# Patient Record
Sex: FEMALE | Race: WHITE | NOT HISPANIC OR LATINO | Employment: FULL TIME | URBAN - NONMETROPOLITAN AREA
[De-identification: names, ages, dates, MRNs, and addresses within clinical notes are randomized per-mention and may not be internally consistent; named-entity substitution may affect disease eponyms.]

---

## 2017-01-11 ENCOUNTER — HISTORY (OUTPATIENT)
Dept: INTERNAL MEDICINE | Facility: OTHER | Age: 57
End: 2017-01-11

## 2017-01-11 ENCOUNTER — COMMUNICATION - GICH (OUTPATIENT)
Dept: INTERNAL MEDICINE | Facility: OTHER | Age: 57
End: 2017-01-11

## 2017-01-11 ENCOUNTER — OFFICE VISIT - GICH (OUTPATIENT)
Dept: INTERNAL MEDICINE | Facility: OTHER | Age: 57
End: 2017-01-11

## 2017-01-11 DIAGNOSIS — R35.0 FREQUENCY OF MICTURITION: ICD-10-CM

## 2017-01-11 DIAGNOSIS — F51.04 PSYCHOPHYSIOLOGIC INSOMNIA: ICD-10-CM

## 2017-01-11 DIAGNOSIS — H92.01 OTALGIA OF RIGHT EAR: ICD-10-CM

## 2017-01-11 DIAGNOSIS — B96.89 OTHER SPECIFIED BACTERIAL AGENTS AS THE CAUSE OF DISEASES CLASSIFIED ELSEWHERE: ICD-10-CM

## 2017-01-11 DIAGNOSIS — Z00.00 ENCOUNTER FOR GENERAL ADULT MEDICAL EXAMINATION WITHOUT ABNORMAL FINDINGS: ICD-10-CM

## 2017-01-11 DIAGNOSIS — I10 ESSENTIAL (PRIMARY) HYPERTENSION: ICD-10-CM

## 2017-01-11 DIAGNOSIS — R20.8 OTHER DISTURBANCES OF SKIN SENSATION: ICD-10-CM

## 2017-01-11 DIAGNOSIS — N76.0 ACUTE VAGINITIS: ICD-10-CM

## 2017-01-11 DIAGNOSIS — Z01.419 ENCOUNTER FOR GYNECOLOGICAL EXAMINATION WITHOUT ABNORMAL FINDING: ICD-10-CM

## 2017-01-11 DIAGNOSIS — G43.909 MIGRAINE WITHOUT STATUS MIGRAINOSUS, NOT INTRACTABLE: ICD-10-CM

## 2017-01-11 DIAGNOSIS — J32.9 CHRONIC SINUSITIS: ICD-10-CM

## 2017-01-11 LAB
BACTERIA URINE: NORMAL BACTERIA/HPF
BILIRUB UR QL: NEGATIVE
CLARITY, URINE: CLEAR CLARITY
COLOR UR: YELLOW COLOR
EPITHELIAL CELLS: NORMAL EPI/HPF
GLUCOSE URINE: NEGATIVE MG/DL
KETONES UR QL: NEGATIVE MG/DL
LEUKOCYTE ESTERASE URINE: ABNORMAL
NITRITE UR QL STRIP: NEGATIVE
OCCULT BLOOD,URINE - HISTORICAL: NEGATIVE
PH UR: 6 [PH]
PROTEIN QUALITATIVE,URINE - HISTORICAL: NEGATIVE MG/DL
RBC - HISTORICAL: NORMAL /HPF
SP GR UR STRIP: <=1.005
UROBILINOGEN,QUALITATIVE - HISTORICAL: NORMAL EU/DL
WBC - HISTORICAL: NORMAL /HPF

## 2017-01-23 ENCOUNTER — COMMUNICATION - GICH (OUTPATIENT)
Dept: INTERNAL MEDICINE | Facility: OTHER | Age: 57
End: 2017-01-23

## 2017-01-23 DIAGNOSIS — R10.9 ABDOMINAL PAIN: ICD-10-CM

## 2017-01-23 DIAGNOSIS — R10.2 PELVIC AND PERINEAL PAIN: ICD-10-CM

## 2017-01-23 LAB — HPV RESULTS - HISTORICAL: POSITIVE

## 2017-01-24 ENCOUNTER — HOSPITAL ENCOUNTER (OUTPATIENT)
Dept: RADIOLOGY | Facility: OTHER | Age: 57
End: 2017-01-24
Attending: INTERNAL MEDICINE

## 2017-01-24 DIAGNOSIS — R10.2 PELVIC AND PERINEAL PAIN: ICD-10-CM

## 2017-01-26 ENCOUNTER — OFFICE VISIT - GICH (OUTPATIENT)
Dept: INTERNAL MEDICINE | Facility: OTHER | Age: 57
End: 2017-01-26

## 2017-01-26 ENCOUNTER — HISTORY (OUTPATIENT)
Dept: INTERNAL MEDICINE | Facility: OTHER | Age: 57
End: 2017-01-26

## 2017-01-26 DIAGNOSIS — N84.0 POLYP OF CORPUS UTERI: ICD-10-CM

## 2017-01-26 DIAGNOSIS — R10.31 RIGHT LOWER QUADRANT PAIN: ICD-10-CM

## 2017-01-26 DIAGNOSIS — R39.9 UNSPECIFIED SYMPTOMS AND SIGNS INVOLVING THE GENITOURINARY SYSTEM: ICD-10-CM

## 2017-01-26 LAB
A/G RATIO - HISTORICAL: 1.8 (ref 1–2)
ALBUMIN SERPL-MCNC: 4.3 G/DL (ref 3.5–5.7)
ALP SERPL-CCNC: 70 IU/L (ref 34–104)
ALT (SGPT) - HISTORICAL: 11 IU/L (ref 7–52)
ANION GAP - HISTORICAL: 13 (ref 5–18)
AST SERPL-CCNC: 15 IU/L (ref 13–39)
BACTERIA URINE: NORMAL BACTERIA/HPF
BILIRUB SERPL-MCNC: 0.4 MG/DL (ref 0.3–1)
BILIRUB UR QL: NEGATIVE
BUN SERPL-MCNC: 15 MG/DL (ref 7–25)
BUN/CREAT RATIO - HISTORICAL: 19
CALCIUM SERPL-MCNC: 9.5 MG/DL (ref 8.6–10.3)
CHLORIDE SERPLBLD-SCNC: 102 MMOL/L (ref 98–107)
CLARITY, URINE: CLEAR CLARITY
CO2 SERPL-SCNC: 28 MMOL/L (ref 21–31)
COLOR UR: YELLOW COLOR
CREAT SERPL-MCNC: 0.79 MG/DL (ref 0.7–1.3)
EPITHELIAL CELLS: NORMAL EPI/HPF
ERYTHROCYTE [DISTWIDTH] IN BLOOD BY AUTOMATED COUNT: 11.6 % (ref 11.5–15.5)
GFR IF NOT AFRICAN AMERICAN - HISTORICAL: >60 ML/MIN/1.73M2
GLOBULIN - HISTORICAL: 2.4 G/DL (ref 2–3.7)
GLUCOSE SERPL-MCNC: 99 MG/DL (ref 70–105)
GLUCOSE URINE: NEGATIVE MG/DL
HCT VFR BLD AUTO: 40.2 % (ref 33–51)
HEMOGLOBIN: 13.3 G/DL (ref 12–16)
KETONES UR QL: NEGATIVE MG/DL
LEUKOCYTE ESTERASE URINE: ABNORMAL
MCH RBC QN AUTO: 28.3 PG (ref 26–34)
MCHC RBC AUTO-ENTMCNC: 33.1 G/DL (ref 32–36)
MCV RBC AUTO: 86 FL (ref 80–100)
NITRITE UR QL STRIP: NEGATIVE
OCCULT BLOOD,URINE - HISTORICAL: NEGATIVE
PH UR: 6 [PH]
PLATELET # BLD AUTO: 170 THOU/CU MM (ref 140–440)
PMV BLD: 7.1 FL (ref 6.5–11)
POTASSIUM SERPL-SCNC: 4.3 MMOL/L (ref 3.5–5.1)
PROT SERPL-MCNC: 6.7 G/DL (ref 6.4–8.9)
PROTEIN QUALITATIVE,URINE - HISTORICAL: NEGATIVE MG/DL
RBC - HISTORICAL: NORMAL /HPF
RED BLOOD COUNT - HISTORICAL: 4.7 MIL/CU MM (ref 4–5.2)
SODIUM SERPL-SCNC: 143 MMOL/L (ref 133–143)
SP GR UR STRIP: <=1.005
UROBILINOGEN,QUALITATIVE - HISTORICAL: NORMAL EU/DL
WBC - HISTORICAL: NORMAL /HPF
WHITE BLOOD COUNT - HISTORICAL: 3.9 THOU/CU MM (ref 4.5–11)

## 2017-02-02 ENCOUNTER — HOSPITAL ENCOUNTER (OUTPATIENT)
Dept: RADIOLOGY | Facility: OTHER | Age: 57
End: 2017-02-02
Attending: INTERNAL MEDICINE

## 2017-02-02 DIAGNOSIS — R10.31 RIGHT LOWER QUADRANT PAIN: ICD-10-CM

## 2017-02-08 ENCOUNTER — AMBULATORY - GICH (OUTPATIENT)
Dept: SCHEDULING | Facility: OTHER | Age: 57
End: 2017-02-08

## 2017-02-09 ENCOUNTER — AMBULATORY - GICH (OUTPATIENT)
Dept: SCHEDULING | Facility: OTHER | Age: 57
End: 2017-02-09

## 2017-04-05 ENCOUNTER — OFFICE VISIT - GICH (OUTPATIENT)
Dept: INTERNAL MEDICINE | Facility: OTHER | Age: 57
End: 2017-04-05

## 2017-04-05 ENCOUNTER — HISTORY (OUTPATIENT)
Dept: INTERNAL MEDICINE | Facility: OTHER | Age: 57
End: 2017-04-05

## 2017-04-05 DIAGNOSIS — H92.01 OTALGIA OF RIGHT EAR: ICD-10-CM

## 2017-04-05 DIAGNOSIS — J01.01 ACUTE RECURRENT MAXILLARY SINUSITIS: ICD-10-CM

## 2017-05-01 ENCOUNTER — COMMUNICATION - GICH (OUTPATIENT)
Dept: INTERNAL MEDICINE | Facility: OTHER | Age: 57
End: 2017-05-01

## 2017-05-01 DIAGNOSIS — F51.04 PSYCHOPHYSIOLOGIC INSOMNIA: ICD-10-CM

## 2017-09-07 ENCOUNTER — OFFICE VISIT - GICH (OUTPATIENT)
Dept: INTERNAL MEDICINE | Facility: OTHER | Age: 57
End: 2017-09-07

## 2017-09-07 ENCOUNTER — HISTORY (OUTPATIENT)
Dept: INTERNAL MEDICINE | Facility: OTHER | Age: 57
End: 2017-09-07

## 2017-09-07 DIAGNOSIS — H92.01 OTALGIA OF RIGHT EAR: ICD-10-CM

## 2017-09-07 DIAGNOSIS — H66.91 OTITIS MEDIA OF RIGHT EAR: ICD-10-CM

## 2017-09-07 DIAGNOSIS — J32.0 CHRONIC MAXILLARY SINUSITIS: ICD-10-CM

## 2017-09-11 ENCOUNTER — AMBULATORY - GICH (OUTPATIENT)
Dept: SCHEDULING | Facility: OTHER | Age: 57
End: 2017-09-11

## 2017-09-13 ENCOUNTER — HISTORY (OUTPATIENT)
Dept: OBGYN | Facility: OTHER | Age: 57
End: 2017-09-13

## 2017-09-13 ENCOUNTER — OFFICE VISIT - GICH (OUTPATIENT)
Dept: OBGYN | Facility: OTHER | Age: 57
End: 2017-09-13

## 2017-09-13 DIAGNOSIS — N84.0 POLYP OF CORPUS UTERI: ICD-10-CM

## 2017-09-14 ENCOUNTER — COMMUNICATION - GICH (OUTPATIENT)
Dept: INTERNAL MEDICINE | Facility: OTHER | Age: 57
End: 2017-09-14

## 2017-10-04 ENCOUNTER — AMBULATORY - GICH (OUTPATIENT)
Dept: INTERNAL MEDICINE | Facility: OTHER | Age: 57
End: 2017-10-04

## 2017-10-04 DIAGNOSIS — H92.01 OTALGIA OF RIGHT EAR: ICD-10-CM

## 2017-10-04 DIAGNOSIS — J01.41 ACUTE RECURRENT PANSINUSITIS: ICD-10-CM

## 2017-10-17 ENCOUNTER — COMMUNICATION - GICH (OUTPATIENT)
Dept: INTERNAL MEDICINE | Facility: OTHER | Age: 57
End: 2017-10-17

## 2017-10-17 DIAGNOSIS — F51.04 PSYCHOPHYSIOLOGIC INSOMNIA: ICD-10-CM

## 2017-10-20 ENCOUNTER — PRE VISIT (OUTPATIENT)
Dept: OTOLARYNGOLOGY | Facility: CLINIC | Age: 57
End: 2017-10-20

## 2017-10-20 NOTE — TELEPHONE ENCOUNTER
"APPT INFO    Date /Time:  10/30/17 at 5PM   Reason for Appt:  chronic maxillary sinusitis and right ear pain   Ref Provider/Clinic:  Abena Adkins @ [facility]   Patient Contact (Y/N) & Call Details: yes, called and spoke to pt, she has been seen at [facility] and [facility].  She had 2 sinus surgeries done in [place] with Dr. Davila.  CT Sinus 2/2047 done at [facility].   Action:  Emailed YESY to [email or web address]     RECORDS CLINIC NAME  (\"None\" if no records ) RECEIVED RECS & IMG? Y/N   (may include other helpful notes)   Internal Clinics:  n/a          External Clinics: [facility]  yes    [place]  yes    [facility]  yes    [facility] [place]  yes     Records Received From:  [facility]    DATE/EXAM/LOCATION  (specify location if different)   Office Notes:  1/11/17, 4/5/17, 9/7/17   Radiology Reports:  CT Head/Sinus 6/30/16   Missing:  Pt has seen 2 ENT doctors and had multiple sinus surgeries   Need Imaging from [facility]   CT Sinus done Feb 2017 (need to find out where it was done)       "

## 2017-10-23 NOTE — TELEPHONE ENCOUNTER
ACTION    What did you do?  Received YESY, faxed with request.   Faxed imaging request to [facility].

## 2017-10-24 NOTE — TELEPHONE ENCOUNTER
Received Imaging From:     Image Type (x): Disc:_____    Pacs:__X___      Exam Date/Name: CT Maxillofacial 2/8/17  CT Maxillofacial 11/8/10   Comments: In Pacs.

## 2017-10-25 NOTE — TELEPHONE ENCOUNTER
[facility]   [facility]     DATE/EXAM/LOCATION  (specify location if different)   Office Notes:  2/8/17, 10/10/12   Radiology Reports: - CT maxillofacial 2/8/17  - CT paranasal sinuses 11/18/10- [facility] (blurry report)   Missing:  records/1 surgery at [facility], 2 surgeries at [facility]

## 2017-10-25 NOTE — TELEPHONE ENCOUNTER
ACTION    What did you do?  Faxed requests to [facility] ([facility]

## 2017-10-26 NOTE — TELEPHONE ENCOUNTER
Records Received From:  [facility]     DATE/EXAM/LOCATION  (specify location if different)   Path/Culture Reports:  3/3/16 right nasal sinus contents   8/11/16 nose/throat

## 2017-10-27 NOTE — TELEPHONE ENCOUNTER
Phone Call:    Who did you talk to? (or) Who did you call? [facility] medical records   Call Detail/Action: Called and left VM to check on status of imaging.

## 2017-10-27 NOTE — TELEPHONE ENCOUNTER
Received Imaging From: [facility]     Image Type (x): Disc:_____    Pacs:_x____      Exam Date/Name: CT Head/Sinus 6/30/16 Comments: imaging is available to view in pacs

## 2017-10-27 NOTE — TELEPHONE ENCOUNTER
Records Received From:   [facility]     DATE/EXAM/LOCATION  (specify location if different)   Office Notes: , 2/10/11 5/25/11, 5/24/11, 1/18/11   Operative Notes: - right sphenoidotomy 1/19/11 Dr. Becerra    Path/Culture Reports:  sphenoid sinus right 1/19/11    Per fax, pathology slides will be mailed

## 2017-10-27 NOTE — TELEPHONE ENCOUNTER
ACTION    What did you do? Faxed 2nd request to [place].  Their fax machine has been down.  Faxed request to alternative number [phone].

## 2017-10-30 ENCOUNTER — AMBULATORY - GICH (OUTPATIENT)
Dept: SCHEDULING | Facility: OTHER | Age: 57
End: 2017-10-30

## 2017-10-30 ENCOUNTER — OFFICE VISIT (OUTPATIENT)
Dept: OTOLARYNGOLOGY | Facility: CLINIC | Age: 57
End: 2017-10-30

## 2017-10-30 VITALS — HEIGHT: 66 IN | BODY MASS INDEX: 21.86 KG/M2 | WEIGHT: 136 LBS

## 2017-10-30 DIAGNOSIS — H92.01 RIGHT EAR PAIN: Primary | ICD-10-CM

## 2017-10-30 PROBLEM — F51.04 CHRONIC INSOMNIA: Status: ACTIVE | Noted: 2017-10-30

## 2017-10-30 PROBLEM — G43.009 MIGRAINE WITHOUT AURA: Status: ACTIVE | Noted: 2017-10-30

## 2017-10-30 PROBLEM — J32.9 CHRONIC SINUSITIS: Status: ACTIVE | Noted: 2017-01-11

## 2017-10-30 RX ORDER — LORAZEPAM 1 MG/1
TABLET ORAL
COMMUNITY
Start: 2017-10-23 | End: 2018-03-07

## 2017-10-30 RX ORDER — SUMATRIPTAN 100 MG/1
100 TABLET, FILM COATED ORAL
COMMUNITY
Start: 2017-01-11 | End: 2018-03-07

## 2017-10-30 RX ORDER — HYDROCODONE BITARTRATE AND ACETAMINOPHEN 5; 325 MG/1; MG/1
1 TABLET ORAL
COMMUNITY
Start: 2017-10-05 | End: 2018-03-07

## 2017-10-30 ASSESSMENT — PAIN SCALES - GENERAL: PAINLEVEL: WORST PAIN (10)

## 2017-10-30 NOTE — PATIENT INSTRUCTIONS
Plan of care:  MRI in [place]-I will contact you with who to call to schedule  To schedule with Dr Cardoza:  You have been referred to the TMD (temporomandibular joint disorder) Clinic. Please call the clinic at [phone] to schedule an appointment.    Clinic contact information:  1. To schedule an appointment call [phone], option 1  2. To talk to the Triage RN call [phone], option 3  3. If you need to speak to Ladan or get a message to your doctor on a Friday, call the triage RN  4. LadanRN: [phone]  5. Surgery scheduling:      Shobha Cabello: [phone]      Alpa Ahn: [phone]  6. Fax: [phone]  7. [phone]

## 2017-10-30 NOTE — NURSING NOTE
Chief Complaint   Patient presents with     Consult     chronic maxillary sinusitis and rt ear pain     Cintia Mcghee Medical Assistant

## 2017-10-30 NOTE — LETTER
10/30/2017       RE: Alessandra Aiken  [address] [place] [address]     Dear Colleague,    Thank you for referring your patient, Alessandra Aiken, to the [facility] EAR NOSE AND THROAT at [facility]. Please see a copy of my visit note below.    Otolaryngology Adult Consultation    Patient: Alessandra Aiken   : 1960     Patient presents with:  Consult:   Chief Complaint   Patient presents with     Consult     chronic maxillary sinusitis and rt ear pain        Referring Provider: Abena Adkins   Consulting Physician:  Candida Alvarado MD       Assessment/Plan: Alessandra has very persistent right ear pain of uncertain etiology.  I certainly cannot find anything on her exam today that would point to a specific cause.  She is quite frustrated by this and has not gotten relief from medical therapy thus far.  I would like her to meet with Dr. Mandeep Cardoza.  I am concerned  she may have neuropathic pain or neuralgia of some sort, and she may benefit from a different class of medical therapy but certainly further diagnostic opinion would be appreciated.  I am also going to order an MRI as she has had this persistent pain, and there is no obvious cause.  I want to make sure that there is not an occult lesion somewhere along the skull base or inner ear that could be contributing to her ongoing pain symptoms.          HPI: Alessandra Aiken is a 57 year old female seen today in the Otolaryngology Clinic in consultation from Dr. Adkins for a history of chronic right ear pain.  This has been going on for quite some time.  She does have a past history of ENT related issues.  She was diagnosed with a sphenoid sinus fungal ball.  This was removed.  She has seen a couple of different ENT doctors and had several surgeries for this, but she states that she began to have pain in her right ear, and it has become quite intense.  It is always there.  Nothing seems to make it better or worse.  She thought  she had hearing loss, but she reports to me that her hearing test has been normal.  She has had imaging studies which have been normal.  I reviewed her CT scans.  It does show a little bit of debris in her sphenoid sinus, but this would not be causing her right ear pain.  She has seen multiple doctors for this in the past.  She has not had trials of neuromodulator drugs.  She states that sometimes she does use narcotics but is not sure that even really helps.  She does not think ibuprofen helps either.  Sometimes this is quite stressful for her, but overall, she has been able to maintain a positive attitude and good function.  She has a history of migraines.  She states that this is distinctly different.  She denies that she has had any trauma or surgery related to her ear.  She did have a tube placed to try to alleviate her pain, and that was not helpful.         No current outpatient prescriptions on file prior to visit.  No current facility-administered medications on file prior to visit.        Allergies   Allergen Reactions     Fentanyl Anxiety and Itching     Patient describes feeling creepy/crawly to Dr. Saenz.        Past Medical History:   Diagnosis Date     Chronic sinusitis 2009    ?     Migraines 1988    ?         Review of Systems   ENT ROS 10/22/2017   Ears, Nose, Throat Ear pain, Ringing/noise in ears, Nasal congestion or drainage, Sore throat        14 point ROS neg other than the symptoms noted above.    Physical Exam:    General Assessment   The patient is alert, oriented and in no acute distress.   Head/Face/Scalp  Grossly normal.   Ears  Normal canals, auricles and tympanic membranes.   Nose  External nose is straight, skin is normal. Intranasal exam (anterior rhinoscopy) reveals normal moist mucosa, turbinate tissue without edema, erythema or crusting.  Septum mainly in the midline.    Mouth  Oral cavity shows healthy mucosa with out ulceration, masses or other lesions involving the tongue,  palate, buccal mucosa, floor of mouth or gingiva.  Dentition in good repair.  Oropharynx with normal tonsils, posterior wall and base of tongue.  Neck  No significant adenopathy, thyroid or salivary gland abnormality.       Again, thank you for allowing me to participate in the care of your patient.      Sincerely,    Candida Alvarado MD

## 2017-10-30 NOTE — MR AVS SNAPSHOT
After Visit Summary   10/30/2017    Alessandra Aiken    MRN: [id number]           Patient Information     Date Of Birth          1960        Visit Information        Provider Department      10/30/2017 5:00 PM Candida Alvarado MD [facility] Ear Nose and Throat        Care Instructions    Plan of care:  MRI in [place]-I will contact you with who to call to schedule  To schedule with Dr Cardoza:  You have been referred to the TMD (temporomandibular joint disorder) Clinic. Please call the clinic at [phone] to schedule an appointment.    Clinic contact information:  1. To schedule an appointment call [phone], option 1  2. To talk to the Triage RN call [phone], option 3  3. If you need to speak to Ladan or get a message to your doctor on a Friday, call the triage RN  4. LadanRN: [phone]  5. Surgery scheduling:      Shobha Cabello: [phone]      Alpa Ahn: [phone]  6. Fax: [phone]  7. [phone]            Follow-ups after your visit        Who to contact     Please call your clinic at [phone] to:    Ask questions about your health    Make or cancel appointments    Discuss your medicines    Learn about your test results    Speak to your doctor   If you have compliments or concerns about an experience at your clinic, or if you wish to file a complaint, please contact [facility] Patient Relations at [phone] or email us at [email or web address]         Additional Information About Your Visit        MyChart Information     [employer] gives you secure access to your electronic health record. If you see a primary care provider, you can also send messages to your care team and make appointments. If you have questions, please call your primary care clinic.  If you do not have a primary care provider, please call [phone] and they will assist you.      [email or web address] is an electronic gateway that provides easy, online access  "to your medical records. With [employer], you can request a clinic appointment, read your test results, renew a prescription or communicate with your care team.     To access your existing account, please contact your [facility] or call [phone] for assistance.        Care EveryWhere ID     This is your Care EveryWhere ID. This could be used by other organizations to access your [facility] medical records  [id number]        Your Vitals Were     Height BMI (Body Mass Index)                1.676 m (5' 6\") 21.95 kg/m2           Blood Pressure from Last 3 Encounters:   No data found for BP    Weight from Last 3 Encounters:   10/30/17 61.7 kg (136 lb)              Today, you had the following     No orders found for display       Primary Care Provider Office Phone # Fax #    Abena AdkinsDO [phone]       [facility] [place] [facility] [address] [facility] [address]        Equal Access to Services     SPARKLE SCHWAB : Hadii aad ku hadasho Soomaali, waaxda luqadaha, qaybta kaalmada adeegyada, waxay genein davidn salvatore rodriguez . So [facility] [phone].    ATENCIÓN: Si habla español, tiene a john disposición servicios gratuitos de asistencia lingüística. Llame al [phone].    We comply with applicable federal civil rights laws and [place] laws. We do not discriminate on the basis of race, color, national origin, age, disability, sex, sexual orientation, or gender identity.            Thank you!     Thank you for choosing [facility] EAR NOSE AND THROAT  for your care. Our goal is always to provide you with excellent care. Hearing back from our patients is one way we can continue to improve our services. Please take a few minutes to complete the written survey that you may receive in the mail after your visit with us. Thank you!             Your Updated Medication List - Protect others around you: Learn how to safely use, store and throw away your " medicines at www.disposemymeds.org.          This list is accurate as of: 10/30/17  5:47 PM.  Always use your most recent med list.                   Brand Name Dispense Instructions for use Diagnosis    HYDROcodone-acetaminophen 5-325 MG per tablet    NORCO     Take 1 tablet by mouth        LORazepam 1 MG tablet    ATIVAN     TAKE 1/2 TO 1 TABLET BY MOUTH ONCE DAILY AT NIGHT AS NEEDED FOR insomnia; do not take with hydrocodone        SUMAtriptan 100 MG tablet    IMITREX     Take 100 mg by mouth

## 2017-11-01 ENCOUNTER — AMBULATORY - GICH (OUTPATIENT)
Dept: RADIOLOGY | Facility: OTHER | Age: 57
End: 2017-11-01

## 2017-11-01 DIAGNOSIS — H92.01 OTALGIA OF RIGHT EAR: ICD-10-CM

## 2017-11-03 NOTE — PROGRESS NOTES
Otolaryngology Adult Consultation    Patient: Alessandra Aiken   : 1960     Patient presents with:  Consult:   Chief Complaint   Patient presents with     Consult     chronic maxillary sinusitis and rt ear pain        Referring Provider: Abena Adkins   Consulting Physician:  Candida Alvarado MD       Assessment/Plan: Alessandra has very persistent right ear pain of uncertain etiology.  I certainly cannot find anything on her exam today that would point to a specific cause.  She is quite frustrated by this and has not gotten relief from medical therapy thus far.  I would like her to meet with Dr. Mandeep Cardoza.  I am concerned  she may have neuropathic pain or neuralgia of some sort, and she may benefit from a different class of medical therapy but certainly further diagnostic opinion would be appreciated.  I am also going to order an MRI as she has had this persistent pain, and there is no obvious cause.  I want to make sure that there is not an occult lesion somewhere along the skull base or inner ear that could be contributing to her ongoing pain symptoms.          HPI: Alessandra Aiken is a 57 year old female seen today in the Otolaryngology Clinic in consultation from Dr. Adkins for a history of chronic right ear pain.  This has been going on for quite some time.  She does have a past history of ENT related issues.  She was diagnosed with a sphenoid sinus fungal ball.  This was removed.  She has seen a couple of different ENT doctors and had several surgeries for this, but she states that she began to have pain in her right ear, and it has become quite intense.  It is always there.  Nothing seems to make it better or worse.  She thought she had hearing loss, but she reports to me that her hearing test has been normal.  She has had imaging studies which have been normal.  I reviewed her CT scans.  It does show a little bit of debris in her sphenoid sinus, but this would not be causing her right ear pain.  She has seen  multiple doctors for this in the past.  She has not had trials of neuromodulator drugs.  She states that sometimes she does use narcotics but is not sure that even really helps.  She does not think ibuprofen helps either.  Sometimes this is quite stressful for her, but overall, she has been able to maintain a positive attitude and good function.  She has a history of migraines.  She states that this is distinctly different.  She denies that she has had any trauma or surgery related to her ear.  She did have a tube placed to try to alleviate her pain, and that was not helpful.         No current outpatient prescriptions on file prior to visit.  No current facility-administered medications on file prior to visit.        Allergies   Allergen Reactions     Fentanyl Anxiety and Itching     Patient describes feeling creepy/crawly to Dr. Saenz.        Past Medical History:   Diagnosis Date     Chronic sinusitis 2009    ?     Migraines 1988    ?         Review of Systems   ENT ROS 10/22/2017   Ears, Nose, Throat Ear pain, Ringing/noise in ears, Nasal congestion or drainage, Sore throat        14 point ROS neg other than the symptoms noted above.    Physical Exam:    General Assessment   The patient is alert, oriented and in no acute distress.   Head/Face/Scalp  Grossly normal.   Ears  Normal canals, auricles and tympanic membranes.   Nose  External nose is straight, skin is normal. Intranasal exam (anterior rhinoscopy) reveals normal moist mucosa, turbinate tissue without edema, erythema or crusting.  Septum mainly in the midline.    Mouth  Oral cavity shows healthy mucosa with out ulceration, masses or other lesions involving the tongue, palate, buccal mucosa, floor of mouth or gingiva.  Dentition in good repair.  Oropharynx with normal tonsils, posterior wall and base of tongue.  Neck  No significant adenopathy, thyroid or salivary gland abnormality.

## 2017-11-07 ENCOUNTER — AMBULATORY - GICH (OUTPATIENT)
Dept: RADIOLOGY | Facility: OTHER | Age: 57
End: 2017-11-07

## 2017-11-07 ENCOUNTER — TRANSFERRED RECORDS (OUTPATIENT)
Dept: HEALTH INFORMATION MANAGEMENT | Facility: CLINIC | Age: 57
End: 2017-11-07

## 2017-11-07 ENCOUNTER — HOSPITAL ENCOUNTER (OUTPATIENT)
Dept: RADIOLOGY | Facility: OTHER | Age: 57
End: 2017-11-07

## 2017-11-07 DIAGNOSIS — H92.01 OTALGIA OF RIGHT EAR: ICD-10-CM

## 2017-11-26 ENCOUNTER — HEALTH MAINTENANCE LETTER (OUTPATIENT)
Age: 57
End: 2017-11-26

## 2017-12-05 ENCOUNTER — TRANSFERRED RECORDS (OUTPATIENT)
Dept: HEALTH INFORMATION MANAGEMENT | Facility: CLINIC | Age: 57
End: 2017-12-05

## 2017-12-27 NOTE — PROGRESS NOTES
Patient Information     Patient Name MRN Alessandra Sampson [id number] Female 1960      Progress Notes by Carley Shaffer at 2017 11:35 AM     Author:  Carley Shaffer Service:  (none) Author Type:  Other Clinical Staff     Filed:  2017 11:35 AM Date of Service:  2017 11:35 AM Status:  Signed     :  Carley Shaffer (Other Clinical Staff)            Falls Risk Criteria:    Age 65 and older or under age 4        Sensory deficits    Poor vision    Use of ambulatory aides    Impaired judgment    Unable to walk independently    Meets High Risk criteria for falls:  no

## 2017-12-28 NOTE — TELEPHONE ENCOUNTER
Patient Information     Patient Name MRN Alessandra Sampson [id number] Female 1960      Telephone Encounter by Jenni Potts at 2017 11:15 AM     Author:  Jenni Potts Service:  (none) Author Type:  (none)     Filed:  2017 11:30 AM Encounter Date:  2017 Status:  Signed     :  Jenni Potts from the [facility] called with information for upcoming appt that the patient has with them.  She was referred to ENT and will be seeing Dr. Candida Alvarado on Monday, Oct 30th @ 5:00pm.  Jenni Potts ....................  2017   11:30 AM

## 2017-12-28 NOTE — PROGRESS NOTES
Patient Information     Patient Name MRN Alessandra Sampson [id number] Female 1960      Progress Notes by Abena Adkins DO at 2017  4:00 PM     Author:  Abena Adkins DO Service:  (none) Author Type:  PHYS- Osteopathic     Filed:  2017  7:29 AM Encounter Date:  2017 Status:  Signed     :  Abena Adkins DO (PHYS- Osteopathic)            Chief Complaint     Patient presents with       Ear Problem      right ear pain, sore throat, sinus drainage, cough, headach        Subjective:   Ms. Aiken is a 57 y.o. female  seen for the acute concern today of ongoing issues with right ear pain, sinus drainage, sinus headaches.  She reports that this has been worse over the last 2-3 weeks.  She has an extensive history of sinusitis with recurrent otitis media.  She has had persistent right ear pain for months to a year.  She does believe that her ear pain improves with antibiotics and her last course was in April.  She has previously seen multiple ENT providers and undergone multiple surgeries including myringotomy and sphenoidectomy.  She did have a CT done in 2017 however no results are available from this.  It is reportedly unremarkable.  She has not been having any issues with her left ear.  She reports that the pain is severe enough at this time that she has difficulty laying her head down.  It has been keeping her up at night.  She does ask for something for pain today as the nonsteroidal anti-inflammatory medication she has been taking is not working.  She is interested in referral to a tertiary center to further evaluate this.      She has not followed up with gynecology after her ultrasound showed fibroids or polyps.  She was encouraged to schedule this appointment.    She  reports that she has never smoked. She has never used smokeless tobacco.    Past medical history reviewed as below:     Past Medical History:     Diagnosis  Date     Allergic rhinitis      Chronic insomnia   "    Hx of pregnancy     G3, P3 - all NSVDs      Hypertension      Migraine      Pyelonephritis 12/4/2014    Right        Sepsis due to urinary tract infection (HC) 12/4/2014    right pyelo, mod right hydro, 2 mm right renal stone    .      ROS:   Pertinent ROS was performed and was negative, including for fevers, chills, chest pain, shortness of breath, increased lower extremity edema, changes in bowel or bladder. No other concerns, with exception of HPI above.      Objective:    /78  Pulse 64  Temp 98.2  F (36.8  C) (Tympanic)  Resp 18  Ht 1.676 m (5' 6\")  Wt 62.4 kg (137 lb 9 oz)  Breastfeeding? No  BMI 22.2 kg/m2  GEN: Vitals reviewed.  Patient is in no acute distress although is visibly worn out. Cooperative with exam.  HEENT: Normocephalic atraumatic.  Pupils equally round.  No scleral icterus, no conjunctival erythema. Oropharynx with no erythema or exudates. Dentition adequate.  Left external auditory canal and tympanic membrane are normal with no significant erythema, drainage or other changes.  Right external auditory canal is clear, right tympanic membrane demonstrates myringotomy tube with cerumen surrounding this.  Minimal drainage is noted.  No significant erythema.  CV: Heart regular in rate and rhythm with no murmur.    LUNGS: Lungs clear to auscultation bilaterally.  Chest rise equal bilaterally.  No accessory muscle use.  SKIN: Warm and dry to touch.  No rash on face, arms and legs.  EXT: No clubbing or cyanosis.  No peripheral edema.     Assessment/Plan:   1. Chronic maxillary sinusitis  - at this time given her recurrent, persistent sinusitis and pain we will refer her to ENT through the [facility] system.  She is to call if she has any new or changing symptoms or if she does not hear from them to schedule this within a week or so.  - AMB CONSULT TO ENT; Future    2. Right ear pain  - given her persistent ear pain along with her chronic sinusitis referral is placed " as above.  She is provided with a few pain meds at this time however was cautioned with these and encouraged to dispose of any in the pharmacy drop box that she does not use.  She is to call she has any worsening at any point.  - AMB CONSULT TO ENT; Future  - HYDROcodone-acetaminophen, 5-325 mg, (NORCO) per tablet; Take 1 tablet by mouth 3 times daily if needed  for Pain Max acetaminophen dose: 4000 mg in 24 hrs.  Dispense: 10 tablet; Refill: 0    3. Right otitis media, unspecified chronicity, unspecified otitis media type  - we did discuss that given her ongoing ear pain and it is unclear how much of this is infected versus other structural defect.  She is to be attention closely to be sure that antibiotics are improving her symptoms and if not they should be avoided in the future.  - will treat with antibiotics, patient to treat symptoms as instructed below, call if she has any ADR's or worsening symptoms  - recommend eating yogurt/kefir 1-2 times daily while on antibiotics   - cefdinir (OMNICEF) 300 mg capsule; Take 1 capsule by mouth 2 times daily.  Dispense: 28 capsule; Refill: 0      Return in about 4 months (around 1/7/2018) for Annual Exam. with repeat pap     TORY MALONE, DO   9/7/2017 5:35 PM    This document was prepared using voice generated softwear. While every attempt was made for accuracy, grammatical errors may exist.

## 2017-12-28 NOTE — PROGRESS NOTES
Patient Information     Patient Name MRN Sex     Alessandra Aiken [id number] Female 1960      Progress Notes by Renaldo Pierce MD at 2017  8:15 AM     Author:  Renaldo Pierce MD Service:  (none) Author Type:  Physician     Filed:  2017 12:12 PM Encounter Date:  2017 Status:  Signed     :  Renaldo Pierce MD (Physician)            SUBJECTIVE:    Alessandra Aiken is a 57 y.o. female who presents for consultation & possible treatment of her endometrial polyp at the request of Dr Adkins.    Gyn Exam   The patient's pertinent negatives include no genital itching, genital lesions, genital odor, pelvic pain, vaginal bleeding or vaginal discharge. The patient is experiencing no pain. She is not pregnant. Pertinent negatives include no abdominal pain, anorexia, chills, constipation, diarrhea, dysuria, fever, frequency, headaches, hematuria, nausea, painful intercourse, rash, urgency or vomiting. She is sexually active. No, her partner does not have an STD. She is postmenopausal.       Allergies      Allergen   Reactions     Fentanyl  Agitation     Patient describes feeling creepy/crawly to Dr. Saenz.     ,   Family History       Problem   Relation Age of Onset     Good Health  Father      Good Health  Mother      Heart Disease  Paternal Grandfather      MI at 65       Other  Neg.      History is negative for diabetes, thyroid problems, cancer, anxiety, depression and asthma       Good Health  Brother      Good Health  Brother      Good Health  Brother      Cancer-breast  No Family History    ,   Current Outpatient Prescriptions:      cefdinir (OMNICEF) 300 mg capsule, Take 1 capsule by mouth 2 times daily., Disp: 28 capsule, Rfl: 0     HYDROcodone-acetaminophen, 5-325 mg, (NORCO) per tablet, Take 1 tablet by mouth 3 times daily if needed  for Pain Max acetaminophen dose: 4000 mg in 24 hrs., Disp: 10 tablet, Rfl: 0     LORazepam (ATIVAN) 1 mg tablet, TAKE 1/2 TO 1 TABLET BY MOUTH ONCE DAILY AT  NIGHT AS NEEDED FOR insomnia, Disp: 30 tablet, Rfl: 2     SUMAtriptan (IMITREX) 100 mg tablet, Take 1 tablet by mouth every 2 hours if needed for Migraine. Max dose: 200mg per 24 hrs., Disp: 9 tablet, Rfl: 11  Medications have been reviewed by me and are current to the best of my knowledge and ability.,   Past Medical History:     Diagnosis  Date     Allergic rhinitis      Chronic insomnia      Hx of pregnancy     G3, P3 - all NSVDs      Hypertension      Migraine      Pyelonephritis 12/4/2014    Right        Sepsis due to urinary tract infection (HC) 12/4/2014    right pyelo, mod right hydro, 2 mm right renal stone    ,   Patient Active Problem List       Diagnosis  Date Noted     Chronic sinusitis  01/11/2017     Health care maintenance  06/15/2016     Colonoscopy: Done in 2012 and normal, repeat 2022  Breast Exam/Mammography: normal mammogram 06/2016  Pap smear: Never any abnormal, repeat this year  DEXA: Age 66  Immunizations: UTD until age 60  Lipids/Annual Exam: Never done here, repeat with next labs  Hepatitis C screen: discuss in future however likely will get with next labs                Hypertension       Chronic insomnia       Migraine without aura       triggers are alcohol and dairy products        ,   Past Surgical History:      Procedure  Laterality Date     COLONOSCOPY SCREENING  6/5/12    Sigmoid diverticulosis; folllow up 10 years       ENDOSCOPIC SPHENOIDOTOMY  2011/2016     Right endoscopic sphenoidotomy for mycetoma removal at [facility]~6/2012       MYRINGOTOMY W TUBE PLACEMENT Right 09/2016     SHOULDER ARTHROSCOPY Right 07/2009     SINUS SURGERY      repeat clearing      and   Social History       Substance Use Topics         Smoking status:   Never Smoker     Smokeless tobacco:   Never Used     Alcohol use   No      Comment: very rarely        REVIEW OF SYSTEMS:  Review of Systems   Constitutional: Negative for chills, fever, malaise/fatigue and weight loss.   Gastrointestinal: Negative for  "abdominal pain, anorexia, constipation, diarrhea, nausea and vomiting.   Genitourinary: Negative for dysuria, frequency, hematuria, pelvic pain, urgency and vaginal discharge.   Skin: Negative for itching and rash.   Neurological: Negative for dizziness, tingling, tremors and headaches.   Psychiatric/Behavioral: Negative for depression. The patient is not nervous/anxious.        OBJECTIVE:  /80  Pulse 80  Ht 1.676 m (5' 6\")  Wt 60.6 kg (133 lb 9.6 oz)  Breastfeeding? No  BMI 21.56 kg/m2    EXAM:   Physical Exam   Constitutional: She is oriented to person, place, and time and well-developed, well-nourished, and in no distress.   HENT:   Head: Normocephalic and atraumatic.   Eyes: Pupils are equal, round, and reactive to light.   Abdominal: Soft. She exhibits no distension and no mass. There is no tenderness. There is no rebound and no guarding.   Genitourinary: Rectum normal, vagina normal, right adnexa normal, left adnexa normal and vulva normal. Uterus is enlarged and tender. Uterus is not deviated and not fixed.   Cervix is not fixed. Cervix exhibits no motion tenderness, no lesion and no tenderness.   Neurological: She is alert and oriented to person, place, and time.   Skin: Skin is warm and dry.   Psychiatric: Mood, memory, affect and judgment normal.       ASSESSMENT/PLAN:  1. Fibroid uterus with some pelvic discomfort  2. Endometrial polyp (3 mm) with no symptoms.     Plan:  We discussed uterine fibroid symptoms and the small, asymptomatic endometrial polyp.  WE discussed observation versus surgical intervention which does not appear needed at this point. I recommended prompt f/u should she encounter and vaginal bleeding or if her her pelvic discomfort worsens and she agrees.  Also consider repeat pelvic u/s in 6 - 12 months to look for interval changes.  All her questions were answered.         "

## 2017-12-28 NOTE — TELEPHONE ENCOUNTER
Patient Information     Patient Name MRN Alessandra Sampson [id number] Female 1960      Telephone Encounter by Tati Mario RN at 10/19/2017  3:36 PM     Author:  Tati Mario RN Service:  (none) Author Type:  NURS- Registered Nurse     Filed:  10/19/2017  3:49 PM Encounter Date:  10/17/2017 Status:  Signed     :  Tati Mario RN (NURS- Registered Nurse)            Ativan not on refill protocol. Called patient's home and she is out of town through weekend. Able to wait until TORY MALONE DO is back for refill.   Unable to complete prescription refill per RN Medication Refill Policy.................... Tati Mario RN ....................  10/19/2017   3:39 PM    This is a Refill request from: [facility]  Medication: LORazepam (ATIVAN) 1 mg tablet  Prescription #:[id number]    Qty:30 tablet   Ref:2  Start:2017    End:              Route:Oral                  DIMAS:No   Class:Print    Sig:TAKE 1/2 TO 1 TABLET BY MOUTH ONCE DAILY AT NIGHT AS NEEDED FOR insomnia  Quantity requested: 30   Last fill date: 17 for #30 X 2 refills  Due for refill:  prn  Last visit with TORY MALONE was on: 2017 in [facility]  PCP:  TORY MALONE DO  Controlled Substance Agreement:  none  Diagnosis r/t this medication request: chronic insomnia

## 2017-12-30 NOTE — NURSING NOTE
Patient Information     Patient Name MRN Alessandra Sampson [id number] Female 1960      Nursing Note by Norman Rob LPN at 2017  8:15 AM     Author:  Norman Rob LPN Service:  (none) Author Type:  NURS- Licensed Practical Nurse     Filed:  2017  8:20 AM Encounter Date:  2017 Status:  Signed     :  Norman Rob LPN (NURS- Licensed Practical Nurse)            Patient presents to the clinic for a follow up regarding an Endometrial Polyp.  Norman Rob LPN ..............2017 8:20 AM

## 2017-12-30 NOTE — NURSING NOTE
Patient Information     Patient Name MRN Alessandra Sampson [id number] Female 1960      Nursing Note by Tati Garnett at 2017  4:00 PM     Author:  Tati Garnett Service:  (none) Author Type:  (none)     Filed:  2017  4:03 PM Encounter Date:  2017 Status:  Signed     :  Tati Garnett            Patient presents to clinic experiencing sinus headache with drainage, sore throat, pain in right ear and cough for past 3 weeks.    Tati Garnett LPN..................2017  3:58 PM

## 2018-01-02 NOTE — NURSING NOTE
Patient Information     Patient Name MRN Alessandra Sampson [id number] Female 1960      Nursing Note by Tati Garnett at 2017  2:50 PM     Author:  Tati Garnett Service:  (none) Author Type:  (none)     Filed:  2017  2:57 PM Encounter Date:  2017 Status:  Signed     :  Tati Garnett            Patient presents to clinic for physical and annual exam.  Ongoing right ear pain rated at 6. She is experiencing burning sensation and urinary frequency for past week.    Tati Garnett LPN..................2017  2:51 PM

## 2018-01-03 NOTE — PATIENT INSTRUCTIONS
Patient Information     Patient Name MRN Sex Alessandra Saravia [id number] Female 1960      Patient Instructions by Abena Adkins DO at 2017  2:50 PM     Author:  Abena Adkins DO  Service:  (none) Author Type:  PHYS- Osteopathic     Filed:  2017  3:32 PM  Encounter Date:  2017 Status:  Addendum     :  Abena Adkins DO (PHYS- Osteopathic)        Related Notes: Original Note by Abena Adkins DO (PHYS- Osteopathic) filed at 2017  3:27 PM            For your seasonal allergies, please try one of the following:  - Loratadine (Claritin) 10mg daily for 3-4 weeks and then as needed (least sedating, least effective)  - Fexofenadine (Allegra) 12 hour 60mg twice daily for 3-4 weeks and then as needed   - Cetirizine (Zyrtec) 10mg daily for 3-4 weeks and then as needed (most sedating,most effective)    Please note that these can take up to 3-4 weeks to see a difference.    Please check your blood pressure daily off of the Amlodipine at various times, record this and bring it to your follow up appointment.  Your blood pressure goal is greater than 110/50 and less than 135/90.  Please call if it is consistently outside this range.

## 2018-01-03 NOTE — PROGRESS NOTES
Patient Information     Patient Name MRN Sex Alessandra Saravia [id number] Female 1960      Progress Notes by Abena Adkins DO at 2017  2:50 PM     Author:  Abena Adkins DO Service:  (none) Author Type:  PHYS- Osteopathic     Filed:  2017  5:20 PM Encounter Date:  2017 Status:  Signed     :  Abena Adkins DO (PHYS- Osteopathic)            Please see H&P from same date.

## 2018-01-03 NOTE — H&P
Patient Information     Patient Name MRN Alessandra Sampson [id number] Female 1960      H&P by Abena Adkins DO at 2017  2:50 PM     Author:  Abena Adkins DO  Service:  (none) Author Type:  PHYS- Osteopathic     Filed:  2017  6:30 AM  Encounter Date:  2017 Status:  Addendum     :  Abena Adkins DO (PHYS- Osteopathic)        Related Notes: Original Note by Abena Adkins DO (PHYS- Osteopathic) filed at 2017  5:20 PM            Chief Complaint     Patient presents with       Physical      annual exam       HPI: Ms. Aiken is a 56 y.o. female who presents today for yearly physical and follow-up of her chronic conditions.  She has several things she would like to discuss today.    She continues to have a lot of urinary symptoms.  She primarily describes some dysuria along with urinary frequency.  She has had many urinalyses in the past few years.  These were all reviewed in depth today and it does not appear that many of them have had true positive infections.  She has been treated with antibiotics off and on.  She has also had extensive evaluation by urology including cystoscopy, repeat CT scans and a second opinion by urology in [place].  We discussed today that likely further evaluation is not necessary at this time as it would not produce further information.  We also discussed that at times vaginal atrophy and discomfort can cause urinary type symptoms.  She has been postmenopausal for several years.  She denies any vaginal bleeding.  She does have some right lower quadrant pain.    She also has been having ongoing issues with her sinuses.  She has seen 2 different ENT doctors.  She has had repeat sinus surgeries.  She has been off and on antibiotics for this.  She currently reports that she is continuing to have sinus congestion along with right ear pain.  She is currently taking loratadine 10 mg daily.  She has a history of using Flonase however has not been using this  lately.  She has tried a [place] and these have not helped so she is not currently doing this.  She also has used Mucinex in the past with minimal improvement.  She also has some ear pain that has been pretty intense lately and additionally feels she has noted some loss of hearing particularly in her right ear.  She is very frustrated at this point is unsure what to do about her ongoing recurrent symptoms.      She has a history of migraines and has been on sumatriptan.  She states that at most she has 3 a month.  She denies any side effects from the medication.  She is due for refill.    She has a history of hypertension.  She has been on amlodipine 2.5 milligrams.  She ran out of this and did not refill it.  She is curious if she can continue off of the medication at this time.  She denies any side effects from it however would prefer to stay off the medication.    She does have a history of chronic insomnia.  She takes a half milligram of lorazepam nightly for this.  She states she is in need of a refill however does appear that a new prescription was sent in approximately 6 weeks ago and so she will check to see if she has any refills at this time.    She is up-to-date on her mammogram and colonoscopy.  She is due for a Pap smear today.  She was offered influenza but declined this today.    History is discussed and updated on 1/11/2017 with patient.  It is current to the best of my knowledge as below.    Past Medical History      Diagnosis   Date     Allergic rhinitis       Chronic insomnia       Hx of pregnancy       G3, P3 - all NSVDs      Hypertension       Migraine       Pyelonephritis  12/4/2014     Right        Sepsis due to urinary tract infection (HC)  12/4/2014     right pyelo, mod right hydro, 2 mm right renal stone         Past Surgical History       Procedure   Laterality Date     Shoulder arthroscopy  Right 07/2009     Endoscopic sphenoidotomy   2011/2016      Right endoscopic sphenoidotomy for  mycetoma removal at [facility] [place]~6/2012   Colonoscopy - sigmoid diverticulosi*       Colonoscopy screening   6/5/12     Sigmoid diverticulosis; folllow up 10 years       Sinus surgery        repeat clearing       Myringotomy w tube placement  Right 09/2016         Current Outpatient Prescriptions     Medication  Sig     amoxicillin-clavulanate 875-125 mg tablet (AUGMENTIN) Take 1 tablet by mouth 2 times daily with meals for 10 days.     cholecalciferol (VITAMIN D) 1,000 unit tablet Take 1 tablet by mouth once daily.     fluticasone (50 mcg per actuation) nasal solution (FLONASE) Inhale 2 Sprays into both nostrils once daily.     guaiFENesin (MUCINEX) 600 mg Extended-Release tablet Take 1 tablet by mouth 2 times daily if needed for Expectoration.     loratadine (CLARITIN) 10 mg tablet Take 1 tablet by mouth once daily.     LORazepam (ATIVAN) 1 mg tablet TAKE 1/2 TO 1 TABLET BY MOUTH ONCE DAILY AT NIGHT AS NEEDED FOR insomnia.     metroNIDAZOLE (FLAGYL) 500 mg tablet Take 1 tablet by mouth 2 times daily.     miscellaneous medical supply (BLOOD PRESSURE CUFF) misc As directed.     miscellaneous medical supply (BLOOD PRESSURE CUFF) misc As directed.     multivitamins with minerals (OCUVITE; OPTIGEN) tablet Take 1 tablet by mouth once daily.     SUMAtriptan (IMITREX) 100 mg tablet Take 1 tablet by mouth every 2 hours if needed for Migraine. Max dose: 200mg per 24 hrs.     No current facility-administered medications for this visit.      Medications have been reviewed by me and are current to the best of my knowledge and ability.       Allergies      Allergen   Reactions     Fentanyl  Agitation     Patient describes feeling creepy/crawly to Dr. Saenz.          Family History       Problem   Relation Age of Onset     Good Health  Father      Good Health  Mother      Heart Disease  Paternal Grandfather      MI at 65       Other  Neg.      History is negative for diabetes, thyroid problems, cancer, anxiety, depression and asthma        Good Health  Brother      Good Health  Brother      Good Health  Brother      Cancer-breast  No Family History        Family Status     Relation  Status     Father Alive    post lyme syndrome      Mother Alive     Paternal Grandfather  at age 65     Brother Alive     Brother Alive     Brother Alive     Neg.      Brother      Brother      Brother      No Family History         Social History     Social History        Marital status:       Spouse name: N/A     Number of children:  N/A     Years of education:  N/A     Social History Main Topics         Smoking status:   Never Smoker     Smokeless tobacco:   Never Used     Alcohol use   No      Comment: very rarely      Drug use:   No      Comment: no iv drug use      Sexual activity:   Yes     Partners:  Male      Comment: monogamous      Other Topics  Concern     Not on file      Social History Narrative     Lives with spouse, in [place].  Patient has been  and remarried.  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  Benny -  does hair    3 sons, Mike - lives in [facility], 5grandkids          ROS  GEN: -fevers/-chills/-night sweats/+wt change - 10 pound weight gain since August however it does appear that she has a cyclical trend of weight gain in the winter and loss in the summer.    NEURO: -headaches/-vision changes  EARS: +hearing changes/-tinnitus  NOSE: +drainage/+congestion  MOUTH/THROAT: - sore throat/-dysphagia/-sores  LUNGS: -sob/-cough  CARDIOVASCULAR: -cp/-palpitations  GI: -pain/-diarrhea/-constipation/-bloody stools  : +dysuria/-hematuria/-urinary incontinence/-sores/-vaginal discharge or bleeding  ENDOCRINE: -skin or hair changes/-hot-cold intolerance  HEMATOLOGIC/LYMPHATIC: -swollen nodes/-easy bruising  SKIN: -rashes/-lesions/-breast or nipple changes  MSK/RHEUM: -joint pain/-swelling  NEURO: -weakness/-parasthesias  PSYCH:  "-anhedonia/-depression/-anxiety  SLEEP: -daytime somnolence       EXAM:   /90  Pulse 64  Temp 96.8  F (36  C) (Tympanic)  Resp 16  Ht 1.676 m (5' 6\")  Wt 72.1 kg (159 lb)  Breastfeeding? No  BMI 25.66 kg/m2  Estimated body mass index is 25.66 kg/(m^2) as calculated from the following:    Height as of this encounter: 1.676 m (5' 6\").    Weight as of this encounter: 72.1 kg (159 lb).      GEN: Vitals reviewed.  Healthy appearing. Patient is in no acute distress. Cooperative with exam.  HEENT: Normocephalic atraumatic.  Normal external eye, conjunctiva, lids, cornea with no scleral icterus or conjunctival erythema. Pupils equally round. Oropharynx with no erythema or exudates. Dentition adequate.  EACs clear bilat, TM gray with normal landmarks on the left, ear tube noted on the right with mild erythema on the tympanic membrane, no obvious drainage.  NECK: Supple; no thyromegaly or masses noted.  No cervical or supraclavicular lymphadenopathy.  CV: Heart regular in rate and rhythm with no murmur.  Radial and posterior tibial pulses palpable.  LUNGS: Lungs clear to auscultation bilaterally.  Chest rise equal bilaterally.  No accessory muscle use.  ABD:  Soft, mildly tender in the right lower quadrant and suprapubic region, and nondistended.  No rebound. Bowel sounds positive.  SKIN: Warm and dry to touch.  No rash on face, arms and legs.  EXT: No clubbing or cyanosis.  No peripheral edema.  NEURO: Alert and oriented to person, place, and time.  Cranial nerves II-XII grossly intact with no focal or lateralizing deficits.  Muscle tone normal.  Gait normal. No tremor. Sensation intact to light touch.  MSK: ROM of upper and lower ext symmetric and full.  PSYCH: Affect appropriate. Speech fluent. Answers questions appropriately and thought process normal.       ASSESSMENT AND PLAN:    1. Hypertension  - Blood pressure today of 134/90  is at the goal of <140/90.  - Continue off of amlodipine at this time.  Instructed to " check BP at home.  Call if the blood pressure is consistently above 140/90 we will need to restart medication.  - Cautioned patient to monitor with antibiotics, herbals and any OTC medications  - electrolytes and renal function done August 2016.  We will repeat this at least yearly.  - miscellaneous medical supply (BLOOD PRESSURE CUFF) [facility]; As directed.  Dispense: 1 Each; Refill: 0    2. Burning sensation  - patient was strongly encouraged to increase her water intake.  She can also try taking some cranberry tablets.  We will treat her bacterial vaginosis along with her atrophic vaginitis and she continues to have symptoms may consider additional therapies.  She also may benefit from ultrasound of the pelvis given her right lower quadrant pain and the symptoms.  If she does not have improvement we likely will pursue this in the future.  - URINALYSIS W REFLEX MICROSCOPIC IF POSITIVE; Future  - URINALYSIS MICROSCOPIC  - WET PREP GENITAL    3. Urinary frequency  - I am questioning whether patient's urinary frequency is secondary to vaginal atrophy and discomfort.  We will treat her positive wet prep today however I do question if she will have ongoing issues.  We did discuss using lubrication including coconut oil versus starting estrogenic cream.  She will try the coconut oil first if she continues to have symptoms we will send a prescription for topical estrogen.  - WET PREP GENITAL; Future  - WET PREP GENITAL    4. Migraine without status migrainosus, not intractable, unspecified migraine type  - stable at this time with as needed Imitrex.  This will be refilled for the next year.  - SUMAtriptan (IMITREX) 100 mg tablet; Take 1 tablet by mouth every 2 hours if needed for Migraine. Max dose: 200mg per 24 hrs.  Dispense: 9 tablet; Refill: 11    5. Chronic sinusitis, unspecified location  - given patient's chronic and ongoing issues I do think that further evaluation or in second opinion would be warranted.  She has  never really seen an allergy doctor which also may be helpful given her ongoing sinus problems and a possible allergic component.  She was also encouraged to try a different anti-histamine if she does not feel like the Claritin is working.  She will try Allegra or Zyrtec.  - She is to call if she has any further questions or concerns.  - AMB CONSULT TO ENT; Future  - AMB CONSULT TO ALLERGY; Future  - amoxicillin-clavulanate 875-125 mg tablet (AUGMENTIN); Take 1 tablet by mouth 2 times daily with meals for 10 days.  Dispense: 20 tablet; Refill: 0    6. Ear pain, right  - most consistent with bacterial illness even significant headaches and ear pain  - will treat with antibiotics, patient to treat symptoms as instructed below, call if she has any ADR's or worsening symptoms  - recommend eating yogurt/kefir 1-2 times daily while on antibiotics   - amoxicillin-clavulanate 875-125 mg tablet (AUGMENTIN); Take 1 tablet by mouth 2 times daily with meals for 10 days.  Dispense: 20 tablet; Refill: 0    7. Bacterial vaginosis  - patient's wet prep is positive for clue cells.  We will treat with antibiotics at this time.  She is to call she has any residual symptoms.  - metroNIDAZOLE (FLAGYL) 500 mg tablet; Take 1 tablet by mouth 2 times daily.  Dispense: 14 tablet; Refill: 0    8. Chronic insomnia  - continue with half milligram of Ativan at this time.  Patient is to call if she has any questions or concerns.  We may need to decrease or discontinue this in the future as she ages given the side effects of benzodiazepine's.    9. Encounter for cervical Pap smear with pelvic exam  - GYN THIN PREP PAP SCREEN IMAGED; Future  - GYN THIN PREP PAP SCREEN IMAGED    10. Health care maintenance  - plan for mammogram in summer of 2017.  Up-to-date on vaccines.        Return in about 6 months (around 7/11/2017) for medication management.    A total of 40 minutes spent in face-to-face consultation of this patient with 15 minutes spent  addressing preventative care and counseling while the remainder focused on acute and chronic health issues with greater than 50% spent in discussion and care coordination of above listed medical problems including diagnosis, treatment options emphasizing risks and benefits of each, prognosis and importance of compliance for each.     Patient Instructions   For your seasonal allergies, please try one of the following:  - Loratadine (Claritin) 10mg daily for 3-4 weeks and then as needed (least sedating, least effective)  - Fexofenadine (Allegra) 12 hour 60mg twice daily for 3-4 weeks and then as needed   - Cetirizine (Zyrtec) 10mg daily for 3-4 weeks and then as needed (most sedating,most effective)    Please note that these can take up to 3-4 weeks to see a difference.    Please check your blood pressure daily off of the Amlodipine at various times, record this and bring it to your follow up appointment.  Your blood pressure goal is greater than 110/50 and less than 135/90.  Please call if it is consistently outside this range.            TORY MALONE, DO   1/11/2017 3:32 PM

## 2018-01-03 NOTE — ADDENDUM NOTE
Patient Information     Patient Name MRN Alessandra Sampson [id number] Female 1960      Addendum Note by Edith Cross at 2017  2:11 PM     Author:  Edith Cross Service:  (none) Author Type:  (none)     Filed:  2017  2:11 PM Encounter Date:  2017 Status:  Signed     :  Edith Cross       Addended by: EDITH CROSS on: 2017 02:11 PM        Modules accepted: Orders

## 2018-01-03 NOTE — TELEPHONE ENCOUNTER
Patient Information     Patient Name MRN Alessandra Sampson [id number] Female 1960      Telephone Encounter by Abena Adkins DO at 2017  4:45 PM     Author:  Abena Adkins DO Service:  (none) Author Type:  PHYS- Osteopathic     Filed:  2017  4:49 PM Encounter Date:  2017 Status:  Signed     :  Abena Adkins DO (PHYS- Osteopathic)            Patient called with results from her wet prep.  Antibiotics to be sent in.  She is to call she has any further questions or concerns.

## 2018-01-03 NOTE — PROGRESS NOTES
Patient Information     Patient Name MRN Sex Alessandra Saravia [id number] Female 1960      Progress Notes by Maribell Dietz at 2017  7:35 AM     Author:  Maribell Dietz Service:  (none) Author Type:  Other Clinical Staff     Filed:  2017  7:35 AM Date of Service:  2017  7:35 AM Status:  Signed     :  Maribell Dietz (Other Clinical Staff)            1.  Has the patient had a previous reaction to IV contrast? No    2.  Does the patient have kidney disease? No    3.  Is the patient on dialysis? No    If YES to any of these questions, exam will be reviewed with a Radiologist before administering contrast.

## 2018-01-03 NOTE — PROGRESS NOTES
Patient Information     Patient Name MRN Alessandra Sampson [id number] Female 1960      Progress Notes by Maribell Dietz at 2017  7:35 AM     Author:  Maribell Dietz Service:  (none) Author Type:  Other Clinical Staff     Filed:  2017  7:36 AM Date of Service:  2017  7:35 AM Status:  Signed     :  Maribell Dietz (Other Clinical Staff)            Falls Risk Criteria:    Age 65 and older or under age 4        Sensory deficits    Poor vision    Use of ambulatory aides    Impaired judgment    Unable to walk independently    Meets High Risk criteria for falls:  no

## 2018-01-03 NOTE — PROGRESS NOTES
Patient Information     Patient Name MRN Sex Alessandra Saravia [id number] Female 1960      Progress Notes by Abena Adkins DO at 2017  1:30 PM     Author:  Abena Adkins DO Service:  (none) Author Type:  PHYS- Osteopathic     Filed:  2017  2:10 PM Encounter Date:  2017 Status:  Signed     :  Abena Adkins DO (PHYS- Osteopathic)            Chief Complaint     Patient presents with       Results      pelvic ultrasound taken 17         HPI: Ms. Aiken is a 56 y.o. female who presents today for follow up of recent ultrasound.  She has had several tests over the last few weeks of which we discussed today.    She has been having some right lower quadrant pain.  She states this is new from prior pains.  She has had this off and on for at least the last month or more.  She has not taken anything with this.  She has been on 2 courses of antibiotics lately and these have not changed her symptoms at all.  She does describe the pain as a burning pain that radiates around her back.  We did get a ultrasound of the pelvis that showed multiple fibroids of the uterus along with an endometrial polyp along but did not visualize the ovaries.  This was discussed personally with gynecology who felt that perhaps further discussion about intervention or visualization of the polyp would be warranted.  She is no longer having periods.    She did recently have a Pap smear at that with HPV positive cytology negative.  She will need a repeat in one year and this was discussed in depth.      She was having some urinary symptoms at her visit 2 weeks ago at which time we did a wet prep and was positive for bacterial vaginosis.  We did treat with metronidazole but she did not have any improvement in her symptoms.  Her UA was negative at that time.  She has continued to have these symptoms.  She has not noted much change overall.  She does have a history of urologic evaluation with cystoscopy that was all  "negative within the last couple years.    She  reports that she has never smoked. She has never used smokeless tobacco.    Past medical history reviewed as below:     Past Medical History      Diagnosis   Date     Allergic rhinitis       Chronic insomnia       Hx of pregnancy       G3, P3 - all NSVDs      Hypertension       Migraine       Pyelonephritis  12/4/2014     Right        Sepsis due to urinary tract infection (HC)  12/4/2014     right pyelo, mod right hydro, 2 mm right renal stone    .      ROS  Pertinent ROS was performed and was negative, including for blood in the stool, changes in stool or diarrhea or constipation, fevers, chills, chest pain or shortness of breath, increased lower extremity edema. No other concerns, with exception of HPI above.      EXAM:   /80  Pulse 84  Temp 97.7  F (36.5  C) (Tympanic)  Resp 16  Ht 1.676 m (5' 6\")  Wt 68.1 kg (150 lb 2 oz)  Breastfeeding? No  BMI 24.23 kg/m2    Estimated body mass index is 24.23 kg/(m^2) as calculated from the following:    Height as of this encounter: 1.676 m (5' 6\").    Weight as of this encounter: 68.1 kg (150 lb 2 oz).      GEN: Vitals reviewed.  Healthy appearing. Patient is in no acute distress. Cooperative with exam.  CV: Heart regular in rate and rhythm with no murmur.  Radial and posterior tibial pulses palpable.  LUNGS: Lungs clear to auscultation bilaterally.  Chest rise equal bilaterally.  No accessory muscle use.  ABD:  Soft, tender in the right lower quadrant, no rebound, no guarding, no rigidity.  Bowel sounds positive.  SKIN: Warm and dry to touch.  No rash on face, arms and legs.  EXT: No clubbing or cyanosis.  No peripheral edema.  PSYCH: Mood is good.  Affect appropriate. Speech fluent. Answers questions appropriately and thought process normal.     ASSESSMENT AND PLAN:    1. Right lower quadrant pain  - etiology of her pain is unknown.  Ovarian cyst versus constipation versus musculoskeletal or other etiology are " considered..  Malignancy is unlikely however possible.  - CT ABDOMEN PELVIS W; Future  - CBC W PLT NO DIFF; Future  - COMP METABOLIC PANEL; Future    2. Urinary tract infection symptoms  - repeat UA today given continued symptoms.  - URINALYSIS W REFLEX MICROSCOPIC IF POSITIVE; Future    3. Endometrial polyp  - referral to gynecology placed.  - AMB CONSULT TO OB-GYN; Future         Return if symptoms worsen or fail to improve.      TORY MALONE,    1/26/2017 2:06 PM

## 2018-01-03 NOTE — PROGRESS NOTES
Patient Information     Patient Name MRN Alessandra Sampson [id number] Female 1960      Progress Notes by Freida Edmonds R.T. ([facility]) at 2017 11:48 AM     Author:  Freida Edmonds R.T. ([facility] Service:  (none) Author Type:  [employer] - Registered Radiologic Technologist     Filed:  2017 11:48 AM Date of Service:  2017 11:48 AM Status:  Signed     :  Freida Edmonds R.T. ([facility]) ([facility] - Registered Radiologic Technologist)            Falls Risk Criteria:    Age 65 and older or under age 4        Sensory deficits    Poor vision    Use of ambulatory aides    Impaired judgment    Unable to walk independently    Meets High Risk criteria for falls:  no

## 2018-01-03 NOTE — TELEPHONE ENCOUNTER
Patient Information     Patient Name MRN Sex Alessandra Saravia [id number] Female 1960      Telephone Encounter by Abena Adkins DO at 2017  5:26 PM     Author:  Abena Adkins DO Service:  (none) Author Type:  PHYS- Osteopathic     Filed:  2017  5:30 PM Encounter Date:  2017 Status:  Signed     :  Abena Adkins DO (PHYS- Osteopathic)            Patient called with abnormal Pap results.  She does continue to have some discomfort in her abdomen/pelvis.  We will obtain an ultrasound of the pelvis at this time.  She will set this up and then schedule an appointment to see me in follow-up to review the results.  We will also discuss what she would like to do about her abnormal Pap results at that time.

## 2018-01-03 NOTE — PROGRESS NOTES
Patient Information     Patient Name MRN Alessandra Sampson [id number] Female 1960      Progress Notes by Maribell Dietz at 2017  7:35 AM     Author:  Maribell Dietz Service:  (none) Author Type:  Other Clinical Staff     Filed:  2017  7:35 AM Date of Service:  2017  7:35 AM Status:  Signed     :  Maribell Dietz (Other Clinical Staff)            IV Contrast- Discharge Instructions After Your CT Scan      The IV contrast you received today will be filtered from your bloodstream by your kidneys during the next 24 hours and pass from the body in urine.  You will not be aware of this process and your urine will not change in color.  To help this process you should drink at least 4 additional glasses of water or juice today.  This reduces stress on your kidneys.    Most contrast reactions are immediate.  Should you develop symptoms of concern after discharge, contact the department at the number below.  After hours you should contact your personal physician.  If you develop breathing distress or wheezing, call 911.

## 2018-01-03 NOTE — NURSING NOTE
Patient Information     Patient Name MRN Alessandra Sampson [id number] Female 1960      Nursing Note by Tati Garnett at 2017  1:30 PM     Author:  Tati Garnett Service:  (none) Author Type:  (none)     Filed:  2017  1:38 PM Encounter Date:  2017 Status:  Signed     :  Tati Garnett            Patient presents to clinic for review and discussion of pelvic ultrasound results taken on 17.    Tati Garnett LPN..................2017  1:35 PM

## 2018-01-04 NOTE — PATIENT INSTRUCTIONS
Patient Information     Patient Name MRN Alessandra Sampson [id number] Female 1960      Patient Instructions by Abena Adkins DO at 2017  9:50 AM     Author:  Abena Adkins DO Service:  (none) Author Type:  PHYS- Osteopathic     Filed:  2017 10:24 AM Encounter Date:  2017 Status:  Signed     :  Abena Adkins DO (PHYS- Osteopathic)            Have ENT send CT done recently.      Your symptoms are most consistent with a bacterial infection.    Take your antibiotics as prescribed. Increase water intake.    Recommend eating yogurt/kefir or taking probiotics 1-2 times daily while on antibiotics     Call if symptoms do not improve in a couple days or if you develop any medication reactions.      For symptomatic relief you may try:    Nasal congestion  - pseudoephedrine 60mg every 4 hours as needed  - afrin nasal spray for no more than 3 consecutive days  - loratadine 10mg once daily as needed     Cough  - Guaifenesin DM (generic Robitussin DM) as needed     Sore throat  - cepacol or other throat lozenges as needed  - gargle salt water (1/2 teaspoon salt in 8oz warm water) every 1-2 hours    Headache, body aches, pain, sinus pressure or fever  - acetaminophen 1000mg every 6 hours as needed (max of 8 - 500mg pills daily)  - ibuprofen 600mg every 4 hours as needed (max of 16 - 200mg pills daily)  - may use oral decongestant If you choose pseudoephedrine, use for only 5-7 days AS DIRECTED. Speak to your pharmacist if you have any concerns about your medications.     General  - get extra rest  - drink plenty of fluid  - avoid tobacco products    You will need to be evaluated if you start to experience:   Fever higher than 102.5 F (39.2 C)   Worsening of current symptoms  Sudden and severe pain in the face and head or trouble seeing or thinking clearly   Swelling or redness around 1 or both eyes   Trouble breathing, chest pain or a stiff neck    * If you are a smoker, try to quit *

## 2018-01-04 NOTE — TELEPHONE ENCOUNTER
Patient Information     Patient Name MRN Sex Alessandra Saravia [id number] Female 1960      Telephone Encounter by Leny Thomas RN at 2017  2:33 PM     Author:  Leny Thomas RN Service:  (none) Author Type:  NURS- Registered Nurse     Filed:  2017  2:35 PM Encounter Date:  2017 Status:  Signed     :  Leny Thomas RN (NURS- Registered Nurse)            Medication is not on refill protocol. Unable to complete prescription refill per RN Medication Refill Policy.................... LENY THOMAS RN ....................  2017   2:33 PM        This is a Refill request from: [facility]  Name of Medication: Ativan 1mg  Quantity requested: 30  Last fill date: 3/9/17  Last visit with TORY MALONE was on: 2017 in [facility]  PCP:  TORY MALONE DO  Controlled Substance Agreement:  none   Diagnosis r/t this medication request: chronic insomnia     Unable to complete prescription refill per RN Medication Refill Policy.................... LENY THOMAS RN ....................  2017   2:33 PM

## 2018-01-04 NOTE — NURSING NOTE
Patient Information     Patient Name MRN Alessandra Sampson [id number] Female 1960      Nursing Note by Tati Garnett at 2017  9:50 AM     Author:  Tati Garnett Service:  (none) Author Type:  (none)     Filed:  2017  9:57 AM Encounter Date:  2017 Status:  Signed     :  Tati Garnett            Patient presents to clinic experiencing right earache, sinus pressure and headache for past 2 weeks.    Tati Garnett LPN..................2017  9:53 AM

## 2018-01-04 NOTE — PROGRESS NOTES
Patient Information     Patient Name MRN Alessandra Sampson [id number] Female 1960      Progress Notes by Abena Adkins DO at 2017  9:50 AM     Author:  Abena Adkins DO Service:  (none) Author Type:  PHYS- Osteopathic     Filed:  2017 10:32 AM Encounter Date:  2017 Status:  Signed     :  Abena Adkins DO (PHYS- Osteopathic)            Chief Complaint     Patient presents with       Ear Problem      right ear pain, headache, sinus pressure x 2 weeks        Subjective:   Ms. Aiken is a 56 y.o. female  seen for the acute concern today of sinus pressure, headache and ear pain.  She states this is been going on for 2 weeks.  The pain has been so severe that she has taken a couple of her 's pain medications.  The pain is primarily in her right ear and maxillary sinuses.  She does have a history of recurrent sinusitis and ear pain.  She was seen by multiple ENT doctors in the past year for these.  Reportedly had a CT done of the sinuses in February that was normal although no records are available for this.  On antibiotics for sinusitis and ear problems approximately 6 times in the last year.  She does have good improvement with the antibiotics.  She has not had any drainage from the right ear although does have a ear tube in place still from last .    She is leaving for vacation and will be flying on a plane in the next week.    She  reports that she has never smoked. She has never used smokeless tobacco.    Past medical history reviewed as below:     Past Medical History:     Diagnosis  Date     Allergic rhinitis      Chronic insomnia      Hx of pregnancy     G3, P3 - all NSVDs      Hypertension      Migraine      Pyelonephritis 2014    Right        Sepsis due to urinary tract infection (HC) 2014    right pyelo, mod right hydro, 2 mm right renal stone    .      ROS:   Pertinent ROS was performed and was negative, including for fevers, chills, shortness of breath,  "lower extremity edema. No other concerns, with exception of HPI above.      Objective:    /80  Pulse 60  Temp 97.2  F (36.2  C) (Tympanic)  Resp 16  Ht 1.676 m (5' 6\")  Wt 64.5 kg (142 lb 4 oz)  Breastfeeding? No  BMI 22.96 kg/m2  GEN: Vitals reviewed.  Patient is in no acute distress. Cooperative with exam.  HEENT: Normocephalic atraumatic.  EACs clear bilat, TM gray with normal landmarks on the left.  Right tympanic membrane with some erythema and serous substance in the lower ear.  Ear tube noted in in place.  Minimal ear wax. Pupils equally round.  No scleral icterus, no conjunctival erythema. Oropharynx with no erythema or exudates.  Right tonsil pillar with slight enlargement.  Dentition adequate.  SKIN: Warm and dry to touch.  No rash on face, arms and legs.  EXT: No clubbing or cyanosis.  No peripheral edema.     Assessment/Plan:   1. Acute recurrent maxillary sinusitis  - most consistent with bacterial illness  - will treat with antibiotics, patient to treat symptoms as instructed below, call if she has any ADR's or worsening symptoms  - recommend eating yogurt/kefir 1-2 times daily while on antibiotics   - amoxicillin-clavulanate 875-125 mg tablet (AUGMENTIN); Take 1 tablet by mouth 2 times daily with meals for 14 days.  Dispense: 28 tablet; Refill: 0    2. Ear pain, right  - we discussed options for follow-up with ENT.  At this time she does not want to return to the ENT doctor she has previously seen.  We did discuss the potential of seeing someone through the [facility] system.  She will think about this and let me know if she wants a referral placed.      Return if symptoms worsen or fail to improve.    Patient Instructions   Have ENT send CT done recently.      Your symptoms are most consistent with a bacterial infection.    Take your antibiotics as prescribed. Increase water intake.    Recommend eating yogurt/kefir or taking probiotics 1-2 times daily while on antibiotics "     Call if symptoms do not improve in a couple days or if you develop any medication reactions.      For symptomatic relief you may try:    Nasal congestion  - pseudoephedrine 60mg every 4 hours as needed  - afrin nasal spray for no more than 3 consecutive days  - loratadine 10mg once daily as needed     Cough  - Guaifenesin DM (generic Robitussin DM) as needed     Sore throat  - cepacol or other throat lozenges as needed  - gargle salt water (1/2 teaspoon salt in 8oz warm water) every 1-2 hours    Headache, body aches, pain, sinus pressure or fever  - acetaminophen 1000mg every 6 hours as needed (max of 8 - 500mg pills daily)  - ibuprofen 600mg every 4 hours as needed (max of 16 - 200mg pills daily)  - may use oral decongestant If you choose pseudoephedrine, use for only 5-7 days AS DIRECTED. Speak to your pharmacist if you have any concerns about your medications.     General  - get extra rest  - drink plenty of fluid  - avoid tobacco products    You will need to be evaluated if you start to experience:   Fever higher than 102.5 F (39.2 C)   Worsening of current symptoms  Sudden and severe pain in the face and head or trouble seeing or thinking clearly   Swelling or redness around 1 or both eyes   Trouble breathing, chest pain or a stiff neck    * If you are a smoker, try to quit *         TORY MALONE,    4/5/2017 10:28 AM    This document was prepared using voice generated softwear. While every attempt was made for accuracy, grammatical errors may exist.

## 2018-01-19 ENCOUNTER — OFFICE VISIT - GICH (OUTPATIENT)
Dept: INTERNAL MEDICINE | Facility: OTHER | Age: 58
End: 2018-01-19

## 2018-01-19 ENCOUNTER — HISTORY (OUTPATIENT)
Dept: INTERNAL MEDICINE | Facility: OTHER | Age: 58
End: 2018-01-19

## 2018-01-19 DIAGNOSIS — J02.9 ACUTE PHARYNGITIS: ICD-10-CM

## 2018-01-19 DIAGNOSIS — L30.9 DERMATITIS: ICD-10-CM

## 2018-01-19 DIAGNOSIS — H92.01 OTALGIA OF RIGHT EAR: ICD-10-CM

## 2018-01-26 VITALS
SYSTOLIC BLOOD PRESSURE: 126 MMHG | DIASTOLIC BLOOD PRESSURE: 80 MMHG | TEMPERATURE: 97.2 F | HEART RATE: 60 BPM | HEIGHT: 66 IN | WEIGHT: 142.25 LBS | RESPIRATION RATE: 16 BRPM | BODY MASS INDEX: 22.86 KG/M2

## 2018-01-26 VITALS
HEART RATE: 84 BPM | SYSTOLIC BLOOD PRESSURE: 134 MMHG | TEMPERATURE: 96.8 F | RESPIRATION RATE: 16 BRPM | DIASTOLIC BLOOD PRESSURE: 90 MMHG | WEIGHT: 159 LBS | BODY MASS INDEX: 24.13 KG/M2 | TEMPERATURE: 97.7 F | WEIGHT: 150.13 LBS | DIASTOLIC BLOOD PRESSURE: 80 MMHG | BODY MASS INDEX: 25.55 KG/M2 | SYSTOLIC BLOOD PRESSURE: 138 MMHG | RESPIRATION RATE: 16 BRPM | HEIGHT: 66 IN | HEART RATE: 64 BPM | HEIGHT: 66 IN

## 2018-01-26 VITALS
RESPIRATION RATE: 18 BRPM | HEART RATE: 64 BPM | WEIGHT: 137.56 LBS | BODY MASS INDEX: 22.11 KG/M2 | HEIGHT: 66 IN | DIASTOLIC BLOOD PRESSURE: 78 MMHG | SYSTOLIC BLOOD PRESSURE: 120 MMHG | TEMPERATURE: 98.2 F

## 2018-01-26 VITALS
HEIGHT: 66 IN | SYSTOLIC BLOOD PRESSURE: 122 MMHG | DIASTOLIC BLOOD PRESSURE: 80 MMHG | WEIGHT: 133.6 LBS | BODY MASS INDEX: 21.47 KG/M2 | HEART RATE: 80 BPM

## 2018-01-29 ENCOUNTER — HISTORY (OUTPATIENT)
Dept: INTERNAL MEDICINE | Facility: OTHER | Age: 58
End: 2018-01-29

## 2018-01-29 ENCOUNTER — DOCUMENTATION ONLY (OUTPATIENT)
Dept: FAMILY MEDICINE | Facility: OTHER | Age: 58
End: 2018-01-29

## 2018-01-29 ENCOUNTER — OFFICE VISIT - GICH (OUTPATIENT)
Dept: INTERNAL MEDICINE | Facility: OTHER | Age: 58
End: 2018-01-29

## 2018-01-29 DIAGNOSIS — Z12.31 ENCOUNTER FOR SCREENING MAMMOGRAM FOR MALIGNANT NEOPLASM OF BREAST: ICD-10-CM

## 2018-01-29 DIAGNOSIS — G43.909 MIGRAINE WITHOUT STATUS MIGRAINOSUS, NOT INTRACTABLE: ICD-10-CM

## 2018-01-29 DIAGNOSIS — N84.0 POLYP OF CORPUS UTERI: ICD-10-CM

## 2018-01-29 DIAGNOSIS — F51.04 PSYCHOPHYSIOLOGIC INSOMNIA: ICD-10-CM

## 2018-01-29 DIAGNOSIS — Z13.220 ENCOUNTER FOR SCREENING FOR LIPOID DISORDERS: ICD-10-CM

## 2018-01-29 DIAGNOSIS — R87.810 CERVICAL HIGH RISK HUMAN PAPILLOMAVIRUS (HPV) DNA TEST POSITIVE: ICD-10-CM

## 2018-01-29 DIAGNOSIS — Z79.899 OTHER LONG TERM (CURRENT) DRUG THERAPY: ICD-10-CM

## 2018-01-29 LAB
A/G RATIO - HISTORICAL: 2 (ref 1–2)
ALBUMIN SERPL-MCNC: 4.5 G/DL (ref 3.5–5.7)
ALP SERPL-CCNC: 78 IU/L (ref 34–104)
ALT (SGPT) - HISTORICAL: 11 IU/L (ref 7–52)
ANION GAP - HISTORICAL: 8 (ref 5–18)
AST SERPL-CCNC: 17 IU/L (ref 13–39)
BILIRUB SERPL-MCNC: 0.7 MG/DL (ref 0.3–1)
BUN SERPL-MCNC: 12 MG/DL (ref 7–25)
BUN/CREAT RATIO - HISTORICAL: 13
CALCIUM SERPL-MCNC: 9.2 MG/DL (ref 8.6–10.3)
CHLORIDE SERPLBLD-SCNC: 104 MMOL/L (ref 98–107)
CHOL/HDL RATIO - HISTORICAL: 2.74
CHOLESTEROL TOTAL: 181 MG/DL
CO2 SERPL-SCNC: 28 MMOL/L (ref 21–31)
CREAT SERPL-MCNC: 0.91 MG/DL (ref 0.7–1.3)
GFR IF NOT AFRICAN AMERICAN - HISTORICAL: >60 ML/MIN/1.73M2
GLOBULIN - HISTORICAL: 2.3 G/DL (ref 2–3.7)
GLUCOSE SERPL-MCNC: 100 MG/DL (ref 70–105)
HDLC SERPL-MCNC: 66 MG/DL (ref 23–92)
LDLC SERPL CALC-MCNC: 93 MG/DL
NON-HDL CHOLESTEROL - HISTORICAL: 115 MG/DL
POTASSIUM SERPL-SCNC: 3.9 MMOL/L (ref 3.5–5.1)
PROT SERPL-MCNC: 6.8 G/DL (ref 6.4–8.9)
PROVIDER ORDERDED STATUS - HISTORICAL: NORMAL
SODIUM SERPL-SCNC: 140 MMOL/L (ref 133–143)
TRIGL SERPL-MCNC: 109 MG/DL

## 2018-01-29 ASSESSMENT — ANXIETY QUESTIONNAIRES
3. WORRYING TOO MUCH ABOUT DIFFERENT THINGS: NOT AT ALL
2. NOT BEING ABLE TO STOP OR CONTROL WORRYING: NOT AT ALL
1. FEELING NERVOUS, ANXIOUS, OR ON EDGE: NOT AT ALL
4. TROUBLE RELAXING: NOT AT ALL
6. BECOMING EASILY ANNOYED OR IRRITABLE: NOT AT ALL
7. FEELING AFRAID AS IF SOMETHING AWFUL MIGHT HAPPEN: NOT AT ALL
5. BEING SO RESTLESS THAT IT IS HARD TO SIT STILL: NOT AT ALL
GAD7 TOTAL SCORE: 0

## 2018-02-05 ENCOUNTER — COMMUNICATION - GICH (OUTPATIENT)
Dept: INTERNAL MEDICINE | Facility: OTHER | Age: 58
End: 2018-02-05

## 2018-02-05 DIAGNOSIS — R87.619 ABNORMAL CYTOLOGICAL FINDING IN SPECIMEN FROM CERVIX UTERI: ICD-10-CM

## 2018-02-05 DIAGNOSIS — R87.810 CERVICAL HIGH RISK HUMAN PAPILLOMAVIRUS (HPV) DNA TEST POSITIVE: ICD-10-CM

## 2018-02-05 LAB — HPV RESULTS - HISTORICAL: POSITIVE

## 2018-02-09 VITALS
RESPIRATION RATE: 18 BRPM | BODY MASS INDEX: 22.21 KG/M2 | TEMPERATURE: 96.1 F | HEART RATE: 72 BPM | DIASTOLIC BLOOD PRESSURE: 78 MMHG | HEIGHT: 66 IN | WEIGHT: 138.19 LBS | SYSTOLIC BLOOD PRESSURE: 114 MMHG

## 2018-02-09 VITALS
WEIGHT: 139.7 LBS | DIASTOLIC BLOOD PRESSURE: 64 MMHG | RESPIRATION RATE: 16 BRPM | BODY MASS INDEX: 22.55 KG/M2 | SYSTOLIC BLOOD PRESSURE: 124 MMHG | TEMPERATURE: 96.5 F

## 2018-02-11 ASSESSMENT — ANXIETY QUESTIONNAIRES: GAD7 TOTAL SCORE: 0

## 2018-02-13 NOTE — PROGRESS NOTES
Patient Information     Patient Name MRAlessandra Agarwal [id number] Female 1960      Progress Notes by Abena Adkins DO at 2018  1:00 PM     Author:  Abena Adkins DO Service:  (none) Author Type:  PHYS- Osteopathic     Filed:  2018  5:40 PM Encounter Date:  2018 Status:  Signed     :  Abena Adkins DO (PHYS- Osteopathic)            Chief Complaint     Patient presents with       Rash      on her back, white spots in the throat and sore Rt ear        Subjective:   Ms. Aiken is a 57 y.o. female  seen for several acute concerns.    She has had a rash that has been on her back for the last 4 weeks.  She has not tried any topical ointments to this.  It is pruritic.  It does burn after she has been working out.  She does feel that it has spread some.  She has not had any change in her home lotion, detergent or other products.    She also has had a sore throat off and on.  This has been going on a couple weeks.  She denies any other symptoms other than some rhinorrhea.  She has not had any fevers.  She has noted some spots on her tonsils.  She has not taken any medications for this.    She does have right ear pain.  This has been chronic.  She did see an ENT at the [facility] who started her on gabapentin.  She did have significant side effects with this after trying it for 3 weeks.  She was then tried on nortriptyline however reports that this has not been working.  This has been for the last 2 and half weeks.  She is curious about getting a refill of her hydrocodone.  Her last prescription was given in October and 30 pills lasted her approximately 3 months.  We did have a long discussion today emphasizing the importance of not using this medication long-term.    She  reports that she has never smoked. She has never used smokeless tobacco.    Past medical history reviewed as below:     Past Medical History:     Diagnosis  Date     Allergic rhinitis      Chronic insomnia       Hx of pregnancy     G3, P3 - all NSVDs      Hypertension      Migraine      Pyelonephritis 12/4/2014    Right        Sepsis due to urinary tract infection (HC) 12/4/2014    right pyelo, mod right hydro, 2 mm right renal stone    .      ROS:   Pertinent ROS was performed and was negative, including for fevers, chills, chest pain, shortness of breath, increased lower extremity edema, changes in bowel or bladder, blood in the stool, difficulty swallowing, sores in the mouth. No other concerns, with exception of HPI above.      Objective:    /64 (Cuff Site: Right Arm, Position: Sitting, Cuff Size: Adult Regular)  Temp 96.5  F (35.8  C) (Tympanic)  Resp 16  Wt 63.4 kg (139 lb 11.2 oz)  BMI 22.55 kg/m2  GEN: Vitals reviewed.  Patient is in no acute distress. Cooperative with exam.  HEENT: Normocephalic atraumatic.  Pupils equally round.  No scleral icterus, no conjunctival erythema. Oropharynx with no erythema or exudates.  Bilateral tonsillar stones are noted.  Dentition adequate.  CV: Heart regular in rate and rhythm with no murmur.    LUNGS: Lungs clear to auscultation bilaterally.  Chest rise equal bilaterally.  No accessory muscle use.  SKIN: Warm and dry to touch.  No rash on face, arms and legs.  Back does have papular rash with areas of excoriation throughout.  EXT: No clubbing or cyanosis.  No peripheral edema.     Assessment/Plan:   1. Dermatitis  -at this time I suspect she is dealing with atopic dermatitis.  She was provided information regarding this.  She is to limit use of cosmetic products.  She is to decrease the Lasix and temperature of her showers.  She can use triamcinolone cream to see if this gives her any benefit.  She is to call if it changes or worsens.  - triamcinolone (ARISTOCORT; KENALOG) 0.1 % cream; Apply  topically to affected area(s) 3 times daily.  Dispense: 80 g; Refill: 1    2. Sore throat  - Likely viral in nature.  Recommend conservative treatment including symptomatic  relief as below.    - Discussed reason for no antibiotics and risk of antibiotic use.    - Area on tonsils does appear to be consistent with tonsillar stones  - Recommend increased fluid intake, humidified air and rest as much as possible.    3. Right ear pain  - advised time she was encouraged to continue to have conversations with ENT regarding this.  She is provided with a prescription of hydrocodone until she sees them.  She is encouraged to limit use as much as possible.  She was instructed not to drive while on this.  We may be able to try low-dose gabapentin as she did get some benefit and may tolerate a 100 mg dose better.  She is to let me know if she has any interested in this in the future.  - HYDROcodone-acetaminophen, 5-325 mg, (NORCO) per tablet; Take 1 tablet by mouth 3 times daily if needed  for Pain Max acetaminophen dose: 4000 mg in 24 hrs.  Dispense: 20 tablet; Refill: 0      Return if symptoms worsen or fail to improve.    Patient Instructions      Index Related topics   Dermatitis: Atopic (Eczema)   ________________________________________________________________________  KEY POINTS    Dermatitis is irritation and swelling of the skin. Atopic means that you have inherited the risk for allergic skin conditions. Atopic dermatitis may also be called eczema.    You may need ointment for the skin or medicine by mouth to treat the itching or inflammation.    Avoid the things that you know will make your skin rash worse, such as wearing tight or scratchy clothing, or certain foods or medicines.  ________________________________________________________________________  What is atopic dermatitis?  Dermatitis is irritation and swelling (inflammation) of the skin. Atopic means that you have inherited the risk for allergic conditions such as skin rashes, hay fever, and asthma.  Atopic dermatitis may also be called eczema. Eczema is a common skin problem that usually starts in childhood or early adulthood.  It s a chronic disease, which means you will likely have it all of your life.    What is the cause?  The immune system is your body s defense against infection. When you have atopic dermatitis, your immune system may react when you are exposed to certain things such as:    Allergies, for example to a food or medicine    Hot baths or showers    Soap    Scratchy or tight clothing    Quick temperature or humidity changes    Stress  Eczema may always be present or may flare-up only in certain seasons. It often gets worse in the winter when indoor air can be very dry.  An allergy to dust mites may make eczema worse. Dust mites are very tiny bugs that you cannot see without a microscope. They live in mattresses, pillows, carpet, and upholstered furniture.    What are the symptoms?  Most people with eczema have a mild form. Symptoms may include:    Itching    Dryness    Fine scales or flaking    Redness  Any area of skin may be affected, but the most common areas are:    Behind the knees    The inside of the elbows    On the side of the neck    Around the eyes and ears  Severe eczema causes intense itching. The skin is usually very sensitive to being touched. Even a light touch, like when your hair blows across your face, may cause itching. Many people who have severe eczema are quite sensitive to scratchy fabrics, especially wool.    How is it diagnosed?  Your healthcare provider will ask about your symptoms and medical history and examine your skin.  Your provider may recommend testing for an allergy to dust mites.    How is it treated?  Treatment cannot cure eczema, but it can help lessen your symptoms and prevent eczema from getting worse. It may help to use a moisturizer. Or it may go away if you put 1% hydrocortisone cream on the area up to 4 times a day. No prescription is needed for this cream.  For more severe eczema, your healthcare provider may prescribe:    Cream or ointment to stop the itching and other  symptoms    Antihistamine pills to help stop itching and any allergic reaction. Do not put antihistamine creams or lotions on your skin if you are taking antihistamine pills.    Anti-inflammatory medicine, such as prednisone, if your symptoms are severe  If tests show that you are allergic to dust mites, your provider may recommend that you try to get rid of any dust mites in your home. In some cases, allergy shots for dust mites may be helpful.  Your healthcare provider will recommend how often you should bathe or shower and which soaps and moisturizers you should use. This helps keep your skin from getting too dry.    How can I take care of myself?  Follow the full course of treatment prescribed by your healthcare provider. In addition:    Avoid scratching your skin even though it itches. Scratching may break the skin and cause infection.    Take short baths or warm showers no more than once a day. Use a mild moisturizing soap or nonsoap cleanser. Avoid long, hot baths. Hot water can increase itching. Pat your skin dry with a soft towel. Do not rub your skin dry. Use moisturizing cream or lotion when your skin is still a little wet.    Use unscented moisturizing creams or ointments, rather than water-based lotions. Moisturize your skin regularly, several times a day, if possible. Ask your healthcare provider or pharmacist for recommendations.    Avoid the things that you know will make your skin rash worse, such as wearing tight or scratchy clothing, or certain foods or medicines.    Choose soft cotton fabrics for your clothing.    Avoid humidity and sudden changes in temperature when possible.    Avoid getting too hot (over-heating).    Wash clothes and bedding in mild soap and hot water. Rinse your laundry twice to get rid of all the soap. Avoid fabric softener liquids and dryer sheets.    Try to get rid of dust mites in your home. Use anti-allergy covers on bed pillows and mattresses. Wash bedding every week or  two. You may want to get rid of [id number] carpets and draperies that catch dust and cannot be damp-wiped or laundered.    Learn to manage stress. Ask for help at home and work when the load is too great to handle. Find ways to relax. For example take up a hobby, listen to music, watch movies, or take walks. Try yoga, meditation, or deep breathing exercises when you feel stressed.  Ask your provider:    How long it will take to recover    If there are activities you should avoid and when you can return to your normal activities    How to take care of yourself at home    What symptoms or problems you should watch for and what to do if you have them  Make sure you know when you should come back for a checkup. Keep all appointments for provider visits or tests.    You can get more information from:    National Eczema Association  834.349.2645  https://nationaleczema.org  Developed by [employer].  Adult Advisor 2017.2 published by [employer].  Last modified: 2016-10-18  Last reviewed: 2015-08-31  This content is reviewed periodically and is subject to change as new health information becomes available. The information is intended to inform and educate and is not a replacement for medical evaluation, advice, diagnosis or treatment by a healthcare professional.  References   Adult Advisor 2017.2 Index    Copyright   2017 [employer], a division of [employer] All rights reserved.            TORY MALONE DO   1/19/2018 4:57 PM    This document was prepared using voice generated softwear. While every attempt was made for accuracy, grammatical errors may exist.

## 2018-02-13 NOTE — H&P
Patient Information     Patient Name MRN Alessandra Sampson [id number] Female 1960      H&P by Abena Adkins DO at 2018  7:40 AM     Author:  Abena Adkins DO Service:  (none) Author Type:  PHYS- Osteopathic     Filed:  2018  8:29 AM Encounter Date:  2018 Status:  Signed     :  Abena Adkins DO (PHYS- Osteopathic)            Chief Complaint     Patient presents with       Physical      annual exam       HPI: Ms. Aiken is a 57 y.o. female who presents today for yearly physical.  She overall is feeling good.      She denies any new concerns.  She has continued to have issues with ear pain.  She has seen ENT for this.  She does have a history of chronic migraines.  She does take her Imitrex 1-2 times monthly.  She denies any changes in this.  She continues to have issues with sleep and does take 1 half to 1 mg of lorazepam at bedtime.  We have discussed the potential of tapering off of this and going onto something else for sleep at some point in the future.  She denies any side effects at this time.    She was noted last year to have an endometrial polyp on pelvic ultrasound.  It was recommended that she consider follow-up in one year for changes.  She is interested in getting this at this time.  We also discussed that she had an abnormal cyst in her kidney.  It was recommended to follow-up however it has been stable for 2 years.  She denies any new symptoms including blood in the urine.  One year ago she was noted to have a Pap smear that was HPV positive.  She is due for repeat today.    She is post menopausal.  She is due for a mammogram.  She is due for screening lipids.  She declines a flu shot.    History is discussed and updated on 2018 with patient.  It is current to the best of my knowledge as below.    Past Medical History:     Diagnosis  Date     Allergic rhinitis      Chronic ear pain, right      Chronic insomnia      Hx of pregnancy     G3, P3 - all NSVDs       Hypertension      Migraine      Pyelonephritis 12/4/2014    Right        Sepsis due to urinary tract infection (HC) 12/4/2014    right pyelo, mod right hydro, 2 mm right renal stone         Past Surgical History:      Procedure  Laterality Date     COLONOSCOPY SCREENING  6/5/12    Sigmoid diverticulosis; folllow up 10 years       ENDOSCOPIC SPHENOIDOTOMY  2011/2016     Right endoscopic sphenoidotomy for mycetoma removal at [place]~6/2012       MYRINGOTOMY W TUBE PLACEMENT Right 09/2016     SHOULDER ARTHROSCOPY Right 07/2009     SINUS SURGERY      repeat clearing           Current Outpatient Prescriptions     Medication  Sig     HYDROcodone-acetaminophen, 5-325 mg, (NORCO) per tablet Take 1 tablet by mouth 3 times daily if needed  for Pain Max acetaminophen dose: 4000 mg in 24 hrs.     LORazepam (ATIVAN) 1 mg tablet TAKE 1/2 TO 1 TABLET BY MOUTH ONCE DAILY AT NIGHT AS NEEDED FOR insomnia; do not take with hydrocodone     SUMAtriptan (IMITREX) 100 mg tablet Take 1 tablet by mouth every 2 hours if needed for Migraine. Max dose: 200mg per 24 hrs.     triamcinolone (ARISTOCORT; KENALOG) 0.1 % cream Apply  topically to affected area(s) 3 times daily.     No current facility-administered medications for this visit.      Medications have been reviewed by me and are current to the best of my knowledge and ability.       Allergies      Allergen   Reactions     Fentanyl  Itching, Anxiety and Agitation     Patient describes feeling creepy/crawly to Dr. Saenz.            Family History       Problem   Relation Age of Onset     Good Health  Father      Good Health  Mother      Heart Disease  Paternal Grandfather      MI at 65       Other  Neg.      History is negative for diabetes, thyroid problems, cancer, anxiety, depression and asthma       Good Health  Brother      Good Health  Brother      Good Health  Brother      Cancer-breast  No Family History        Family Status     Relation  Status     Father Alive    post lyme syndrome   "    Mother Alive     Paternal Grandfather  at age 65     Neg.      Brother Alive     Brother Alive     Brother Alive     No Family History         Social History     Social History        Marital status:       Spouse name: N/A     Number of children:  N/A     Years of education:  N/A     Social History Main Topics         Smoking status:   Never Smoker     Smokeless tobacco:   Never Used     Alcohol use   No      Comment: very rarely      Drug use:   No      Comment: no iv drug use      Sexual activity:   Yes     Partners:  Male      Comment: monogamous      Other Topics  Concern     Not on file      Social History Narrative     Lives with spouse, in [place].  Patient has been  and remarried.  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  Benny -  does hair    3 sons, Mike - lives in [facility], 5 grandkids          ROS  GEN: -fevers/-chills/-night sweats/-wt change  NEURO: +headaches/-vision changes  EARS: -hearing changes/-tinnitus  NOSE: -drainage/-congestion  MOUTH/THROAT: - sore throat/-dysphagia/-sores  LUNGS: -sob/-cough  CARDIOVASCULAR: -cp/-palpitations  GI: -pain/-diarrhea/-constipation/-bloody stools  : -dysuria/-hematuria/-sores/-vaginal discharge or bleeding  ENDOCRINE: -skin or hair changes  HEMATOLOGIC/LYMPHATIC: -swollen nodes  SKIN: -rashes/-lesions/-breast or nipple changes  MSK/RHEUM: -joint pain/-swelling  NEURO: -weakness/-parasthesias  PSYCH: -anhedonia/-depression/-anxiety       EXAM:   /78 (Cuff Site: Right Arm, Position: Sitting, Cuff Size: Adult Regular)  Pulse 72  Temp 96.1  F (35.6  C) (Tympanic)  Resp 18  Ht 1.676 m (5' 6\")  Wt 62.7 kg (138 lb 3 oz)  Breastfeeding? No  BMI 22.3 kg/m2  Estimated body mass index is 22.3 kg/(m^2) as calculated from the following:    Height as of this encounter: 1.676 m (5' 6\").    Weight as of this encounter: 62.7 kg (138 lb 3 oz).      GEN: Vitals " reviewed.  Healthy appearing. Patient is in no acute distress. Cooperative with exam.  HEENT: Normocephalic atraumatic.  Normal external eye, conjunctiva, lids, cornea with no scleral icterus or conjunctival erythema. Pupils equally round. Oropharynx with no erythema or exudates. Dentition adequate.  EACs clear bilat, TM gray with normal landmarks on the left.  Ear tube noted on the right.  NECK: Supple; no thyromegaly or masses noted.  No cervical or supraclavicular lymphadenopathy.  CV: Heart regular in rate and rhythm with no murmur.  Radial and posterior tibial pulses palpable.  LUNGS: Lungs clear to auscultation bilaterally.  Chest rise equal bilaterally.  No accessory muscle use.  ABD:  Soft, nontender, and nondistended.  No rebound. Bowel sounds positive.  : Normal female external genitalia.  Atrophy present.  No vulvar lesion or mass. Cervix visualized, slight erythema around the cervical os.  Cervix is noted to be friable. Pap smear obtained.  BUS wnl.  Ovaries not palpated.  Normal urethra and no bladder mass palpable.    SKIN: Warm and dry to touch.  No rash on face, arms and legs.  EXT: No clubbing or cyanosis.  No peripheral edema.  NEURO: Alert and oriented to person, place, and time.  Cranial nerves II-XII grossly intact with no focal or lateralizing deficits.  Muscle tone normal.  Gait normal. No tremor. Sensation intact to light touch.  MSK: ROM of upper and lower ext symmetric and full.  PSYCH: Affect appropriate. Speech fluent. Answers questions appropriately and thought process normal.       ASSESSMENT AND PLAN:    1. Migraine without status migrainosus, not intractable, unspecified migraine type  - stable.  Medication renewed.  - SUMAtriptan (IMITREX) 100 mg tablet; Take 1 tablet by mouth every 2 hours if needed for Migraine. Max dose: 200mg per 24 hrs.  Dispense: 9 tablet; Refill: 11    2. Chronic insomnia  - stable.  Medication renewed.  - LORazepam (ATIVAN) 1 mg tablet; TAKE 1/2 TO 1 TABLET  BY MOUTH ONCE DAILY AT NIGHT AS NEEDED FOR insomnia; do not take with hydrocodone  Dispense: 30 tablet; Refill: 5    3. Screening for hyperlipidemia  - LIPID PANEL    4. Encounter for screening mammogram for breast cancer  - XR MAMMO BILAT SCREENING; Future    5. High risk medication use  - COMP METABOLIC PANEL    6. Endometrial polyp  - plan for repeat pelvic ultrasound to assess the size of her polyp.  - US PELVIS COMPLETE TA AND TV; Future    7. Cervical high risk HPV (human papillomavirus) test positive  - repeat Pap smear obtained today given HPV positive in the past.  We will contact her with results.  - GYN THIN PREP PAP SCREEN IMAGED; Future  - GYN THIN PREP PAP SCREEN IMAGED    8. Kidney cyst  - I will plan to talk with radiology about whether repeat imaging is necessary.  I will contact her with recommendations.            Return in about 1 year (around 1/29/2019) for Annual Exam, and as needed sooner.       TORY MALONE, DO   1/29/2018 8:19 AM    This document was prepared using voice generated softwear. While every attempt was made for accuracy, grammatical errors may exist.

## 2018-02-13 NOTE — NURSING NOTE
Patient Information     Patient Name MRN Alessandra Sampson [id number] Female 1960      Nursing Note by Blanca Arredondo at 2018  1:00 PM     Author:  Blanca Arredondo Service:  (none) Author Type:  (none)     Filed:  2018  1:23 PM Encounter Date:  2018 Status:  Signed     :  Blanca Arredondo            Coming in with a Rash on her back that has been there for two, has white spots in her throat, times 1 1/2 to 2 weeks, also a sore Rt ear  Blanca Arredondo ....................  2018   1:03 PM

## 2018-02-13 NOTE — PROGRESS NOTES
Patient Information     Patient Name MRN Sex Alessandra Saravia [id number] Female 1960      Progress Notes by Abena Adkins DO at 2018  7:40 AM     Author:  Abena Adkins DO Service:  (none) Author Type:  PHYS- Osteopathic     Filed:  2018  8:29 AM Encounter Date:  2018 Status:  Signed     :  Abena Adkins DO (PHYS- Osteopathic)            Please see H&P from same date.

## 2018-02-13 NOTE — ADDENDUM NOTE
Patient Information     Patient Name MRN Alessandra Sampson [id number] Female 1960      Addendum Note by Edith Cross at 2018  9:44 AM     Author:  Edith Cross Service:  (none) Author Type:  (none)     Filed:  2018  9:44 AM Encounter Date:  2018 Status:  Signed     :  Edith Cross       Addended by: EDITH CROSS on: 2018 09:44 AM        Modules accepted: Orders

## 2018-02-13 NOTE — NURSING NOTE
Patient Information     Patient Name MRN Sex Alessandra Saravia [id number] Female 1960      Nursing Note by Tati Garnett at 2018  7:40 AM     Author:  Tati Garnett Service:  (none) Author Type:  (none)     Filed:  2018  7:49 AM Encounter Date:  2018 Status:  Signed     :  Tati Garnett            Patient presents to clinic for physical and annual exam.    Tati Garnett LPN..................2018  7:45 AM

## 2018-02-13 NOTE — PATIENT INSTRUCTIONS
Patient Information     Patient Name MRN Alessandra Sampson [id number] Female 1960      Patient Instructions by Abena Adkins DO at 2018  1:00 PM     Author:  Abena Adkins DO Service:  (none) Author Type:  PHYS- Osteopathic     Filed:  2018  1:26 PM Encounter Date:  2018 Status:  Signed     :  Abena Adkins DO (PHYS- Osteopathic)               Index Related topics   Dermatitis: Atopic (Eczema)   ________________________________________________________________________  KEY POINTS    Dermatitis is irritation and swelling of the skin. Atopic means that you have inherited the risk for allergic skin conditions. Atopic dermatitis may also be called eczema.    You may need ointment for the skin or medicine by mouth to treat the itching or inflammation.    Avoid the things that you know will make your skin rash worse, such as wearing tight or scratchy clothing, or certain foods or medicines.  ________________________________________________________________________  What is atopic dermatitis?  Dermatitis is irritation and swelling (inflammation) of the skin. Atopic means that you have inherited the risk for allergic conditions such as skin rashes, hay fever, and asthma.  Atopic dermatitis may also be called eczema. Eczema is a common skin problem that usually starts in childhood or early adulthood. It s a chronic disease, which means you will likely have it all of your life.    What is the cause?  The immune system is your body s defense against infection. When you have atopic dermatitis, your immune system may react when you are exposed to certain things such as:    Allergies, for example to a food or medicine    Hot baths or showers    Soap    Scratchy or tight clothing    Quick temperature or humidity changes    Stress  Eczema may always be present or may flare-up only in certain seasons. It often gets worse in the winter when indoor air can be very dry.  An allergy to dust mites may  make eczema worse. Dust mites are very tiny bugs that you cannot see without a microscope. They live in mattresses, pillows, carpet, and upholstered furniture.    What are the symptoms?  Most people with eczema have a mild form. Symptoms may include:    Itching    Dryness    Fine scales or flaking    Redness  Any area of skin may be affected, but the most common areas are:    Behind the knees    The inside of the elbows    On the side of the neck    Around the eyes and ears  Severe eczema causes intense itching. The skin is usually very sensitive to being touched. Even a light touch, like when your hair blows across your face, may cause itching. Many people who have severe eczema are quite sensitive to scratchy fabrics, especially wool.    How is it diagnosed?  Your healthcare provider will ask about your symptoms and medical history and examine your skin.  Your provider may recommend testing for an allergy to dust mites.    How is it treated?  Treatment cannot cure eczema, but it can help lessen your symptoms and prevent eczema from getting worse. It may help to use a moisturizer. Or it may go away if you put 1% hydrocortisone cream on the area up to 4 times a day. No prescription is needed for this cream.  For more severe eczema, your healthcare provider may prescribe:    Cream or ointment to stop the itching and other symptoms    Antihistamine pills to help stop itching and any allergic reaction. Do not put antihistamine creams or lotions on your skin if you are taking antihistamine pills.    Anti-inflammatory medicine, such as prednisone, if your symptoms are severe  If tests show that you are allergic to dust mites, your provider may recommend that you try to get rid of any dust mites in your home. In some cases, allergy shots for dust mites may be helpful.  Your healthcare provider will recommend how often you should bathe or shower and which soaps and moisturizers you should use. This helps keep your skin  from getting too dry.    How can I take care of myself?  Follow the full course of treatment prescribed by your healthcare provider. In addition:    Avoid scratching your skin even though it itches. Scratching may break the skin and cause infection.    Take short baths or warm showers no more than once a day. Use a mild moisturizing soap or nonsoap cleanser. Avoid long, hot baths. Hot water can increase itching. Pat your skin dry with a soft towel. Do not rub your skin dry. Use moisturizing cream or lotion when your skin is still a little wet.    Use unscented moisturizing creams or ointments, rather than water-based lotions. Moisturize your skin regularly, several times a day, if possible. Ask your healthcare provider or pharmacist for recommendations.    Avoid the things that you know will make your skin rash worse, such as wearing tight or scratchy clothing, or certain foods or medicines.    Choose soft cotton fabrics for your clothing.    Avoid humidity and sudden changes in temperature when possible.    Avoid getting too hot (over-heating).    Wash clothes and bedding in mild soap and hot water. Rinse your laundry twice to get rid of all the soap. Avoid fabric softener liquids and dryer sheets.    Try to get rid of dust mites in your home. Use anti-allergy covers on bed pillows and mattresses. Wash bedding every week or two. You may want to get rid of [id number] carpets and draperies that catch dust and cannot be damp-wiped or laundered.    Learn to manage stress. Ask for help at home and work when the load is too great to handle. Find ways to relax. For example take up a hobby, listen to music, watch movies, or take walks. Try yoga, meditation, or deep breathing exercises when you feel stressed.  Ask your provider:    How long it will take to recover    If there are activities you should avoid and when you can return to your normal activities    How to take care of yourself at home    What symptoms or  problems you should watch for and what to do if you have them  Make sure you know when you should come back for a checkup. Keep all appointments for provider visits or tests.    You can get more information from:    National Eczema Association  369.820.6106  https://nationaleczema.org  Developed by [employer].  Adult Advisor 2017.2 published by [employer].  Last modified: 2016-10-18  Last reviewed: 2015-08-31  This content is reviewed periodically and is subject to change as new health information becomes available. The information is intended to inform and educate and is not a replacement for medical evaluation, advice, diagnosis or treatment by a healthcare professional.  References   Adult Advisor 2017.2 Index    Copyright   2017 [employer], a division of [employer] All rights reserved.

## 2018-02-15 ENCOUNTER — AMBULATORY - GICH (OUTPATIENT)
Dept: FAMILY MEDICINE | Facility: OTHER | Age: 58
End: 2018-02-15

## 2018-02-19 ENCOUNTER — HOSPITAL ENCOUNTER (OUTPATIENT)
Dept: ULTRASOUND IMAGING | Facility: OTHER | Age: 58
Discharge: HOME | End: 2018-02-19
Attending: INTERNAL MEDICINE | Admitting: INTERNAL MEDICINE
Payer: COMMERCIAL

## 2018-02-19 ENCOUNTER — HOSPITAL ENCOUNTER (OUTPATIENT)
Dept: MAMMOGRAPHY | Facility: OTHER | Age: 58
End: 2018-02-19
Attending: INTERNAL MEDICINE
Payer: COMMERCIAL

## 2018-02-19 DIAGNOSIS — N84.0 ENDOMETRIAL POLYP: ICD-10-CM

## 2018-02-19 DIAGNOSIS — Z12.39 SCREENING BREAST EXAMINATION: ICD-10-CM

## 2018-02-19 PROCEDURE — 77067 SCR MAMMO BI INCL CAD: CPT

## 2018-02-19 PROCEDURE — 76856 US EXAM PELVIC COMPLETE: CPT

## 2018-02-20 ENCOUNTER — DOCUMENTATION ONLY (OUTPATIENT)
Dept: FAMILY MEDICINE | Facility: OTHER | Age: 58
End: 2018-02-20

## 2018-02-20 RX ORDER — SUMATRIPTAN 100 MG/1
1 TABLET, FILM COATED ORAL
COMMUNITY
Start: 2018-01-29 | End: 2019-01-31

## 2018-02-20 RX ORDER — TRIAMCINOLONE ACETONIDE 1 MG/G
CREAM TOPICAL 3 TIMES DAILY
COMMUNITY
Start: 2018-01-19 | End: 2018-03-07

## 2018-02-20 RX ORDER — HYDROCODONE BITARTRATE AND ACETAMINOPHEN 5; 325 MG/1; MG/1
1 TABLET ORAL 3 TIMES DAILY PRN
COMMUNITY
Start: 2018-01-19 | End: 2018-04-25

## 2018-02-20 RX ORDER — LORAZEPAM 1 MG/1
.5-1 TABLET ORAL
COMMUNITY
Start: 2018-01-29 | End: 2018-03-07

## 2018-03-07 ENCOUNTER — OFFICE VISIT (OUTPATIENT)
Dept: OBGYN | Facility: OTHER | Age: 58
End: 2018-03-07
Attending: OBSTETRICS & GYNECOLOGY
Payer: COMMERCIAL

## 2018-03-07 VITALS
DIASTOLIC BLOOD PRESSURE: 86 MMHG | SYSTOLIC BLOOD PRESSURE: 146 MMHG | HEART RATE: 68 BPM | WEIGHT: 139 LBS | BODY MASS INDEX: 22.44 KG/M2

## 2018-03-07 DIAGNOSIS — R87.810 CERVICAL HIGH RISK HPV (HUMAN PAPILLOMAVIRUS) TEST POSITIVE: Primary | ICD-10-CM

## 2018-03-07 DIAGNOSIS — Z01.812 PRE-PROCEDURE LAB EXAM: ICD-10-CM

## 2018-03-07 LAB — HCG UR QL: NEGATIVE

## 2018-03-07 PROCEDURE — 88305 TISSUE EXAM BY PATHOLOGIST: CPT

## 2018-03-07 PROCEDURE — 57456 ENDOCERV CURETTAGE W/SCOPE: CPT | Performed by: OBSTETRICS & GYNECOLOGY

## 2018-03-07 PROCEDURE — 81025 URINE PREGNANCY TEST: CPT | Performed by: OBSTETRICS & GYNECOLOGY

## 2018-03-07 RX ORDER — NORTRIPTYLINE HCL 10 MG
CAPSULE ORAL
COMMUNITY
Start: 2018-02-01 | End: 2018-03-07

## 2018-03-07 RX ORDER — GABAPENTIN 300 MG/1
CAPSULE ORAL
COMMUNITY
Start: 2017-12-05 | End: 2018-03-07

## 2018-03-07 RX ORDER — AMITRIPTYLINE HYDROCHLORIDE 10 MG/1
TABLET ORAL
COMMUNITY
Start: 2018-02-20 | End: 2018-11-15

## 2018-03-07 ASSESSMENT — PAIN SCALES - GENERAL: PAINLEVEL: NO PAIN (0)

## 2018-03-07 NOTE — NURSING NOTE
Patient presents today for a colposcopy.  Veronica Jones LPN  3/7/2018  1:49 PM           Prior to the start of the procedure and with procedural staff participation, I verbally confirmed the patient s identity using two indicators, relevant allergies, that the procedure was appropriate and matched the consent or emergent situation, and that the correct equipment/implants were available. Immediately prior to starting the procedure I conducted the Time Out with the procedural staff and re-confirmed the patient s name, procedure, and site/side. (The Joint Commission universal protocol was followed.)  Yes    Sedation (Moderate or Deep): None

## 2018-03-07 NOTE — PROGRESS NOTES
"Colposcopy Procedure Note    57 year old  female presents for colposcopy due to pap smear on 18 showing NILM but +HPV. She had pap 2017 that was also NILM, HPV+.    Cervical Cancer Risk Factors:  - Smoking: denies  - Sexual partners:  monogamous  - Immunosuppression: denies  - HPV vaccination status: not done    Time Out - \"Pause for the Cause\"  Just before the procedure began the following was verified:    Initials   Patient Name    Alessandra Aiken    Patient Date of Birth 1960    Procedure to be performed  Colposcopy   Procedure for colposcopy and biopsy has been explained to the patient. Consent:  Written consent signed and scanned into medical record.    Procedure:  Speculum placed in vagina and excellent visualization of cervix  Achieved. Acetic acid applied to the cervix and no acetowhite changes visualized. No areas of abnormal vascularity. Cervix then swabbed with Lugol's solution with diffusely decreased uptake consistent with atrophy. Endocervical curettage performed and cells collected with a cytobrush. SCJ not completely visualized.      Assessment/Plan:   Ms. Alessandra Aiken is a 57 year old  with Normal exam without visible pathology.    -Specimens sent to pathology  -Will base further treatment on pathology findings.  -Post biopsy instructions given to patient    Ashleigh Fregoso MD  OB/GYN  3/7/2018 2:57 PM      "

## 2018-03-07 NOTE — LETTER
"3/7/2018       RE: Alessandra Aiken  [address]  [facility] [address]     Dear Colleague,    Thank you for referring your patient, Alessandra Aiken, to the [facility] at [facility]. Please see a copy of my visit note below.    Colposcopy Procedure Note    57 year old  female presents for colposcopy due to pap smear on 18 showing NILM but +HPV. She had pap 2017 that was also NILM, HPV+.    Cervical Cancer Risk Factors:  - Smoking: denies  - Sexual partners:  monogamous  - Immunosuppression: denies  - HPV vaccination status: not done    Time Out - \"Pause for the Cause\"  Just before the procedure began the following was verified:    Initials   Patient Name    Alessandra Aiken    Patient Date of Birth 1960    Procedure to be performed  Colposcopy   Procedure for colposcopy and biopsy has been explained to the patient. Consent:  Written consent signed and scanned into medical record.    Procedure:  Speculum placed in vagina and excellent visualization of cervix  Achieved. Acetic acid applied to the cervix and no acetowhite changes visualized. No areas of abnormal vascularity. Cervix then swabbed with Lugol's solution with diffusely decreased uptake consistent with atrophy. Endocervical curettage performed and cells collected with a cytobrush. SCJ not completely visualized.      Assessment/Plan:   Ms. Alessandra Aiken is a 57 year old  with Normal exam without visible pathology.    -Specimens sent to pathology  -Will base further treatment on pathology findings.  -Post biopsy instructions given to patient    Again, thank you for allowing me to participate in the care of your patient.      Sincerely,    Ashleigh Fregoso MD      "

## 2018-03-07 NOTE — MR AVS SNAPSHOT
After Visit Summary   3/7/2018    Alessandra Aiken    MRN: [id number]           Patient Information     Date Of Birth          1960        Visit Information        Provider Department      3/7/2018 1:45 PM Ashleigh Fregoso MD;  OB ROOM [facility]        Today's Diagnoses     Cervical high risk HPV (human papillomavirus) test positive    -  1    Pre-procedure lab exam           Follow-ups after your visit        Your next 10 appointments already scheduled     Mar 09, 2018  3:00 PM CST   (Arrive by 2:45 PM)   NEW FACIAL PAIN with Yehuda Cardoza DDS   [facility] ([facility])    [address] [place] [id number] [phone]            Mar 09, 2018  3:00 PM CST   (Arrive by 2:45 PM)   RETURN FACIAL PAIN with Candida Alvarado MD   [facility] ([facility])    [address] [place] [address] [phone]              Who to contact     If you have questions or need follow up information about today's clinic visit or your schedule please contact [facility] directly at [phone].  Normal or non-critical lab and imaging results will be communicated to you by OndaViahart, letter or phone within 4 business days after the clinic has received the results. If you do not hear from us within 7 days, please contact the clinic through OndaViahart or phone. If you have a critical or abnormal lab result, we will notify you by phone as soon as possible.  Submit refill requests through InteKrin or call your pharmacy and they will forward the refill request to us. Please allow 3 business days for your refill to be completed.          Additional Information About Your Visit        OndaViahart Information     InteKrin gives you secure access to your electronic health record. If you see a primary care provider, you can also send messages to  your care team and make appointments. If you have questions, please call your primary care clinic.  If you do not have a primary care provider, please call [phone] and they will assist you.        Care EveryWhere ID     This is your Care EveryWhere ID. This could be used by other organizations to access your [place] medical records  [id number]        Your Vitals Were     Pulse Last Period BMI (Body Mass Index)             68 03/07/2003 22.44 kg/m2          Blood Pressure from Last 3 Encounters:   03/07/18 146/86   01/29/18 114/78   01/19/18 124/64    Weight from Last 3 Encounters:   03/07/18 63 kg (139 lb)   01/29/18 62.7 kg (138 lb 3 oz)   01/19/18 63.4 kg (139 lb 11.2 oz)              We Performed the Following     COLP VAGINA W CERVIX IF PRES W BIOPSY     HCG qualitative urine     Surgical pathology exam        Primary Care Provider Office Phone # Fax #    Abena FRANCISCO DO Lucille [phone]       [address] [facility] [address]        Equal Access to Services     [facility]: Hadii aad ku hadasho Soomaali, waaxda luqadaha, qaybta kaalmada adeegyaclarissa, glo rodriguez . So [facility] [phone].    ATENCIÓN: Si habla español, tiene a john disposición servicios gratuitos de asistencia lingüística. Llame al [phone].    We comply with applicable federal civil rights laws and [place] laws. We do not discriminate on the basis of race, color, national origin, age, disability, sex, sexual orientation, or gender identity.            Thank you!     Thank you for choosing [facility]  for your care. Our goal is always to provide you with excellent care. Hearing back from our patients is one way we can continue to improve our services. Please take a few minutes to complete the written survey that you may receive in the mail after your visit with us. Thank you!             Your Updated Medication List - Protect others around you: Learn how to safely  use, store and throw away your medicines at www.disposemymeds.org.          This list is accurate as of 3/7/18  3:01 PM.  Always use your most recent med list.                   Brand Name Dispense Instructions for use Diagnosis    amitriptyline 10 MG tablet    ELAVIL          HYDROcodone-acetaminophen 5-325 MG per tablet    NORCO     Take 1 tablet by mouth 3 times daily as needed for pain . Max acetaminophen dose: 4000 mg in 24 hrs.        SUMAtriptan 100 MG tablet    IMITREX     Take 1 tablet by mouth every 2 hours as needed for migraine . maxillary dose: 200mg per 24 hrs.

## 2018-03-09 ENCOUNTER — OFFICE VISIT (OUTPATIENT)
Dept: OTOLARYNGOLOGY | Facility: CLINIC | Age: 58
End: 2018-03-09
Payer: COMMERCIAL

## 2018-03-09 ENCOUNTER — APPOINTMENT (OUTPATIENT)
Dept: OTOLARYNGOLOGY | Facility: CLINIC | Age: 58
End: 2018-03-09
Payer: COMMERCIAL

## 2018-03-09 VITALS — HEIGHT: 66 IN | WEIGHT: 139 LBS | BODY MASS INDEX: 22.34 KG/M2

## 2018-03-09 DIAGNOSIS — H92.01 RIGHT EAR PAIN: Primary | ICD-10-CM

## 2018-03-09 RX ORDER — TRIAMCINOLONE ACETONIDE 1 MG/G
CREAM TOPICAL
Refills: 1 | COMMUNITY
Start: 2018-01-19 | End: 2018-04-25 | Stop reason: ALTCHOICE

## 2018-03-09 RX ORDER — LORAZEPAM 1 MG/1
TABLET ORAL
Refills: 2 | COMMUNITY
Start: 2018-01-09 | End: 2018-08-21

## 2018-03-09 ASSESSMENT — PAIN SCALES - GENERAL: PAINLEVEL: MILD PAIN (3)

## 2018-03-09 NOTE — MR AVS SNAPSHOT
"              After Visit Summary   3/9/2018    Alessandra Aiken    MRN: [id number]           Patient Information     Date Of Birth          1960        Visit Information        Provider Department      3/9/2018 3:00 PM Candida Alvarado MD [facility]        Today's Diagnoses     Right ear pain    -  1       Follow-ups after your visit        Who to contact     Please call your clinic at [phone] to:    Ask questions about your health    Make or cancel appointments    Discuss your medicines    Learn about your test results    Speak to your doctor            Additional Information About Your Visit        MyChart Information     RealMatch gives you secure access to your electronic health record. If you see a primary care provider, you can also send messages to your care team and make appointments. If you have questions, please call your primary care clinic.  If you do not have a primary care provider, please call [phone] and they will assist you.      RealMatch is an electronic gateway that provides easy, online access to your medical records. With RealMatch, you can request a clinic appointment, read your test results, renew a prescription or communicate with your care team.     To access your existing account, please contact your [facility] or call [phone] for assistance.        Care EveryWhere ID     This is your Care EveryWhere ID. This could be used by other organizations to access your [place] medical records  [id number]        Your Vitals Were     Height Last Period BMI (Body Mass Index)             1.676 m (5' 5.98\") 03/07/2003 22.45 kg/m2          Blood Pressure from Last 3 Encounters:   03/07/18 146/86   01/29/18 114/78   01/19/18 124/64    Weight from Last 3 Encounters:   03/09/18 63 kg (139 lb)   03/07/18 63 kg (139 lb)   01/29/18 62.7 kg (138 lb 3 oz)              Today, you had the following     No orders found for display       Primary Care " Provider Office Phone # Fax #    Abena Adkins [phone] [address]   [facility] [address]        Equal Access to Services     ARCADIO SCHWAB : Venkatesh masters tobi Sonestor, waglennyda luqadaha, qaybta kaalmada vanessa, glo manjarrez royalmana batista laTracyleticia chavez. So [facility] [phone].    ATENCIÓN: Si habla español, tiene a john disposición servicios gratuitos de asistencia lingüística. Llame al [phone].    We comply with applicable federal civil rights laws and [place] laws. We do not discriminate on the basis of race, color, national origin, age, disability, sex, sexual orientation, or gender identity.            Thank you!     Thank you for choosing [facility] E[facility]SE AND THROAT  for your care. Our goal is always to provide you with excellent care. Hearing back from our patients is one way we can continue to improve our services. Please take a few minutes to complete the written survey that you may receive in the mail after your visit with us. Thank you!             Your Updated Medication List - Protect others around you: Learn how to safely use, store and throw away your medicines at www.disposemymeds.org.          This list is accurate as of 3/9/18 11:59 PM.  Always use your most recent med list.                   Brand Name Dispense Instructions for use Diagnosis    amitriptyline 10 MG tablet    ELAVIL          HYDROcodone-acetaminophen 5-325 MG per tablet    NORCO     Take 1 tablet by mouth 3 times daily as needed for pain . Max acetaminophen dose: 4000 mg in 24 hrs.        LORazepam 1 MG tablet    ATIVAN     TAKE 1/2-1 TABLET BY MOUTH ONCE DAILY AT NIGHT AS NEEDED FOR insomnia DO not TAKE WITH hydrocodone        SUMAtriptan 100 MG tablet    IMITREX     Take 1 tablet by mouth every 2 hours as needed for migraine . maxillary dose: 200mg per 24 hrs.        triamcinolone 0.1 % cream    KENALOG     Apply topically to affected area(s) 3 times daily.

## 2018-03-09 NOTE — LETTER
3/9/2018       RE: Alessandra Aiken  [address] [facility] [address]     Dear Colleague,    Thank you for referring your patient, Alessandra Aiken, to the [facility] E[facility] at [facility]. Please see a copy of my visit note below.    Alessandra has seen me for R ear pain.  I referred her to Dr. Cardoza who evaluated for TMD and has started trial of medical therapy.  She is still concerned about the tube that was placed 3 years ago and what is causing her ear pain.  Imaging and exam have not revealed the cause.      Exam:  R ear examined under the operating microscope, PET is out, sitting on TM.  This is removed using an alligator forceps.  TM is intact and normal.    I am unable to determine the cause of her R ear pain.  She will continue to work with Dr. Cardoza on trials of medical therapy.  Her previous sinus disease does not appear to be contributing to the pain in her R ear.  I will see her as needed.    Again, thank you for allowing me to participate in the care of your patient.      Sincerely,    Candida Alvarado MD

## 2018-03-09 NOTE — NURSING NOTE
Chief Complaint   Patient presents with     RECHECK     facial pain     Cintia Mcghee Medical Assistant

## 2018-03-10 NOTE — PROGRESS NOTES
Alessandra has seen me for R ear pain.  I referred her to Dr. Cardoza who evaluated for TMD and has started trial of medical therapy.  She is still concerned about the tube that was placed 3 years ago and what is causing her ear pain.  Imaging and exam have not revealed the cause.      Exam:  R ear examined under the operating microscope, PET is out, sitting on TM.  This is removed using an alligator forceps.  TM is intact and normal.    I am unable to determine the cause of her R ear pain.  She will continue to work with Dr. Cardoza on trials of medical therapy.  Her previous sinus disease does not appear to be contributing to the pain in her R ear.  I will see her as needed.

## 2018-04-18 ENCOUNTER — OFFICE VISIT (OUTPATIENT)
Dept: OBGYN | Facility: OTHER | Age: 58
End: 2018-04-18
Attending: OBSTETRICS & GYNECOLOGY
Payer: COMMERCIAL

## 2018-04-18 VITALS
BODY MASS INDEX: 22.45 KG/M2 | HEART RATE: 72 BPM | DIASTOLIC BLOOD PRESSURE: 86 MMHG | SYSTOLIC BLOOD PRESSURE: 124 MMHG | WEIGHT: 139 LBS

## 2018-04-18 DIAGNOSIS — N87.9 CERVICAL DYSPLASIA: Primary | ICD-10-CM

## 2018-04-18 DIAGNOSIS — Z01.812 PRE-OPERATIVE LABORATORY EXAMINATION: ICD-10-CM

## 2018-04-18 LAB — HCG UR QL: NEGATIVE

## 2018-04-18 PROCEDURE — 88307 TISSUE EXAM BY PATHOLOGIST: CPT | Performed by: OBSTETRICS & GYNECOLOGY

## 2018-04-18 PROCEDURE — 81025 URINE PREGNANCY TEST: CPT | Performed by: OBSTETRICS & GYNECOLOGY

## 2018-04-18 PROCEDURE — 57460 BX OF CERVIX W/SCOPE LEEP: CPT | Performed by: OBSTETRICS & GYNECOLOGY

## 2018-04-18 ASSESSMENT — PAIN SCALES - GENERAL: PAINLEVEL: NO PAIN (0)

## 2018-04-18 NOTE — MR AVS SNAPSHOT
After Visit Summary   4/18/2018    Alessandra Aiken    MRN: [id number]           Patient Information     Date Of Birth          1960        Visit Information        Provider Department      4/18/2018 8:15 AM Ashleigh Fregoso MD;  OB ROOM [facility]        Today's Diagnoses     Cervical dysplasia    -  1    Pre-operative laboratory examination           Follow-ups after your visit        Who to contact     If you have questions or need follow up information about today's clinic visit or your schedule please contact [facility] directly at [phone].  Normal or non-critical lab and imaging results will be communicated to you by Colecticahart, letter or phone within 4 business days after the clinic has received the results. If you do not hear from us within 7 days, please contact the clinic through NeuroVistat or phone. If you have a critical or abnormal lab result, we will notify you by phone as soon as possible.  Submit refill requests through A4 Data or call your pharmacy and they will forward the refill request to us. Please allow 3 business days for your refill to be completed.          Additional Information About Your Visit        MyChart Information     A4 Data gives you secure access to your electronic health record. If you see a primary care provider, you can also send messages to your care team and make appointments. If you have questions, please call your primary care clinic.  If you do not have a primary care provider, please call [phone] and they will assist you.        Care EveryWhere ID     This is your Care EveryWhere ID. This could be used by other organizations to access your [place] medical records  [id number]        Your Vitals Were     Pulse Last Period BMI (Body Mass Index)             72 03/07/2003 22.45 kg/m2          Blood Pressure from Last 3 Encounters:   04/18/18 124/86   03/07/18 146/86   01/29/18 114/78    Weight from Last  3 Encounters:   04/18/18 63 kg (139 lb)   03/09/18 63 kg (139 lb)   03/07/18 63 kg (139 lb)              We Performed the Following     COLP CERVIX/UPPER VAGINA W LOOP ELEC BX CERVIX     HCG qualitative urine     Surgical pathology exam        Primary Care Provider Office Phone # Fax #    Abena Adkins [phone] [address] [facility] [address]        Equal Access to Services     SPARKLE SCHWAB : Hadii aad ku hadasho Soomaali, waaxda luqadaha, qaybta kaalmada adeegyada, waxay idiin hayaan adeeg kharash lajoss . So [facility] [phone].    ATENCIÓN: Si darcy rodarte, tiene a john disposición servicios gratuitos de asistencia lingüística. Llame al [phone].    We comply with applicable federal civil rights laws and [place] laws. We do not discriminate on the basis of race, color, national origin, age, disability, sex, sexual orientation, or gender identity.            Thank you!     Thank you for choosing [facility]  for your care. Our goal is always to provide you with excellent care. Hearing back from our patients is one way we can continue to improve our services. Please take a few minutes to complete the written survey that you may receive in the mail after your visit with us. Thank you!             Your Updated Medication List - Protect others around you: Learn how to safely use, store and throw away your medicines at www.disposemymeds.org.          This list is accurate as of 4/18/18  9:01 AM.  Always use your most recent med list.                   Brand Name Dispense Instructions for use Diagnosis    amitriptyline 10 MG tablet    ELAVIL          HYDROcodone-acetaminophen 5-325 MG per tablet    NORCO     Take 1 tablet by mouth 3 times daily as needed for pain . Max acetaminophen dose: 4000 mg in 24 hrs.        LORazepam 1 MG tablet    ATIVAN     TAKE 1/2-1 TABLET BY MOUTH ONCE DAILY AT NIGHT AS NEEDED FOR insomnia DO not TAKE WITH hydrocodone         SUMAtriptan 100 MG tablet    IMITREX     Take 1 tablet by mouth every 2 hours as needed for migraine . maxillary dose: 200mg per 24 hrs.        triamcinolone 0.1 % cream    KENALOG     Apply topically to affected area(s) 3 times daily.

## 2018-04-18 NOTE — PROGRESS NOTES
LEEP NOTE    Menstrual History:  Menstrual History 3/7/2018   LAST MENSTRUAL PERIOD 3/7/2003     Indication/History:  MAICOL II-III on ECC    Consent: Verbal and written consent obtained, prior to sedative, discussed risks and benefits of treatment including damage to surrounding tissues, increased risk of  labor, bleeding, infection, and pain and no treatment including risk of dysplasia progression.    UPT: Negative    Colposcopy findings: Atrophic vagina and cervix. no acetowhite changes. Diffusely decreased uptake of Lugol's. no abnormal vascularity.    Anesthesia: 1% Lidocaine with EPI in radial pattern: 20 mL    Intracervical block  Prep: Lugol's solution    EBL:  5 mL   Complications:  none    LOOP size/type: 15x12 mm  Post LEEP ECC was not done    Bleeding controlled with ball tipped cautery and Monsel's solution.    Pathology:  Jar #1:  Anterior LEEP  Jar #2:  Posterior LEEP  Jar #3:  Top hat    Follow up: pending results of LEEP  Discharge Instructions provided    Ashleigh Fregoso MD  OB/GYN  2018 8:58 AM

## 2018-04-18 NOTE — LETTER
----- Message from Vinay Centeno LPN sent at 1/13/2017  2:45 PM CST -----  Contact: Pt       ----- Message -----     From: Megan Jaffe     Sent: 1/13/2017   1:38 PM       To: Chhaya SANDOVAL Staff    Pt called and stated she needed to speak to the nurse. She stated [place] and needs to see the doctor the following week. She can be reached at [phone].    Thanks,  TF    2018       RE: Alessandra Aiken  [address]   [facility] [address]     Dear Colleague,    Thank you for referring your patient, Alessandra Aiken, to the [facility] at [facility]. Please see a copy of my visit note below.    LEEP NOTE    Menstrual History:  Menstrual History 3/7/2018   LAST MENSTRUAL PERIOD 3/7/2003     Indication/History:  MAICOL II-III on ECC    Consent: Verbal and written consent obtained, prior to sedative, discussed risks and benefits of treatment including damage to surrounding tissues, increased risk of  labor, bleeding, infection, and pain and no treatment including risk of dysplasia progression.    UPT: Negative    Colposcopy findings: Atrophic vagina and cervix. no acetowhite changes. Diffusely decreased uptake of Lugol's. no abnormal vascularity.    Anesthesia: 1% Lidocaine with EPI in radial pattern: 20 mL    Intracervical block  Prep: Lugol's solution    EBL:  5 mL   Complications:  none    LOOP size/type: 15x12 mm  Post LEEP ECC was not done    Bleeding controlled with ball tipped cautery and Monsel's solution.    Pathology:  Jar #1:  Anterior LEEP  Jar #2:  Posterior LEEP  Jar #3:  Top hat    Follow up: pending results of LEEP  Discharge Instructions provided    Ashleigh Fregoso MD  OB/GYN  2018 8:58 AM

## 2018-04-18 NOTE — NURSING NOTE
Patient presents today to have a LEEP procedure done.  Veronica Jones LPN  4/18/2018  8:18 AM

## 2018-04-20 ENCOUNTER — MYC MEDICAL ADVICE (OUTPATIENT)
Dept: OBGYN | Facility: OTHER | Age: 58
End: 2018-04-20

## 2018-04-23 ENCOUNTER — TELEPHONE (OUTPATIENT)
Dept: INTERNAL MEDICINE | Facility: OTHER | Age: 58
End: 2018-04-23

## 2018-04-23 ENCOUNTER — MYC MEDICAL ADVICE (OUTPATIENT)
Dept: INTERNAL MEDICINE | Facility: OTHER | Age: 58
End: 2018-04-23

## 2018-04-25 ENCOUNTER — OFFICE VISIT (OUTPATIENT)
Dept: INTERNAL MEDICINE | Facility: OTHER | Age: 58
End: 2018-04-25
Attending: INTERNAL MEDICINE
Payer: COMMERCIAL

## 2018-04-25 VITALS
BODY MASS INDEX: 22.82 KG/M2 | RESPIRATION RATE: 16 BRPM | DIASTOLIC BLOOD PRESSURE: 84 MMHG | WEIGHT: 142 LBS | SYSTOLIC BLOOD PRESSURE: 130 MMHG | HEART RATE: 80 BPM | HEIGHT: 66 IN | TEMPERATURE: 98.3 F

## 2018-04-25 DIAGNOSIS — R22.9 SKIN NODULE: ICD-10-CM

## 2018-04-25 DIAGNOSIS — R87.613 HSIL (HIGH GRADE SQUAMOUS INTRAEPITHELIAL LESION) ON PAP SMEAR OF CERVIX: ICD-10-CM

## 2018-04-25 DIAGNOSIS — N93.9 VAGINAL BLEEDING: ICD-10-CM

## 2018-04-25 DIAGNOSIS — R21 SKIN RASH: Primary | ICD-10-CM

## 2018-04-25 PROCEDURE — 99214 OFFICE O/P EST MOD 30 MIN: CPT | Performed by: INTERNAL MEDICINE

## 2018-04-25 RX ORDER — TRIAMCINOLONE ACETONIDE 5 MG/G
CREAM TOPICAL
Qty: 30 G | Refills: 1 | Status: SHIPPED | OUTPATIENT
Start: 2018-04-25 | End: 2018-07-06

## 2018-04-25 ASSESSMENT — PAIN SCALES - GENERAL: PAINLEVEL: NO PAIN (0)

## 2018-04-25 NOTE — PROGRESS NOTES
Chief Complaint   Patient presents with     Follow Up For     rash on back, right side, right leg shin          Subjective:   Ms. Aiken is a 57 year old female  seen for the acute concern today of continued rash.    She reports that the rash that has been present on her back and chest is unchanged.  She has tried triamcinolone cream 0.1% and does not feel that this has helped.  Additionally she does have a spot on her anterior left leg.  This 1 is nonpruritic, tender and larger overall.  This is been going on since mid December.    Additionally she did have a LEEP procedure last week with gynecology.  It did show high-grade squamous intraepithelial lesion.  This was removed completely on biopsy and follow-up is recommended in 1 year.  This morning she did work out and afterward had a gush of bright red blood.  She has had some additional discharge off and on since then.  She does have a little bit of pain in the right lower quadrant but otherwise no discomfort.  She has not had any increased discharge although does continue to have some discoloration to her vaginal mucus which has been there since procedure.  She has not had any pruritus with this.  She is curious about further follow-up of this in the long run and does indicate she may be interested in hysterectomy.    She  reports that she has never smoked. She has never used smokeless tobacco.    Past medical history reviewed as below:     Past Medical History:   Diagnosis Date     Allergic rhinitis      Essential (primary) hypertension      Migraine without status migrainosus, not intractable      Otalgia of right ear      Pregnancy     G3, P3 - all NSVDs     Psychophysiologic insomnia      Tubulo-interstitial nephritis     12/4/2014,Right     Urinary tract infection     12/4/2014,right pyelo, mod right hydro, 2 mm right renal stone   .      ROS:   Pertinent  ROS was performed and was negative, including for fevers, chills, increased lower extremity edema,  "changes in bowel or bladder, blood in the stool.  No other concerns, with exception of HPI above.      Objective:    /84 (BP Location: Right arm, Patient Position: Sitting, Cuff Size: Adult Regular)  Pulse 80  Temp 98.3  F (36.8  C) (Tympanic)  Resp 16  Ht 5' 6\" (1.676 m)  Wt 142 lb (64.4 kg)  LMP 03/07/2003  Breastfeeding? No  BMI 22.92 kg/m2  GEN: Vitals reviewed.  Patient is in no acute distress. Cooperative with exam.  HEENT: Normocephalic atraumatic.  Pupils equally round.  No scleral icterus, no conjunctival erythema.   LUNGS: Chest rise equal bilaterally.  No accessory muscle use.  SKIN: Warm and dry to touch.   Papular rash noted on the back.  It is relatively intermittent.  Additional rash noted between the breast and the chest.  One nodule approximately 5-8 mm across on the left shin, tender to palpation.  EXT: No clubbing or cyanosis.  No peripheral edema.  : Normal female external genitalia.  No vulvar lesion or mass. Cervix visualized and residual changes from recent LEEP are noted.  No active bleeding.        Assessment/Plan:   1. Skin rash  -We will increase the concentration of her steroid cream.  She is to continue with this regularly.  If she does continue to have symptoms I would recommend that we consider referral to dermatology or biopsy.  - triamcinolone (KENALOG) 0.5 % cream; Apply sparingly to affected area three times daily.  Dispense: 30 g; Refill: 1    2. Skin nodule  -Although this may be related to her above skin rash there is concern for erythema nodosum.  If she does continue to have this despite treatment we will plan to biopsy the area.    3. Vaginal bleeding  -Appears to be resolved at this time.  If she does have recurrent episodes she was encouraged to follow-up with gynecology.  She was encouraged to decrease her activity level slightly for the next week or so.    4. HSIL (high grade squamous intraepithelial lesion) on Pap smear of cervix  -Given her interest in " possibly pursuing hysterectomy I will talk with gynecology regarding this.         TORY MALONE DO   4/25/2018 1:40 PM    This document was prepared using voice generated softwear. While every attempt was made for accuracy, grammatical errors may exist.

## 2018-04-25 NOTE — MR AVS SNAPSHOT
"              After Visit Summary   4/25/2018    Alessandra Aiken    MRN: [id number]           Patient Information     Date Of Birth          1960        Visit Information        Provider Department      4/25/2018 1:00 PM Abena Adkins DO [facility]        Today's Diagnoses     Skin rash    -  1    Skin nodule        Vaginal bleeding           Follow-ups after your visit        Follow-up notes from your care team     Return if symptoms worsen or fail to improve.      Who to contact     If you have questions or need follow up information about today's clinic visit or your schedule please contact [facility] directly at [phone].  Normal or non-critical lab and imaging results will be communicated to you by [employer], letter or phone within 4 business days after the clinic has received the results. If you do not hear from us within 7 days, please contact the clinic through [employer] or phone. If you have a critical or abnormal lab result, we will notify you by phone as soon as possible.  Submit refill requests through [facility] [employer] or call your pharmacy and they will forward the refill request to us. Please allow 3 business days for your refill to be completed.          Additional Information About Your Visit        MyChart Information     [facility] gives you secure access to your electronic health record. If you see a primary care provider, you can also send messages to your care team and make appointments. If you have questions, please call your primary care clinic.  If you do not have a primary care provider, please call [phone] and they will assist you.        Care EveryWhere ID     This is your Care EveryWhere ID. This could be used by other organizations to access your [facility] medical records  [id number]        Your Vitals Were     Pulse Temperature Respirations Height Last Period Breastfeeding?    80 98.3  F (36.8  C) (Tympanic) 16 5' 6\" (1.676 m) 03/07/2003 No    " BMI (Body Mass Index)                   22.92 kg/m2            Blood Pressure from Last 3 Encounters:   04/25/18 130/84   04/18/18 124/86   03/07/18 146/86    Weight from Last 3 Encounters:   04/25/18 142 lb (64.4 kg)   04/18/18 139 lb (63 kg)   03/09/18 139 lb (63 kg)              Today, you had the following     No orders found for display         Today's Medication Changes          These changes are accurate as of 4/25/18  1:34 PM.  If you have any questions, ask your nurse or doctor.               Start taking these medicines.        Dose/Directions    triamcinolone 0.5 % cream   Commonly known as:  KENALOG   Used for:  Skin rash   Replaces:  triamcinolone 0.1 % cream   Started by:  Abena Adkins DO        Apply sparingly to affected area three times daily.   Quantity:  30 g   Refills:  1         Stop taking these medicines if you haven't already. Please contact your care team if you have questions.     HYDROcodone-acetaminophen 5-325 MG per tablet   Commonly known as:  NORCO   Stopped by:  Abena Adkins DO           triamcinolone 0.1 % cream   Commonly known as:  KENALOG   Replaced by:  triamcinolone 0.5 % cream   Stopped by:  Abena Adkins DO                Where to get your medicines      These medications were sent to [facility], - [place] - [address]  [address] [facility] [address]     Phone:  [phone]     triamcinolone 0.5 % cream                Primary Care Provider Office Phone # Fax #    Abena Adkins [phone]       [address] [facility] [address]        Equal Access to Services     [facility] : Hadii pia britton Sonestor, waaxda luqadaha, qaybta kaalmada glo mendez. So [facility] [phone].    ATENCIÓN: Si habla español, tiene a john disposición servicios gratuitos de asistencia lingüística. Jennifer al [phone].    We comply with applicable federal civil rights laws  and [place] laws. We do not discriminate on the basis of race, color, national origin, age, disability, sex, sexual orientation, or gender identity.            Thank you!     Thank you for choosing [facility]  for your care. Our goal is always to provide you with excellent care. Hearing back from our patients is one way we can continue to improve our services. Please take a few minutes to complete the written survey that you may receive in the mail after your visit with us. Thank you!             Your Updated Medication List - Protect others around you: Learn how to safely use, store and throw away your medicines at www.disposemymeds.org.          This list is accurate as of 4/25/18  1:34 PM.  Always use your most recent med list.                   Brand Name Dispense Instructions for use Diagnosis    amitriptyline 10 MG tablet    ELAVIL          LORazepam 1 MG tablet    ATIVAN     TAKE 1/2-1 TABLET BY MOUTH ONCE DAILY AT NIGHT AS NEEDED FOR insomnia DO not TAKE WITH hydrocodone        SUMAtriptan 100 MG tablet    IMITREX     Take 1 tablet by mouth every 2 hours as needed for migraine . maxillary dose: 200mg per 24 hrs.        triamcinolone 0.5 % cream    KENALOG    30 g    Apply sparingly to affected area three times daily.    Skin rash

## 2018-04-25 NOTE — NURSING NOTE
Patient presents to clinic for follow up with rash on right side, back and right leg shin.  Also, she would like to discuss results for Cervical Dysplasia procedure on 04/18/18.    Tati Garnett LPN............4/25/2018 1:04 PM

## 2018-05-02 ENCOUNTER — MYC MEDICAL ADVICE (OUTPATIENT)
Dept: INTERNAL MEDICINE | Facility: OTHER | Age: 58
End: 2018-05-02
Payer: COMMERCIAL

## 2018-05-02 DIAGNOSIS — R30.0 DYSURIA: Primary | ICD-10-CM

## 2018-05-03 ENCOUNTER — MYC MEDICAL ADVICE (OUTPATIENT)
Dept: INTERNAL MEDICINE | Facility: OTHER | Age: 58
End: 2018-05-03

## 2018-05-03 DIAGNOSIS — R30.0 DYSURIA: ICD-10-CM

## 2018-05-03 LAB
ALBUMIN UR-MCNC: NEGATIVE MG/DL
APPEARANCE UR: CLEAR
BACTERIA #/AREA URNS HPF: ABNORMAL /HPF
BILIRUB UR QL STRIP: NEGATIVE
COLOR UR AUTO: YELLOW
GLUCOSE UR STRIP-MCNC: NEGATIVE MG/DL
HGB UR QL STRIP: ABNORMAL
KETONES UR STRIP-MCNC: NEGATIVE MG/DL
LEUKOCYTE ESTERASE UR QL STRIP: ABNORMAL
NITRATE UR QL: NEGATIVE
NON-SQ EPI CELLS #/AREA URNS LPF: ABNORMAL /LPF
PH UR STRIP: 5.5 PH (ref 5–7)
RBC #/AREA URNS AUTO: ABNORMAL /HPF
SOURCE: ABNORMAL
SP GR UR STRIP: <1.005 (ref 1–1.03)
UROBILINOGEN UR STRIP-ACNC: 0.2 EU/DL (ref 0.2–1)
WBC #/AREA URNS AUTO: ABNORMAL /HPF

## 2018-05-03 PROCEDURE — 87086 URINE CULTURE/COLONY COUNT: CPT | Performed by: INTERNAL MEDICINE

## 2018-05-03 PROCEDURE — 87088 URINE BACTERIA CULTURE: CPT | Performed by: INTERNAL MEDICINE

## 2018-05-03 PROCEDURE — 81001 URINALYSIS AUTO W/SCOPE: CPT | Performed by: INTERNAL MEDICINE

## 2018-05-04 NOTE — TELEPHONE ENCOUNTER
Patient states she is still bleeding fresh red blood since her LEEP procedure. Also reports cramping on the right side. She has been needing to change her pad about twice per day. She denies feeling weak, having a fever, or any foul vaginal discharge. Patient offered and accepted appointment for Wednesday at 8:15 AM.    Afshan Dixon RN...................5/4/2018 10:21 AM

## 2018-05-05 LAB
BACTERIA SPEC CULT: ABNORMAL
SPECIMEN SOURCE: ABNORMAL

## 2018-05-09 ENCOUNTER — OFFICE VISIT (OUTPATIENT)
Dept: OBGYN | Facility: OTHER | Age: 58
End: 2018-05-09
Attending: OBSTETRICS & GYNECOLOGY
Payer: COMMERCIAL

## 2018-05-09 VITALS
DIASTOLIC BLOOD PRESSURE: 84 MMHG | WEIGHT: 137.13 LBS | HEART RATE: 64 BPM | SYSTOLIC BLOOD PRESSURE: 124 MMHG | BODY MASS INDEX: 22.13 KG/M2 | TEMPERATURE: 97.8 F

## 2018-05-09 DIAGNOSIS — R51.9 FACIAL PAIN: Primary | ICD-10-CM

## 2018-05-09 DIAGNOSIS — N87.9 CERVICAL DYSPLASIA: Primary | ICD-10-CM

## 2018-05-09 DIAGNOSIS — R30.0 DYSURIA: ICD-10-CM

## 2018-05-09 PROCEDURE — 99214 OFFICE O/P EST MOD 30 MIN: CPT | Performed by: OBSTETRICS & GYNECOLOGY

## 2018-05-09 RX ORDER — NITROFURANTOIN 25; 75 MG/1; MG/1
100 CAPSULE ORAL 2 TIMES DAILY
Qty: 14 CAPSULE | Refills: 0 | Status: SHIPPED | OUTPATIENT
Start: 2018-05-09 | End: 2018-07-06

## 2018-05-09 ASSESSMENT — PAIN SCALES - GENERAL: PAINLEVEL: NO PAIN (0)

## 2018-05-09 NOTE — PROGRESS NOTES
Follow-Up Visit    S: Ms. Alessandra Aiken is a 58 year old  here for bleeding concerns following a LEEP on 18. She reports the bleeding was menstrual-like until 18 and now only occurs when she urinates. She has been having intermittent cramping during this time that is mild. No fevers or chills. No intercourse since LEEP. She is wondering if she can just have a hysterectomy and be done with all of these issues.    O:  /84 (BP Location: Right arm, Patient Position: Sitting, Cuff Size: Adult Regular)  Pulse 64  Temp 97.8  F (36.6  C) (Oral)  Wt 62.2 kg (137 lb 2 oz)  LMP 2003  BMI 22.13 kg/m2  Gen: Well-appearing, NAD  Pulm: nonlabored  Psych: appropriate mood and affect    Pelvic:  Normal appearing external female genitalia. Normal hair distribution. Vagina is without lesions. No discharge or blood in vaginal vault. Few pinpoint areas of bright red oozing on surface of cervix, otherwise healing well post-LEEP. Areas treated with silver nitrate.    A/P:  Ms. Alessandra Aiken is a 58 year old  here for bleeding post-LEEP. Areas that were oozing now treated. Discussed that hysterectomy is not necessarily indicated for high grade dysplasia, especially given this is her first episode.  However, the cervical dysplasia has been very distressing for her and as she no longer is of child-bearing age, she prefers to have it removed. Discussed the risks and benefits of hysterectomy, including risk of bleeding, infection, and injury to surrounding organs. Discussed anticipated hospital stay, recovery period, and postoperative restrictions. Despite having a hysterectomy, she will still require pap smears for 20 years for high grade dysplasia before she could graduate from screening. Patient desires to proceed with hysterectomy, surgical consents signed today. Tentatively scheduled for 18. She will need a preop physical within 30 days of surgery with her PCP.    At the end of today's visit,  she also had complaints of dysuria. Had a recent urine culture that returned 50-100K of E coli, Rx sent for treatment.    25 minutes were spent with the patient with >50% spent counseling on abnormal pap smears, cervical dysplasia, and hysterectomy.    Ashleigh Fregoso MD  OB/GYN  5/9/2018 10:06 AM

## 2018-05-09 NOTE — LETTER
2018       RE: Alesasndra Aiken  [address]  [facility] [address]     Dear Colleague,    Thank you for referring your patient, Alessandra Aiken, to the [facility] at [facility]. Please see a copy of my visit note below.    Follow-Up Visit    S: Ms. Alessandra Aiken is a 58 year old  here for bleeding concerns following a LEEP on 18. She reports the bleeding was menstrual-like until 18 and now only occurs when she urinates. She has been having intermittent cramping during this time that is mild. No fevers or chills. No intercourse since LEEP. She is wondering if she can just have a hysterectomy and be done with all of these issues.    O:  /84 (BP Location: Right arm, Patient Position: Sitting, Cuff Size: Adult Regular)  Pulse 64  Temp 97.8  F (36.6  C) (Oral)  Wt 62.2 kg (137 lb 2 oz)  LMP 2003  BMI 22.13 kg/m2  Gen: Well-appearing, NAD  Pulm: nonlabored  Psych: appropriate mood and affect    Pelvic:  Normal appearing external female genitalia. Normal hair distribution. Vagina is without lesions. No discharge or blood in vaginal vault. Few pinpoint areas of bright red oozing on surface of cervix, otherwise healing well post-LEEP. Areas treated with silver nitrate.    A/P:  Ms. Alessandra Aiken is a 58 year old  here for bleeding post-LEEP. Areas that were oozing now treated. Discussed that hysterectomy is not necessarily indicated for high grade dysplasia, especially given this is her first episode.  However, the cervical dysplasia has been very distressing for her and as she no longer is of child-bearing age, she prefers to have it removed. Discussed the risks and benefits of hysterectomy, including risk of bleeding, infection, and injury to surrounding organs. Discussed anticipated hospital stay, recovery period, and postoperative restrictions. Despite having a hysterectomy, she will still require pap smears for  20 years for high grade dysplasia before she could graduate from screening. Patient desires to proceed with hysterectomy, surgical consents signed today. Tentatively scheduled for 8/9/18. She will need a preop physical within 30 days of surgery with her PCP.    At the end of today's visit, she also had complaints of dysuria. Had a recent urine culture that returned 50-100K of E coli, Rx sent for treatment.    25 minutes were spent with the patient with >50% spent counseling on abnormal pap smears, cervical dysplasia, and hysterectomy.    Ashleigh Fregoso MD  OB/GYN  5/9/2018 10:06 AM

## 2018-05-09 NOTE — NURSING NOTE
Patient presents today as she has been having vaginal bleeding since her LEEP procedure.  Veronica Jones LPN  5/9/2018  8:15 AM

## 2018-05-09 NOTE — MR AVS SNAPSHOT
After Visit Summary   5/9/2018    Alessandra Aiken    MRN: [id number]           Patient Information     Date Of Birth          1960        Visit Information        Provider Department      5/9/2018 8:15 AM Ashleigh Fregoso MD [facility]        Today's Diagnoses     Cervical dysplasia    -  1    Dysuria           Follow-ups after your visit        Your next 10 appointments already scheduled     Jul 27, 2018  2:00 PM CDT   Office Visit with Abena Adkins DO   [facility]    [address]  [place] [address]   [phone]           Bring a current list of meds and any records pertaining to this visit. For Physicals, please bring immunization records and any forms needing to be filled out. Please arrive 10 minutes early to complete paperwork.            Aug 24, 2018  8:45 AM CDT   SHORT with Ashleigh Fregoso MD   [facility]    [address]  [place] [address]   [phone]            Sep 21, 2018  3:30 PM CDT   Office Visit with Ashleigh Fregoso MD   [facility]    [address]  [place] [address]   [phone]           Bring a current list of meds and any records pertaining to this visit. For Physicals, please bring immunization records and any forms needing to be filled out. Please arrive 10 minutes early to complete paperwork.              Who to contact     If you have questions or need follow up information about today's clinic visit or your schedule please contact [facility] directly at [phone].  Normal or non-critical lab and imaging results will be communicated to you by MyChart, letter or phone within 4 business days after the clinic has received the results. If you do not hear from us within 7 days, please contact the clinic  through [employer] or phone. If you have a critical or abnormal lab result, we will notify you by phone as soon as possible.  Submit refill requests through [employer] or call your pharmacy and they will forward the refill request to us. Please allow 3 business days for your refill to be completed.          Additional Information About Your Visit        [email or web address] Information     [employer] gives you secure access to your electronic health record. If you see a primary care provider, you can also send messages to your care team and make appointments. If you have questions, please call your primary care clinic.  If you do not have a primary care provider, please call [phone] and they will assist you.        Care EveryWhere ID     This is your Care EveryWhere ID. This could be used by other organizations to access your [facility] medical records  [id number]        Your Vitals Were     Pulse Temperature Last Period BMI (Body Mass Index)          64 97.8  F (36.6  C) (Oral) 03/07/2003 22.13 kg/m2         Blood Pressure from Last 3 Encounters:   05/09/18 124/84   04/25/18 130/84   04/18/18 124/86    Weight from Last 3 Encounters:   05/09/18 62.2 kg (137 lb 2 oz)   04/25/18 64.4 kg (142 lb)   04/18/18 63 kg (139 lb)              Today, you had the following     No orders found for display         Today's Medication Changes          These changes are accurate as of 5/9/18 10:15 AM.  If you have any questions, ask your nurse or doctor.               Start taking these medicines.        Dose/Directions    nitroFURantoin (macrocrystal-monohydrate) 100 MG capsule   Commonly known as:  MACROBID   Used for:  Dysuria   Started by:  Ashleigh Fregoso MD        Dose:  100 mg   Take 1 capsule (100 mg) by mouth 2 times daily   Quantity:  14 capsule   Refills:  0            Where to get your medicines      These medications were sent to [facility], - [place], [place] - [address]  [address], [place] [address]     Phone:  [phone]     nitroFURantoin (macrocrystal-monohydrate) 100 MG capsule                Primary Care Provider Office Phone # Fax #    Abena Adkins [phone]       [address]  [place] [address]        Equal Access to Services     [facility] : Hadii aad ku hadasho Soomaali, waaxda luqadaha, qaybta kaalmada adeegyada, waxay idiin hayaan ademana robynwilla rodriguez . So [facility] [phone].    ATENCIÓN: Si habla español, tiene a john disposición servicios gratuitos de asistencia lingüística. Llame al [phone].    We comply with applicable federal civil rights laws and [place] laws. We do not discriminate on the basis of race, color, national origin, age, disability, sex, sexual orientation, or gender identity.            Thank you!     Thank you for choosing [facility]  for your care. Our goal is always to provide you with excellent care. Hearing back from our patients is one way we can continue to improve our services. Please take a few minutes to complete the written survey that you may receive in the mail after your visit with us. Thank you!             Your Updated Medication List - Protect others around you: Learn how to safely use, store and throw away your medicines at www.disposemymeds.org.          This list is accurate as of 5/9/18 10:15 AM.  Always use your most recent med list.                   Brand Name Dispense Instructions for use Diagnosis    amitriptyline 10 MG tablet    ELAVIL          LORazepam 1 MG tablet    ATIVAN     TAKE 1/2-1 TABLET BY MOUTH ONCE DAILY AT NIGHT AS NEEDED FOR insomnia DO not TAKE WITH hydrocodone        nitroFURantoin (macrocrystal-monohydrate) 100 MG capsule    MACROBID    14 capsule    Take 1 capsule (100 mg) by mouth 2 times daily    Dysuria       SUMAtriptan 100 MG tablet    IMITREX     Take 1 tablet by mouth every 2 hours as needed for migraine . maxillary dose: 200mg per 24 hrs.         triamcinolone 0.5 % cream    KENALOG    30 g    Apply sparingly to affected area three times daily.    Skin rash

## 2018-05-09 NOTE — NURSING NOTE
Date of Surgery: 8/9/18  Type of Surgery: Total Vaginal Hysterectomy, Bilateral Salpingectomy, Cystoscopy  Surgeon: Dr. Ashleigh Fregoso MD     Patient's consents were signed and appropriate appointments were scheduled by the [facility] [address] . Patient was given surgical folder. Surgical forms were faxed to [phone] and [phone], copies made and kept for informative purposes, and originals were delivered to Day-surgery. Patient denies questions at this time.    Patient will have pre-op exam with her PCP.    Afshan Dixon RN...................5/9/2018 8:54 AM

## 2018-05-10 ENCOUNTER — TELEPHONE (OUTPATIENT)
Dept: OTOLARYNGOLOGY | Facility: CLINIC | Age: 58
End: 2018-05-10

## 2018-05-15 ENCOUNTER — HOSPITAL ENCOUNTER (OUTPATIENT)
Dept: CT IMAGING | Facility: OTHER | Age: 58
Discharge: HOME | End: 2018-05-15
Attending: OTOLARYNGOLOGY | Admitting: OTOLARYNGOLOGY
Payer: COMMERCIAL

## 2018-05-15 DIAGNOSIS — R51.9 FACIAL PAIN: ICD-10-CM

## 2018-05-15 PROCEDURE — 70486 CT MAXILLOFACIAL W/O DYE: CPT

## 2018-05-16 ENCOUNTER — CARE COORDINATION (OUTPATIENT)
Dept: OTOLARYNGOLOGY | Facility: CLINIC | Age: 58
End: 2018-05-16

## 2018-05-29 ENCOUNTER — MYC MEDICAL ADVICE (OUTPATIENT)
Dept: INTERNAL MEDICINE | Facility: OTHER | Age: 58
End: 2018-05-29

## 2018-05-31 ENCOUNTER — MYC MEDICAL ADVICE (OUTPATIENT)
Dept: INTERNAL MEDICINE | Facility: OTHER | Age: 58
End: 2018-05-31

## 2018-05-31 NOTE — TELEPHONE ENCOUNTER
Please call patient and let her know that I can get her in on Friday at 340 or Monday at 140 for a biopsy of the area.

## 2018-06-01 NOTE — TELEPHONE ENCOUNTER
Called patient. She can come in on Monday at 1:40.    Milagro Mahajan LPN on 6/1/2018 at 8:42 AM

## 2018-06-04 ENCOUNTER — HOSPITAL ENCOUNTER (OUTPATIENT)
Dept: GENERAL RADIOLOGY | Facility: OTHER | Age: 58
Discharge: HOME | End: 2018-06-04
Attending: INTERNAL MEDICINE | Admitting: INTERNAL MEDICINE
Payer: COMMERCIAL

## 2018-06-04 ENCOUNTER — HOSPITAL ENCOUNTER (OUTPATIENT)
Dept: GENERAL RADIOLOGY | Facility: OTHER | Age: 58
End: 2018-06-04
Attending: INTERNAL MEDICINE
Payer: COMMERCIAL

## 2018-06-04 ENCOUNTER — OFFICE VISIT (OUTPATIENT)
Dept: FAMILY MEDICINE | Facility: OTHER | Age: 58
End: 2018-06-04
Attending: NURSE PRACTITIONER
Payer: COMMERCIAL

## 2018-06-04 ENCOUNTER — OFFICE VISIT (OUTPATIENT)
Dept: INTERNAL MEDICINE | Facility: OTHER | Age: 58
End: 2018-06-04
Attending: INTERNAL MEDICINE
Payer: COMMERCIAL

## 2018-06-04 VITALS
HEART RATE: 64 BPM | BODY MASS INDEX: 23.08 KG/M2 | SYSTOLIC BLOOD PRESSURE: 128 MMHG | DIASTOLIC BLOOD PRESSURE: 88 MMHG | WEIGHT: 143 LBS

## 2018-06-04 VITALS
TEMPERATURE: 98.9 F | HEART RATE: 90 BPM | WEIGHT: 142 LBS | BODY MASS INDEX: 22.92 KG/M2 | SYSTOLIC BLOOD PRESSURE: 144 MMHG | DIASTOLIC BLOOD PRESSURE: 84 MMHG | RESPIRATION RATE: 18 BRPM

## 2018-06-04 DIAGNOSIS — R22.9 SKIN NODULE: ICD-10-CM

## 2018-06-04 DIAGNOSIS — S81.802A OPEN WOUND OF LEFT LOWER EXTREMITY, INITIAL ENCOUNTER: Primary | ICD-10-CM

## 2018-06-04 DIAGNOSIS — M79.662 PAIN OF LEFT LOWER LEG: ICD-10-CM

## 2018-06-04 DIAGNOSIS — R22.9 SKIN NODULE: Primary | ICD-10-CM

## 2018-06-04 LAB
CRP SERPL-MCNC: 0 MG/L
ERYTHROCYTE [SEDIMENTATION RATE] IN BLOOD BY WESTERGREN METHOD: 7 MM/H (ref 1–15)

## 2018-06-04 PROCEDURE — 73590 X-RAY EXAM OF LOWER LEG: CPT | Mod: LT

## 2018-06-04 PROCEDURE — 99213 OFFICE O/P EST LOW 20 MIN: CPT | Performed by: NURSE PRACTITIONER

## 2018-06-04 PROCEDURE — 99214 OFFICE O/P EST MOD 30 MIN: CPT | Mod: 25 | Performed by: INTERNAL MEDICINE

## 2018-06-04 PROCEDURE — 11100 HC BIOPSY SKIN/SUBQ/MUC MEM, SINGLE LESION: CPT | Performed by: INTERNAL MEDICINE

## 2018-06-04 PROCEDURE — 86140 C-REACTIVE PROTEIN: CPT | Performed by: INTERNAL MEDICINE

## 2018-06-04 PROCEDURE — 36415 COLL VENOUS BLD VENIPUNCTURE: CPT | Performed by: INTERNAL MEDICINE

## 2018-06-04 PROCEDURE — 88305 TISSUE EXAM BY PATHOLOGIST: CPT

## 2018-06-04 PROCEDURE — 85652 RBC SED RATE AUTOMATED: CPT | Performed by: INTERNAL MEDICINE

## 2018-06-04 PROCEDURE — 71046 X-RAY EXAM CHEST 2 VIEWS: CPT

## 2018-06-04 RX ORDER — TRAMADOL HYDROCHLORIDE 50 MG/1
50 TABLET ORAL EVERY 6 HOURS PRN
Qty: 10 TABLET | Refills: 0 | Status: SHIPPED | OUTPATIENT
Start: 2018-06-04 | End: 2018-06-22

## 2018-06-04 ASSESSMENT — PAIN SCALES - GENERAL
PAINLEVEL: EXTREME PAIN (8)
PAINLEVEL: SEVERE PAIN (6)

## 2018-06-04 NOTE — PATIENT INSTRUCTIONS
General:  - Keep biopsy area protected from excessive sun exposure.   - Use sunscreen.   - Wear light-colored clothing to protect the area. Can also consider use of button bandages for next 6-12 months when outdoors, as the area will be more sun sensitive.  - Call if you notice any increase in bleeding, redness or pain over the next several days    Wash daily with warm soapy water and pat dry.

## 2018-06-04 NOTE — NURSING NOTE
LET GEL ordered by Lauren Albarado NP .  [facility]   Lot # [id number]  Exp. 10/05/2018  NDC [id number]  Patient tolerated well. Let gel applied to wound area and covered with tegaderm.   Stacia Valentine LPN............. June 4, 2018 6:38 PM

## 2018-06-04 NOTE — PROGRESS NOTES
Nursing Notes:   Stacia Valentine, LPN  6/4/2018  6:39 PM  Signed  Patient presents to the clinic for bleeding from her biopsy site. States earlier today she had a punch biopsy to her left shin. States it was bleeding so bad earlier, it filled her shoe. Has been having to constantly changed bandgages.   Stacia Valentine LPN............. June 4, 2018 5:55 PM     Stacia Valentine, LPN  6/4/2018  6:39 PM  Signed  LET GEL ordered by Lauren Albarado NP .  Medication administered per order in [facility]   Lot # [id number]  Exp. 10/05/2018  NDC [id number]  Patient tolerated well. Let gel applied to wound area and covered with tegaderm.   Stacia Valentine LPN............. June 4, 2018 6:38 PM     Stacia Valentine, LPN  6/4/2018  7:07 PM  Signed  LIDOCAINE ordered by Lauren Albarado NP .  Medication administered per order in [employer]   Lot # [id number]  Exp. 02/2022  NDC 72730-545-54   Patient tolerated well.  Stacia Valentine LPN............. June 4, 2018 7:07 PM     SUBJECTIVE:   Alessandra Aiken is a 58 year old female who presents to clinic today for the following health issues:    Surgical wound that will not quit bleeding.  She saw her primary today and had a punch biopsy done on a lesion on her leg.  She states that all afternoon and all evening here and has been bleeding.  She cannot get it to stop.  She denies aspirin use, denies heavy  alcohol use, at home she states that she has been elevating, putting pressure on it but nothing has worked.      Problem list and histories reviewed & adjusted, as indicated.  Additional history: as documented    Patient Active Problem List   Diagnosis     Aftercare following surgery of the musculoskeletal system     Chronic sinusitis     Chronic insomnia     Health care maintenance     Hypertension     Migraine without aura     Affections of shoulder region     Pelvic pain in female     Recurrent sinusitis     Shoulder pain     Past Surgical History:   Procedure  Laterality Date     ARTHROSCOPY SHOULDER      07/2009     COLONOSCOPY      6/5/12,Sigmoid diverticulosis; folllow up 10 years     ENDOSCOPIC SINUS SURGERY      repeat clearing     LEEP TX, CERVICAL  04/18/2018    Dr Fregoso     MYRINGOTOMY, INSERT TUBE, COMBINED      09/2016     OTHER SURGICAL HISTORY      2011/2016,[id number],OTHER, Right endoscopic sphenoidotomy for mycetoma removal at [place]~6/2012       Social History   Substance Use Topics     Smoking status: Never Smoker     Smokeless tobacco: Never Used     Alcohol use No      Comment: Alcoholic Drinks/day: very rarely     Family History   Problem Relation Age of Onset     Family History Negative Father      Good Health     Family History Negative Mother      Good Health     HEART DISEASE Paternal Grandfather      Heart Disease,MI at 65     Family History Negative Brother      Good Health     Family History Negative Brother      Good Health     Family History Negative Brother      Good Health     Other - See Comments No family hx of      History is negative for diabetes, thyroid problems, cancer, anxiety, depression and asthma     Breast Cancer No family hx of      Cancer-breast         Current Outpatient Prescriptions   Medication Sig Dispense Refill     traMADol (ULTRAM) 50 MG tablet Take 1 tablet (50 mg) by mouth every 6 hours as needed for severe pain 10 tablet 0     amitriptyline (ELAVIL) 10 MG tablet        LORazepam (ATIVAN) 1 MG tablet TAKE 1/2-1 TABLET BY MOUTH ONCE DAILY AT NIGHT AS NEEDED FOR insomnia DO not TAKE WITH hydrocodone  2     nitroFURantoin, macrocrystal-monohydrate, (MACROBID) 100 MG capsule Take 1 capsule (100 mg) by mouth 2 times daily 14 capsule 0     SUMAtriptan (IMITREX) 100 MG tablet Take 1 tablet by mouth every 2 hours as needed for migraine . maxillary dose: 200mg per 24 hrs.       triamcinolone (KENALOG) 0.5 % cream Apply sparingly to affected area three times daily. 30 g 1     Allergies   Allergen Reactions     Fentanyl Anxiety and  Itching     Patient describes feeling creepy/crawly to Dr. Saenz.          ROS:  Notable findings in the HPI.       OBJECTIVE:     /84 (BP Location: Left arm, Patient Position: Sitting, Cuff Size: Adult Regular)  Pulse 90  Temp 98.9  F (37.2  C) (Tympanic)  Resp 18  Wt 142 lb (64.4 kg)  LMP 03/07/2003  Breastfeeding? No  BMI 22.92 kg/m2  Body mass index is 22.92 kg/(m^2).  GENERAL: healthy, alert and no distress  EYES: Eyes grossly normal to inspection  RESP: with ease  SKIN: On the left shin there is a 5 mm punch biopsy hole.  It is actively bleeding.    Diagnostic Test Results:  none     ASSESSMENT/PLAN:     1. Open wound of left lower extremity, initial encounter  - traMADol (ULTRAM) 50 MG tablet; Take 1 tablet (50 mg) by mouth every 6 hours as needed for severe pain  Dispense: 10 tablet; Refill: 0    Medical Decision Making:    Differential Diagnosis:  BCC, Hemangioma, SCC. Melanoma      Serious Comorbid Conditions:  Adult:  None    PLAN:    Lesion: At first we attempted to apply let gel using the epinephrine to decrease the bleeding.  She continued to lose around that.  She did not get good anesthesia so we used 2 cc of 1% lidocaine.  The wound was washed up well.  After informed consent was obtained 2 4-0 Ethilon sutures were placed.  Bleeding was managed with pressure.  A large pressure dressing was then applied.  She was told not to remove the dressing for 24 hours.  Sutures out in 10 days.  She was complaining of moderate to severe pain anytime the wound was tested touched, her  was asking if she could have something for pain.  Alessandra agrees she would like to have something for pain at home.  Ultram No. 10 was written.  No refills she is also advised to not take this at the same time as her Ativan.  She understands.  Followup:    If not improving or if condition worsens, follow up with your Primary Care Provider    Disclaimer:  This note consists of words and symbols derived from  keyboarding, dictation, or using voice recognition software. As a result, there may be errors in the script that have gone undetected. Please consider this when interpreting information found in this note.      Lauren Albarado NP, 6/4/2018 5:57 PM

## 2018-06-04 NOTE — NURSING NOTE
Patient presents to the clinic for bleeding from her biopsy site. States earlier today she had a punch biopsy to her left shin. States it was bleeding so bad earlier, it filled her shoe. Has been having to constantly changed bandgages.   Stacia Valentine LPN............. June 4, 2018 5:55 PM

## 2018-06-04 NOTE — PROGRESS NOTES
"Chief Complaint   Patient presents with     Derm Problem     Painful spot on leg         HPI: Ms. Aiken is a 58 year old female who presents today for follow up of skin nodule.  This has been going on for the last 3 months.  She does feel that overall it has worsened.  She does have pain that extends up the anterior shin on the left leg.  She has poked at the lesion and has not noted any drainage from it.  She has not been having any fevers or chills.  She has never had anything like this prior.  She does however feel that the pain is similar to what she feels in her right ear as she has had ongoing issues with ear pain.  She has not tried any recent topicals on the leg however in the past has tried steroids with no improvement.    She  reports that she has never smoked. She has never used smokeless tobacco.    Past medical history reviewed as below:     Past Medical History:   Diagnosis Date     Allergic rhinitis      Essential (primary) hypertension      High grade squamous intraepithelial cervical dysplasia 01/2018     Migraine without status migrainosus, not intractable      Otalgia of right ear      Pregnancy     G3, P3 - all NSVDs     Psychophysiologic insomnia      Tubulo-interstitial nephritis     12/4/2014,Right     Urinary tract infection     12/4/2014,right pyelo, mod right hydro, 2 mm right renal stone   .      ROS  Pertinent ROS was performed and was negative, including for fever, chills, chest pain, shortness of breath, increased lower extremity edema, changes in bowel or bladder, blood in the stool, difficulty swallowing, sores in the mouth. No other concerns, with exception of HPI above.      EXAM:   /88 (BP Location: Right arm, Patient Position: Sitting, Cuff Size: Adult Regular)  Pulse 64  Wt 143 lb (64.9 kg)  LMP 03/07/2003  BMI 23.08 kg/m2    Estimated body mass index is 23.08 kg/(m^2) as calculated from the following:    Height as of 4/25/18: 5' 6\" (1.676 m).    Weight as of this " encounter: 143 lb (64.9 kg).      GEN: Vitals reviewed. Healthy appearing. Patient is in no acute distress. Cooperative with exam.  HEENT: Normocephalic atraumatic.  Pupils equally round.  No scleral icterus, no conjunctival erythema.   LUNGS: Chest rise equal bilaterally.  No accessory muscle use.  SKIN: Warm and dry to touch.  No rash on face, arms and legs.  Skin lesion is noted on the anterior left shin, approx 7-8mm across with central area of excoriation, no drainage, area extending up leg with tenderness to palpation for 1-2 inches along tibia, no surrounding ecchymosis, erythema, induration or edema.  EXT: No clubbing or cyanosis.  No peripheral edema.  PSYCH: Mood is good.  Affect appropriate. Speech fluent. Answers questions appropriately and thought process normal.    LABS: 6/4/2018 - Personally ordered/reviewed  Results for orders placed or performed in visit on 06/04/18   Sedimentation Rate (ESR)   Result Value Ref Range    Sed Rate 7 1 - 15 mm/h   CRP inflammation   Result Value Ref Range    CRP Inflammation 0.0 <0.5 mg/L        Skin Punch Biopsy procedure note  Indication: Skin nodule  Exam: Above  Number of biopsies: 1  Location 1: Anterior left shin    Anesthesia: 1% lidocaine with epinephrine.    Universal protocol was followed. TIME OUT conducted just prior to starting procedure confirmed patient identity, site/side, procedure, patient position, and availability of correct equipment and implants.  Yes    The procedure, benefits, risks, and alternatives were explained to the patient.  She voiced understanding of the informationand agreed to proceed with the procedure.    The skin overlying the left shin was prepped and draped in the usual manner. Anesthetic was infiltrated in area of biopsy.  A 4 mm punch biopsy was obtained from the superior portion of the lesion.  No suture was necessary as the lesion has minimal to no bleeding.    The patient tolerated the procedure well and there were no  immediate complications.  The wound was dressed with antibiotic ointment and covered in a bandage.    Follow up: The specimen is labelled and sent to pathology for evaluation.       ASSESSMENT AND PLAN:  1. Skin nodule  - etiology is unknown at this time, although it may be erythema nodosum, it does not classically reflect this.  Labs and CxR are obtained to rule this out.  Punch biopsy completed to determine possible etiology.  She will be called with results  - Single Lesion  - XR Chest 2 Views; Future  - XR Tibia & Fibula Left 2 Views; Future  - Surgical pathology exam  - Sedimentation Rate (ESR)  - CRP inflammation    2. Pain of left lower leg  -  Given the pain that is extending up the leg I do have concern for an underlying etiology.  xr was obtained today and reviewed personally, no obvious abnormality seen.  She is to call with any new issues or change  - XR Chest 2 Views; Future  - XR Tibia & Fibula Left 2 Views; Future         Patient Instructions   General:  - Keep biopsy area protected from excessive sun exposure.   - Use sunscreen.   - Wear light-colored clothing to protect the area. Can also consider use of button bandages for next 6-12 months when outdoors, as the area will be more sun sensitive.  - Call if you notice any increase in bleeding, redness or pain over the next several days    Wash daily with warm soapy water and pat dry.             TORY MALONE, DO   6/4/2018 5:30 PM    This document was prepared using voice generated softwear. While every attempt was made for accuracy, grammatical errors may exist.

## 2018-06-04 NOTE — MR AVS SNAPSHOT
After Visit Summary   6/4/2018    Alessandra Aiken    MRN: [id number]           Patient Information     Date Of Birth          1960        Visit Information        Provider Department      6/4/2018 5:30 PM Lauren Albarado NP [facility]        Today's Diagnoses     Open wound of left lower extremity, initial encounter    -  1      Care Instructions    Ultram can not be taken at the same time as the Ativan    Dressing on for 24 hours and do not disturb it unless it is bleeding through    Sutures out in 10 days              Follow-ups after your visit        Your next 10 appointments already scheduled     Jul 27, 2018  2:00 PM CDT   [facility]    [address]  [place] [address]   [phone]           Bring a current list of meds and any records pertaining to this visit. For Physicals, please bring immunization records and any forms needing to be filled out. Please arrive 10 minutes early to complete paperwork.            Aug 09, 2018   Pro[facility] with Ash[facility]regoso MD   [facility]    [address]  [place] [address]   [phone]            Aug 24, 2018  8:45 AM CDT   SHORT with Ashleigh Fregoso MD   [facility]    [address]  [place] [address]   [phone]            Sep 21, 2018  3:30 PM CDT   Office Visit with Ashleigh Fregoso MD   [facility]    [address]  [place] [address]   [phone]           Bring a current list of meds and any records pertaining to this visit. For Physicals, please bring immunization records and any forms needing to be filled out. Please arrive 10 minutes early to complete paperwork.              Future tests that  were ordered for you today     Open Future Orders        Priority Expected Expires Ordered    XR Chest 2 Views Routine 6/4/2018 6/4/2019 6/4/2018    XR Tibia & Fibula Left 2 Views Routine 6/4/2018 6/4/2019 6/4/2018            Who to contact     If you have questions or need follow up information about today's clinic visit or your schedule please contact [facility] directly at [phone].  Normal or non-critical lab and imaging results will be communicated to you by JumpSeathart, letter or phone within 4 business days after the clinic has received the results. If you do not hear from us within 7 days, please contact the clinic through Glints or phone. If you have a critical or abnormal lab result, we will notify you by phone as soon as possible.  Submit refill requests through Gl[employer] or call your pharmacy and they will forward the refill request to us. Please allow 3 business days for your refill to be completed.          Additional Information About Your Visit        Glints Information     Glints gives you secure access to your electronic health record. If you see a primary care provider, you can also send messages to your care team and make appointments. If you have questions, please call your primary care clinic.  If you do not have a primary care provider, please call [phone] and they will assist you.        Care EveryWhere ID     This is your Care EveryWhere ID. This could be used by other organizations to access your [facility] medical records  [id number]        Your Vitals Were     Pulse Temperature Respirations Last Period Breastfeeding? BMI (Body Mass Index)    90 98.9  F (37.2  C) (Tympanic) 18 03/07/2003 No 22.92 kg/m2       Blood Pressure from Last 3 Encounters:   06/04/18 144/84   06/04/18 128/88   05/09/18 124/84    Weight from Last 3 Encounters:   06/04/18 142 lb (64.4 kg)   06/04/18 143 lb (64.9 kg)   05/09/18 137 lb 2 oz (62.2 kg)              Today, you had the following      No orders found for display         Today's Medication Changes          These changes are accurate as of 6/4/18  7:21 PM.  If you have any questions, ask your nurse or doctor.               Start taking these medicines.        Dose/Directions    traMADol 50 MG tablet   Commonly known as:  ULTRAM   Used for:  Open wound of left lower extremity, initial encounter   Started by:  Lauren Albarado NP        Dose:  50 mg   Take 1 tablet (50 mg) by mouth every 6 hours as needed for severe pain   Quantity:  10 tablet   Refills:  0            Where to get your medicines      Some of these will need a paper prescription and others can be bought over the counter.  Ask your nurse if you have questions.     Bring a paper prescription for each of these medications     traMADol 50 MG tablet               Information about OPIOIDS     PRESCRIPTION OPIOIDS: WHAT YOU NEED TO KNOW   You have a prescription for an opioid (narcotic) pain medicine. Opioids can cause addiction. If you have a history of chemical dependency of any type, you are at a higher risk of becoming addicted to opioids. Only take this medicine after all other options have been tried. Take it for as short a time and as few doses as possible.     Do not:    Drive. If you drive while taking these medicines, you could be arrested for driving under the influence (DUI).    Operate heavy machinery    Do any other dangerous activities while taking these medicines.     Drink any alcohol while taking these medicines.      Take with any other medicines that contain acetaminophen. Read all labels carefully. Look for the word  acetaminophen  or  Tylenol.  Ask your pharmacist if you have questions or are unsure.    Store your pills in a secure place, locked if possible. We will not replace any lost or stolen medicine. If you don t finish your medicine, please throw away (dispose) as directed by your pharmacist. The [place] [employer] has more information about  safe disposal: https://www.pca.[facility].us/living-green/managing-unwanted-medications    All opioids tend to cause constipation. Drink plenty of water and eat foods that have a lot of fiber, such as fruits, vegetables, prune juice, apple juice and high-fiber cereal. Take a laxative (Miralax, milk of magnesia, Colace, Senna) if you don t move your bowels at least every other day.          Primary Care Provider Office Phone # Fax #    Abena Adkins,  [phone] [address] [facility] [address]        Equal Access to Services     [facility]: Hadii aad guerrero hadasho Soomaali, waaxda luqadaha, qaybta kaalmada ademanayada, glo rodriguez . So [facility] [phone].    ATENCIÓN: Si habla español, tiene a john disposición servicios gratuitos de asistencia lingüística. Llame al [phone].    We comply with applicable federal civil rights laws and [place] laws. We do not discriminate on the basis of race, color, national origin, age, disability, sex, sexual orientation, or gender identity.            Thank you!     Thank you for choosing [facility] AND [facility]  for your care. Our goal is always to provide you with excellent care. Hearing back from our patients is one way we can continue to improve our services. Please take a few minutes to complete the written survey that you may receive in the mail after your visit with us. Thank you!             Your Updated Medication List - Protect others around you: Learn how to safely use, store and throw away your medicines at www.disposemymeds.org.          This list is accurate as of 6/4/18  7:21 PM.  Always use your most recent med list.                   Brand Name Dispense Instructions for use Diagnosis    amitriptyline 10 MG tablet    ELAVIL          LORazepam 1 MG tablet    ATIVAN     TAKE 1/2-1 TABLET BY MOUTH ONCE DAILY AT NIGHT AS NEEDED FOR insomnia DO not TAKE WITH hydrocodone        nitroFURantoin  (macrocrystal-monohydrate) 100 MG capsule    MACROBID    14 capsule    Take 1 capsule (100 mg) by mouth 2 times daily    Dysuria       SUMAtriptan 100 MG tablet    IMITREX     Take 1 tablet by mouth every 2 hours as needed for migraine . maxillary dose: 200mg per 24 hrs.        traMADol 50 MG tablet    ULTRAM    10 tablet    Take 1 tablet (50 mg) by mouth every 6 hours as needed for severe pain    Open wound of left lower extremity, initial encounter       triamcinolone 0.5 % cream    KENALOG    30 g    Apply sparingly to affected area three times daily.    Skin rash

## 2018-06-04 NOTE — MR AVS SNAPSHOT
After Visit Summary   6/4/2018    Alessandra Aiken    MRN: [id number]           Patient Information     Date Of Birth          1960        Visit Information        Provider Department      6/4/2018 1:40 PM Abena Adkins DO;   [facility]        Today's Diagnoses     Skin nodule    -  1    Pain of left lower leg          Care Instructions    General:  - Keep biopsy area protected from excessive sun exposure.   - Use sunscreen.   - Wear light-colored clothing to protect the area. Can also consider use of button bandages for next 6-12 months when outdoors, as the area will be more sun sensitive.  - Call if you notice any increase in bleeding, redness or pain over the next several days    Wash daily with warm soapy water and pat dry.               Follow-ups after your visit        Your next 10 appointments already scheduled     Jun 04, 2018  2:15 PM CDT   XR TIBIA & FIBULA LEFT 2 VIEWS with [id number]   [facility] ([facility]    [address]  [place] [address] [phone]           Please bring a list of your current medicines to your exam. (Include vitamins, minerals and over-thecounter medicines.) Leave your valuables at home.  Tell your doctor if there is a chance you may be pregnant.  You do not need to do anything special for this exam.            Jul 27, 2018  2:00 PM CDT   Office Visit with Abena Adkins DO   [facility]    [address]  [place] [address] [phone]           Bring a current list of meds and any records pertaining to this visit. For Physicals, please bring immunization records and any forms needing to be filled out. Please arrive 10 minutes early to complete paperwork.            Aug 09, 2018   Procedure with Ashleigh Fregoso MD   [facility]    [address]  [place] [address]   [phone]            Aug 24, 2018  8:45 AM CDT   [facility]oso [facility]    [address]  [place] [address]   [phone]            Sep 21, 2018  3:30 PM CDT   [facility] Visit with Ashleigh Fregoso MD   [facility]    [address]  [place] [address]   [phone]           Bring a current list of meds and any records pertaining to this visit. For Physicals, please bring immunization records and any forms needing to be filled out. Please arrive 10 minutes early to complete paperwork.              Future tests that were ordered for you today     Open Future Orders        Priority Expected Expires Ordered    XR Chest 2 Views Routine 6/4/2018 6/4/2019 6/4/2018    XR Tibia & Fibula Left 2 Views Routine 6/4/2018 6/4/2019 6/4/2018            Who to contact     If you have questions or need follow up information about today's clinic visit or your schedule please contact [facility] directly at [phone].  Normal or non-critical lab and imaging results will be communicated to you by Net[employer] Systemshart, letter or phone within 4 business days after the clinic has received the results. If you do not hear from us within 7 days, please contact the clinic through LoyalBlocks or phone. If you have a critical or abnormal lab result, we will notify you by phone as soon as possible.  Submit refill requests through LoyalBlocks or call your pharmacy and they will forward the refill request to us. Please allow 3 business days for your refill to be completed.          Additional Information About Your Visit        Loyal[employer] Information     [employer] gives you secure access to your electronic health record. If you see a primary care provider, you can also send messages to your care team and make appointments. If you have  questions, please call your primary care clinic.  If you do not have a primary care provider, please call [phone] and they will assist you.        Care EveryWhere ID     This is your Care EveryWhere ID. This could be used by other organizations to access your [place] medical records  [id number]        Your Vitals Were     Pulse Last Period BMI (Body Mass Index)             64 03/07/2003 23.08 kg/m2          Blood Pressure from Last 3 Encounters:   06/04/18 128/88   05/09/18 124/84   04/25/18 130/84    Weight from Last 3 Encounters:   06/04/18 143 lb (64.9 kg)   05/09/18 137 lb 2 oz (62.2 kg)   04/25/18 142 lb (64.4 kg)              We Performed the Following     CRP inflammation     Sedimentation Rate (ESR)     Single Lesion     Surgical pathology exam        Primary Care Provider Office Phone # Fax #    Abena Adkins [phone] [address] [facility] [address]        Equal Access to Services     [facility]: Hadii aad ku hadasho Soomaali, waaxda luqadaha, qaybta kaalmada adeegyada, waxay genein hayleticia rodriguez . So [facility] [phone].    ATENCIÓN: Si habla español, tiene a john disposición servicios gratuitos de asistencia lingüística. Llame al [phone].    We comply with applicable federal civil rights laws and [place] laws. We do not discriminate on the basis of race, color, national origin, age, disability, sex, sexual orientation, or gender identity.            Thank you!     Thank you for choosing [facility]  for your care. Our goal is always to provide you with excellent care. Hearing back from our patients is one way we can continue to improve our services. Please take a few minutes to complete the written survey that you may receive in the mail after your visit with us. Thank you!             Your Updated Medication List - Protect others around you: Learn how to safely use, store and throw away your medicines at  www.disposemymeds.org.          This list is accurate as of 6/4/18  2:12 PM.  Always use your most recent med list.                   Brand Name Dispense Instructions for use Diagnosis    amitriptyline 10 MG tablet    ELAVIL          LORazepam 1 MG tablet    ATIVAN     TAKE 1/2-1 TABLET BY MOUTH ONCE DAILY AT NIGHT AS NEEDED FOR insomnia DO not TAKE WITH hydrocodone        nitroFURantoin (macrocrystal-monohydrate) 100 MG capsule    MACROBID    14 capsule    Take 1 capsule (100 mg) by mouth 2 times daily    Dysuria       SUMAtriptan 100 MG tablet    IMITREX     Take 1 tablet by mouth every 2 hours as needed for migraine . maxillary dose: 200mg per 24 hrs.        triamcinolone 0.5 % cream    KENALOG    30 g    Apply sparingly to affected area three times daily.    Skin rash

## 2018-06-05 NOTE — NURSING NOTE
LIDOCAINE ordered by Lauren Albarado NP .  Medication administered per order in [facility]   Lot # [id number]  Exp. 02/2022  NDC 26969-341-90   Patient tolerated well.  Stacia Valentine LPN............. June 4, 2018 7:07 PM

## 2018-06-05 NOTE — PATIENT INSTRUCTIONS
Ultram can not be taken at the same time as the Ativan    Dressing on for 24 hours and do not disturb it unless it is bleeding through    Sutures out in 10 days

## 2018-06-08 ENCOUNTER — TELEPHONE (OUTPATIENT)
Dept: INTERNAL MEDICINE | Facility: OTHER | Age: 58
End: 2018-06-08

## 2018-06-08 DIAGNOSIS — L98.9 SKIN LESION: Primary | ICD-10-CM

## 2018-06-08 NOTE — TELEPHONE ENCOUNTER
She was called regarding results of her punch biopsy.  She will need excisional biopsy.  Referral placed to general surgery.

## 2018-06-22 ENCOUNTER — OFFICE VISIT (OUTPATIENT)
Dept: SURGERY | Facility: OTHER | Age: 58
End: 2018-06-22
Attending: INTERNAL MEDICINE
Payer: COMMERCIAL

## 2018-06-22 VITALS
BODY MASS INDEX: 22.18 KG/M2 | WEIGHT: 137.4 LBS | SYSTOLIC BLOOD PRESSURE: 132 MMHG | HEART RATE: 80 BPM | DIASTOLIC BLOOD PRESSURE: 84 MMHG

## 2018-06-22 DIAGNOSIS — L98.9 SKIN LESION: Primary | ICD-10-CM

## 2018-06-22 PROCEDURE — 88305 TISSUE EXAM BY PATHOLOGIST: CPT | Performed by: SURGERY

## 2018-06-22 PROCEDURE — 11604 EXC TR-EXT MAL+MARG 3.1-4 CM: CPT | Performed by: SURGERY

## 2018-06-22 ASSESSMENT — PAIN SCALES - GENERAL: PAINLEVEL: MODERATE PAIN (5)

## 2018-06-22 NOTE — MR AVS SNAPSHOT
After Visit Summary   6/22/2018    Alessandra Aiken    MRN: [id number]           Patient Information     Date Of Birth          1960        Visit Information        Provider Department      6/22/2018 8:20 AM Kassandra Diaz MD; [facility] [address] [facility]        Today's Diagnoses     Skin lesion          Care Instructions    Your incision was closed with stitches that will need to be removed. It is ok to remove the dressing and get the incision wet in the shower on the day after your procedure. Don't soak in a tub, pool or lakefor 5 days.If you have concerns, please call.             Follow-ups after your visit        Follow-up notes from your care team     Return in about 2 weeks (around 7/6/2018) for Suture removal.      Your next 10 appointments already scheduled     Jul 27, 2018  2:00 PM CDT   Office Visit with Abena Adkins DO   [facility]    [address]  [place] [address]   [phone]           Bring a current list of meds and any records pertaining to this visit. For Physicals, please bring immunization records and any forms needing to be filled out. Please arrive 10 minutes early to complete paperwork.            Aug 09, 2018   Procedure with Ashleigh Fregoso MD   [facility]    [address]  [facility] [address]   [phone]            Aug 24, 2018  8:45 AM CDT   SHORT with Ashleigh Fregoso MD   [facility]    [address]  [place] [facility] [address]   [phone]            Sep 21, 2018  3:30 PM CDT   Office Visit with Ashleigh Fregoso MD   [facility]    [address]  [place] [address]   [phone]           Bring a current list of meds and any records pertaining to this visit. For  Physicals, please bring immunization records and any forms needing to be filled out. Please arrive 10 minutes early to complete paperwork.              Who to contact     If you have questions or need follow up information about today's clinic visit or your schedule please contact [facility] directly at [phone].  Normal or non-critical lab and imaging results will be communicated to you by MyChart, letter or phone within 4 business days after the clinic has received the results. If you do not hear from us within 7 days, please contact the clinic through Astrum Solarhart or phone. If you have a critical or abnormal lab result, we will notify you by phone as soon as possible.  Submit refill requests through [employer] or call your pharmacy and they will forward the refill request to us. Please allow 3 business days for your refill to be completed.          Additional Information About Your Visit        MyChart Information     [employer] gives you secure access to your electronic health record. If you see a primary care provider, you can also send messages to your care team and make appointments. If you have questions, please call your primary care clinic.  If you do not have a primary care provider, please call [phone] and they will assist you.        Care EveryWhere ID     This is your Care EveryWhere ID. This could be used by other organizations to access your [place] medical records  [id number]        Your Vitals Were     Pulse Last Period Breastfeeding? BMI (Body Mass Index)          80 03/07/2003 No 22.18 kg/m2         Blood Pressure from Last 3 Encounters:   06/22/18 132/84   06/04/18 144/84   06/04/18 128/88    Weight from Last 3 Encounters:   06/22/18 137 lb 6.4 oz (62.3 kg)   06/04/18 142 lb (64.4 kg)   06/04/18 143 lb (64.9 kg)              Today, you had the following     No orders found for display       Primary Care Provider Office Phone # Fax #    Abena Adkins [phone]  [phone]       [address]  [place] [address]        Equal Access to Services     BRITNEYSPARKLE JOSSELIN : Hadii pia masters tobi Valerio, waglennyda lujulianrenny, qaybta kamarcosda royalfrancisca, waxdenis genein hayaasal paigeamna chasidylawrence jennifer chavez. So [facility] [phone].    ATENCIÓN: Si habla español, tiene a john disposición servicios gratuitos de asistencia lingüística. Llame al [phone].    We comply with applicable federal civil rights laws and [place] laws. We do not discriminate on the basis of race, color, national origin, age, disability, sex, sexual orientation, or gender identity.            Thank you!     Thank you for choosing [facility]  for your care. Our goal is always to provide you with excellent care. Hearing back from our patients is one way we can continue to improve our services. Please take a few minutes to complete the written survey that you may receive in the mail after your visit with us. Thank you!             Your Updated Medication List - Protect others around you: Learn how to safely use, store and throw away your medicines at www.disposemymeds.org.          This list is accurate as of 6/22/18  9:00 AM.  Always use your most recent med list.                   Brand Name Dispense Instructions for use Diagnosis    amitriptyline 10 MG tablet    ELAVIL          LORazepam 1 MG tablet    ATIVAN     TAKE 1/2-1 TABLET BY MOUTH ONCE DAILY AT NIGHT AS NEEDED FOR insomnia DO not TAKE WITH hydrocodone        nitroFURantoin (macrocrystal-monohydrate) 100 MG capsule    MACROBID    14 capsule    Take 1 capsule (100 mg) by mouth 2 times daily    Dysuria       SUMAtriptan 100 MG tablet    IMITREX     Take 1 tablet by mouth every 2 hours as needed for migraine . maxillary dose: 200mg per 24 hrs.        triamcinolone 0.5 % cream    KENALOG    30 g    Apply sparingly to affected area three times daily.    Skin rash

## 2018-06-22 NOTE — NURSING NOTE
Patient presents to the clinic today for a lesion removal.      Josue Bermudez LPN 06/22/18 8:18 AM

## 2018-06-22 NOTE — NURSING NOTE
Prior to the start of the procedure and with procedural staff participation, I verbally confirmed the patient s identity using two indicators, relevant allergies, that the procedure was appropriate and matched the consent or emergent situation, and that the correct equipment/implants were available. Immediately prior to starting the procedure I conducted the Time Out with the procedural staff and re-confirmed the patient s name, procedure, and site/side. (The Joint Commission universal protocol was followed.)  Yes    Sedation (Moderate or Deep): None      Josue Bermudez LPN 06/22/18 8:25 AM

## 2018-06-22 NOTE — PROGRESS NOTES
SUBJECTIVE:  58 year old female presents for lesion removal. This lesion has been present for awhile. She had a punch biopsy that showed atypia without a definitive diagnosis.     OBJECTIVE:  1.4 x 1.2 x 0.4 cm scaly, nodule left shin with suture at the 12 o'clock position    ASSESSMENT:  Lesion size: as above  Defect size: lesion and margin :  3.4 x 1.5 x 0.5 cm-intermediate closure    PROCEDURE:  The pathophysiology of skin lesions and skin cancers was discussed with the patient. The risks, benefits and alternatives to excision of the lesion were discussed with the patient, including the risks of infection,scarring, bruising, bleeding and the possible need for further procedures. The patient expressed understanding and wishes to proceed. Informed consent paperwork was completed.    Chloraprep was used to cleanse the skinin the area of the lesion. 1% Lidocaine with epinephrine was infiltrated in the skin and subcutaneous tissue in the area of the lesion. When appropriate anesthesia had been achieved, the lesion was sharply excised with a margin of grossly normal tissue. The skin edges were approximated using 4-0 Vicryl and 4-0 Ethilon. Sterile dressing was applied. The specimen was labelled and sent to pathology for evaluation. The procedure was well tolerated without complications. Patient was given post procedure instructions and denied further questions. We will call the patient with pathology results.

## 2018-07-06 ENCOUNTER — OFFICE VISIT (OUTPATIENT)
Dept: SURGERY | Facility: OTHER | Age: 58
End: 2018-07-06
Attending: SURGERY
Payer: COMMERCIAL

## 2018-07-06 VITALS
WEIGHT: 137 LBS | BODY MASS INDEX: 22.11 KG/M2 | HEART RATE: 64 BPM | TEMPERATURE: 98 F | DIASTOLIC BLOOD PRESSURE: 84 MMHG | SYSTOLIC BLOOD PRESSURE: 118 MMHG

## 2018-07-06 DIAGNOSIS — Z48.3 AFTERCARE FOLLOWING SURGERY FOR NEOPLASM: Primary | ICD-10-CM

## 2018-07-06 PROCEDURE — 99212 OFFICE O/P EST SF 10 MIN: CPT | Performed by: SURGERY

## 2018-07-06 ASSESSMENT — PAIN SCALES - GENERAL: PAINLEVEL: NO PAIN (0)

## 2018-07-06 NOTE — MR AVS SNAPSHOT
After Visit Summary   7/6/2018    Alessandra Aiken    MRN: [id number]           Patient Information     Date Of Birth          1960        Visit Information        Provider Department      7/6/2018 8:20 AM Kassandra Diaz MD [facility]        Today's Diagnoses     Aftercare following surgery for neoplasm    -  1       Follow-ups after your visit        Follow-up notes from your care team     Return if symptoms worsen or fail to improve.      Your next 10 appointments already scheduled     Jul 27, 2018  2:00 PM CDT   Office Visit with Abena Adkins DO   [facility] ([facility])    [address]  [place] [address]   [phone]           Bring a current list of meds and any records pertaining to this visit. For Physicals, please bring immunization records and any forms needing to be filled out. Please arrive 10 minutes early to complete paperwork.            Aug 09, 2018   Procedure with Ashleigh Fregoso MD   [facility] ([facility]    [address]  [place] [address]   [phone]            Aug 24, 2018  8:45 AM CDT   SHORT with Ashleigh Fregoso MD   [facility] ([facility])    [address]  [facility] [address]   [phone]            Sep 21, 2018  3:30 PM CDT   Office Visit with Ashleigh Fregoso MD   [facility] ([facility])    [address]  [place] [address]   [phone]           Bring a current list of meds and any records pertaining to this visit. For Physicals, please bring immunization records and any forms needing to be filled out. Please arrive 10 minutes early to complete paperwork.              Who to contact     If you have questions or need follow up information about today's clinic visit or your schedule please contact   [facility] directly at [phone].  Normal or non-critical lab and imaging results will be communicated to you by MyChart, letter or phone within 4 business days after the clinic has received the results. If you do not hear from us within 7 days, please contact the clinic through Cloakwarehart or phone. If you have a critical or abnormal lab result, we will notify you by phone as soon as possible.  Submit refill requests through [employer] or call your pharmacy and they will forward the refill request to us. Please allow 3 business days for your refill to be completed.          Additional Information About Your Visit        [employer] Information     [employer] gives you secure access to your electronic health record. If you see a primary care provider, you can also send messages to your care team and make appointments. If you have questions, please call your primary care clinic.  If you do not have a primary care provider, please call [phone] and they will assist you.        Care EveryWhere ID     This is your Care EveryWhere ID. This could be used by other organizations to access your [place] medical records  [id number]        Your Vitals Were     Pulse Temperature Last Period BMI (Body Mass Index)          64 98  F (36.7  C) (Tympanic) 03/07/2003 22.11 kg/m2         Blood Pressure from Last 3 Encounters:   07/06/18 118/84   06/22/18 132/84   06/04/18 144/84    Weight from Last 3 Encounters:   07/06/18 137 lb (62.1 kg)   06/22/18 137 lb 6.4 oz (62.3 kg)   06/04/18 142 lb (64.4 kg)              Today, you had the following     No orders found for display       Primary Care Provider Office Phone # Fax #    Abena Adkins  [phone] [address] [facility] [address]        Equal Access to Services     ARCADIO SCHWAB : Venkatesh Valerio, tony gray, brian kaglo villeda. So [facility] [phone].    ATENCIÓN: Si  darcy rodarte, tiene a john disposición servicios gratuitos de asistencia lingüística. Jennifer love [phone].    We comply with applicable federal civil rights laws and [place] laws. We do not discriminate on the basis of race, color, national origin, age, disability, sex, sexual orientation, or gender identity.            Thank you!     Thank you for choosing [facility]  for your care. Our goal is always to provide you with excellent care. Hearing back from our patients is one way we can continue to improve our services. Please take a few minutes to complete the written survey that you may receive in the mail after your visit with us. Thank you!             Your Updated Medication List - Protect others around you: Learn how to safely use, store and throw away your medicines at www.disposemymeds.org.          This list is accurate as of 7/6/18 10:14 AM.  Always use your most recent med list.                   Brand Name Dispense Instructions for use Diagnosis    amitriptyline 10 MG tablet    ELAVIL          LORazepam 1 MG tablet    ATIVAN     TAKE 1/2-1 TABLET BY MOUTH ONCE DAILY AT NIGHT AS NEEDED FOR insomnia DO not TAKE WITH hydrocodone        SUMAtriptan 100 MG tablet    IMITREX     Take 1 tablet by mouth every 2 hours as needed for migraine . maxillary dose: 200mg per 24 hrs.

## 2018-07-06 NOTE — PROGRESS NOTES
Patient presents for post procedure visit after excision of a squamous cell cancer from her left shin on 6/22. Patient has done well. No problems with incision.    /84  Pulse 64  Temp 98  F (36.7  C) (Tympanic)  Wt 137 lb (62.1 kg)  LMP 03/07/2003  BMI 22.11 kg/m2    General: NAD, pleasant and cooperative with exam and interview.  Skin:healing incision left lower leg. No sign of infection. No pain with palpation. Sutures removed without difficulty.  Psychiatry: awake, alert and oriented. Appropriate affect.  Pathology results: squamous cell cancer clear margins  Assessment/Plan:  Discussed procedure and pathology results. Patient can return to normal activities. Continue to monitor for new or changing lesions. Encouraged sunscreen use, SPF 30 or greater. Patient will call with questions or concerns.

## 2018-07-27 ENCOUNTER — OFFICE VISIT (OUTPATIENT)
Dept: INTERNAL MEDICINE | Facility: OTHER | Age: 58
End: 2018-07-27
Attending: INTERNAL MEDICINE
Payer: COMMERCIAL

## 2018-07-27 VITALS
HEART RATE: 72 BPM | DIASTOLIC BLOOD PRESSURE: 82 MMHG | BODY MASS INDEX: 22.5 KG/M2 | TEMPERATURE: 98.9 F | WEIGHT: 139.4 LBS | RESPIRATION RATE: 16 BRPM | OXYGEN SATURATION: 96 % | SYSTOLIC BLOOD PRESSURE: 136 MMHG

## 2018-07-27 DIAGNOSIS — Z01.818 PREOP GENERAL PHYSICAL EXAM: Primary | ICD-10-CM

## 2018-07-27 DIAGNOSIS — I10 ESSENTIAL HYPERTENSION: ICD-10-CM

## 2018-07-27 DIAGNOSIS — J32.9 CHRONIC SINUSITIS, UNSPECIFIED LOCATION: ICD-10-CM

## 2018-07-27 DIAGNOSIS — D04.9 SQUAMOUS CELL CARCINOMA IN SITU OF SKIN: ICD-10-CM

## 2018-07-27 DIAGNOSIS — R23.9 SKIN CHANGE: ICD-10-CM

## 2018-07-27 LAB
ERYTHROCYTE [DISTWIDTH] IN BLOOD BY AUTOMATED COUNT: 12.2 % (ref 10–15)
HCT VFR BLD AUTO: 38.9 % (ref 35–47)
HGB BLD-MCNC: 13.2 G/DL (ref 11.7–15.7)
MCH RBC QN AUTO: 29 PG (ref 26.5–33)
MCHC RBC AUTO-ENTMCNC: 33.9 G/DL (ref 31.5–36.5)
MCV RBC AUTO: 86 FL (ref 78–100)
PLATELET # BLD AUTO: 180 10E9/L (ref 150–450)
RBC # BLD AUTO: 4.55 10E12/L (ref 3.8–5.2)
WBC # BLD AUTO: 4.8 10E9/L (ref 4–11)

## 2018-07-27 PROCEDURE — 36415 COLL VENOUS BLD VENIPUNCTURE: CPT | Performed by: INTERNAL MEDICINE

## 2018-07-27 PROCEDURE — 85027 COMPLETE CBC AUTOMATED: CPT | Performed by: INTERNAL MEDICINE

## 2018-07-27 PROCEDURE — 99214 OFFICE O/P EST MOD 30 MIN: CPT | Performed by: INTERNAL MEDICINE

## 2018-07-27 ASSESSMENT — PAIN SCALES - GENERAL: PAINLEVEL: NO PAIN (0)

## 2018-07-27 NOTE — PROGRESS NOTES
[facility]  [address]  [place] [address]  [phone]    PRE-OP EVALUATION:  Today's date: 2018    Alessandra Aiken (: 1960) presents for pre-operative evaluation assessment as requested by Dr. Joseline Fregoso.  She requires evaluation and anesthesia risk assessment prior to undergoing surgery/procedure for treatment of hysterectomy .    Proposed Surgery/ Procedure: Vaginal Hysterectomy  Date of Surgery/ Procedure: 18  Time of Surgery/ Procedure: unknown  Hospital/Surgical Facility: [facility]  Fax number for surgical facility: [phone]  Primary Physician: Abena Adkins  Type of Anesthesia Anticipated: to be determined    Patient has a Health Care Directive or Living Will:  NO    1. NO - Do you have a history of heart attack, stroke, stent, bypass or surgery on an artery in the head, neck, heart or legs?  2. NO - Do you ever have any pain or discomfort in your chest?  3. NO - Do you have a history of  Heart Failure?  4. NO - Are you troubled by shortness of breath when: walking on the level, up a slight hill or at night?  5. NO - Do you currently have a cold, bronchitis or other respiratory infection?  6. NO - Do you have a cough, shortness of breath or wheezing?  7. NO - Do you sometimes get pains in the calves of your legs when you walk?  8. NO - Do you or anyone in your family have previous history of blood clots?  9. NO - Do you or does anyone in your family have a serious bleeding problem such as prolonged bleeding following surgeries or cuts?  10. NO - Have you ever had problems with anemia or been told to take iron pills?  11. NO - Have you had any abnormal blood loss such as black, tarry or bloody stools, or abnormal vaginal bleeding?  12. NO - Have you ever had a blood transfusion?  13. Yes - Have you or any of your relatives ever had problems with anesthesia? - severe nausea with anesthesia  14. NO - Do you have sleep apnea, excessive snoring or  daytime drowsiness?  15. NO - Do you have any prosthetic heart valves?  16. NO - Do you have prosthetic joints?  17. NO - Is there any chance that you may be pregnant?      HPI:     HPI related to upcoming procedure: Overall she has been doing okay.  She does feels that that she wants to complete the hysterectomy.  She has not been having any chest pain, palpitations, shortness of breath lower extremity edema or other new symptoms.  She continues to have chronic sinus issues and ear pain although this has been prolonged and she does follow with ENT for it.    She has a history of hypertension although has not been on medications for this.  With dietary changes she has been able to control it well.  She denies any issues at this time.    She recently was diagnosed with squamous cell carcinoma of the skin and this was removed completely.  With her complexion and history of sun exposure it was recommended that we consider having dermatology examine her skin on annual basis.  She is open to this.      MEDICAL HISTORY:     Patient Active Problem List    Diagnosis Date Noted     Chronic insomnia 10/30/2017     Priority: Medium     Migraine without aura 10/30/2017     Priority: Medium     Overview:   triggers are alcohol and dairy products       Chronic sinusitis 01/11/2017     Priority: Medium     Hypertension 05/13/2015     Priority: Medium      Past Medical History:   Diagnosis Date     Allergic rhinitis      Essential (primary) hypertension      High grade squamous intraepithelial cervical dysplasia 01/2018     Migraine without status migrainosus, not intractable      Otalgia of right ear      Pregnancy     G3, P3 - all NSVDs     Psychophysiologic insomnia      Squamous cell carcinoma of skin 06/2018    leg     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    Right     Urinary tract infection 12/04/2014    right pyelo, mod right hydro, 2 mm right renal stone     Past Surgical History:   Procedure Laterality Date      ARTHROSCOPY SHOULDER  07/2009     AS NASAL/SINUS SCOPE W SPHENOIDOTOMY      2011/2016 Right for mycetoma removal at [facility]~6/2012     COLONOSCOPY  06/05/2012    Sigmoid diverticulosis; follow up 10 years     ENDOSCOPIC SINUS SURGERY      repeat clearing     LEEP TX, CERVICAL  04/18/2018    Dr Fregoso     MYRINGOTOMY, INSERT TUBE, COMBINED  09/2016     Current Outpatient Prescriptions   Medication Sig Dispense Refill     amitriptyline (ELAVIL) 10 MG tablet        LORazepam (ATIVAN) 1 MG tablet TAKE 1/2-1 TABLET BY MOUTH ONCE DAILY AT NIGHT AS NEEDED FOR insomnia DO not TAKE WITH hydrocodone  2     SUMAtriptan (IMITREX) 100 MG tablet Take 1 tablet by mouth every 2 hours as needed for migraine . maxillary dose: 200mg per 24 hrs.       OTC products: None, except as noted above    Allergies   Allergen Reactions     Fentanyl Anxiety and Itching     Patient describes feeling creepy/crawly to Dr. Saenz.       Latex Allergy: NO    Social History   Substance Use Topics     Smoking status: Never Smoker     Smokeless tobacco: Never Used     Alcohol use No      Comment: Alcoholic Drinks/day: very rarely     History   Drug Use No       REVIEW OF SYSTEMS:   Review of systems negative for fevers, chills, chest pain, palpitations, changes in bowel or bladder, increased lower extremity edema.  Additional systems including constitutional, neuro, ENT, endocrine, pulmonary, cardiac, gastrointestinal, genitourinary, musculoskeletal, integument and psychiatric systems are negative, except as otherwise noted.    EXAM:   /82 (BP Location: Right arm, Patient Position: Sitting, Cuff Size: Adult Regular)  Pulse 72  Temp 98.9  F (37.2  C) (Tympanic)  Resp 16  Wt 139 lb 6.4 oz (63.2 kg)  LMP 03/07/2003  SpO2 96%  Breastfeeding? No  BMI 22.5 kg/m2    GENERAL APPEARANCE: healthy, alert and no distress     EYES: EOMI, PERRL     HENT: ear canals and TM's normal and nose and mouth without ulcers or lesions     NECK: no adenopathy, no  asymmetry, masses, or scars and thyroid normal to palpation     RESP: lungs clear to auscultation - no rales, rhonchi or wheezes     CV: regular rates and rhythm, normal S1 S2, no S3 or S4 and no murmur, click or rub     ABDOMEN:  soft, nontender, no HSM or masses and bowel sounds normal     MS: extremities normal- no gross deformities noted, no evidence of inflammation in joints, FROM in all extremities.     SKIN: no suspicious lesions or rashes     NEURO: Normal strength and tone, sensory exam grossly normal, mentation intact and speech normal     PSYCH: mentation appears normal. and affect normal/bright     LYMPHATICS: No cervical adenopathy    DIAGNOSTICS:     Labs Resulted Today:   Results for orders placed or performed in visit on 07/27/18   CBC W PLT No Diff   Result Value Ref Range    WBC 4.8 4.0 - 11.0 10e9/L    RBC Count 4.55 3.8 - 5.2 10e12/L    Hemoglobin 13.2 11.7 - 15.7 g/dL    Hematocrit 38.9 35.0 - 47.0 %    MCV 86 78 - 100 fl    MCH 29.0 26.5 - 33.0 pg    MCHC 33.9 31.5 - 36.5 g/dL    RDW 12.2 10.0 - 15.0 %    Platelet Count 180 150 - 450 10e9/L       IMPRESSION:   Reason for surgery/procedure: Pelvic pain, abnormal Pap smear  Diagnosis/reason for consult: Hypertension, chronic sinus issues    The proposed surgical procedure is considered INTERMEDIATE risk.    REVISED CARDIAC RISK INDEX  The patient has the following serious cardiovascular risks for perioperative complications such as (MI, PE, VFib and 3  AV Block):  No serious cardiac risks  INTERPRETATION: 0 risks: Class I (very low risk - 0.4% complication rate)    The patient has the following additional risks for perioperative complications:  No identified additional risks      ICD-10-CM    1. Preop general physical exam Z01.818 CBC W PLT No Diff     CBC W PLT No Diff   2. Essential hypertension I10    3. Chronic sinusitis, unspecified location J32.9    4. Squamous cell carcinoma in situ of skin -referral placed to dermatology for annual skin  exams and to talk about other skin changes that she has noted. D04.9 DERMATOLOGY REFERRAL   5. Skin change R23.9 DERMATOLOGY REFERRAL       RECOMMENDATIONS:       --Patient is to take all scheduled medications on the day of surgery EXCEPT for modifications listed below.    APPROVAL GIVEN to proceed with proposed procedure, without further diagnostic evaluation       Signed Electronically by: Abena Adkins DO    Copy of this evaluation report is provided to requesting physician.    [place] Preop Guidelines    Revised Cardiac Risk Index

## 2018-07-27 NOTE — NURSING NOTE
Pt present to clinic today for pre op for her vaginal hysterectomy on August 9th at [facility].  Gwen Goncalves LPN on 7/27/2018 at 2:04 PM  Date of Surgery: August 9th 2018  Type of Surgery: Vaginal Hysterectomy  Surgeon: Dr. Mirna Fregoso   Hospital:  [facility]      Fever/Chills or other infectious symptoms in pastmonth: NO  >10lb weight loss in past two months: no     Health Care Directive/Code status:  No   Hx of blood transfusions:   NO   Td up to date:  YES  History of VRE/MRSA:  NO   Gwen Goncalves LPN on 7/27/2018 at 2:07 PM

## 2018-07-27 NOTE — PATIENT INSTRUCTIONS
Before Your Surgery      Call your surgeon if there is any change in your health. This includes signs of a cold or flu (such as a sore throat, runny nose, cough, rash or fever).    Do not smoke, drink alcohol or take over the counter medicine (unless your surgeon or primary care doctor tells you to) for the 24 hours before and after surgery.    If you take prescribed drugs: Follow your doctor s orders about which medicines to take and which to stop until after surgery.    Eating and drinking prior to surgery: follow the instructions from your surgeon    Take a shower or bath the night before surgery. Use the soap your surgeon gave you to gently clean your skin. If you do not have soap from your surgeon, use your regular soap. Do not shave or scrub the surgery site.  Wear clean pajamas and have clean sheets on your bed.   PREOP RECOMMENDATIONS:  -- Hold aspirin for 7 days before surgery  -- Hold Advil/ibuprofen, Aleve/naproxen, for 2-3 days prior to surgery  -- Hold vitamins and supplements for 2 weeks prior to surgery  -- Okay to use Tylenol (Acetaminophen)  -- No food or drink after midnight the night before surgery    Please call our office and the surgical services with any change in condition or new symptoms that develop between today and your surgical date, in particular if you have anynew chest pain, shortness of breath, swelling or fevers.

## 2018-07-27 NOTE — MR AVS SNAPSHOT
After Visit Summary   7/27/2018    Alessandra Aiken    MRN: [id number]           Patient Information     Date Of Birth          1960        Visit Information        Provider Department      7/27/2018 2:00 PM Abena Adkins DO [facility]        Today's Diagnoses     Preop general physical exam    -  1    Essential hypertension        Chronic sinusitis, unspecified location        Squamous cell carcinoma in situ of skin        Skin change          Care Instructions      Before Your Surgery      Call your surgeon if there is any change in your health. This includes signs of a cold or flu (such as a sore throat, runny nose, cough, rash or fever).    Do not smoke, drink alcohol or take over the counter medicine (unless your surgeon or primary care doctor tells you to) for the 24 hours before and after surgery.    If you take prescribed drugs: Follow your doctor s orders about which medicines to take and which to stop until after surgery.    Eating and drinking prior to surgery: follow the instructions from your surgeon    Take a shower or bath the night before surgery. Use the soap your surgeon gave you to gently clean your skin. If you do not have soap from your surgeon, use your regular soap. Do not shave or scrub the surgery site.  Wear clean pajamas and have clean sheets on your bed.   PREOP RECOMMENDATIONS:  -- Hold aspirin for 7 days before surgery  -- Hold Advil/ibuprofen, Aleve/naproxen, for 2-3 days prior to surgery  -- Hold vitamins and supplements for 2 weeks prior to surgery  -- Okay to use Tylenol (Acetaminophen)  -- No food or drink after midnight the night before surgery    Please call our office and the surgical services with any change in condition or new symptoms that develop between today and your surgical date, in particular if you have anynew chest pain, shortness of breath, swelling or fevers.               Follow-ups after your visit        Additional Services      DERMATOLOGY REFERRAL       Your provider has referred you to: dermatology professionals - [facility]    Please be aware that coverage of these services is subject to the terms and limitations of your health insurance plan.  Call member services at your health plan with any benefit or coverage questions.      Please bring the following with you to your appointment:    (1) Any X-Rays, CTs or MRIs which have been performed.  Contact the facility where they were done to arrange for  prior to your scheduled appointment.    (2) List of current medications  (3) This referral request   (4) Any documents/labs given to you for this referral                  Follow-up notes from your care team     Return in about 6 months (around 1/27/2019) for Annual Review.      Your next 10 appointments already scheduled     Aug 09, 2018   [facility]    [address]  [place] [address]   [phone]            Aug 24, 2018  8:45 AM CDT   SHORT with Ashleigh Fregoso MD   [facility]    [address]  [place] [address]   [phone]            Sep 21, 2018  3:30 PM CDT   Office Visit with Ashleigh [facility]oso [facility]   [facility]    [address]  [place] [address]   [phone]           Bring a current list of meds and any records pertaining to this visit. For Physicals, please bring immunization records and any forms needing to be filled out. Please arrive 10 minutes early to complete paperwork.              Future tests that were ordered for you today     Open Future Orders        Priority Expected Expires Ordered    CBC W PLT No Diff Routine  7/27/2019 7/27/2018            Who to contact     If you have questions or need follow up information about today's clinic visit or your  schedule please contact [facility] directly at [phone].  Normal or non-critical lab and imaging results will be communicated to you by MyChart, letter or phone within 4 business days after the clinic has received the results. If you do not hear from us within 7 days, please contact the clinic through [employer] or phone. If you have a critical or abnormal lab result, we will notify you by phone as soon as possible.  Submit refill requests through [employer] or call your pharmacy and they will forward the refill request to us. Please allow 3 business days for your refill to be completed.          Additional Information About Your Visit        [employer] Information     [employer] gives you secure access to your electronic health record. If you see a primary care provider, you can also send messages to your care team and make appointments. If you have questions, please call your primary care clinic.  If you do not have a primary care provider, please call [phone] and they will assist you.        Care EveryWhere ID     This is your Care EveryWhere ID. This could be used by other organizations to access your [facility] medical records  [id number]        Your Vitals Were     Pulse Temperature Respirations Last Period Pulse Oximetry Breastfeeding?    72 98.9  F (37.2  C) (Tympanic) 16 03/07/2003 96% No    BMI (Body Mass Index)                   22.5 kg/m2            Blood Pressure from Last 3 Encounters:   07/27/18 136/82   07/06/18 118/84   06/22/18 132/84    Weight from Last 3 Encounters:   07/27/18 139 lb 6.4 oz (63.2 kg)   07/06/18 137 lb (62.1 kg)   06/22/18 137 lb 6.4 oz (62.3 kg)              We Performed the Following     DERMATOLOGY REFERRAL        Primary Care Provider Office Phone # Fax #    Abena FRANCISCO Lucille  [phone] [address] [facility] [address]        Equal Access to Services     ARCADIO SCHWAB : tony Polanco, brian  glo zaldivarmana chavez. So [facility] [phone].    ATENCIÓN: Si darcy rodarte, tiene a john disposición servicios gratuitos de asistencia lingüística. Jennifer al [phone].    We comply with applicable federal civil rights laws and [place] laws. We do not discriminate on the basis of race, color, national origin, age, disability, sex, sexual orientation, or gender identity.            Thank you!     Thank you for choosing [facility]  for your care. Our goal is always to provide you with excellent care. Hearing back from our patients is one way we can continue to improve our services. Please take a few minutes to complete the written survey that you may receive in the mail after your visit with us. Thank you!             Your Updated Medication List - Protect others around you: Learn how to safely use, store and throw away your medicines at www.disposemymeds.org.          This list is accurate as of 7/27/18  2:50 PM.  Always use your most recent med list.                   Brand Name Dispense Instructions for use Diagnosis    amitriptyline 10 MG tablet    ELAVIL          LORazepam 1 MG tablet    ATIVAN     TAKE 1/2-1 TABLET BY MOUTH ONCE DAILY AT NIGHT AS NEEDED FOR insomnia DO not TAKE WITH hydrocodone        SUMAtriptan 100 MG tablet    IMITREX     Take 1 tablet by mouth every 2 hours as needed for migraine . maxillary dose: 200mg per 24 hrs.

## 2018-08-08 ENCOUNTER — ANESTHESIA EVENT (OUTPATIENT)
Dept: SURGERY | Facility: OTHER | Age: 58
End: 2018-08-08
Payer: COMMERCIAL

## 2018-08-09 ENCOUNTER — SURGERY (OUTPATIENT)
Age: 58
End: 2018-08-09

## 2018-08-09 ENCOUNTER — ANESTHESIA (OUTPATIENT)
Dept: SURGERY | Facility: OTHER | Age: 58
End: 2018-08-09
Payer: COMMERCIAL

## 2018-08-09 ENCOUNTER — HOSPITAL ENCOUNTER (OUTPATIENT)
Facility: OTHER | Age: 58
Discharge: HOME | End: 2018-08-10
Attending: OBSTETRICS & GYNECOLOGY | Admitting: OBSTETRICS & GYNECOLOGY
Payer: COMMERCIAL

## 2018-08-09 DIAGNOSIS — Z90.710 S/P VAGINAL HYSTERECTOMY: Primary | ICD-10-CM

## 2018-08-09 LAB
ABO + RH BLD: NORMAL
ABO + RH BLD: NORMAL
BLD GP AB SCN SERPL QL: NORMAL
BLOOD BANK CMNT PATIENT-IMP: NORMAL
ERYTHROCYTE [DISTWIDTH] IN BLOOD BY AUTOMATED COUNT: 12.3 % (ref 10–15)
HCT VFR BLD AUTO: 37.1 % (ref 35–47)
HGB BLD-MCNC: 12.7 G/DL (ref 11.7–15.7)
MCH RBC QN AUTO: 29.2 PG (ref 26.5–33)
MCHC RBC AUTO-ENTMCNC: 34.2 G/DL (ref 31.5–36.5)
MCV RBC AUTO: 85 FL (ref 78–100)
PLATELET # BLD AUTO: 144 10E9/L (ref 150–450)
RBC # BLD AUTO: 4.35 10E12/L (ref 3.8–5.2)
SPECIMEN EXP DATE BLD: NORMAL
WBC # BLD AUTO: 2.9 10E9/L (ref 4–11)

## 2018-08-09 PROCEDURE — 88307 TISSUE EXAM BY PATHOLOGIST: CPT | Performed by: OBSTETRICS & GYNECOLOGY

## 2018-08-09 PROCEDURE — 25000125 ZZHC RX 250: Performed by: NURSE ANESTHETIST, CERTIFIED REGISTERED

## 2018-08-09 PROCEDURE — 37000008 ZZH ANESTHESIA TECHNICAL FEE, 1ST 30 MIN: Performed by: OBSTETRICS & GYNECOLOGY

## 2018-08-09 PROCEDURE — 86901 BLOOD TYPING SEROLOGIC RH(D): CPT | Performed by: OBSTETRICS & GYNECOLOGY

## 2018-08-09 PROCEDURE — 71000014 ZZH RECOVERY PHASE 1 LEVEL 2 FIRST HR: Performed by: OBSTETRICS & GYNECOLOGY

## 2018-08-09 PROCEDURE — 36000056 ZZH SURGERY LEVEL 3 1ST 30 MIN: Performed by: OBSTETRICS & GYNECOLOGY

## 2018-08-09 PROCEDURE — 36000058 ZZH SURGERY LEVEL 3 EA 15 ADDTL MIN: Performed by: OBSTETRICS & GYNECOLOGY

## 2018-08-09 PROCEDURE — 25000128 H RX IP 250 OP 636: Performed by: NURSE ANESTHETIST, CERTIFIED REGISTERED

## 2018-08-09 PROCEDURE — 25000128 H RX IP 250 OP 636: Performed by: OBSTETRICS & GYNECOLOGY

## 2018-08-09 PROCEDURE — 40000306 ZZH STATISTIC PRE PROC ASSESS II: Performed by: OBSTETRICS & GYNECOLOGY

## 2018-08-09 PROCEDURE — 86900 BLOOD TYPING SEROLOGIC ABO: CPT | Performed by: OBSTETRICS & GYNECOLOGY

## 2018-08-09 PROCEDURE — 85027 COMPLETE CBC AUTOMATED: CPT | Performed by: OBSTETRICS & GYNECOLOGY

## 2018-08-09 PROCEDURE — 58262 VAG HYST INCLUDING T/O: CPT | Performed by: OBSTETRICS & GYNECOLOGY

## 2018-08-09 PROCEDURE — 58262 VAG HYST INCLUDING T/O: CPT | Performed by: NURSE ANESTHETIST, CERTIFIED REGISTERED

## 2018-08-09 PROCEDURE — 37000009 ZZH ANESTHESIA TECHNICAL FEE, EACH ADDTL 15 MIN: Performed by: OBSTETRICS & GYNECOLOGY

## 2018-08-09 PROCEDURE — 25000132 ZZH RX MED GY IP 250 OP 250 PS 637: Performed by: OBSTETRICS & GYNECOLOGY

## 2018-08-09 PROCEDURE — 58262 VAG HYST INCLUDING T/O: CPT | Mod: 80 | Performed by: OBSTETRICS & GYNECOLOGY

## 2018-08-09 PROCEDURE — 27210794 ZZH OR GENERAL SUPPLY STERILE: Performed by: OBSTETRICS & GYNECOLOGY

## 2018-08-09 PROCEDURE — 86850 RBC ANTIBODY SCREEN: CPT | Performed by: OBSTETRICS & GYNECOLOGY

## 2018-08-09 PROCEDURE — 25000125 ZZHC RX 250: Performed by: OBSTETRICS & GYNECOLOGY

## 2018-08-09 PROCEDURE — 36415 COLL VENOUS BLD VENIPUNCTURE: CPT | Performed by: OBSTETRICS & GYNECOLOGY

## 2018-08-09 RX ORDER — DEXAMETHASONE SODIUM PHOSPHATE 4 MG/ML
INJECTION, SOLUTION INTRA-ARTICULAR; INTRALESIONAL; INTRAMUSCULAR; INTRAVENOUS; SOFT TISSUE PRN
Status: DISCONTINUED | OUTPATIENT
Start: 2018-08-09 | End: 2018-08-09

## 2018-08-09 RX ORDER — SODIUM CHLORIDE, SODIUM LACTATE, POTASSIUM CHLORIDE, CALCIUM CHLORIDE 600; 310; 30; 20 MG/100ML; MG/100ML; MG/100ML; MG/100ML
INJECTION, SOLUTION INTRAVENOUS CONTINUOUS
Status: DISCONTINUED | OUTPATIENT
Start: 2018-08-09 | End: 2018-08-09 | Stop reason: HOSPADM

## 2018-08-09 RX ORDER — ACETAMINOPHEN 325 MG/1
650 TABLET ORAL EVERY 4 HOURS PRN
Status: DISCONTINUED | OUTPATIENT
Start: 2018-08-09 | End: 2018-08-10 | Stop reason: HOSPADM

## 2018-08-09 RX ORDER — LIDOCAINE 40 MG/G
CREAM TOPICAL
Status: DISCONTINUED | OUTPATIENT
Start: 2018-08-09 | End: 2018-08-10 | Stop reason: HOSPADM

## 2018-08-09 RX ORDER — IBUPROFEN 600 MG/1
600 TABLET, FILM COATED ORAL EVERY 6 HOURS PRN
Status: DISCONTINUED | OUTPATIENT
Start: 2018-08-09 | End: 2018-08-10 | Stop reason: HOSPADM

## 2018-08-09 RX ORDER — OXYCODONE HYDROCHLORIDE 5 MG/1
5-10 TABLET ORAL EVERY 4 HOURS PRN
Status: DISCONTINUED | OUTPATIENT
Start: 2018-08-09 | End: 2018-08-10 | Stop reason: HOSPADM

## 2018-08-09 RX ORDER — ONDANSETRON 2 MG/ML
4 INJECTION INTRAMUSCULAR; INTRAVENOUS EVERY 6 HOURS PRN
Status: DISCONTINUED | OUTPATIENT
Start: 2018-08-09 | End: 2018-08-10 | Stop reason: HOSPADM

## 2018-08-09 RX ORDER — LIDOCAINE 40 MG/G
CREAM TOPICAL
Status: DISCONTINUED | OUTPATIENT
Start: 2018-08-09 | End: 2018-08-09 | Stop reason: HOSPADM

## 2018-08-09 RX ORDER — ONDANSETRON 4 MG/1
4 TABLET, ORALLY DISINTEGRATING ORAL EVERY 6 HOURS PRN
Status: DISCONTINUED | OUTPATIENT
Start: 2018-08-09 | End: 2018-08-10 | Stop reason: HOSPADM

## 2018-08-09 RX ORDER — METOCLOPRAMIDE HYDROCHLORIDE 5 MG/ML
10 INJECTION INTRAMUSCULAR; INTRAVENOUS EVERY 6 HOURS PRN
Status: DISCONTINUED | OUTPATIENT
Start: 2018-08-09 | End: 2018-08-10 | Stop reason: HOSPADM

## 2018-08-09 RX ORDER — ONDANSETRON 4 MG/1
4 TABLET, ORALLY DISINTEGRATING ORAL EVERY 30 MIN PRN
Status: DISCONTINUED | OUTPATIENT
Start: 2018-08-09 | End: 2018-08-09

## 2018-08-09 RX ORDER — CEFAZOLIN SODIUM 1 G/3ML
1 INJECTION, POWDER, FOR SOLUTION INTRAMUSCULAR; INTRAVENOUS SEE ADMIN INSTRUCTIONS
Status: DISCONTINUED | OUTPATIENT
Start: 2018-08-09 | End: 2018-08-09 | Stop reason: HOSPADM

## 2018-08-09 RX ORDER — ONDANSETRON 2 MG/ML
4 INJECTION INTRAMUSCULAR; INTRAVENOUS EVERY 30 MIN PRN
Status: DISCONTINUED | OUTPATIENT
Start: 2018-08-09 | End: 2018-08-09

## 2018-08-09 RX ORDER — KETOROLAC TROMETHAMINE 30 MG/ML
INJECTION, SOLUTION INTRAMUSCULAR; INTRAVENOUS PRN
Status: DISCONTINUED | OUTPATIENT
Start: 2018-08-09 | End: 2018-08-09

## 2018-08-09 RX ORDER — LIDOCAINE HYDROCHLORIDE 10 MG/ML
INJECTION, SOLUTION INFILTRATION; PERINEURAL PRN
Status: DISCONTINUED | OUTPATIENT
Start: 2018-08-09 | End: 2018-08-09

## 2018-08-09 RX ORDER — PROPOFOL 10 MG/ML
INJECTION, EMULSION INTRAVENOUS CONTINUOUS PRN
Status: DISCONTINUED | OUTPATIENT
Start: 2018-08-09 | End: 2018-08-09

## 2018-08-09 RX ORDER — ONDANSETRON 2 MG/ML
INJECTION INTRAMUSCULAR; INTRAVENOUS PRN
Status: DISCONTINUED | OUTPATIENT
Start: 2018-08-09 | End: 2018-08-09

## 2018-08-09 RX ORDER — OXYCODONE HYDROCHLORIDE 5 MG/1
5-10 TABLET ORAL EVERY 6 HOURS PRN
Status: DISCONTINUED | OUTPATIENT
Start: 2018-08-09 | End: 2018-08-09

## 2018-08-09 RX ORDER — BUPIVACAINE HYDROCHLORIDE 7.5 MG/ML
INJECTION, SOLUTION INTRASPINAL PRN
Status: DISCONTINUED | OUTPATIENT
Start: 2018-08-09 | End: 2018-08-09

## 2018-08-09 RX ORDER — PHENAZOPYRIDINE HYDROCHLORIDE 100 MG/1
200 TABLET, FILM COATED ORAL ONCE
Status: DISCONTINUED | OUTPATIENT
Start: 2018-08-09 | End: 2018-08-09 | Stop reason: HOSPADM

## 2018-08-09 RX ORDER — SODIUM CHLORIDE, SODIUM LACTATE, POTASSIUM CHLORIDE, CALCIUM CHLORIDE 600; 310; 30; 20 MG/100ML; MG/100ML; MG/100ML; MG/100ML
INJECTION, SOLUTION INTRAVENOUS CONTINUOUS
Status: DISCONTINUED | OUTPATIENT
Start: 2018-08-09 | End: 2018-08-09

## 2018-08-09 RX ORDER — BUPIVACAINE HYDROCHLORIDE AND EPINEPHRINE 5; 5 MG/ML; UG/ML
INJECTION, SOLUTION PERINEURAL PRN
Status: DISCONTINUED | OUTPATIENT
Start: 2018-08-09 | End: 2018-08-09

## 2018-08-09 RX ORDER — HYDROMORPHONE HYDROCHLORIDE 1 MG/ML
.3-.5 INJECTION, SOLUTION INTRAMUSCULAR; INTRAVENOUS; SUBCUTANEOUS EVERY 5 MIN PRN
Status: DISCONTINUED | OUTPATIENT
Start: 2018-08-09 | End: 2018-08-09

## 2018-08-09 RX ORDER — SCOLOPAMINE TRANSDERMAL SYSTEM 1 MG/1
1 PATCH, EXTENDED RELEASE TRANSDERMAL
Status: DISCONTINUED | OUTPATIENT
Start: 2018-08-09 | End: 2018-08-09 | Stop reason: HOSPADM

## 2018-08-09 RX ORDER — CEFAZOLIN SODIUM 2 G/100ML
2 INJECTION, SOLUTION INTRAVENOUS
Status: COMPLETED | OUTPATIENT
Start: 2018-08-09 | End: 2018-08-09

## 2018-08-09 RX ORDER — ACETAMINOPHEN 10 MG/ML
INJECTION, SOLUTION INTRAVENOUS PRN
Status: DISCONTINUED | OUTPATIENT
Start: 2018-08-09 | End: 2018-08-09

## 2018-08-09 RX ORDER — NALOXONE HYDROCHLORIDE 0.4 MG/ML
.1-.4 INJECTION, SOLUTION INTRAMUSCULAR; INTRAVENOUS; SUBCUTANEOUS
Status: DISCONTINUED | OUTPATIENT
Start: 2018-08-09 | End: 2018-08-10 | Stop reason: HOSPADM

## 2018-08-09 RX ORDER — PROCHLORPERAZINE MALEATE 10 MG
10 TABLET ORAL EVERY 6 HOURS PRN
Status: DISCONTINUED | OUTPATIENT
Start: 2018-08-09 | End: 2018-08-10 | Stop reason: HOSPADM

## 2018-08-09 RX ORDER — SODIUM CHLORIDE, SODIUM LACTATE, POTASSIUM CHLORIDE, CALCIUM CHLORIDE 600; 310; 30; 20 MG/100ML; MG/100ML; MG/100ML; MG/100ML
INJECTION, SOLUTION INTRAVENOUS CONTINUOUS
Status: DISCONTINUED | OUTPATIENT
Start: 2018-08-09 | End: 2018-08-10 | Stop reason: HOSPADM

## 2018-08-09 RX ORDER — METOCLOPRAMIDE 5 MG/1
10 TABLET ORAL EVERY 6 HOURS PRN
Status: DISCONTINUED | OUTPATIENT
Start: 2018-08-09 | End: 2018-08-10 | Stop reason: HOSPADM

## 2018-08-09 RX ORDER — NALOXONE HYDROCHLORIDE 0.4 MG/ML
.1-.4 INJECTION, SOLUTION INTRAMUSCULAR; INTRAVENOUS; SUBCUTANEOUS
Status: ACTIVE | OUTPATIENT
Start: 2018-08-09 | End: 2018-08-10

## 2018-08-09 RX ORDER — LIDOCAINE HYDROCHLORIDE 20 MG/ML
INJECTION, SOLUTION INFILTRATION; PERINEURAL PRN
Status: DISCONTINUED | OUTPATIENT
Start: 2018-08-09 | End: 2018-08-09

## 2018-08-09 RX ADMIN — IBUPROFEN 600 MG: 600 TABLET ORAL at 15:39

## 2018-08-09 RX ADMIN — BUPIVACAINE HYDROCHLORIDE AND EPINEPHRINE BITARTRATE 10 ML: 5; .0091 INJECTION, SOLUTION PERINEURAL at 08:42

## 2018-08-09 RX ADMIN — OXYCODONE HYDROCHLORIDE 10 MG: 5 TABLET ORAL at 18:14

## 2018-08-09 RX ADMIN — LIDOCAINE HYDROCHLORIDE 60 MG: 20 INJECTION, SOLUTION INFILTRATION; PERINEURAL at 07:45

## 2018-08-09 RX ADMIN — OXYCODONE HYDROCHLORIDE 5 MG: 5 TABLET ORAL at 12:01

## 2018-08-09 RX ADMIN — SODIUM CHLORIDE, SODIUM LACTATE, POTASSIUM CHLORIDE, AND CALCIUM CHLORIDE 100 ML/HR: 600; 310; 30; 20 INJECTION, SOLUTION INTRAVENOUS at 10:18

## 2018-08-09 RX ADMIN — SODIUM CHLORIDE, SODIUM LACTATE, POTASSIUM CHLORIDE, AND CALCIUM CHLORIDE: 600; 310; 30; 20 INJECTION, SOLUTION INTRAVENOUS at 07:27

## 2018-08-09 RX ADMIN — KETOROLAC TROMETHAMINE 30 MG: 30 INJECTION, SOLUTION INTRAMUSCULAR at 08:57

## 2018-08-09 RX ADMIN — LIDOCAINE HYDROCHLORIDE 0.1 ML: 10 INJECTION, SOLUTION EPIDURAL; INFILTRATION; INTRACAUDAL; PERINEURAL at 07:27

## 2018-08-09 RX ADMIN — OXYCODONE HYDROCHLORIDE 10 MG: 5 TABLET ORAL at 22:56

## 2018-08-09 RX ADMIN — ACETAMINOPHEN 650 MG: 325 TABLET, FILM COATED ORAL at 22:56

## 2018-08-09 RX ADMIN — IBUPROFEN 600 MG: 600 TABLET ORAL at 21:45

## 2018-08-09 RX ADMIN — PROPOFOL 75 MCG/KG/MIN: 10 INJECTION, EMULSION INTRAVENOUS at 07:45

## 2018-08-09 RX ADMIN — SCOLOPAMINE TRANSDERMAL SYSTEM 1 PATCH: 1 PATCH, EXTENDED RELEASE TRANSDERMAL at 07:19

## 2018-08-09 RX ADMIN — OXYCODONE HYDROCHLORIDE 5 MG: 5 TABLET ORAL at 12:04

## 2018-08-09 RX ADMIN — CEFAZOLIN SODIUM 2 G: 2 INJECTION, SOLUTION INTRAVENOUS at 07:40

## 2018-08-09 RX ADMIN — LIDOCAINE HYDROCHLORIDE 3 ML: 10 INJECTION, SOLUTION INFILTRATION; PERINEURAL at 07:43

## 2018-08-09 RX ADMIN — DEXAMETHASONE SODIUM PHOSPHATE 4 MG: 4 INJECTION, SOLUTION INTRA-ARTICULAR; INTRALESIONAL; INTRAMUSCULAR; INTRAVENOUS; SOFT TISSUE at 07:51

## 2018-08-09 RX ADMIN — BUPIVACAINE HYDROCHLORIDE IN DEXTROSE 1.8 ML: 7.5 INJECTION, SOLUTION SUBARACHNOID at 07:43

## 2018-08-09 RX ADMIN — MIDAZOLAM 2 MG: 1 INJECTION INTRAMUSCULAR; INTRAVENOUS at 07:38

## 2018-08-09 RX ADMIN — ACETAMINOPHEN 1000 MG: 10 INJECTION, SOLUTION INTRAVENOUS at 07:47

## 2018-08-09 RX ADMIN — ONDANSETRON 4 MG: 2 INJECTION INTRAMUSCULAR; INTRAVENOUS at 07:38

## 2018-08-09 NOTE — PROGRESS NOTES
Pt up ambulated in halls with standby assist. Pt tolerated well with steady gait. Pt IV SL and is taking in good PO.  Pt has small amount of bloody drainage on nikita pad. HS cares done and pt back to bed. Pt given 10 mg roxicodone per MD order. Philly Fregoso RN on 8/9/2018 at 6:28 PM

## 2018-08-09 NOTE — OR NURSING
PACU Transfer Note    Alessandra Aiken was transferred to [facility] via cart.  Equipment used for transport:  none.  Accompanied by:  RN  Report to KALYN Crump    PACU Respiratory Event Documentation     1) Episodes of Apnea greater than or equal to 10 seconds: 0    2) Bradypnea - less than 8 breaths per minute: 0    3) Pain score on 0 to 10 scale: 0    4) Pain-sedation mismatch (yes or no): no    5) Repeated 02 desaturation less than 90% (yes or no): no    Anesthesia notified? (yes or no): yes, updated    Any of the above events occuring repeatedly in separate 30 minute intervals may be considered recurrent PACU respiratory events.    Patient stable and meets phase 1 discharge criteria for transport from PACU.

## 2018-08-09 NOTE — ANESTHESIA PREPROCEDURE EVALUATION
Anesthesia Evaluation     . Pt has had prior anesthetic. Type: General    History of anesthetic complications   - PONV        ROS/MED HX    ENT/Pulmonary:  - neg pulmonary ROS     Neurologic:  - neg neurologic ROS     Cardiovascular:  - neg cardiovascular ROS       METS/Exercise Tolerance:  >4 METS   Hematologic:  - neg hematologic  ROS       Musculoskeletal:  - neg musculoskeletal ROS       GI/Hepatic:  - neg GI/hepatic ROS       Renal/Genitourinary:  - ROS Renal section negative       Endo:  - neg endo ROS       Psychiatric:  - neg psychiatric ROS       Infectious Disease:  - neg infectious disease ROS       Malignancy:      - no malignancy   Other:    - neg other ROS                 Physical Exam  Normal systems: dental    Airway   Mallampati: I  TM distance: >3 FB  Neck ROM: full    Dental     Cardiovascular   Rhythm and rate: regular and normal      Pulmonary    breath sounds clear to auscultation                    Anesthesia Plan      History & Physical Review      ASA Status:  2 .    NPO Status:  > 8 hours    Plan for Spinal (with ITN for PPC) with Propofol induction. Maintenance will be TIVA.    PONV prophylaxis:  Ondansetron (or other 5HT-3), Scopolamine patch and Dexamethasone or Solumedrol       Postoperative Care  Postoperative pain management:  IV analgesics, Oral pain medications, Multi-modal analgesia and Neuraxial analgesia.      Consents  Anesthetic plan, risks, benefits and alternatives discussed with:  Patient and Spouse..                          .

## 2018-08-09 NOTE — ANESTHESIA POSTPROCEDURE EVALUATION
Patient: Alessandra A Attila    Procedure(s):  Total Vaginal Hysterectomy & Bilateral Salpingectomy, Cystoscopy - Wound Class: II-Clean Contaminated   - Wound Class: II-Clean Contaminated    Diagnosis:Cervical Dysplasia  Diagnosis Additional Information: No value filed.    Anesthesia Type:  Spinal    Note:  Anesthesia Post Evaluation    Patient location during evaluation: PACU  Patient participation: Able to fully participate in evaluation  Level of consciousness: awake and alert  Pain management: adequate  Airway patency: patent  Cardiovascular status: acceptable  Respiratory status: acceptable  Hydration status: acceptable  PONV: none             Last vitals:  Vitals:    08/09/18 0715 08/09/18 0900   BP: (!) 132/94 125/84   Resp: 16 16   Temp: 97.8  F (36.6  C) 96.9  F (36.1  C)   SpO2: 96% 96%         Electronically Signed By: CLARENCE BENNETT CRNA  August 9, 2018  9:10 AM

## 2018-08-09 NOTE — H&P (VIEW-ONLY)
[facility]  [address]  [place] [address]  [phone]    PRE-OP EVALUATION:  Today's date: 2018    Alessandra Aiken (: 1960) presents for pre-operative evaluation assessment as requested by Dr. Joseline Fregoso.  She requires evaluation and anesthesia risk assessment prior to undergoing surgery/procedure for treatment of hysterectomy .    Proposed Surgery/ Procedure: Vaginal Hysterectomy  Date of Surgery/ Procedure: 18  Time of Surgery/ Procedure: unknown  Hospital/Surgical Facility: [facility]  Fax number for surgical facility: [phone]  Primary Physician: Abena Adkins  Type of Anesthesia Anticipated: to be determined    Patient has a Health Care Directive or Living Will:  NO    1. NO - Do you have a history of heart attack, stroke, stent, bypass or surgery on an artery in the head, neck, heart or legs?  2. NO - Do you ever have any pain or discomfort in your chest?  3. NO - Do you have a history of  Heart Failure?  4. NO - Are you troubled by shortness of breath when: walking on the level, up a slight hill or at night?  5. NO - Do you currently have a cold, bronchitis or other respiratory infection?  6. NO - Do you have a cough, shortness of breath or wheezing?  7. NO - Do you sometimes get pains in the calves of your legs when you walk?  8. NO - Do you or anyone in your family have previous history of blood clots?  9. NO - Do you or does anyone in your family have a serious bleeding problem such as prolonged bleeding following surgeries or cuts?  10. NO - Have you ever had problems with anemia or been told to take iron pills?  11. NO - Have you had any abnormal blood loss such as black, tarry or bloody stools, or abnormal vaginal bleeding?  12. NO - Have you ever had a blood transfusion?  13. Yes - Have you or any of your relatives ever had problems with anesthesia? - severe nausea with anesthesia  14. NO - Do you have sleep apnea, excessive snoring or  daytime drowsiness?  15. NO - Do you have any prosthetic heart valves?  16. NO - Do you have prosthetic joints?  17. NO - Is there any chance that you may be pregnant?      HPI:     HPI related to upcoming procedure: Overall she has been doing okay.  She does feels that that she wants to complete the hysterectomy.  She has not been having any chest pain, palpitations, shortness of breath lower extremity edema or other new symptoms.  She continues to have chronic sinus issues and ear pain although this has been prolonged and she does follow with ENT for it.    She has a history of hypertension although has not been on medications for this.  With dietary changes she has been able to control it well.  She denies any issues at this time.    She recently was diagnosed with squamous cell carcinoma of the skin and this was removed completely.  With her complexion and history of sun exposure it was recommended that we consider having dermatology examine her skin on annual basis.  She is open to this.      MEDICAL HISTORY:     Patient Active Problem List    Diagnosis Date Noted     Chronic insomnia 10/30/2017     Priority: Medium     Migraine without aura 10/30/2017     Priority: Medium     Overview:   triggers are alcohol and dairy products       Chronic sinusitis 01/11/2017     Priority: Medium     Hypertension 05/13/2015     Priority: Medium      Past Medical History:   Diagnosis Date     Allergic rhinitis      Essential (primary) hypertension      High grade squamous intraepithelial cervical dysplasia 01/2018     Migraine without status migrainosus, not intractable      Otalgia of right ear      Pregnancy     G3, P3 - all NSVDs     Psychophysiologic insomnia      Squamous cell carcinoma of skin 06/2018    leg     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    Right     Urinary tract infection 12/04/2014    right pyelo, mod right hydro, 2 mm right renal stone     Past Surgical History:   Procedure Laterality Date      ARTHROSCOPY SHOULDER  07/2009     AS NASAL/SINUS SCOPE W SPHENOIDOTOMY      2011/2016 Right for mycetoma removal at [place]~6/2012     COLONOSCOPY  06/05/2012    Sigmoid diverticulosis; follow up 10 years     ENDOSCOPIC SINUS SURGERY      repeat clearing     LEEP TX, CERVICAL  04/18/2018    Dr Fregoso     MYRINGOTOMY, INSERT TUBE, COMBINED  09/2016     Current Outpatient Prescriptions   Medication Sig Dispense Refill     amitriptyline (ELAVIL) 10 MG tablet        LORazepam (ATIVAN) 1 MG tablet TAKE 1/2-1 TABLET BY MOUTH ONCE DAILY AT NIGHT AS NEEDED FOR insomnia DO not TAKE WITH hydrocodone  2     SUMAtriptan (IMITREX) 100 MG tablet Take 1 tablet by mouth every 2 hours as needed for migraine . maxillary dose: 200mg per 24 hrs.       OTC products: None, except as noted above    Allergies   Allergen Reactions     Fentanyl Anxiety and Itching     Patient describes feeling creepy/crawly to Dr. Saenz.       Latex Allergy: NO    Social History   Substance Use Topics     Smoking status: Never Smoker     Smokeless tobacco: Never Used     Alcohol use No      Comment: Alcoholic Drinks/day: very rarely     History   Drug Use No       REVIEW OF SYSTEMS:   Review of systems negative for fevers, chills, chest pain, palpitations, changes in bowel or bladder, increased lower extremity edema.  Additional systems including constitutional, neuro, ENT, endocrine, pulmonary, cardiac, gastrointestinal, genitourinary, musculoskeletal, integument and psychiatric systems are negative, except as otherwise noted.    EXAM:   /82 (BP Location: Right arm, Patient Position: Sitting, Cuff Size: Adult Regular)  Pulse 72  Temp 98.9  F (37.2  C) (Tympanic)  Resp 16  Wt 139 lb 6.4 oz (63.2 kg)  LMP 03/07/2003  SpO2 96%  Breastfeeding? No  BMI 22.5 kg/m2    GENERAL APPEARANCE: healthy, alert and no distress     EYES: EOMI, PERRL     HENT: ear canals and TM's normal and nose and mouth without ulcers or lesions     NECK: no adenopathy, no  asymmetry, masses, or scars and thyroid normal to palpation     RESP: lungs clear to auscultation - no rales, rhonchi or wheezes     CV: regular rates and rhythm, normal S1 S2, no S3 or S4 and no murmur, click or rub     ABDOMEN:  soft, nontender, no HSM or masses and bowel sounds normal     MS: extremities normal- no gross deformities noted, no evidence of inflammation in joints, FROM in all extremities.     SKIN: no suspicious lesions or rashes     NEURO: Normal strength and tone, sensory exam grossly normal, mentation intact and speech normal     PSYCH: mentation appears normal. and affect normal/bright     LYMPHATICS: No cervical adenopathy    DIAGNOSTICS:     Labs Resulted Today:   Results for orders placed or performed in visit on 07/27/18   CBC W PLT No Diff   Result Value Ref Range    WBC 4.8 4.0 - 11.0 10e9/L    RBC Count 4.55 3.8 - 5.2 10e12/L    Hemoglobin 13.2 11.7 - 15.7 g/dL    Hematocrit 38.9 35.0 - 47.0 %    MCV 86 78 - 100 fl    MCH 29.0 26.5 - 33.0 pg    MCHC 33.9 31.5 - 36.5 g/dL    RDW 12.2 10.0 - 15.0 %    Platelet Count 180 150 - 450 10e9/L       IMPRESSION:   Reason for surgery/procedure: Pelvic pain, abnormal Pap smear  Diagnosis/reason for consult: Hypertension, chronic sinus issues    The proposed surgical procedure is considered INTERMEDIATE risk.    REVISED CARDIAC RISK INDEX  The patient has the following serious cardiovascular risks for perioperative complications such as (MI, PE, VFib and 3  AV Block):  No serious cardiac risks  INTERPRETATION: 0 risks: Class I (very low risk - 0.4% complication rate)    The patient has the following additional risks for perioperative complications:  No identified additional risks      ICD-10-CM    1. Preop general physical exam Z01.818 CBC W PLT No Diff     CBC W PLT No Diff   2. Essential hypertension I10    3. Chronic sinusitis, unspecified location J32.9    4. Squamous cell carcinoma in situ of skin -referral placed to dermatology for annual skin  exams and to talk about other skin changes that she has noted. D04.9 DERMATOLOGY REFERRAL   5. Skin change R23.9 DERMATOLOGY REFERRAL       RECOMMENDATIONS:       --Patient is to take all scheduled medications on the day of surgery EXCEPT for modifications listed below.    APPROVAL GIVEN to proceed with proposed procedure, without further diagnostic evaluation       Signed Electronically by: Abena Adkins DO    Copy of this evaluation report is provided to requesting physician.    [facility] Preop Guidelines    Revised Cardiac Risk Index

## 2018-08-09 NOTE — INTERVAL H&P NOTE
History and Physical Update    The history and physical has been reviewed and the patient has been examined.  There are no interim changes to the patient's history or physical condition.    Ashleigh Fregoso MD

## 2018-08-09 NOTE — OP NOTE
Name: Alessandra Aiken  MRN: [id number]  : 1960  Date of Surgery: 2018    Pre-operative Diagnosis:   - Cervical dysplasia  Post-operative Diagnosis:   - Same    Procedure(s):   1. Exam under anesthesia   2. Total vaginal hysterectomy, bilateral salpingectomy  3. Cystoscopy    Surgeon: Dr. Ashleigh Fregoso  Assistants: Dr Darrius Fregoso    Anesthesia: MAC and spinal  EBL: 50 mL   Specimens: Uterus, cervix and bilateral fallopian tubes  Complications: None apparent    Indications: Alessandra Aiken is a 58 year old old with high grade cervical dysplasia s/p LEEP. Due to anxiety regarding recurrent risk of high grade dysplasia, she desired hysterectomy.  The risks and benefits of and alternatives to the procedure were discussed with the patient, all questions were answered and written informed consent was obtained.     Findings: Exam under anesthesia revealed small, mobile, retroverted uterus. Cervix flush with the vaginal walls. Grade 1-2 cystocele at rest.     Procedure Details:    After consent was obtainedthe patient was taken to the OR suite and placed under general anesthetic. She was prepped and draped in sterile fashion in dorsal lithotomy position in candy cane stirrups. After appropriate timeout procedure, a weighted speculum was placed in the posterior vaginal fornix. Right angle retractors were placed in the anterior and lateral vaginal fornices. The cervix was grasped with single tooth tenaculae on the anterior and posterior lips and the cervix was injected with 0.5% marcaine with dilute epinephrine circumfrentially. The vaginal mucosa was sharply incised circumscribing the cervix. The anterior vesicopubic fascia was sharply dissected, and the peritoneum identified and divided. Intraabdominal position was confirmed. The cul de sac was entered sharply posterior to the cervix in a similar fashion. The vaginal mucosa was bluntly swept away from the underlying ligaments.The uterosacral ligament on the  left was clamped, cauterized and divided with the Ligasure device.  In two successive bites the cardinal ligament complex was clamped, cauterized and divided. The same process was performed on the right taking down the uterosacral and cardinal ligaments. The uterus was inverted and the right and left uteroovarian ligaments were clamped cauterized and divided. The uterus was removed and sent to Pathology.    A pack was placed in the abdomen and the tubes and ovaries were seen to be normal in appearance.The right tube was grasped and brought into the field. It was excised by dividing the mesosalpinx with Ligasure and excised. The same was carried out on the left.. A Segundo's suturewas placed incorporating the utersacral ligaments and the posterior peritoneum and the ends were brought out through the vaginal mucosa. The pack was removed, all counts noted to be correct, and the culdeplasty stitch was tied. Brisk bleeding was noted from the right angle of the vaginal cuff, which was controlled with a Figure-of-X of 0- vicryl. The vaginal cuff was reapproximated with O Vicryl in an interrupted fashion. Hemostasis was excellent.     Cystoscopy was performed with efflux of urine noted from bilateral ureteral orifices. A bladder survey was performed without evidence of injury or foreign material. A Gorman catheter was placed in the urethra with clear urine returning.  The patient was returned to the supine position and awakened from her anesthetic. She was taken to the PACU in stable condition. There were no complications.     First Assist: Dr Darrius Fregoso was requested to be present for this procedure to assist with hemostasis and visualization.     Ashleihg Fregoso MD  OB/GYN  8/9/2018 9:12 AM

## 2018-08-09 NOTE — PROGRESS NOTES
Pt had been given roxicodone per MD order see MAR for 7 out of ten pain. Pt is now sleeping and call light in reach. Philly Fregoso RN on 8/9/2018 at 1:21 PM

## 2018-08-09 NOTE — IP AVS SNAPSHOT
[facility]    [address]    [place] [address]    Phone:  [phone]    Fax:  [phone]                                       After Visit Summary   8/9/2018    Alessandra Aiken    MRN: [id number]           After Visit Summary Signature Page     I have received my discharge instructions, and my questions have been answered. I have discussed any challenges I see with this plan with the nurse or doctor.    ..........................................................................................................................................  Patient/Patient Representative Signature      ..........................................................................................................................................  Patient Representative Print Name and Relationship to Patient    ..................................................               ................................................  Date                                            Time    ..........................................................................................................................................  Reviewed by Signature/Title    ...................................................              ..............................................  Date                                                            Time

## 2018-08-09 NOTE — ANESTHESIA CARE TRANSFER NOTE
Patient: Alessandra A Attila    Procedure(s):  Total Vaginal Hysterectomy & Bilateral Salpingectomy, Cystoscopy - Wound Class: II-Clean Contaminated   - Wound Class: II-Clean Contaminated    Diagnosis: Cervical Dysplasia  Diagnosis Additional Information: No value filed.    Anesthesia Type:   Spinal     Note:  Airway :Room Air  Patient transferred to:PACU  Handoff Report: Identifed the Patient, Identified the Reponsible Provider, Reviewed the pertinent medical history, Discussed the surgical course, Reviewed Intra-OP anesthesia mangement and issues during anesthesia, Set expectations for post-procedure period and Allowed opportunity for questions and acknowledgement of understanding      Vitals: (Last set prior to Anesthesia Care Transfer)    CRNA VITALS  8/9/2018 0827 - 8/9/2018 0859      8/9/2018             Resp Rate (set): 10                Electronically Signed By: CLARENCE Milian CRNA  August 9, 2018  8:59 AM

## 2018-08-09 NOTE — IP AVS SNAPSHOT
MRN:[id number]                      After Visit Summary   8/9/2018    Alessandra Aiken    MRN: [id number]           Thank you!     Thank you for choosing [facility] for your care. Our goal is always to provide you with excellent care. Hearing back from our patients is one way we can continue to improve our services. Please take a few minutes to complete the written survey that you may receive in the mail after you visit with us. Thank you!        Patient Information     Date Of Birth          1960        Designated Caregiver       Most Recent Value    Caregiver    Will someone help with your care after discharge? yes    Name of designated caregiver vernon -    Phone number of caregiver [phone]      About your hospital stay     You were admitted on:  August 9, 2018 You last received care in the:  [facility]    You were discharged on:  August 10, 2018       Who to Call     For medical emergencies, please call 911.  For non-urgent questions about your medical care, please call your primary care provider or clinic, [phone]  For questions related to your surgery, please call your surgery clinic        Attending Provider     Provider Ashleigh Booker MD OB/Gyn       Primary Care Provider Office Phone # Fax #    Abena FRANCISCO DO [place] [phone]      After Care Instructions     Diet Instructions       Resume pre-procedure diet            Discharge Instructions       Pelvic rest for 6 weeks, including no sex, tampons, or douching            Discharge Instructions       Follow up with Dr Fregoso in 2 and 6 weeks            No Alcohol       For 24 hours post procedure            No driving or operating machinery        until the day after procedure            No lifting        No lifting over 15 lbs and no strenuous physical activity for 6 weeks                  Your next 10 appointments already scheduled     Aug 22, 2018  8:45 AM MAISHA   SHORT with  Ashleigh Fregoso MD   [facility]    [address]  [place] [address] [phone]            Sep 21, 2018  3:30 PM CDT   Office Visit with Ashleigh Fregoso MD   [facility]    [address]  [place] [address] [phone]           Bring a current list of meds and any records pertaining to this visit. For Physicals, please bring immunization records and any forms needing to be filled out. Please arrive 10 minutes early to complete paperwork.            Jan 31, 2019  3:40 PM CST   PHYSICAL with Abena Adkins DO   [facility]    [address]  [place] [phone]              Pending Results     Date and Time Order Name Status Description    8/9/2018 0818 Surgical pathology exam In process             Admission Information     Date & Time Provider Department Dept. Phone    8/9/2018 Ashleigh Fregoso MD [facility] [phone]      Your Vitals Were     Blood Pressure Pulse Temperature Respirations Weight Last Period    123/83 55 98.2  F (36.8  C) (Oral) 18 60.4 kg (133 lb 2 oz) 03/07/2003    Pulse Oximetry BMI (Body Mass Index)                98% 21.49 kg/m2          MyChart Information     "[employer]" gives you secure access to your electronic health record. If you see a primary care provider, you can also send messages to your care team and make appointments. If you have questions, please call your primary care clinic.  If you do not have a primary care provider, please call [phone] and they will assist you.        Care EveryWhere ID     This is your Care EveryWhere ID. This could be used by other organizations to access your [facility] medical records  [id number]        Equal Access to Services     ARCADIO SCHWAB : tony Polanco,  brian mendez, glo jarquinaan ah. Sonia [facility] [phone].    ATENCIÓN: Si darcy rodarte, tiene a john disposición servicios gratuitos de asistencia lingüística. Jennifer al [phone].    We comply with applicable federal civil rights laws and [place] laws. We do not discriminate on the basis of race, color, national origin, age, disability, sex, sexual orientation, or gender identity.               Review of your medicines      START taking        Dose / Directions    ibuprofen 600 MG tablet   Commonly known as:  ADVIL/MOTRIN        Dose:  600 mg   Take 1 tablet (600 mg) by mouth every 6 hours as needed for other (inflammatory pain)   Quantity:  40 tablet   Refills:  0       oxyCODONE-acetaminophen 5-325 MG per tablet   Commonly known as:  PERCOCET        Dose:  1-2 tablet   Take 1-2 tablets by mouth every 6 hours as needed for pain   Quantity:  20 tablet   Refills:  0       polyethylene glycol powder   Commonly known as:  MIRALAX        Dose:  1 capful   Take 17 g (1 capful) by mouth daily   Quantity:  510 g   Refills:  0         CONTINUE these medicines which have NOT CHANGED        Dose / Directions    amitriptyline 10 MG tablet   Commonly known as:  ELAVIL        Refills:  0       LORazepam 1 MG tablet   Commonly known as:  ATIVAN        TAKE 1/2-1 TABLET BY MOUTH ONCE DAILY AT NIGHT AS NEEDED FOR insomnia DO not TAKE WITH hydrocodone   Refills:  2       SUMAtriptan 100 MG tablet   Commonly known as:  IMITREX        Dose:  1 tablet   Take 1 tablet by mouth every 2 hours as needed for migraine . maxillary dose: 200mg per 24 hrs.   Refills:  0            Where to get your medicines      These medications were sent to [facility], - [place], [place] - [address]  [address], [place] [address]     Phone:  [phone]     ibuprofen 600 MG tablet    polyethylene glycol powder         Some of these will need a paper prescription and others  can be bought over the counter. Ask your nurse if you have questions.     Bring a paper prescription for each of these medications     oxyCODONE-acetaminophen 5-325 MG per tablet                Protect others around you: Learn how to safely use, store and throw away your medicines at www.disposemymeds.org.        Information about OPIOIDS     PRESCRIPTION OPIOIDS: WHAT YOU NEED TO KNOW   We gave you an opioid (narcotic) pain medicine. It is important to manage your pain, but opioids are not always the best choice. You should first try all the other options your care team gave you. Take this medicine for as short a time (and as few doses) as possible.    Some activities can increase your pain, such as bandage changes or therapy sessions. It may help to take your pain medicine 30 to 60 minutes before these activities. Reduce your stress by getting enough sleep, working on hobbies you enjoy and practicing relaxation or meditation. Talk to your care team about ways to manage your pain beyond prescription opioids.    These medicines have risks:    DO NOT drive when on new or higher doses of pain medicine. These medicines can affect your alertness and reaction times, and you could be arrested for driving under the influence (DUI). If you need to use opioids long-term, talk to your care team about driving.    DO NOT operate heavy machinery    DO NOT do any other dangerous activities while taking these medicines.    DO NOT drink any alcohol while taking these medicines.     If the opioid prescribed includes acetaminophen, DO NOT take with any other medicines that contain acetaminophen. Read all labels carefully. Look for the word  acetaminophen  or  Tylenol.  Ask your pharmacist if you have questions or are unsure.    You can get addicted to pain medicines, especially if you have a history of addiction (chemical, alcohol or substance dependence). Talk to your care team about ways to reduce this risk.    All opioids tend to  cause constipation. Drink plenty of water and eat foods that have a lot of fiber, such as fruits, vegetables, prune juice, apple juice and high-fiber cereal. Take a laxative (Miralax, milk of magnesia, Colace, Senna) if you don t move your bowels at least every other day. Other side effects include upset stomach, sleepiness, dizziness, throwing up, tolerance (needing more of the medicine to have the same effect), physical dependence and slowed breathing.    Store your pills in a secure place, locked if possible. We will not replace any lost or stolen medicine. If you don t finish your medicine, please throw away (dispose) as directed by your pharmacist. The [place] [employer]ution [employer] has more information about safe disposal: https://www.pca.[facility].[place].us/living-green/managing-unwanted-medications             Medication List: This is a list of all your medications and when to take them. Check marks below indicate your daily home schedule. Keep this list as a reference.      Medications           Morning Afternoon Evening Bedtime As Needed    amitriptyline 10 MG tablet   Commonly known as:  ELAVIL                                ibuprofen 600 MG tablet   Commonly known as:  ADVIL/MOTRIN   Take 1 tablet (600 mg) by mouth every 6 hours as needed for other (inflammatory pain)   Last time this was given:  600 mg on 8/10/2018  7:33 AM                                LORazepam 1 MG tablet   Commonly known as:  ATIVAN   TAKE 1/2-1 TABLET BY MOUTH ONCE DAILY AT NIGHT AS NEEDED FOR insomnia DO not TAKE WITH hydrocodone                                oxyCODONE-acetaminophen 5-325 MG per tablet   Commonly known as:  PERCOCET   Take 1-2 tablets by mouth every 6 hours as needed for pain                                polyethylene glycol powder   Commonly known as:  MIRALAX   Take 17 g (1 capful) by mouth daily                                SUMAtriptan 100 MG tablet   Commonly known as:  IMITREX   Take 1 tablet by mouth every  2 hours as needed for migraine . maxillary dose: 200mg per 24 hrs.

## 2018-08-10 VITALS
BODY MASS INDEX: 21.49 KG/M2 | HEART RATE: 55 BPM | TEMPERATURE: 98.2 F | OXYGEN SATURATION: 98 % | SYSTOLIC BLOOD PRESSURE: 123 MMHG | RESPIRATION RATE: 18 BRPM | WEIGHT: 133.13 LBS | DIASTOLIC BLOOD PRESSURE: 83 MMHG

## 2018-08-10 LAB — HGB BLD-MCNC: 10.7 G/DL (ref 11.7–15.7)

## 2018-08-10 PROCEDURE — 36415 COLL VENOUS BLD VENIPUNCTURE: CPT | Performed by: OBSTETRICS & GYNECOLOGY

## 2018-08-10 PROCEDURE — 25000132 ZZH RX MED GY IP 250 OP 250 PS 637: Performed by: OBSTETRICS & GYNECOLOGY

## 2018-08-10 PROCEDURE — 85018 HEMOGLOBIN: CPT | Performed by: OBSTETRICS & GYNECOLOGY

## 2018-08-10 RX ORDER — IBUPROFEN 600 MG/1
600 TABLET, FILM COATED ORAL EVERY 6 HOURS PRN
Qty: 40 TABLET | Refills: 0 | Status: SHIPPED | OUTPATIENT
Start: 2018-08-10 | End: 2019-01-31

## 2018-08-10 RX ORDER — POLYETHYLENE GLYCOL 3350 17 G/17G
1 POWDER, FOR SOLUTION ORAL DAILY
Qty: 510 G | Refills: 0 | Status: SHIPPED | OUTPATIENT
Start: 2018-08-10 | End: 2018-09-17

## 2018-08-10 RX ORDER — OXYCODONE AND ACETAMINOPHEN 5; 325 MG/1; MG/1
1-2 TABLET ORAL EVERY 6 HOURS PRN
Qty: 20 TABLET | Refills: 0 | Status: SHIPPED | OUTPATIENT
Start: 2018-08-10 | End: 2018-09-17

## 2018-08-10 RX ADMIN — IBUPROFEN 600 MG: 600 TABLET ORAL at 07:33

## 2018-08-10 NOTE — PLAN OF CARE
Problem: Hysterectomy (Adult)  Goal: Signs and Symptoms of Listed Potential Problems Will be Absent, Minimized or Managed (Hysterectomy)  Signs and symptoms of listed potential problems will be absent, minimized or managed by discharge/transition of care (reference Hysterectomy (Adult) CPG).  Outcome: Improving   08/09/18 2318   Hysterectomy   Problems Assessed (Hysterectomy) all   Problems Present (Hysterectomy) none   Alert & oriented X3, makes needs known.  Lungs are clear bilaterally throughout, bowel sounds are active, passing gas.  SCD's in place.  Gorman catheter in place draining adequate amounts of clear urine.

## 2018-08-10 NOTE — DISCHARGE INSTRUCTIONS
[place] [facility] DISCHARGE NOTE    Patient discharged to home at 12:26 PM via ambulation. Accompanied by significant other and staff. Discharge instructions reviewed with patient, opportunity offered to ask questions. Prescriptions sent with patient to fill . All belongings sent with patient.    Neelam Flowers

## 2018-08-10 NOTE — DISCHARGE SUMMARY
[facility] Discharge Summary    Alessandra Aiken MRN# [id number]   Age: 58 year old YOB: 1960     Date of Admission:  8/9/2018  Date of Discharge::  8/10/2018   Admitting Physician:  Ashleigh Fregoso MD   Discharge Physician:  Ashleigh Fregoso MD           Admission Diagnoses:     Cervical dysplasia          Discharge Diagnosis:     Same s/p below procedure          Procedures:   Procedure(s):  total vaginal hysterectomy, bilateral salpingectomy, cystoscopy            Medications Prior to Admission:     Prescriptions Prior to Admission   Medication Sig Dispense Refill Last Dose     amitriptyline (ELAVIL) 10 MG tablet    8/8/2018 at pm     LORazepam (ATIVAN) 1 MG tablet TAKE 1/2-1 TABLET BY MOUTH ONCE DAILY AT NIGHT AS NEEDED FOR insomnia DO not TAKE WITH hydrocodone  2 8/8/2018 at pm     SUMAtriptan (IMITREX) 100 MG tablet Take 1 tablet by mouth every 2 hours as needed for migraine . maxillary dose: 200mg per 24 hrs.   More than a month at Unknown time             Discharge Medications:     Current Discharge Medication List      START taking these medications    Details   ibuprofen (ADVIL/MOTRIN) 600 MG tablet Take 1 tablet (600 mg) by mouth every 6 hours as needed for other (inflammatory pain)  Qty: 40 tablet, Refills: 0    Associated Diagnoses: S/P vaginal hysterectomy      oxyCODONE-acetaminophen (PERCOCET) 5-325 MG per tablet Take 1-2 tablets by mouth every 6 hours as needed for pain  Qty: 20 tablet, Refills: 0    Associated Diagnoses: S/P vaginal hysterectomy      polyethylene glycol (MIRALAX) powder Take 17 g (1 capful) by mouth daily  Qty: 510 g, Refills: 0    Associated Diagnoses: S/P vaginal hysterectomy         CONTINUE these medications which have NOT CHANGED    Details   amitriptyline (ELAVIL) 10 MG tablet       LORazepam (ATIVAN) 1 MG tablet TAKE 1/2-1 TABLET BY MOUTH ONCE DAILY AT NIGHT AS NEEDED FOR insomnia DO not TAKE WITH hydrocodone  Refills: 2      SUMAtriptan (IMITREX) 100 MG  tablet Take 1 tablet by mouth every 2 hours as needed for migraine . maxillary dose: 200mg per 24 hrs.                   Consultations:   No consultations were requested during this admission          Brief History of Illness:     Alessandra Aiken is a 58 year old old with high grade cervical dysplasia s/p LEEP. Due to anxiety regarding recurrent risk of high grade dysplasia, she desired hysterectomy.           Hospital Course:     The patient's hospital course was unremarkable.  She recovered as anticipated and experienced no post-operative complications. On day of discharge, she was tolerating a regular diet, ambulating without difficulty, voiding spontaneously, and pain was well controlled on oral medications.  She was deemed stable for discharge on POD#1.          Discharge Instructions and Follow-Up:   Discharge diet: Regular   Discharge activity: Lifting restricted to 15 pounds  No heavy lifting, pushing, pulling for 6 week(s)  Pelvic rest: abstain from intercourse and do not use tampons for 6 week(s)   Discharge follow-up: Follow up with Dr Fregoso in 2 and 6 weeks   Wound care Drink plenty of fluids  Ice to area for comfort           Discharge Disposition:   Discharged to home       Ashleigh rFegoso MD

## 2018-08-10 NOTE — PROGRESS NOTES
Gyn Progress Note    S: Patient has been feeling well. Pain is well controlled. Voiding spontaneously since catheter removal. Ambulating without difficulty.    O:   Vitals:    08/09/18 1300 08/09/18 1500 08/09/18 2045 08/10/18 0733   BP: 116/88 136/85 116/75 123/83   Cuff Size:   Adult Regular    Pulse: 68 66  55   Resp: 16 16 16 18   Temp:  97.3  F (36.3  C) 98.2  F (36.8  C) 98.2  F (36.8  C)   TempSrc:  Oral Oral Oral   SpO2: 95% 97% 96% 98%   Weight:         Gen: resting in bed, NAD  Resp: nonlabored  Abd: soft, nondistended, nontender  Ext: nontender, no edema    Hemoglobin   Date Value Ref Range Status   08/10/2018 10.7 (L) 11.7 - 15.7 g/dL Final   ]     A/P: 58 year old POD#1 s/p TVH, BS, cysto- doing well  - okay to discharge    Ashleigh Fregoso MD  OB/GYN  8/10/2018 8:41 AM

## 2018-08-21 DIAGNOSIS — F51.04 CHRONIC INSOMNIA: Primary | ICD-10-CM

## 2018-08-22 ENCOUNTER — OFFICE VISIT (OUTPATIENT)
Dept: OBGYN | Facility: OTHER | Age: 58
End: 2018-08-22
Attending: OBSTETRICS & GYNECOLOGY
Payer: COMMERCIAL

## 2018-08-22 VITALS
SYSTOLIC BLOOD PRESSURE: 112 MMHG | TEMPERATURE: 98.1 F | DIASTOLIC BLOOD PRESSURE: 80 MMHG | WEIGHT: 138.13 LBS | BODY MASS INDEX: 22.29 KG/M2 | HEART RATE: 68 BPM

## 2018-08-22 DIAGNOSIS — Z90.710 S/P VAGINAL HYSTERECTOMY: Primary | ICD-10-CM

## 2018-08-22 PROCEDURE — 99024 POSTOP FOLLOW-UP VISIT: CPT | Performed by: OBSTETRICS & GYNECOLOGY

## 2018-08-22 ASSESSMENT — PAIN SCALES - GENERAL: PAINLEVEL: NO PAIN (0)

## 2018-08-22 NOTE — NURSING NOTE
"Patient presents today for her two week postop from a s/p vaginal hysterectomy.  Veronica Jones LPN  8/22/2018  8:46 AM           Chief Complaint   Patient presents with     Surgical Followup     two week postop       Initial /80 (BP Location: Right arm, Patient Position: Sitting, Cuff Size: Adult Large)  Pulse 68  Temp 98.1  F (36.7  C) (Oral)  Wt 62.7 kg (138 lb 2 oz)  LMP 03/07/2003  BMI 22.29 kg/m2 Estimated body mass index is 22.29 kg/(m^2) as calculated from the following:    Height as of 4/25/18: 1.676 m (5' 6\").    Weight as of this encounter: 62.7 kg (138 lb 2 oz).  Medication Reconciliation: complete    Veronica Jones LPN    "

## 2018-08-22 NOTE — PROGRESS NOTES
Postoperative Visit  2018    S: Alessandra Aiken is a 58 year old  here for post-operative visit following TVH, BS, cysto on 18. She reports feeling well. Energy is getting back to normal. Still has mild constipation but this is improving. Bladder functioning normally. Tolerating regular diet. No pain.    O:   /80 (BP Location: Right arm, Patient Position: Sitting, Cuff Size: Adult Large)  Pulse 68  Temp 98.1  F (36.7  C) (Oral)  Wt 62.7 kg (138 lb 2 oz)  LMP 2003  BMI 22.29 kg/m2  Gen:  Well-appearing, NAD  Psych: appropriate mood and affect    A/P:   Ms. Alessandra Aiken is a 58 year old  two weeks s/p TVH, BS, cysto. Doing well, no concerns. Reviewed benign path results, including no residual cervical dysplasia. Given her recent high grade dysplasia, she still requires pap smears for 20 years- next is due 2019.  RTC 4 weeks for final postop check    Ashleigh Fregoso MD  OB/GYN  2018 9:07 AM

## 2018-08-22 NOTE — LETTER
2018       RE: Alessandra Aiken  [address]  [facility] [address]     Dear Colleague,    Thank you for referring your patient, Alessandra Aiken, to the [facility]. Please see a copy of my visit note below.    Postoperative Visit  2018    S: Alessandra Aiken is a 58 year old  here for post-operative visit following TVH, BS, cysto on 18. She reports feeling well. Energy is getting back to normal. Still has mild constipation but this is improving. Bladder functioning normally. Tolerating regular diet. No pain.    O:   /80 (BP Location: Right arm, Patient Position: Sitting, Cuff Size: Adult Large)  Pulse 68  Temp 98.1  F (36.7  C) (Oral)  Wt 62.7 kg (138 lb 2 oz)  LMP 2003  BMI 22.29 kg/m2  Gen:  Well-appearing, NAD  Psych: appropriate mood and affect    A/P:   Ms. Alessandra Aiken is a 58 year old  two weeks s/p TVH, BS, cysto. Doing well, no concerns. Reviewed benign path results, including no residual cervical dysplasia. Given her recent high grade dysplasia, she still requires pap smears for 20 years- next is due 2019.  RTC 4 weeks for final postop check    Ashleigh Fregoso MD  OB/GYN  2018 9:07 AM

## 2018-08-22 NOTE — MR AVS SNAPSHOT
After Visit Summary   8/22/2018    Alessandra Aiken    MRN: [id number]           Patient Information     Date Of Birth          1960        Visit Information        Provider Department      8/22/2018 8:45 AM Ashleigh Fregoso MD [facility]        Today's Diagnoses     S/P vaginal hysterectomy    -  1       Follow-ups after your visit        Your next 10 appointments already scheduled     Sep 21, 2018  3:30 PM CDT   [facility] Visit with Ash[facility]oso MD   [facility]    [address]  [place] [address] [phone]           Bring a current list of meds and any records pertaining to this visit. For Physicals, please bring immunization records and any forms needing to be filled out. Please arrive 10 minutes early to complete paperwork.            Jan 31, 2019  3:40 PM CST   PHYSICAL with Abena Adkins DO   [facility]    [address]  [place] [address] [phone]              Who to contact     If you have questions or need follow up information about today's clinic visit or your schedule please contact [facility] directly at [phone].  Normal or non-critical lab and imaging results will be communicated to you by [employer], letter or phone within 4 business days after the clinic has received the results. If you do not hear from us within 7 days, please contact the clinic through [facility] or phone. If you have a critical or abnormal lab result, we will notify you by phone as soon as possible.  Submit refill requests through [employer] or call your pharmacy and they will forward the refill request to us. Please allow 3 business days for your refill to be completed.          Additional Information About Your Visit        [email or web address] Information     [employer] gives you secure access to your electronic health record. If  you see a primary care provider, you can also send messages to your care team and make appointments. If you have questions, please call your primary care clinic.  If you do not have a primary care provider, please call [phone] and they will assist you.        Care EveryWhere ID     This is your Care EveryWhere ID. This could be used by other organizations to access your [facility] medical records  [id number]        Your Vitals Were     Pulse Temperature Last Period BMI (Body Mass Index)          68 98.1  F (36.7  C) (Oral) 03/07/2003 22.29 kg/m2         Blood Pressure from Last 3 Encounters:   08/22/18 112/80   08/10/18 123/83   07/27/18 136/82    Weight from Last 3 Encounters:   08/22/18 62.7 kg (138 lb 2 oz)   08/09/18 60.4 kg (133 lb 2 oz)   07/27/18 63.2 kg (139 lb 6.4 oz)              Today, you had the following     No orders found for display       Primary Care Provider Office Phone # Fax #    Abena FRANCISCO DO [place] [phone]       [address] [facility] [address]        Equal Access to Services     [facility] AH: Hadii aad ku hadasho Soomaali, waaxda luqadaha, qaybta kaalmada adeegyada, glo rodriguez . So [facility] [phone].    ATENCIÓN: Si habla español, tiene a john disposición servicios gratuitos de asistencia lingüística. Llame al [phone].    We comply with applicable federal civil rights laws and [place] laws. We do not discriminate on the basis of race, color, national origin, age, disability, sex, sexual orientation, or gender identity.            Thank you!     Thank you for choosing [facility] AND [facility] [place] [facility]\""  for your care. Our goal is always to provide you with excellent care. Hearing back from our patients is one way we can continue to improve our services. Please take a few minutes to complete the written survey that you may receive in the mail after your visit with us. Thank you!             Your Updated Medication List - Protect  others around you: Learn how to safely use, store and throw away your medicines at www.disposemymeds.org.          This list is accurate as of 8/22/18  9:10 AM.  Always use your most recent med list.                   Brand Name Dispense Instructions for use Diagnosis    amitriptyline 10 MG tablet    ELAVIL          ibuprofen 600 MG tablet    ADVIL/MOTRIN    40 tablet    Take 1 tablet (600 mg) by mouth every 6 hours as needed for other (inflammatory pain)    S/P vaginal hysterectomy       LORazepam 1 MG tablet    ATIVAN     TAKE 1/2-1 TABLET BY MOUTH ONCE DAILY AT NIGHT AS NEEDED FOR insomnia DO not TAKE WITH hydrocodone        oxyCODONE-acetaminophen 5-325 MG per tablet    PERCOCET    20 tablet    Take 1-2 tablets by mouth every 6 hours as needed for pain    S/P vaginal hysterectomy       polyethylene glycol powder    MIRALAX    510 g    Take 17 g (1 capful) by mouth daily    S/P vaginal hysterectomy       SUMAtriptan 100 MG tablet    IMITREX     Take 1 tablet by mouth every 2 hours as needed for migraine . maxillary dose: 200mg per 24 hrs.

## 2018-08-23 RX ORDER — LORAZEPAM 1 MG/1
TABLET ORAL
Qty: 30 TABLET | Refills: 5 | Status: SHIPPED | OUTPATIENT
Start: 2018-08-23 | End: 2019-01-31

## 2018-08-23 NOTE — TELEPHONE ENCOUNTER
Called and spoke with Angela in pharmacy and she states that patient had refilled 4/12/18 and 6/13/18 off Rx from Jan., but cannot fill off that Rx anymore because it is pass the 6 month ck.    Aster Fregoso RN on 8/23/2018 at 1:05 PM

## 2018-08-23 NOTE — TELEPHONE ENCOUNTER
Patient was given medication with 30 tablets and 5 refills in January.  She has only refilled that once since then per the prescription monitoring report.  Please asked the pharmacy if she can just refill what is there or if she needs a new one placed due to the duration since the original prescription.

## 2018-08-24 NOTE — TELEPHONE ENCOUNTER
Prescription has been faxed to [facility] pharmacy.  Tati Garnett LPN............8/24/2018 8:43 AM

## 2018-09-17 ENCOUNTER — OFFICE VISIT (OUTPATIENT)
Dept: OBGYN | Facility: OTHER | Age: 58
End: 2018-09-17
Attending: OBSTETRICS & GYNECOLOGY
Payer: COMMERCIAL

## 2018-09-17 VITALS
WEIGHT: 138 LBS | RESPIRATION RATE: 16 BRPM | HEART RATE: 72 BPM | BODY MASS INDEX: 22.27 KG/M2 | TEMPERATURE: 98.1 F | DIASTOLIC BLOOD PRESSURE: 78 MMHG | SYSTOLIC BLOOD PRESSURE: 114 MMHG

## 2018-09-17 DIAGNOSIS — Z90.710 S/P VAGINAL HYSTERECTOMY: ICD-10-CM

## 2018-09-17 PROCEDURE — 99024 POSTOP FOLLOW-UP VISIT: CPT | Performed by: OBSTETRICS & GYNECOLOGY

## 2018-09-17 ASSESSMENT — PAIN SCALES - GENERAL: PAINLEVEL: MILD PAIN (2)

## 2018-09-17 NOTE — NURSING NOTE
"Chief Complaint   Patient presents with     Surgical Followup     Vaginal Hysterectomy       Initial LMP 03/07/2003 Estimated body mass index is 22.29 kg/(m^2) as calculated from the following:    Height as of 4/25/18: 1.676 m (5' 6\").    Weight as of 8/22/18: 62.7 kg (138 lb 2 oz).  Medication Reconciliation: complete    Yessica Barrera LPN  "

## 2018-09-17 NOTE — PROGRESS NOTES
Alessandra is 6 weeks out from a vaginal hysterectomy bilateral salpingectomy.  Apical suspension.  Doing excellent.  No concerns.  Bladder and bowel function good.  Has been back to work for 4 weeks.    Physical exam.  Blood pressure 116/78 pulse 72 weight 133    Abdomen soft benign    Speculum exam shows a well supported well healing vaginal cuff.  A couple sutures are still present.    Assessment:  Satisfactory recovery from vaginal hysterectomy    Plan:    One additional week on vaginal restrictions otherwise can return to normal activity

## 2018-09-17 NOTE — MR AVS SNAPSHOT
After Visit Summary   9/17/2018    Alessandra Aiken    MRN: [id number]           Patient Information     Date Of Birth          1960        Visit Information        Provider Department      9/17/2018 3:00 PM Darrius Fregoso MD [facility]        Today's Diagnoses     S/P vaginal hysterectomy           Follow-ups after your visit        Your next 10 appointments already scheduled     Jan 31, 2019  3:40 PM CST   PHYSICAL with Abena Adkins DO   [facility] ([facility])    [address]  [place] [address] [phone]              Who to contact     If you have questions or need follow up information about today's clinic visit or your schedule please contact [facility] directly at [phone].  Normal or non-critical lab and imaging results will be communicated to you by MediaCorehart, letter or phone within 4 business days after the clinic has received the results. If you do not hear from us within 7 days, please contact the clinic through MediaCorehart or phone. If you have a critical or abnormal lab result, we will notify you by phone as soon as possible.  Submit refill requests through Movatu or call your pharmacy and they will forward the refill request to us. Please allow 3 business days for your refill to be completed.          Additional Information About Your Visit        MyChart Information     Movatu gives you secure access to your electronic health record. If you see a primary care provider, you can also send messages to your care team and make appointments. If you have questions, please call your primary care clinic.  If you do not have a primary care provider, please call [phone] and they will assist you.        Care EveryWhere ID     This is your Care EveryWhere ID. This could be used by other organizations to access your [place] medical records  [id number]        Your Vitals Were      Pulse Temperature Respirations Last Period Breastfeeding? BMI (Body Mass Index)    72 98.1  F (36.7  C) (Tympanic) 16 03/07/2003 No 22.27 kg/m2       Blood Pressure from Last 3 Encounters:   09/17/18 114/78   08/22/18 112/80   08/10/18 123/83    Weight from Last 3 Encounters:   09/17/18 62.6 kg (138 lb)   08/22/18 62.7 kg (138 lb 2 oz)   08/09/18 60.4 kg (133 lb 2 oz)              Today, you had the following     No orders found for display       Primary Care Provider Office Phone # Fax #    Abena Adkins, [phone]       [address] [facility] [address]        Equal Access to Services     ARCADIO SCHWAB : Venkatesh britton Sonestor, waaxda luqadaha, qaybta kaalmada adeegyada, glo rodriguez . [facility] [phone].    ATENCIÓN: Si habla español, tiene a john disposición servicios gratuitos de asistencia lingüística. Llame al [phone].    We comply with applicable federal civil rights laws and [place] laws. We do not discriminate on the basis of race, color, national origin, age, disability, sex, sexual orientation, or gender identity.            Thank you!     Thank you for choosing [facility] AND [facility]  for your care. Our goal is always to provide you with excellent care. Hearing back from our patients is one way we can continue to improve our services. Please take a few minutes to complete the written survey that you may receive in the mail after your visit with us. Thank you!             Your Updated Medication List - Protect others around you: Learn how to safely use, store and throw away your medicines at www.disposemymeds.org.          This list is accurate as of 9/17/18  3:28 PM.  Always use your most recent med list.                   Brand Name Dispense Instructions for use Diagnosis    amitriptyline 10 MG tablet    ELAVIL          ibuprofen 600 MG tablet    ADVIL/MOTRIN    40 tablet    Take 1 tablet (600 mg) by mouth every 6 hours as  needed for other (inflammatory pain)    S/P vaginal hysterectomy       LORazepam 1 MG tablet    ATIVAN    30 tablet    TAKE 1/2 TO 1 TABLET BY MOUTH ONCE DAILY AT BEDTIME AS NEEDED FOR insomnia DO not TAKE WITH hydrocodone    Chronic insomnia       SUMAtriptan 100 MG tablet    IMITREX     Take 1 tablet by mouth every 2 hours as needed for migraine . maxillary dose: 200mg per 24 hrs.

## 2018-11-15 DIAGNOSIS — F51.04 CHRONIC INSOMNIA: Primary | ICD-10-CM

## 2018-11-19 RX ORDER — AMITRIPTYLINE HYDROCHLORIDE 10 MG/1
TABLET ORAL
Qty: 180 TABLET | Refills: 0 | Status: SHIPPED | OUTPATIENT
Start: 2018-11-19 | End: 2019-01-31

## 2018-11-19 NOTE — TELEPHONE ENCOUNTER
Routing refill request to provider for review/approval because:  Medication is reported/historical  LOV: 7/27/18  Left message for patient to return call to verify Rx for amitriptyline.  Noted as historical on current medication list not noted in care everywhere.    Patient states was started on amitriptyline through the [facility] over a year ago and states takes only 2 (10 mg) tablets at bedtime not 4.   Amitriptyline set as this.  Dr. Adkins out of office and patient is out of medication , will route to covering team let for review and consideraiton.  Aster Fregoso RN on 11/19/2018 at 9:52 AM

## 2018-11-24 ENCOUNTER — OFFICE VISIT (OUTPATIENT)
Dept: FAMILY MEDICINE | Facility: OTHER | Age: 58
End: 2018-11-24
Attending: NURSE PRACTITIONER
Payer: COMMERCIAL

## 2018-11-24 VITALS
SYSTOLIC BLOOD PRESSURE: 152 MMHG | DIASTOLIC BLOOD PRESSURE: 88 MMHG | HEART RATE: 72 BPM | BODY MASS INDEX: 23.18 KG/M2 | TEMPERATURE: 98.6 F | HEIGHT: 66 IN | WEIGHT: 144.2 LBS

## 2018-11-24 DIAGNOSIS — H00.025 HORDEOLUM INTERNUM LEFT LOWER EYELID: Primary | ICD-10-CM

## 2018-11-24 PROCEDURE — 99213 OFFICE O/P EST LOW 20 MIN: CPT | Performed by: NURSE PRACTITIONER

## 2018-11-24 RX ORDER — OFLOXACIN 3 MG/ML
1-2 SOLUTION/ DROPS OPHTHALMIC 4 TIMES DAILY
Qty: 3 ML | Refills: 0 | Status: SHIPPED | OUTPATIENT
Start: 2018-11-24 | End: 2019-01-31

## 2018-11-24 ASSESSMENT — PAIN SCALES - GENERAL: PAINLEVEL: EXTREME PAIN (8)

## 2018-11-24 NOTE — PROGRESS NOTES
"Nursing Notes:   Lynn Ayala LPN  11/24/2018 11:44 AM  Signed  Patient is in clinic for eye problem since 11/20/18. Noticed a bump in L eye, no itching, pain assessment 8/10.  Lynn Ayala LPN....................  11/24/2018   11:32 AM    Chief Complaint   Patient presents with     Eye Problem       Initial LMP 03/07/2003 Estimated body mass index is 22.27 kg/(m^2) as calculated from the following:    Height as of 4/25/18: 5' 6\" (1.676 m).    Weight as of 9/17/18: 138 lb (62.6 kg).  Medication Reconciliation: complete    Lynn Ayala LPN      SUBJECTIVE:   Alessandra Aiken is a 58 year old female who presents to clinic today for the following health issues:    Eye(s) Problem      Duration: 4 days    Description:  Location: left  Pain: YES- mild and into sinus area  Redness: YES- eye lids  Discharge: YES- started 2 days ago    Accompanying signs and symptoms: No fevers, chills, no change of vision.     History (Trauma, foreign body exposure,): None    Precipitating or alleviating factors (contact use): None    Therapies tried and outcome: hot packs, rest        Problem list and histories reviewed & adjusted, as indicated.  Additional history: as documented    Current Outpatient Prescriptions   Medication Sig Dispense Refill     amoxicillin-clavulanate (AUGMENTIN) 875-125 MG per tablet Take 1 tablet by mouth 2 times daily for 7 days 14 tablet 0     ofloxacin (OCUFLOX) 0.3 % ophthalmic solution Place 1-2 drops Into the left eye 4 times daily for 7 days 3 mL 0     amitriptyline (ELAVIL) 10 MG tablet Take 2 tablets by mouth at bedtime 180 tablet 0     ibuprofen (ADVIL/MOTRIN) 600 MG tablet Take 1 tablet (600 mg) by mouth every 6 hours as needed for other (inflammatory pain) 40 tablet 0     LORazepam (ATIVAN) 1 MG tablet TAKE 1/2 TO 1 TABLET BY MOUTH ONCE DAILY AT BEDTIME AS NEEDED FOR insomnia DO not TAKE WITH hydrocodone 30 tablet 5     SUMAtriptan (IMITREX) 100 MG tablet Take 1 tablet by mouth every 2 hours as " "needed for migraine . maxillary dose: 200mg per 24 hrs.       Allergies   Allergen Reactions     Fentanyl Anxiety and Itching     Patient describes feeling creepy/crawly to Dr. Saenz.          ROS:  Notable findings in the HPI.       OBJECTIVE:     /88 (BP Location: Left arm, Patient Position: Sitting, Cuff Size: Adult Regular)  Pulse 72  Temp 98.6  F (37  C) (Tympanic)  Ht 5' 6\" (1.676 m)  Wt 144 lb 3.2 oz (65.4 kg)  LMP 03/07/2003  Breastfeeding? No  BMI 23.27 kg/m2  Body mass index is 23.27 kg/(m^2).  GENERAL: healthy, alert and no distress  EYES: PERRL, EOMI, conjunctivae and sclerae normal and eyelids- hordeolum/sty left eye as bottom lid is turned out a stye can be seen with active drainage noted, yellow to green. Bottom eye lid is red, swollen. Tender to palpate.   HENT: normal cephalic/atraumatic, right ear: normal: no effusions, no erythema, normal landmarks, left ear: normal: no effusions, no erythema, normal landmarks, nose and mouth without ulcers or lesions, oropharynx clear, oral mucous membranes moist and sinuses: frontal tenderness on left  RESP: without increased work of breathing.   SKIN: no suspicious lesions or rashes    Diagnostic Test Results:  none     ASSESSMENT/PLAN:     1. Hordeolum internum left lower eyelid  - amoxicillin-clavulanate (AUGMENTIN) 875-125 MG per tablet; Take 1 tablet by mouth 2 times daily for 7 days  Dispense: 14 tablet; Refill: 0  - ofloxacin (OCUFLOX) 0.3 % ophthalmic solution; Place 1-2 drops Into the left eye 4 times daily for 7 days  Dispense: 3 mL; Refill: 0    Worried about cellulitis/abscess will start on oral abx and eye drops.     PLAN:    Eye Problem: Warm packs for comfort. Hygiene measures discussed. Antibiotic ointment per order. See eye doctor on Monday if not slowly improving.     Followup:    If not improving or if condition worsens, follow up with your Primary Care Provider    I explained my diagnostic considerations and recommendations to " the patient, who voiced understanding and agreement with the treatment plan. All questions were answered. We discussed potential side effects of any prescribed or recommended therapies, as well as expectations for response to treatments. She was advised to contact our office if there is no improvement or worsening of conditions or symptoms.  If s/s worsen or persist, patient will either come back or follow up with PCP.    Disclaimer:  This note consists of words and symbols derived from keyboarding, dictation, or using voice recognition software. As a result, there may be errors in the script that have gone undetected. Please consider this when interpreting information found in this note.      Lauren Albarado NP, 11/24/2018 11:56 AM

## 2018-11-24 NOTE — MR AVS SNAPSHOT
After Visit Summary   11/24/2018    Alessandra Aiken    MRN: [id number]           Patient Information     Date Of Birth          1960        Visit Information        Provider Department      11/24/2018 12:00 PM Lauren Albarado NP [facility]        Today's Diagnoses     Hordeolum internum left lower eyelid    -  1      Care Instructions      Sty (or Stye)  A sty is an infection of the oil gland of the eyelid. It may develop into a small pocket of pus (an abscess). This can cause pain, redness, and swelling. In early stages, a sty is treated with antibiotic cream, eye drops, or a small towel soaked in warm water (a warm compress). More severe cases may need to be opened and drained by a healthcare provider.  Home care    Eye drops or ointment are usually prescribed to treat the infection. Use these as directed.     Artificial tears may also be used to lubricate the eye and make it more comfortable. You can buy these over the counter without a prescription. Talk with your healthcare provider before using any over-the-counter treatment for a sty.    Apply a warm, damp towel to the affected eye for at least 5 minutes, 3 to 4 times a day for a week. Warm compresses open the pores and speed the healing. But if the compresses are too hot, they may burn your eyelid.    Sometimes the sty will drain with this treatment alone. If this happens, keep using the antibiotic until all the redness and swelling are gone.    Wash your hands before and after touching the infected eyelid to avoid spreading the infection.    Don t squeeze or try to break open the sty.  Follow-up care  Follow up with your healthcare provider, or as advised.   When to seek medical advice  Call your healthcare provider right away if any of these occur:    Increase in swelling or redness around the eyelid after 48 to 72 hours    Increase in eye pain or the eyelid blisters    Increase in warmth--the eyelid feels  hot    Drainage of blood or thick pus from the sty    Blister on the eyelid    Inability to open the eyelid due to swelling    Fever of 100.4 F (38 C) or above, or as directed by your provider    Vision changes    Headache or stiff neck    The sty comes back      Hand warmers    Hard boiled eggs    Rice bags    Warm compresses     Not better by Monday see Eye doctor                  Follow-ups after your visit        Your next 10 appointments already scheduled     Jan 31, 2019  3:40 PM CST   PHYSICAL with Abena Adkins,    [facility] ([facility])    [address]  [place] [address] [phone]              Who to contact     If you have questions or need follow up information about today's clinic visit or your schedule please contact [facility] directly at [phone].  Normal or non-critical lab and imaging results will be communicated to you by Outlistenhart, letter or phone within 4 business days after the clinic has received the results. If you do not hear from us within 7 days, please contact the clinic through Outlistenhart or phone. If you have a critical or abnormal lab result, we will notify you by phone as soon as possible.  Submit refill requests through Jambotech or call your pharmacy and they will forward the refill request to us. Please allow 3 business days for your refill to be completed.          Additional Information About Your Visit        Outlistenhart Information     Jambotech gives you secure access to your electronic health record. If you see a primary care provider, you can also send messages to your care team and make appointments. If you have questions, please call your primary care clinic.  If you do not have a primary care provider, please call [phone] and they will assist you.        Care EveryWhere ID     This is your Care EveryWhere ID. This could be used by other organizations to access your [facility]  "records  [id number]        Your Vitals Were     Pulse Temperature Height Last Period Breastfeeding? BMI (Body Mass Index)    72 98.6  F (37  C) (Tympanic) 5' 6\" (1.676 m) 03/07/2003 No 23.27 kg/m2       Blood Pressure from Last 3 Encounters:   11/24/18 152/88   09/17/18 114/78   08/22/18 112/80    Weight from Last 3 Encounters:   11/24/18 144 lb 3.2 oz (65.4 kg)   09/17/18 138 lb (62.6 kg)   08/22/18 138 lb 2 oz (62.7 kg)              Today, you had the following     No orders found for display         Today's Medication Changes          These changes are accurate as of 11/24/18 12:11 PM.  If you have any questions, ask your nurse or doctor.               Start taking these medicines.        Dose/Directions    amoxicillin-clavulanate 875-125 MG per tablet   Commonly known as:  AUGMENTIN   Used for:  Hordeolum internum left lower eyelid   Started by:  Lauren Albarado NP        Dose:  1 tablet   Take 1 tablet by mouth 2 times daily for 7 days   Quantity:  14 tablet   Refills:  0       ofloxacin 0.3 % ophthalmic solution   Commonly known as:  OCUFLOX   Used for:  Hordeolum internum left lower eyelid   Started by:  Lauren Albarado NP        Dose:  1-2 drop   Place 1-2 drops Into the left eye 4 times daily for 7 days   Quantity:  3 mL   Refills:  0            Where to get your medicines      These medications were sent to [id number] - [place], [place] - [address], [facility] [address]     Phone:  [phone]     amoxicillin-clavulanate 875-125 MG per tablet    ofloxacin 0.3 % ophthalmic solution                Primary Care Provider Office Phone # Fax #    Abena FRANCISCO DO [place] [phone] [address] [facility] [address]        Equal Access to Services     [facility] JOSSELIN AH: Hadii aad ku hadasho Soomaali, waaxda luqadaha, qaybta kaalmada vanessa, glo rodriguez ah. [facility] [phone].    ATENCIÓN: Si " darcy rodarte, tiene a john disposición servicios gratuitos de asistencia lingüística. Jennifer love [phone].    We comply with applicable federal civil rights laws and [place] laws. We do not discriminate on the basis of race, color, national origin, age, disability, sex, sexual orientation, or gender identity.            Thank you!     Thank you for choosing [facility] AND [facility]  for your care. Our goal is always to provide you with excellent care. Hearing back from our patients is one way we can continue to improve our services. Please take a few minutes to complete the written survey that you may receive in the mail after your visit with us. Thank you!             Your Updated Medication List - Protect others around you: Learn how to safely use, store and throw away your medicines at www.disposemymeds.org.          This list is accurate as of 11/24/18 12:11 PM.  Always use your most recent med list.                   Brand Name Dispense Instructions for use Diagnosis    amitriptyline 10 MG tablet    ELAVIL    180 tablet    Take 2 tablets by mouth at bedtime    Chronic insomnia       amoxicillin-clavulanate 875-125 MG per tablet    AUGMENTIN    14 tablet    Take 1 tablet by mouth 2 times daily for 7 days    Hordeolum internum left lower eyelid       ibuprofen 600 MG tablet    ADVIL/MOTRIN    40 tablet    Take 1 tablet (600 mg) by mouth every 6 hours as needed for other (inflammatory pain)    S/P vaginal hysterectomy       LORazepam 1 MG tablet    ATIVAN    30 tablet    TAKE 1/2 TO 1 TABLET BY MOUTH ONCE DAILY AT BEDTIME AS NEEDED FOR insomnia DO not TAKE WITH hydrocodone    Chronic insomnia       ofloxacin 0.3 % ophthalmic solution    OCUFLOX    3 mL    Place 1-2 drops Into the left eye 4 times daily for 7 days    Hordeolum internum left lower eyelid       SUMAtriptan 100 MG tablet    IMITREX     Take 1 tablet by mouth every 2 hours as needed for migraine . maxillary dose: 200mg per 24 hrs.

## 2018-11-24 NOTE — PATIENT INSTRUCTIONS
Sty (or Stye)  A sty is an infection of the oil gland of the eyelid. It may develop into a small pocket of pus (an abscess). This can cause pain, redness, and swelling. In early stages, a sty is treated with antibiotic cream, eye drops, or a small towel soaked in warm water (a warm compress). More severe cases may need to be opened and drained by a healthcare provider.  Home care    Eye drops or ointment are usually prescribed to treat the infection. Use these as directed.     Artificial tears may also be used to lubricate the eye and make it more comfortable. You can buy these over the counter without a prescription. Talk with your healthcare provider before using any over-the-counter treatment for a sty.    Apply a warm, damp towel to the affected eye for at least 5 minutes, 3 to 4 times a day for a week. Warm compresses open the pores and speed the healing. But if the compresses are too hot, they may burn your eyelid.    Sometimes the sty will drain with this treatment alone. If this happens, keep using the antibiotic until all the redness and swelling are gone.    Wash your hands before and after touching the infected eyelid to avoid spreading the infection.    Don t squeeze or try to break open the sty.  Follow-up care  Follow up with your healthcare provider, or as advised.   When to seek medical advice  Call your healthcare provider right away if any of these occur:    Increase in swelling or redness around the eyelid after 48 to 72 hours    Increase in eye pain or the eyelid blisters    Increase in warmth--the eyelid feels hot    Drainage of blood or thick pus from the sty    Blister on the eyelid    Inability to open the eyelid due to swelling    Fever of 100.4 F (38 C) or above, or as directed by your provider    Vision changes    Headache or stiff neck    The sty comes back      Hand warmers    Hard boiled eggs    Rice bags    Warm compresses     Not better by Monday see Eye doctor

## 2018-11-24 NOTE — NURSING NOTE
"Patient is in clinic for eye problem since 11/20/18. Noticed a bump in L eye, no itching, pain assessment 8/10.  Lynn Ayala LPN....................  11/24/2018   11:32 AM    Chief Complaint   Patient presents with     Eye Problem       Initial LMP 03/07/2003 Estimated body mass index is 22.27 kg/(m^2) as calculated from the following:    Height as of 4/25/18: 5' 6\" (1.676 m).    Weight as of 9/17/18: 138 lb (62.6 kg).  Medication Reconciliation: complete    Lynn Ayala LPN    "

## 2019-01-31 ENCOUNTER — OFFICE VISIT (OUTPATIENT)
Dept: INTERNAL MEDICINE | Facility: OTHER | Age: 59
End: 2019-01-31
Attending: INTERNAL MEDICINE
Payer: COMMERCIAL

## 2019-01-31 VITALS
TEMPERATURE: 97.9 F | DIASTOLIC BLOOD PRESSURE: 78 MMHG | RESPIRATION RATE: 18 BRPM | WEIGHT: 146.6 LBS | HEIGHT: 66 IN | SYSTOLIC BLOOD PRESSURE: 136 MMHG | BODY MASS INDEX: 23.56 KG/M2 | HEART RATE: 74 BPM

## 2019-01-31 DIAGNOSIS — H92.01 OTALGIA, RIGHT: Primary | ICD-10-CM

## 2019-01-31 DIAGNOSIS — F51.04 CHRONIC INSOMNIA: ICD-10-CM

## 2019-01-31 DIAGNOSIS — G43.009 MIGRAINE WITHOUT AURA AND WITHOUT STATUS MIGRAINOSUS, NOT INTRACTABLE: ICD-10-CM

## 2019-01-31 DIAGNOSIS — R10.31 ABDOMINAL PAIN, RIGHT LOWER QUADRANT: ICD-10-CM

## 2019-01-31 DIAGNOSIS — Z11.59 ENCOUNTER FOR HEPATITIS C SCREENING TEST FOR LOW RISK PATIENT: ICD-10-CM

## 2019-01-31 DIAGNOSIS — L75.0 URINARY BODY ODOR: ICD-10-CM

## 2019-01-31 DIAGNOSIS — D64.9 ANEMIA, UNSPECIFIED TYPE: ICD-10-CM

## 2019-01-31 DIAGNOSIS — N95.2 VAGINAL ATROPHY: ICD-10-CM

## 2019-01-31 DIAGNOSIS — Z79.899 HIGH RISK MEDICATION USE: ICD-10-CM

## 2019-01-31 LAB
ALBUMIN UR-MCNC: NEGATIVE MG/DL
APPEARANCE UR: CLEAR
BILIRUB UR QL STRIP: NEGATIVE
COLOR UR AUTO: YELLOW
GLUCOSE UR STRIP-MCNC: NEGATIVE MG/DL
HGB UR QL STRIP: NEGATIVE
KETONES UR STRIP-MCNC: ABNORMAL MG/DL
LEUKOCYTE ESTERASE UR QL STRIP: NEGATIVE
NITRATE UR QL: NEGATIVE
PH UR STRIP: 5 PH (ref 5–9)
SOURCE: ABNORMAL
SP GR UR STRIP: >1.03 (ref 1–1.03)
UROBILINOGEN UR STRIP-ACNC: 0.2 EU/DL (ref 0.2–1)

## 2019-01-31 PROCEDURE — 81003 URINALYSIS AUTO W/O SCOPE: CPT | Performed by: INTERNAL MEDICINE

## 2019-01-31 PROCEDURE — 86803 HEPATITIS C AB TEST: CPT | Performed by: INTERNAL MEDICINE

## 2019-01-31 PROCEDURE — 36415 COLL VENOUS BLD VENIPUNCTURE: CPT | Performed by: INTERNAL MEDICINE

## 2019-01-31 PROCEDURE — 99396 PREV VISIT EST AGE 40-64: CPT | Performed by: INTERNAL MEDICINE

## 2019-01-31 RX ORDER — SUMATRIPTAN 100 MG/1
100 TABLET, FILM COATED ORAL
Qty: 9 TABLET | Refills: 11 | Status: SHIPPED | OUTPATIENT
Start: 2019-01-31 | End: 2020-03-20

## 2019-01-31 RX ORDER — METHOCARBAMOL 750 MG/1
TABLET, FILM COATED ORAL
Qty: 90 TABLET | Refills: 2 | Status: SHIPPED | OUTPATIENT
Start: 2019-01-31 | End: 2019-07-26

## 2019-01-31 RX ORDER — LORAZEPAM 1 MG/1
TABLET ORAL
Qty: 30 TABLET | Refills: 5 | Status: SHIPPED | OUTPATIENT
Start: 2019-01-31 | End: 2019-07-26

## 2019-01-31 ASSESSMENT — PAIN SCALES - GENERAL: PAINLEVEL: NO PAIN (0)

## 2019-01-31 ASSESSMENT — MIFFLIN-ST. JEOR: SCORE: 1266.72

## 2019-01-31 NOTE — NURSING NOTE
Patient presents to the clinic for annual visit, medication discussion and review.     Medication Reconciliation: complete         Stacia Valentine LPN............. January 31, 2019 3:58 PM

## 2019-01-31 NOTE — PATIENT INSTRUCTIONS
Patient Education     Kegel Exercises  Kegel exercises don t need special clothing or equipment. They re easy to learn and simple to do. And if you do them right, no one can tell you re doing them, so they can be done almost anywhere. Your healthcare provider, nurse, or physical therapist can answer any questions you have and help you get started.    A weak pelvic floor  The pelvic floor muscles may weaken due to aging, pregnancy and vaginal childbirth, injury, surgery, chronic cough, or lack of exercise. If the pelvic floor is weak, your bladder and other pelvic organs may sag out of place. The urethra may also open too easily and allow urine to leak out. Kegel exercises can help you strengthen your pelvic floor muscles. Then they can better support the pelvic organs and control urine flow.  How Kegel exercises are done  Try each of the Kegel exercises described below. When you re doing them, try not to move your leg, buttock, or stomach muscles:    Contract as if you were stopping your urine stream. But do it when you re not urinating.    Tighten your rectum as if trying not to pass gas. Contract your anus, but don t move your buttocks.    You may place a finger or 2 in the vagina and squeeze your finger with your vagina to learn which muscles to tighten.  Try to hold each Kegel for a slow count to 5. You probably won t be able to hold them for that long at first. But keep practicing. It will get easier as your pelvic floor gets stronger. Eventually, special weights that you place in your vagina may be recommended to help make your Kegels even more effective. Visit your healthcare provider if you have difficulties doing Kegel exercises.  Helpful hints  Here are some tips to follow:    Do your Kegels as often as you can. The more you do them, the faster you ll feel the results.    Pick an activity you do often as a reminder. For instance, do your Kegels every time you sit down.    Tighten your pelvic floor before  you sneeze, get up from a chair, cough, laugh, or lift. This protects your pelvic floor from injury and can help prevent urine leakage.   Date Last Reviewed: 10/1/2017    2239-1467 [employer]. [address] [place] [address], [place] [address]. All rights reserved. This information is not intended as a substitute for professional medical care. Always follow your healthcare professional's instructions.

## 2019-01-31 NOTE — PROGRESS NOTES
Chief Complaint   Patient presents with     Annual Visit         HPI: Ms. Aiken is a 58 year old female who presents today for yearly physical.  She overall is feeling good.      She reports today that she has continued to have right ear pain.  She has had extensive workup for this through multiple ENT groups.  She has been tried on gabapentin, amitriptyline and other medications.  At most recently she tapered off of her amitriptyline due to side effects and is not interested in going back on it.  She incidentally tried 1 of her 's Robaxin tablets for some muscle spasm and found that this helped her ear pain.  She is interested in trying this ongoing.    She has issues with chronic insomnia.  She continues to use Ativan for this as needed.  She does not use them regularly.  She denies any obvious side effects.    She does continue to have migraines.  She uses sumatriptan for this and finds it to be beneficial.  She reports rare need for this overall.    She is previously noted to be anemic on labs.  She is due for recheck.  She also has been having some issues with urinary odor and some dysuria.  She denies any hematuria.  She also has been having some discomfort on the external vulva.  She denies any vaginal drainage or bleeding.    She is postmenopausal.  She declines a flu shot today.  She is due for hep C testing.  She is up-to-date on her Pap smear and colonoscopy however will need repeat Pap smear later this spring and also ongoing given her history of high-grade dysplasia.    History is discussed and updated on 1/31/2019 with patient.  It is current to the best of my knowledge as below.    Past Medical History:   Diagnosis Date     Allergic rhinitis      Chronic sinusitis      Essential (primary) hypertension      High grade squamous intraepithelial cervical dysplasia 01/2018     Migraine without status migrainosus, not intractable      Otalgia of right ear     chronic     Pregnancy     G3, P3 - all  NSVDs     Psychophysiologic insomnia      Squamous cell carcinoma of skin 06/2018    leg     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    Right     Ureterolithiasis 12/04/2014    right pyelo, mod right hydro, 2 mm right renal stone        Past Surgical History:   Procedure Laterality Date     ARTHROSCOPY SHOULDER  07/2009     AS NASAL/SINUS SCOPE W SPHENOIDOTOMY      2011/2016 Right for mycetoma removal at [facility]6/2012     COLONOSCOPY  06/05/2012    Sigmoid diverticulosis; follow up 10 years     CYSTOSCOPY N/A 8/9/2018    Procedure: CYSTOSCOPY;;  Surgeon: Ashleigh Fregoso MD;  Location:  OR     ENDOSCOPIC SINUS SURGERY      repeat clearing     HYSTERECTOMY VAGINAL Bilateral 8/9/2018    Procedure: HYSTERECTOMY VAGINAL;  Total Vaginal Hysterectomy & Bilateral Salpingectomy, Cystoscopy;  Surgeon: Ashleigh Fregoso MD;  Location:  OR     LEEP TX, CERVICAL  04/18/2018    Dr Fregoso     MYRINGOTOMY, INSERT TUBE, COMBINED  09/2016         Current Outpatient Medications   Medication Sig Dispense Refill     LORazepam (ATIVAN) 1 MG tablet TAKE 1/2 TO 1 TABLET BY MOUTH ONCE DAILY AT BEDTIME AS NEEDED FOR insomnia 30 tablet 5     methocarbamol (ROBAXIN) 750 MG tablet Take 1/2 to 1 tablet by mouth 3 times daily as needed for ear pain 90 tablet 2     SUMAtriptan (IMITREX) 100 MG tablet Take 1 tablet (100 mg) by mouth every 2 hours as needed for migraine . maxillary dose: 200mg per 24 hrs. 9 tablet 11       Allergies   Allergen Reactions     Fentanyl Anxiety and Itching     Patient describes feeling creepy/crawly to Dr. Saenz.         Family History   Problem Relation Age of Onset     No Known Problems Father      No Known Problems Mother      Heart Disease Paternal Grandfather         Heart Disease,MI at 65     No Known Problems Brother      No Known Problems Brother      No Known Problems Brother      Other - See Comments No family hx of         History is negative for diabetes, thyroid problems, cancer, anxiety,  "depression and asthma     Breast Cancer No family hx of         Cancer-breast       Family Status   Relation Name Status     Fa  Alive        post lyme syndrome     Mo  Alive     PGFa   at age 65     Bro 1 Alive     Bro 2 Alive     Bro 3 Alive     Neg  (Not Specified)        Social History     Tobacco Use     Smoking status: Never Smoker     Smokeless tobacco: Never Used   Substance Use Topics     Alcohol use: No     Alcohol/week: 0.0 oz     Comment: Alcoholic Drinks/day: very rarely       Social History     Social History Narrative    Lives with spouse, in [place].  Patient has been  and remarried.  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  Benny -  does hair  3 sons, Mike - lives in [facility] [place], 5 grandkids              ROS  GEN: -fevers/-chills/-night sweats/-wt change  NEURO: -headaches/-vision changes  EARS: -hearing changes/-tinnitus/+ ear pain  NOSE: -drainage/+ occasional congestion  MOUTH/THROAT: - sore throat/-dysphagia/-sores  LUNGS: -sob/-cough  CARDIOVASCULAR: -cp/-palpitations  GI: + Occasional pain in the right lower quadrant/-diarrhea/-constipation/-bloody stools  :-dysuria/-hematuria/-sores/-vaginal discharge or bleeding/+ discomfort externally  ENDOCRINE: -skin or hair changes  HEMATOLOGIC/LYMPHATIC: -swollen nodes  SKIN: -rashes/-lesions/-breast or nipple changes  MSK/RHEUM: + Occasional joint pain/-swelling  NEURO: -weakness/-parasthesias       EXAM:   /78 (BP Location: Right arm, Patient Position: Sitting, Cuff Size: Adult Regular)   Pulse 74   Temp 97.9  F (36.6  C) (Tympanic)   Resp 18   Ht 1.676 m (5' 6\")   Wt 66.5 kg (146 lb 9.6 oz)   LMP 2003   Breastfeeding? No   BMI 23.66 kg/m    Estimated body mass index is 23.66 kg/m  as calculated from the following:    Height as of this encounter: 1.676 m (5' 6\").    Weight as of this encounter: 66.5 kg (146 lb 9.6 oz).      GEN: Vitals " reviewed. Healthy appearing. Patient is in no acute distress. Cooperative with exam.  HEENT: Normocephalic atraumatic.  Normal external eye, conjunctiva, lids, cornea with no scleral icterus or conjunctival erythema. Pupils equally round. Oropharynx with no erythema or exudates. Dentition adequate.  EACs clear bilat, TM gray with normal landmarks.  NECK: Supple; no thyromegaly or masses noted.  No cervical or supraclavicular lymphadenopathy.  CV: Heart regular in rate and rhythm with no murmur.    LUNGS: Lungs clear to auscultation bilaterally.  Chest rise equal bilaterally.  No accessory muscle use.  ABD:  Soft, only tender in the right lower quadrant, and nondistended.  No rebound. Bowel sounds positive.  : Normal female external genitalia.  Atrophy present.  No vulvar lesion or mass.   SKIN: Warm and dry to touch.  No rash on face, arms and legs.  EXT: No clubbing or cyanosis.  No peripheral edema.  NEURO: Alert and oriented to person, place, and time.  Cranial nerves II-XII grossly intact with no focal or lateralizing deficits.  Muscle tone normal.  Gait normal. No tremor. Sensation intact to light touch.  MSK: ROM of upper and lower ext symmetric and full.  PSYCH:Affect appropriate. Speech fluent. Answers questions appropriately and thought process normal.    LABS: 1/31/2019 - Personally ordered/reviewed  Results for orders placed or performed in visit on 01/31/19   *UA reflex to Microscopic   Result Value Ref Range    Color Urine Yellow     Appearance Urine Clear     Glucose Urine Negative NEG^Negative mg/dL    Bilirubin Urine Negative NEG^Negative    Ketones Urine Trace (A) NEG^Negative mg/dL    Specific Gravity Urine >1.030 (H) 1.000 - 1.030    Blood Urine Negative NEG^Negative    pH Urine 5.0 5.0 - 9.0 pH    Protein Albumin Urine Negative NEG^Negative mg/dL    Urobilinogen Urine 0.2 0.2 - 1.0 EU/dL    Nitrite Urine Negative NEG^Negative    Leukocyte Esterase Urine Negative NEG^Negative    Source Midstream  Urine              ASSESSMENT AND PLAN:    Chronic insomnia  -Stable at this time, medication renewed.  Cautioned about overuse of this.  - LORazepam (ATIVAN) 1 MG tablet  Dispense: 30 tablet; Refill: 5    Otalgia, right  At this time we can continue with some Robaxin.  She is to call if she has any side effects.  - methocarbamol (ROBAXIN) 750 MG tablet  Dispense: 90 tablet; Refill: 2    Anemia, unspecified type  -Recheck pending.  - CBC W PLT No Diff    Encounter for hepatitis C screening test for low risk patient  -Antibody drawn today.  - Hepatitis C antibody    High risk medication use  -Given her use of ibuprofen, amitriptyline recently and her Lorazepam along with new prescription for Robaxin, CMP is obtained and pending  - Comprehensive Metabolic Panel    Migraine without aura and without status migrainosus, not intractable  -Stable at this time, med renewed.  - SUMAtriptan (IMITREX) 100 MG tablet  Dispense: 9 tablet; Refill: 11    Urinary body odor  -UA negative.  Encouraged increased water intake.  She can try Kegel exercises to help with urinary leakage.  - *UA reflex to Microscopic    Vaginal atrophy  -We discussed options including estrogen topical, coconut oil or other options.  She will try some over-the-counter coconut oil and call if she would like prescription in the future.    Abdominal pain, right lower quadrant  Etiology of her right lower quadrant pain is unknown.  She was encouraged to call if she has continued issues with this and we can pursue further evaluation.-       Follow-up with me in 3 months to review medication and perform Pap smear    Patient Instructions     Patient Education     Kegel Exercises  Kegel exercises don t need special clothing or equipment. They re easy to learn and simple to do. And if you do them right, no one can tell you re doing them, so they can be done almost anywhere. Your healthcare provider, nurse, or physical therapist can answer any questions you have and  help you get started.    A weak pelvic floor  The pelvic floor muscles may weaken due to aging, pregnancy and vaginal childbirth, injury, surgery, chronic cough, or lack of exercise. If the pelvic floor is weak, your bladder and other pelvic organs may sag out of place. The urethra may also open too easily and allow urine to leak out. Kegel exercises can help you strengthen your pelvic floor muscles. Then they can better support the pelvic organs and control urine flow.  How Kegel exercises are done  Try each of the Kegel exercises described below. When you re doing them, try not to move your leg, buttock, or stomach muscles:    Contract as if you were stopping your urine stream. But do it when you re not urinating.    Tighten your rectum as if trying not to pass gas. Contract your anus, but don t move your buttocks.    You may place a finger or 2 in the vagina and squeeze your finger with your vagina to learn which muscles to tighten.  Try to hold each Kegel for a slow count to 5. You probably won t be able to hold them for that long at first. But keep practicing. It will get easier as your pelvic floor gets stronger. Eventually, special weights that you place in your vagina may be recommended to help make your Kegels even more effective. Visit your healthcare provider if you have difficulties doing Kegel exercises.  Helpful hints  Here are some tips to follow:    Do your Kegels as often as you can. The more you do them, the faster you ll feel the results.    Pick an activity you do often as a reminder. For instance, do your Kegels every time you sit down.    Tighten your pelvic floor before you sneeze, get up from a chair, cough, laugh, or lift. This protects your pelvic floor from injury and can help prevent urine leakage.   Date Last Reviewed: 10/1/2017    2543-6093 [employer] [address] [place] [address], [place] [address]. All rights reserved. This information is not intended as a substitute for  professional medical care. Always follow your healthcare professional's instructions.                 TORY MALONE,    1/31/2019 4:54 PM    This document was prepared using voice generated softwear. While every attempt was made for accuracy, spelling and grammatical errors may exist.

## 2019-02-04 LAB — HCV AB SERPL QL IA: NONREACTIVE

## 2019-03-10 ENCOUNTER — OFFICE VISIT (OUTPATIENT)
Dept: FAMILY MEDICINE | Facility: OTHER | Age: 59
End: 2019-03-10
Attending: NURSE PRACTITIONER
Payer: COMMERCIAL

## 2019-03-10 VITALS
RESPIRATION RATE: 16 BRPM | HEART RATE: 62 BPM | SYSTOLIC BLOOD PRESSURE: 134 MMHG | TEMPERATURE: 97.9 F | HEIGHT: 66 IN | BODY MASS INDEX: 23.09 KG/M2 | DIASTOLIC BLOOD PRESSURE: 78 MMHG | WEIGHT: 143.7 LBS | OXYGEN SATURATION: 98 %

## 2019-03-10 DIAGNOSIS — J01.80 OTHER ACUTE SINUSITIS, RECURRENCE NOT SPECIFIED: Primary | ICD-10-CM

## 2019-03-10 PROCEDURE — 99214 OFFICE O/P EST MOD 30 MIN: CPT | Performed by: NURSE PRACTITIONER

## 2019-03-10 RX ORDER — FLUTICASONE PROPIONATE 50 MCG
1 SPRAY, SUSPENSION (ML) NASAL DAILY
Qty: 16 ML | Refills: 0 | Status: SHIPPED | OUTPATIENT
Start: 2019-03-10 | End: 2019-07-08

## 2019-03-10 RX ORDER — PREDNISONE 20 MG/1
TABLET ORAL
Qty: 10 TABLET | Refills: 0 | Status: SHIPPED | OUTPATIENT
Start: 2019-03-10 | End: 2019-07-08

## 2019-03-10 ASSESSMENT — ENCOUNTER SYMPTOMS
SINUS PAIN: 1
MUSCULOSKELETAL NEGATIVE: 1
NEUROLOGICAL NEGATIVE: 1
GASTROINTESTINAL NEGATIVE: 1
COUGH: 0
CONSTITUTIONAL NEGATIVE: 1
SORE THROAT: 0
RHINORRHEA: 1
SINUS PRESSURE: 1

## 2019-03-10 ASSESSMENT — MIFFLIN-ST. JEOR: SCORE: 1253.57

## 2019-03-10 ASSESSMENT — PAIN SCALES - GENERAL: PAINLEVEL: MODERATE PAIN (4)

## 2019-03-10 NOTE — PROGRESS NOTES
Close sUBJECTIVE:   Alessandra Aiken is a 58 year old female who presents to clinic today for the following health issues:    HPI  Presents with sinus congestion, pain, pressure and drainage for the past week. Has post nasal drip. Low grade temps. Slight cough. Hasn't taken anything for symptoms.   Has chronic ear pain. History of 3 sinus surgeries, one at [facility], 2 by Dr. Davila.     Patient Active Problem List    Diagnosis Date Noted     S/P vaginal hysterectomy 08/09/2018     Priority: Medium     Chronic insomnia 10/30/2017     Priority: Medium     Migraine without aura 10/30/2017     Priority: Medium     Overview:   triggers are alcohol and dairy products       Chronic sinusitis 01/11/2017     Priority: Medium     Hypertension 05/13/2015     Priority: Medium     Past Medical History:   Diagnosis Date     Allergic rhinitis      Chronic sinusitis      Essential (primary) hypertension      High grade squamous intraepithelial cervical dysplasia 01/2018     Migraine without status migrainosus, not intractable      Otalgia of right ear     chronic     Pregnancy     G3, P3 - all NSVDs     Psychophysiologic insomnia      Squamous cell carcinoma of skin 06/2018    leg     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    Right     Ureterolithiasis 12/04/2014    right pyelo, mod right hydro, 2 mm right renal stone      Past Surgical History:   Procedure Laterality Date     ARTHROSCOPY SHOULDER  07/2009     AS NASAL/SINUS SCOPE W SPHENOIDOTOMY      2011/2016 Right for mycetoma removal at [facility]~6/2012     COLONOSCOPY  06/05/2012    Sigmoid diverticulosis; follow up 10 years     CYSTOSCOPY N/A 8/9/2018    Procedure: CYSTOSCOPY;;  Surgeon: Ashleigh Fregoso MD;  Location:  OR     ENDOSCOPIC SINUS SURGERY      repeat clearing     HYSTERECTOMY VAGINAL Bilateral 8/9/2018    Procedure: HYSTERECTOMY VAGINAL;  Total Vaginal Hysterectomy & Bilateral Salpingectomy, Cystoscopy;  Surgeon: Ashleigh Fregoso MD;  Location:  OR     LEEP TX,  CERVICAL  04/18/2018    Dr Fregoso     MYRINGOTOMY, INSERT TUBE, COMBINED  09/2016     Family History   Problem Relation Age of Onset     No Known Problems Father      No Known Problems Mother      Heart Disease Paternal Grandfather         Heart Disease,MI at 65     No Known Problems Brother      No Known Problems Brother      No Known Problems Brother      Other - See Comments No family hx of         History is negative for diabetes, thyroid problems, cancer, anxiety, depression and asthma     Breast Cancer No family hx of         Cancer-breast     Social History     Tobacco Use     Smoking status: Never Smoker     Smokeless tobacco: Never Used   Substance Use Topics     Alcohol use: No     Alcohol/week: 0.0 oz     Comment: Alcoholic Drinks/day: very rarely     Social History     Social History Narrative    Lives with spouse, in [place].  Patient has been  and remarried.  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  Benny -  does hair  3 sons, Mike - lives in [facility], [facility]     Current Outpatient Medications   Medication Sig Dispense Refill     LORazepam (ATIVAN) 1 MG tablet TAKE 1/2 TO 1 TABLET BY MOUTH ONCE DAILY AT BEDTIME AS NEEDED FOR insomnia 30 tablet 5     methocarbamol (ROBAXIN) 750 MG tablet Take 1/2 to 1 tablet by mouth 3 times daily as needed for ear pain 90 tablet 2     SUMAtriptan (IMITREX) 100 MG tablet Take 1 tablet (100 mg) by mouth every 2 hours as needed for migraine . maxillary dose: 200mg per 24 hrs. 9 tablet 11     Allergies   Allergen Reactions     Fentanyl Anxiety and Itching     Patient describes feeling creepy/crawly to Dr. Saenz.        Review of Systems   Constitutional: Negative.    HENT: Positive for ear pain (Chronic right ear pain), postnasal drip, rhinorrhea, sinus pressure and sinus pain. Negative for sore throat.    Respiratory: Negative for cough.    Gastrointestinal: Negative.   "  Musculoskeletal: Negative.    Neurological: Negative.         OBJECTIVE:     /78 (BP Location: Right arm, Patient Position: Sitting, Cuff Size: Adult Regular)   Pulse 62   Temp 97.9  F (36.6  C) (Tympanic)   Resp 16   Ht 1.676 m (5' 6\")   Wt 65.2 kg (143 lb 11.2 oz)   LMP 03/07/2003   SpO2 98%   Breastfeeding? No   BMI 23.19 kg/m    Body mass index is 23.19 kg/m .  Physical Exam   Constitutional: She appears well-developed and well-nourished. No distress.   HENT:   Head: Normocephalic and atraumatic.   Right Ear: Tympanic membrane and external ear normal.   Left Ear: Tympanic membrane and external ear normal.   Nose: Nose normal. Right sinus exhibits no maxillary sinus tenderness and no frontal sinus tenderness. Left sinus exhibits no maxillary sinus tenderness and no frontal sinus tenderness.   Mouth/Throat: Uvula is midline, oropharynx is clear and moist and mucous membranes are normal.   Eyes: Conjunctivae and lids are normal. No scleral icterus.   Neck: Normal range of motion. Neck supple.   Cardiovascular: Normal rate, regular rhythm and normal heart sounds.   Pulmonary/Chest: Effort normal.   Lymphadenopathy:     She has no cervical adenopathy.   Skin: She is not diaphoretic.   Nursing note and vitals reviewed.      Diagnostic Test Results:  No results found for this or any previous visit (from the past 24 hour(s)).    ASSESSMENT/PLAN:       ICD-10-CM    1. Other acute sinusitis, recurrence not specified J01.80 predniSONE (DELTASONE) 20 MG tablet     fluticasone (FLONASE) 50 MCG/ACT nasal spray     Patient with a history of previous sinus surgeries, presents with 1 week history of sinus pressure and drainage.   Nontender on exam, no facial erythema or edema.   Will treat as viral. Ordered prednisone and flonase to help decrease drainage and inflammation. Advised saline rinses.  Advised close f/u with PCP if not improving.   Given Epic educational materials.     "

## 2019-03-10 NOTE — PATIENT INSTRUCTIONS
Patient Education   Take medications as ordered.   Rinse sinuses with mag spray then use Flonase.   Follow up with PCP in 1 week if not improving.       Sinusitis (No Antibiotics)    The sinuses are air-filled spaces within the bones of the face. They connect to the inside of the nose. Sinusitis is an inflammation of the tissue that lines the sinuses. Sinusitis can occur during a cold. It can also happen due to allergies to pollens and other particles in the air. It can cause symptoms such as sinus congestion, headache, sore throat, facial swelling, and a feeling of fullness. It may also cause a low-grade fever. Your sinusitis does not include an infection with bacteria. Because of this, antibiotics are not used to treat this problem.  Home care    Drink plenty of water, hot tea, and other liquids. This may help thin nasal mucus. It also may help your sinuses drain fluids.    Heat may help soothe painful areas of your face. Use a towel soaked in hot water. Or,  the shower and direct the warm spray onto your face. Using a vaporizer along with a menthol rub at night may also help soothe symptoms.     An expectorant with guaifenesin may help thin nasal mucus and help your sinuses drain fluids.    You can use an over-the-counter decongestant, unless a similar medicine was prescribed to you. Nasal sprays work the fastest. Use one that contains phenylephrine or oxymetazoline. First blow your nose gently. Then use the spray. Do not use these medicines more often than directed on the label. If you do, your symptoms may get worse. You may also take pills that contain pseudoephedrine. Don t use products that combine multiple medicines. This is because side effects may be increased. Read all medicine labels. You can also ask the pharmacist for help. (People with high blood pressure should not use decongestants. They can raise blood pressure.)    Over-the-counter antihistamines may help if allergies contributed to  your sinusitis.      Use acetaminophen or ibuprofen to control pain, unless another pain medicine was prescribed to you. If you have chronic liver or kidney disease or ever had a stomach ulcer, talk with your healthcare provider before using these medicines. (Aspirin should never be taken by anyone under age 18 who is ill with a fever. It may cause severe liver damage.)    Use nasal rinses or irrigation as instructed by your healthcare provider.    Don't smoke. This can make symptoms worse.  Follow-up care  Follow up with your healthcare provider or our staff if you are NOT better in 1 week.  When to seek medical advice  Call your healthcare provider if any of these occur:    Green or yellow fluid draining from your nose or into your throat    Facial pain or headache that gets worse    Stiff neck    Unusual drowsiness or confusion    Swelling of your forehead or eyelids    Vision problems, such as blurred or double vision    Fever of 100.4 F (38 C) or higher, or as directed by your healthcare provider    Seizure    Breathing problems    Symptoms that don't go away in 10 days  Date Last Reviewed: 11/1/2017 2000-2018 [employer]. [address] [place] [address], [place] [address]. All rights reserved. This information is not intended as a substitute for professional medical care. Always follow your healthcare professional's instructions.

## 2019-03-10 NOTE — NURSING NOTE
Patient in clinic for sinus problem x 1 week.     Lynn Ayala LPN....................  3/10/2019   10:25 AM  Chief Complaint   Patient presents with     Sinus Problem       Medication Reconciliation: complete    Lynn Ayala LPN

## 2019-03-11 ENCOUNTER — MYC MEDICAL ADVICE (OUTPATIENT)
Dept: INTERNAL MEDICINE | Facility: OTHER | Age: 59
End: 2019-03-11

## 2019-03-11 DIAGNOSIS — J01.90 ACUTE SINUSITIS WITH SYMPTOMS > 10 DAYS: Primary | ICD-10-CM

## 2019-07-08 ENCOUNTER — OFFICE VISIT (OUTPATIENT)
Dept: INTERNAL MEDICINE | Facility: OTHER | Age: 59
End: 2019-07-08
Attending: INTERNAL MEDICINE
Payer: COMMERCIAL

## 2019-07-08 ENCOUNTER — HOSPITAL ENCOUNTER (OUTPATIENT)
Dept: GENERAL RADIOLOGY | Facility: OTHER | Age: 59
Discharge: HOME | End: 2019-07-08
Attending: INTERNAL MEDICINE | Admitting: INTERNAL MEDICINE
Payer: COMMERCIAL

## 2019-07-08 VITALS
OXYGEN SATURATION: 93 % | SYSTOLIC BLOOD PRESSURE: 122 MMHG | RESPIRATION RATE: 18 BRPM | BODY MASS INDEX: 22.82 KG/M2 | HEIGHT: 66 IN | HEART RATE: 64 BPM | DIASTOLIC BLOOD PRESSURE: 70 MMHG | WEIGHT: 142 LBS

## 2019-07-08 DIAGNOSIS — S99.921A FOOT INJURY, RIGHT, INITIAL ENCOUNTER: ICD-10-CM

## 2019-07-08 DIAGNOSIS — S99.921A FOOT INJURY, RIGHT, INITIAL ENCOUNTER: Primary | ICD-10-CM

## 2019-07-08 PROCEDURE — 99213 OFFICE O/P EST LOW 20 MIN: CPT | Performed by: INTERNAL MEDICINE

## 2019-07-08 PROCEDURE — 73630 X-RAY EXAM OF FOOT: CPT | Mod: RT

## 2019-07-08 ASSESSMENT — MIFFLIN-ST. JEOR: SCORE: 1240.86

## 2019-07-08 ASSESSMENT — PAIN SCALES - GENERAL: PAINLEVEL: EXTREME PAIN (8)

## 2019-07-08 NOTE — PATIENT INSTRUCTIONS
Ice every 3-4 hours, gentle exercise/rest as much as possible.    Recommend Ibuprofen 200 mg tablet (3 tablets) 3-4 times daily for 5-7 days and then as needed.  Be sure to take with food.  Maximum 16 per day.    Or     Naproxen 220 mg tablet (1-2 tablets) 1-2 times daily for 5-7 days and then as needed.  Be sure totake with food.  Max of 4 per day.    Caution with NSAIDS (ibuprofen, aspirin, naproxen, aleve, advil) due to risk for increased blood pressure,stomach pain/nausea/ulcers and kidney damage; use minimal amount necessary    -- in addition to --     Tylenol 1000mg (2- extra strength 500mg tablets or 3 regular strength 325mg tablets) three times a day as needed; Maximum of 4000mg daily    - Return/call as needed for follow-up should any new symptoms develop, for worsening of current symptoms or if symptoms do notresolve with above plan.
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
## 2019-07-08 NOTE — NURSING NOTE
Patient presents to the clinic for right foot.     Medication Reconciliation: complete   Stacia Valentine LPN............. July 8, 2019 11:09 AM

## 2019-07-26 ENCOUNTER — OFFICE VISIT (OUTPATIENT)
Dept: INTERNAL MEDICINE | Facility: OTHER | Age: 59
End: 2019-07-26
Attending: INTERNAL MEDICINE
Payer: COMMERCIAL

## 2019-07-26 VITALS
WEIGHT: 138.2 LBS | OXYGEN SATURATION: 95 % | TEMPERATURE: 98.4 F | BODY MASS INDEX: 22.21 KG/M2 | HEIGHT: 66 IN | HEART RATE: 57 BPM | RESPIRATION RATE: 16 BRPM

## 2019-07-26 DIAGNOSIS — Z12.4 SCREENING FOR CERVICAL CANCER: Primary | ICD-10-CM

## 2019-07-26 DIAGNOSIS — F51.04 CHRONIC INSOMNIA: ICD-10-CM

## 2019-07-26 DIAGNOSIS — D64.9 ANEMIA, UNSPECIFIED TYPE: ICD-10-CM

## 2019-07-26 DIAGNOSIS — H92.01 OTALGIA, RIGHT: ICD-10-CM

## 2019-07-26 DIAGNOSIS — Z90.710 S/P HYSTERECTOMY: ICD-10-CM

## 2019-07-26 DIAGNOSIS — R87.613 HSIL (HIGH GRADE SQUAMOUS INTRAEPITHELIAL LESION) ON PAP SMEAR OF CERVIX: ICD-10-CM

## 2019-07-26 DIAGNOSIS — Z79.899 HIGH RISK MEDICATION USE: ICD-10-CM

## 2019-07-26 LAB
ALBUMIN SERPL-MCNC: 4.5 G/DL (ref 3.5–5.7)
ALP SERPL-CCNC: 76 U/L (ref 34–104)
ALT SERPL W P-5'-P-CCNC: 10 U/L (ref 7–52)
ANION GAP SERPL CALCULATED.3IONS-SCNC: 5 MMOL/L (ref 3–14)
AST SERPL W P-5'-P-CCNC: 16 U/L (ref 13–39)
BILIRUB SERPL-MCNC: 0.8 MG/DL (ref 0.3–1)
BUN SERPL-MCNC: 12 MG/DL (ref 7–25)
CALCIUM SERPL-MCNC: 9.6 MG/DL (ref 8.6–10.3)
CHLORIDE SERPL-SCNC: 106 MMOL/L (ref 98–107)
CO2 SERPL-SCNC: 29 MMOL/L (ref 21–31)
CREAT SERPL-MCNC: 0.88 MG/DL (ref 0.6–1.2)
ERYTHROCYTE [DISTWIDTH] IN BLOOD BY AUTOMATED COUNT: 12.8 % (ref 10–15)
GFR SERPL CREATININE-BSD FRML MDRD: 66 ML/MIN/{1.73_M2}
GLUCOSE SERPL-MCNC: 82 MG/DL (ref 70–105)
HCT VFR BLD AUTO: 40.6 % (ref 35–47)
HGB BLD-MCNC: 13.6 G/DL (ref 11.7–15.7)
MCH RBC QN AUTO: 28.5 PG (ref 26.5–33)
MCHC RBC AUTO-ENTMCNC: 33.5 G/DL (ref 31.5–36.5)
MCV RBC AUTO: 85 FL (ref 78–100)
PLATELET # BLD AUTO: 152 10E9/L (ref 150–450)
POTASSIUM SERPL-SCNC: 3.8 MMOL/L (ref 3.5–5.1)
PROT SERPL-MCNC: 6.9 G/DL (ref 6.4–8.9)
RBC # BLD AUTO: 4.77 10E12/L (ref 3.8–5.2)
SODIUM SERPL-SCNC: 140 MMOL/L (ref 134–144)
WBC # BLD AUTO: 3.7 10E9/L (ref 4–11)

## 2019-07-26 PROCEDURE — 88142 CYTOPATH C/V THIN LAYER: CPT | Performed by: INTERNAL MEDICINE

## 2019-07-26 PROCEDURE — 87624 HPV HI-RISK TYP POOLED RSLT: CPT | Mod: ZL | Performed by: INTERNAL MEDICINE

## 2019-07-26 PROCEDURE — 40001026 ZZHCL STATISTICAL PAP TEST QC: Performed by: INTERNAL MEDICINE

## 2019-07-26 PROCEDURE — 36415 COLL VENOUS BLD VENIPUNCTURE: CPT | Mod: ZL | Performed by: INTERNAL MEDICINE

## 2019-07-26 PROCEDURE — 99214 OFFICE O/P EST MOD 30 MIN: CPT | Performed by: INTERNAL MEDICINE

## 2019-07-26 PROCEDURE — G0123 SCREEN CERV/VAG THIN LAYER: HCPCS | Performed by: INTERNAL MEDICINE

## 2019-07-26 PROCEDURE — 85027 COMPLETE CBC AUTOMATED: CPT | Mod: ZL | Performed by: INTERNAL MEDICINE

## 2019-07-26 PROCEDURE — 80053 COMPREHEN METABOLIC PANEL: CPT | Mod: ZL | Performed by: INTERNAL MEDICINE

## 2019-07-26 RX ORDER — METHOCARBAMOL 750 MG/1
750 TABLET, FILM COATED ORAL 3 TIMES DAILY PRN
Qty: 270 TABLET | Refills: 1 | Status: SHIPPED | OUTPATIENT
Start: 2019-07-26 | End: 2020-01-30

## 2019-07-26 RX ORDER — LORAZEPAM 1 MG/1
1 TABLET ORAL
Qty: 30 TABLET | Refills: 5 | Status: SHIPPED | OUTPATIENT
Start: 2019-07-26 | End: 2020-01-30

## 2019-07-26 ASSESSMENT — MIFFLIN-ST. JEOR: SCORE: 1223.62

## 2019-07-26 ASSESSMENT — PAIN SCALES - GENERAL: PAINLEVEL: NO PAIN (0)

## 2019-07-26 NOTE — NURSING NOTE
Patient presents to the clinic for med check.     Medication Reconciliation: complete   Stacia Valentine LPN............. July 26, 2019 10:03 AM

## 2019-07-26 NOTE — PROGRESS NOTES
"Chief Complaint   Patient presents with     Recheck Medication         HPI: Ms. Aiken is a 59 year old female who presents today for follow up of multiple issues.    She has a recent history of high grade squamous intraepithelial lesion status post hysterectomy.  She is due for repeat Pap smear.  She has not had any new issues.    She does have ongoing issues with chronic insomnia.  She uses lorazepam as needed for this.  She denies any obvious side effects.    She has continued on Robaxin for her pain.  She feels that it has continued to help some.  She denies any obvious side effects.      Last hemoglobin was obtained for her was postop last summer.  It did show a hemoglobin of 10.7. We will plan to recheck to ensure improvement.    She does feel that her toe that was recently broken has improved and her swelling has decreased.      She  reports that she has never smoked. She has never used smokeless tobacco.    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      ROS  Pertinent ROS was performed and was negative, including for fever, chills, chest pain, shortness of breath, increased lower extremity edema, changes in bowel or bladder, blood in the stool, difficulty swallowing, sores in the mouth. No other concerns, with exception of HPI above.      EXAM:   Pulse 57   Temp 98.4  F (36.9  C) (Tympanic)   Resp 16   Ht 1.676 m (5' 6\")   Wt 62.7 kg (138 lb 3.2 oz)   LMP " "03/07/2003   SpO2 95%   Breastfeeding? No   BMI 22.31 kg/m      Estimated body mass index is 22.31 kg/m  as calculated from the following:    Height as of this encounter: 1.676 m (5' 6\").    Weight as of this encounter: 62.7 kg (138 lb 3.2 oz).      GEN: Vitals reviewed. Healthy appearing. Patient is in no acute distress. Cooperative with exam.  HEENT: Normocephalic atraumatic.  Pupils equally round.  No scleral icterus, no conjunctival erythema. Oropharynx with no erythema or exudates. Dentition adequate.  NECK: Supple; no thyromegaly.  No neck, cervical LAD.  Submandibular lymphadenopathy is noted.  This is slightly greater on the left than the right.  CV: Heart regular in rate and rhythm with no murmur.    LUNGS: Lungs clear to auscultation bilaterally.  Chest rise equal bilaterally.  No accessory muscle use.  ABD: Nondistended.  : Normal female external genitalia.  Atrophy present.   Pap smear from vaginal cuff.  BUS wnl.  Ovaries not palpated.  Normal urethra and no bladder mass palpable.  SKIN: Warm and dry to touch.  No rash on face, arms and legs.  EXT: No clubbing or cyanosis.  No peripheral edema.  Right fifth toe with decreased swelling overall.  PSYCH: Mood is good.  Affect appropriate. Speech fluent. Answers questions appropriately and thought process normal.     ASSESSMENT AND PLAN:  Chronic insomnia  -Stable at this time.  Continue current medication.  - LORazepam (ATIVAN) 1 MG tablet  Dispense: 30 tablet; Refill: 5    Otalgia, right  -Stable at this time, continue medication.  - methocarbamol (ROBAXIN) 750 MG tablet  Dispense: 270 tablet; Refill: 1    Screening for cervical cancer  - HPV High Risk Types DNA Cervical  - Pap Screen Thin Prep with HPV - recommended age 30 - 65 years (select HPV order below)    HSIL (high grade squamous intraepithelial lesion) on Pap smear of cervix  - HPV High Risk Types DNA Cervical  - Pap Screen Thin Prep with HPV - recommended age 30 - 65 years (select HPV order " below)    S/P hysterectomy  - HPV High Risk Types DNA Cervical  - Pap Screen Thin Prep with HPV - recommended age 30 - 65 years (select HPV order below)    High risk medication use  - Comprehensive Metabolic Panel    Anemia, unspecified type  -Hemoglobin has improved as has white blood cell count.  - CBC W PLT No Diff           Return in about 6 months (around 1/26/2020) for Recheck sleep; 12 months annual px/pap.      TORY MALONE DO   7/26/2019 8:14 AM    This document was prepared using voice generated softwear. While every attempt was made for accuracy, grammatical errors may exist.

## 2019-08-02 ENCOUNTER — HOSPITAL ENCOUNTER (OUTPATIENT)
Dept: MAMMOGRAPHY | Facility: OTHER | Age: 59
Discharge: HOME | End: 2019-08-02
Attending: INTERNAL MEDICINE | Admitting: INTERNAL MEDICINE
Payer: COMMERCIAL

## 2019-08-02 DIAGNOSIS — Z12.39 BREAST SCREENING, UNSPECIFIED: ICD-10-CM

## 2019-08-02 PROCEDURE — 77067 SCR MAMMO BI INCL CAD: CPT

## 2019-08-06 LAB
COPATH REPORT: NORMAL
FINAL DIAGNOSIS: ABNORMAL
HPV HR 12 DNA CVX QL NAA+PROBE: POSITIVE
HPV16 DNA SPEC QL NAA+PROBE: NEGATIVE
HPV18 DNA SPEC QL NAA+PROBE: NEGATIVE
PAP: NORMAL
SPECIMEN DESCRIPTION: ABNORMAL
SPECIMEN SOURCE CVX/VAG CYTO: ABNORMAL

## 2019-08-09 ENCOUNTER — MYC MEDICAL ADVICE (OUTPATIENT)
Dept: INTERNAL MEDICINE | Facility: OTHER | Age: 59
End: 2019-08-09

## 2019-08-19 ENCOUNTER — MYC MEDICAL ADVICE (OUTPATIENT)
Dept: INTERNAL MEDICINE | Facility: OTHER | Age: 59
End: 2019-08-19

## 2019-08-19 DIAGNOSIS — R39.9 URINARY SYMPTOM OR SIGN: Primary | ICD-10-CM

## 2019-08-21 DIAGNOSIS — R39.9 URINARY SYMPTOM OR SIGN: ICD-10-CM

## 2019-08-21 LAB
ALBUMIN UR-MCNC: NEGATIVE MG/DL
APPEARANCE UR: CLEAR
BACTERIA #/AREA URNS HPF: ABNORMAL /HPF
BILIRUB UR QL STRIP: NEGATIVE
COLOR UR AUTO: YELLOW
GLUCOSE UR STRIP-MCNC: NEGATIVE MG/DL
HGB UR QL STRIP: NEGATIVE
KETONES UR STRIP-MCNC: NEGATIVE MG/DL
LEUKOCYTE ESTERASE UR QL STRIP: ABNORMAL
NITRATE UR QL: NEGATIVE
NON-SQ EPI CELLS #/AREA URNS LPF: ABNORMAL /LPF
PH UR STRIP: 5.5 PH (ref 5–9)
RBC #/AREA URNS AUTO: ABNORMAL /HPF
SOURCE: ABNORMAL
SP GR UR STRIP: 1.01 (ref 1–1.03)
UROBILINOGEN UR STRIP-ACNC: 0.2 EU/DL (ref 0.2–1)
WBC #/AREA URNS AUTO: ABNORMAL /HPF

## 2019-08-21 PROCEDURE — 81001 URINALYSIS AUTO W/SCOPE: CPT | Performed by: INTERNAL MEDICINE

## 2019-08-21 NOTE — TELEPHONE ENCOUNTER
Patient called the.  She has been having some urinary symptoms.  UA will be placed and she can stop and have this done.      Questions are answered regarding her positive HPV test.    She has been having significant upper respiratory infections and fatigue over the last couple months.  She would like a visit for this.  She will be seen next week.  She can come to [facility] sooner if symptoms worsen.

## 2019-08-21 NOTE — TELEPHONE ENCOUNTER
Patient has been added to the schedule for 1pm on 8.27.2019.  Jenni Potts on 8/21/2019 at 8:37 AM

## 2019-08-27 ENCOUNTER — HOSPITAL ENCOUNTER (OUTPATIENT)
Dept: GENERAL RADIOLOGY | Facility: OTHER | Age: 59
Discharge: HOME | End: 2019-08-27
Attending: INTERNAL MEDICINE | Admitting: INTERNAL MEDICINE
Payer: COMMERCIAL

## 2019-08-27 ENCOUNTER — OFFICE VISIT (OUTPATIENT)
Dept: INTERNAL MEDICINE | Facility: OTHER | Age: 59
End: 2019-08-27
Attending: INTERNAL MEDICINE
Payer: COMMERCIAL

## 2019-08-27 VITALS
BODY MASS INDEX: 22.88 KG/M2 | TEMPERATURE: 98.1 F | HEART RATE: 60 BPM | OXYGEN SATURATION: 98 % | SYSTOLIC BLOOD PRESSURE: 132 MMHG | RESPIRATION RATE: 16 BRPM | HEIGHT: 66 IN | DIASTOLIC BLOOD PRESSURE: 70 MMHG | WEIGHT: 142.4 LBS

## 2019-08-27 DIAGNOSIS — D72.819 LEUKOPENIA, UNSPECIFIED TYPE: Primary | ICD-10-CM

## 2019-08-27 DIAGNOSIS — Z79.899 HIGH RISK MEDICATION USE: ICD-10-CM

## 2019-08-27 DIAGNOSIS — N64.4 BREAST PAIN: ICD-10-CM

## 2019-08-27 DIAGNOSIS — R05.3 CHRONIC COUGH: ICD-10-CM

## 2019-08-27 DIAGNOSIS — N28.1 KIDNEY CYSTS: ICD-10-CM

## 2019-08-27 LAB
ALBUMIN SERPL-MCNC: 4.5 G/DL (ref 3.5–5.7)
ALP SERPL-CCNC: 76 U/L (ref 34–104)
ALT SERPL W P-5'-P-CCNC: 14 U/L (ref 7–52)
ANION GAP SERPL CALCULATED.3IONS-SCNC: 5 MMOL/L (ref 3–14)
AST SERPL W P-5'-P-CCNC: 19 U/L (ref 13–39)
BASOPHILS # BLD AUTO: 0 10E9/L (ref 0–0.2)
BASOPHILS NFR BLD AUTO: 0.4 %
BILIRUB SERPL-MCNC: 0.8 MG/DL (ref 0.3–1)
BUN SERPL-MCNC: 18 MG/DL (ref 7–25)
CALCIUM SERPL-MCNC: 9.5 MG/DL (ref 8.6–10.3)
CHLORIDE SERPL-SCNC: 105 MMOL/L (ref 98–107)
CO2 SERPL-SCNC: 30 MMOL/L (ref 21–31)
CREAT SERPL-MCNC: 0.84 MG/DL (ref 0.6–1.2)
DIFFERENTIAL METHOD BLD: NORMAL
EOSINOPHIL # BLD AUTO: 0 10E9/L (ref 0–0.7)
EOSINOPHIL NFR BLD AUTO: 0.2 %
ERYTHROCYTE [DISTWIDTH] IN BLOOD BY AUTOMATED COUNT: 12.7 % (ref 10–15)
GFR SERPL CREATININE-BSD FRML MDRD: 70 ML/MIN/{1.73_M2}
GLUCOSE SERPL-MCNC: 96 MG/DL (ref 70–105)
HCT VFR BLD AUTO: 41.2 % (ref 35–47)
HGB BLD-MCNC: 14 G/DL (ref 11.7–15.7)
IMM GRANULOCYTES # BLD: 0 10E9/L (ref 0–0.4)
IMM GRANULOCYTES NFR BLD: 0.4 %
LYMPHOCYTES # BLD AUTO: 1.4 10E9/L (ref 0.8–5.3)
LYMPHOCYTES NFR BLD AUTO: 30.3 %
MCH RBC QN AUTO: 29 PG (ref 26.5–33)
MCHC RBC AUTO-ENTMCNC: 34 G/DL (ref 31.5–36.5)
MCV RBC AUTO: 85 FL (ref 78–100)
MONOCYTES # BLD AUTO: 0.3 10E9/L (ref 0–1.3)
MONOCYTES NFR BLD AUTO: 6.1 %
NEUTROPHILS # BLD AUTO: 3 10E9/L (ref 1.6–8.3)
NEUTROPHILS NFR BLD AUTO: 62.6 %
PLATELET # BLD AUTO: 179 10E9/L (ref 150–450)
POTASSIUM SERPL-SCNC: 4.1 MMOL/L (ref 3.5–5.1)
PROT SERPL-MCNC: 7.2 G/DL (ref 6.4–8.9)
RBC # BLD AUTO: 4.83 10E12/L (ref 3.8–5.2)
SODIUM SERPL-SCNC: 140 MMOL/L (ref 134–144)
WBC # BLD AUTO: 4.8 10E9/L (ref 4–11)

## 2019-08-27 PROCEDURE — 71046 X-RAY EXAM CHEST 2 VIEWS: CPT

## 2019-08-27 PROCEDURE — 36415 COLL VENOUS BLD VENIPUNCTURE: CPT | Mod: ZL | Performed by: INTERNAL MEDICINE

## 2019-08-27 PROCEDURE — 80053 COMPREHEN METABOLIC PANEL: CPT | Mod: ZL | Performed by: INTERNAL MEDICINE

## 2019-08-27 PROCEDURE — 85025 COMPLETE CBC W/AUTO DIFF WBC: CPT | Mod: ZL | Performed by: INTERNAL MEDICINE

## 2019-08-27 PROCEDURE — 85025 COMPLETE CBC W/AUTO DIFF WBC: CPT

## 2019-08-27 PROCEDURE — 99214 OFFICE O/P EST MOD 30 MIN: CPT | Performed by: INTERNAL MEDICINE

## 2019-08-27 PROCEDURE — 85045 AUTOMATED RETICULOCYTE COUNT: CPT

## 2019-08-27 ASSESSMENT — MIFFLIN-ST. JEOR: SCORE: 1242.67

## 2019-08-27 ASSESSMENT — PAIN SCALES - GENERAL: PAINLEVEL: NO PAIN (0)

## 2019-08-27 NOTE — PROGRESS NOTES
Chief Complaint   Patient presents with     Fatigue          Subjective:   Ms. Aiken is a 59 year old female  seen for the acute concern today of fatigue along with sore throat and cough.    She reports that this started 3 months.  She has had a dry cough, hoarseness in morning.  Raspy cough.  Increase SOB with any activity and at rest.  No hemoptysis.  No chest pain.  No palpitations.  She does have a chronic sore throat.  She denies any obvious reflux.  Labs are reviewed and she has a history of leukopenia persistently over the last several years.    She has also had breast burning at times.  Recent negative mammogram.  Episodes come every few days to every few weeks.      She has had right flank pain with constant pain that varies between sharp and achey.  This has been present for the last 6 years.  No bowel changes.  In reviewing her past imaging bilateral renal cysts were seen with a mildly complex right renal cyst.    She has also had urinary burning over the last couple weeks.  No hematuria.  Increased odor.  Increase pain with intercourse.  No new sexual partner.  No vaginal discharge.      She  reports that she has never smoked. She has never used smokeless tobacco.    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      ROS:   Pertinent  ROS was performed and was negative, including for fevers, chills, chest pain, shortness of breath, increased lower extremity edema,  "changes in bowel or bladder, blood in the stool, difficulty swallowing, sores in the mouth. Increased headaches recently, feels they are tension based.  No other concerns, with exception of HPI above.      Objective:    /70 (BP Location: Right arm, Patient Position: Sitting, Cuff Size: Adult Regular)   Pulse 60   Temp 98.1  F (36.7  C) (Tympanic)   Resp 16   Ht 1.676 m (5' 6\")   Wt 64.6 kg (142 lb 6.4 oz)   LMP 03/07/2003   SpO2 98%   Breastfeeding? No   BMI 22.98 kg/m    GEN: Vitals reviewed.  Patient is in no acute distress. Cooperative with exam.  HEENT: Normocephalic atraumatic.  Pupils equally round.  No scleral icterus, no conjunctival erythema. Oropharynx with no erythema or exudates. Dentition adequate.  NECK: Supple; no thyromegaly.  No neck, cervical LAD.  Slight LAD submandibular, fairly symmetric and slightly tender with palpation  CV: Heart regular in rate and rhythm with no murmur.   LUNGS: Lungs clear to auscultation bilaterally.  Chest rise equal bilaterally.  No accessory muscle use.  ABD:  Soft, tender to palpation in RLQ, and nondistended.  No rebound. Bowel sounds positive.  SKIN: Warm and dry to touch.  No rash on face, arms and legs.  EXT: No clubbing or cyanosis.  No peripheral edema.     Assessment/Plan:   Leukopenia, unspecified type  - at this time given her leukopenia along with ongoing symptoms we will obtain a peripheral blood smear, may consider immunoglobulin testing in the future  - Bld morphology pathology review  - CBC and Differential    Chronic cough  -Chest x-ray is done today.  No obvious abnormality.  She likely would benefit from pulmonary function studies.  We will discuss this in follow-up.  - XR Chest 2 Views    Kidney cysts  -Renal function overall has been stable.  Plan for CT of the abdomen to follow-up on past kidney cyst.  - CT Abdomen Pelvis w Contrast  - Comprehensive Metabolic Panel    High risk medication use  - Comprehensive Metabolic " Panel    Breast pain  -She is to call if she has continued problems with this and we can consider referral versus further evaluation      - Return/call as needed for follow-up should any new symptoms develop, for worsening of current symptoms or if symptoms do not resolve with above plan.    Return in about 10 days (around 9/6/2019).     TORY MALONE, DO   8/27/2019 1:28 PM    This document was prepared using voice generated softwear. While every attempt was made for accuracy, grammatical errors may exist.

## 2019-08-27 NOTE — NURSING NOTE
Patient presents to the clinic for multiple issues.     Medication Reconciliation: complete   Stacia Valentine LPN............. August 27, 2019 1:03 PM

## 2019-08-28 ENCOUNTER — OFFICE VISIT (OUTPATIENT)
Dept: OBGYN | Facility: OTHER | Age: 59
End: 2019-08-28
Attending: OBSTETRICS & GYNECOLOGY
Payer: COMMERCIAL

## 2019-08-28 VITALS
HEART RATE: 60 BPM | SYSTOLIC BLOOD PRESSURE: 138 MMHG | WEIGHT: 142 LBS | DIASTOLIC BLOOD PRESSURE: 86 MMHG | BODY MASS INDEX: 22.92 KG/M2

## 2019-08-28 DIAGNOSIS — B97.7 HPV IN FEMALE: Primary | ICD-10-CM

## 2019-08-28 PROCEDURE — 57420 EXAM OF VAGINA W/SCOPE: CPT | Performed by: OBSTETRICS & GYNECOLOGY

## 2019-08-28 ASSESSMENT — PAIN SCALES - GENERAL: PAINLEVEL: MILD PAIN (3)

## 2019-08-28 NOTE — LETTER
"    2019        RE: Alessandra Aiken  [address]  [place] [address]        Colposcopy Procedure Note    59 year old  female presents for colposcopy due to pap smear on 19 showing NILM, HPV+ not 16, 18.    Pap history:  2017: NILM, HPV+  2018: NILM, HPV+. Colposcopy- MAICOL II, III on ECC. LEEP: MAIOCL II-III, negative margins  2018: hysterectomy, no residual dysplasia  2019: NILM, HPV+    Time Out - \"Pause for the Cause\"  Just before the procedure began the following was verified:    Initials   Patient Name    Alessandra Aiken    Patient Date of Birth 1960    Procedure to be performed  Colposcopy   Procedure for colposcopy and biopsy has been explained to the patient. Consent:  Risks, benefits of treatment, and no treatment were discussed.  Patient's questions were elicited and answered.     Procedure:  Speculum placed in vagina and excellent visualization of cuff achieved. Cuff swabbed x with acetic acid solution. No areas of acetowhite changes or abnormal vascularity. Cuff then swabbed with Lugol's solution. Diffusely decreased uptake, no focal lesions.      Assessment/Plan:   Ms. Alessandra Aiken is a 59 year old  with persistent HPV, normal exam. Discussed recommendation for continued screening until 20 years after her hysterectomy, however, discussed that guidelines post-hysterectomy are not clear as to the role of HPV. Due to the first five years being the highest risk time period, recommend following the algorithm as though she has a cervix. Therefore, recommend cotesting in 12 months- if NILM, HPV negative then would need repeat cotesting in another 12 months. If HPV is again positive, recommend referral for colposcopy. Once she is beyond 5 years of surveillance, can consider switching to cytology alone.    Ashleigh Fregoso MD  OB/GYN  2019 2:56 PM          Sincerely,        Ashleigh Fregoso MD    "

## 2019-08-28 NOTE — PROGRESS NOTES
"Colposcopy Procedure Note    59 year old  female presents for colposcopy due to pap smear on 19 showing NILM, HPV+ not 16, 18.    Pap history:  2017: NILM, HPV+  2018: NILM, HPV+. Colposcopy- MAICOL II, III on ECC. LEEP: MAICOL II-III, negative margins  2018: hysterectomy, no residual dysplasia  2019: NILM, HPV+    Time Out - \"Pause for the Cause\"  Just before the procedure began the following was verified:    Initials   Patient Name    Alessandra Aiken    Patient Date of Birth 1960    Procedure to be performed  Colposcopy   Procedure for colposcopy and biopsy has been explained to the patient. Consent:  Risks, benefits of treatment, and no treatment were discussed.  Patient's questions were elicited and answered.     Procedure:  Speculum placed in vagina and excellent visualization of cuff achieved. Cuff swabbed x with acetic acid solution. No areas of acetowhite changes or abnormal vascularity. Cuff then swabbed with Lugol's solution. Diffusely decreased uptake, no focal lesions.      Assessment/Plan:   Ms. Alessandra Aiken is a 59 year old  with persistent HPV, normal exam. Discussed recommendation for continued screening until 20 years after her hysterectomy, however, discussed that guidelines post-hysterectomy are not clear as to the role of HPV. Due to the first five years being the highest risk time period, recommend following the algorithm as though she has a cervix. Therefore, recommend cotesting in 12 months- if NILM, HPV negative then would need repeat cotesting in another 12 months. If HPV is again positive, recommend referral for colposcopy. Once she is beyond 5 years of surveillance, can consider switching to cytology alone.    Ashleigh Fregoso MD  OB/GYN  2019 2:56 PM      "

## 2019-08-28 NOTE — NURSING NOTE
"Chief Complaint   Patient presents with     Procedure     colposcopy       Initial /86 (BP Location: Right arm, Patient Position: Sitting, Cuff Size: Adult Regular)   Pulse 60   Wt 64.4 kg (142 lb)   LMP 03/07/2003   BMI 22.92 kg/m   Estimated body mass index is 22.92 kg/m  as calculated from the following:    Height as of 8/27/19: 1.676 m (5' 6\").    Weight as of this encounter: 64.4 kg (142 lb).  Medication Reconciliation: camilla Jones LPN     Prior to the start of the procedure and with procedural staff participation, I verbally confirmed the patient s identity using two indicators, relevant allergies, that the procedure was appropriate and matched the consent or emergent situation, and that the correct equipment/implants were available. Immediately prior to starting the procedure I conducted the Time Out with the procedural staff and re-confirmed the patient s name, procedure, and site/side. (The Joint Commission universal protocol was followed.)  Yes    Sedation (Moderate or Deep): None    "

## 2019-08-30 ENCOUNTER — HOSPITAL ENCOUNTER (OUTPATIENT)
Dept: CT IMAGING | Facility: OTHER | Age: 59
Discharge: HOME | End: 2019-08-30
Attending: INTERNAL MEDICINE | Admitting: INTERNAL MEDICINE
Payer: COMMERCIAL

## 2019-08-30 DIAGNOSIS — N28.1 KIDNEY CYSTS: ICD-10-CM

## 2019-08-30 PROCEDURE — 74177 CT ABD & PELVIS W/CONTRAST: CPT

## 2019-08-30 PROCEDURE — 25500064 ZZH RX 255 OP 636: Performed by: INTERNAL MEDICINE

## 2019-08-30 RX ADMIN — IOHEXOL 86 ML: 350 INJECTION, SOLUTION INTRAVENOUS at 10:30

## 2019-09-06 ENCOUNTER — OFFICE VISIT (OUTPATIENT)
Dept: INTERNAL MEDICINE | Facility: OTHER | Age: 59
End: 2019-09-06
Attending: INTERNAL MEDICINE
Payer: COMMERCIAL

## 2019-09-06 VITALS
HEART RATE: 86 BPM | DIASTOLIC BLOOD PRESSURE: 82 MMHG | TEMPERATURE: 97.2 F | WEIGHT: 143.4 LBS | BODY MASS INDEX: 23.05 KG/M2 | SYSTOLIC BLOOD PRESSURE: 122 MMHG | HEIGHT: 66 IN | RESPIRATION RATE: 16 BRPM

## 2019-09-06 DIAGNOSIS — N89.8 VAGINAL ODOR: ICD-10-CM

## 2019-09-06 DIAGNOSIS — Z85.828 HISTORY OF SCC (SQUAMOUS CELL CARCINOMA) OF SKIN: ICD-10-CM

## 2019-09-06 DIAGNOSIS — N30.00 ACUTE CYSTITIS WITHOUT HEMATURIA: ICD-10-CM

## 2019-09-06 DIAGNOSIS — R39.9 URINARY SYMPTOM OR SIGN: ICD-10-CM

## 2019-09-06 DIAGNOSIS — R76.8 POSITIVE ANA (ANTINUCLEAR ANTIBODY): ICD-10-CM

## 2019-09-06 DIAGNOSIS — L98.9 SKIN LESION OF LEFT LEG: Primary | ICD-10-CM

## 2019-09-06 DIAGNOSIS — L81.9 DISCOLORATION OF SKIN OF FINGER: ICD-10-CM

## 2019-09-06 LAB
ALBUMIN UR-MCNC: NEGATIVE MG/DL
APPEARANCE UR: CLEAR
BILIRUB UR QL STRIP: NEGATIVE
COLOR UR AUTO: YELLOW
CRP SERPL-MCNC: 0.1 MG/L
ERYTHROCYTE [SEDIMENTATION RATE] IN BLOOD BY WESTERGREN METHOD: 6 MM/H (ref 1–15)
GLUCOSE UR STRIP-MCNC: NEGATIVE MG/DL
HGB UR QL STRIP: NEGATIVE
KETONES UR STRIP-MCNC: NEGATIVE MG/DL
LEUKOCYTE ESTERASE UR QL STRIP: ABNORMAL
NITRATE UR QL: POSITIVE
NON-SQ EPI CELLS #/AREA URNS LPF: NORMAL /LPF
PH UR STRIP: 5.5 PH (ref 5–9)
RBC #/AREA URNS AUTO: NORMAL /HPF
SOURCE: ABNORMAL
SP GR UR STRIP: 1.01 (ref 1–1.03)
SPECIMEN SOURCE: NORMAL
UROBILINOGEN UR STRIP-ACNC: 0.2 EU/DL (ref 0.2–1)
WBC #/AREA URNS AUTO: NORMAL /HPF
WET PREP SPEC: NORMAL

## 2019-09-06 PROCEDURE — 99214 OFFICE O/P EST MOD 30 MIN: CPT | Performed by: INTERNAL MEDICINE

## 2019-09-06 PROCEDURE — 81001 URINALYSIS AUTO W/SCOPE: CPT | Mod: ZL | Performed by: INTERNAL MEDICINE

## 2019-09-06 PROCEDURE — 87210 SMEAR WET MOUNT SALINE/INK: CPT | Mod: ZL | Performed by: INTERNAL MEDICINE

## 2019-09-06 PROCEDURE — 86140 C-REACTIVE PROTEIN: CPT | Mod: ZL | Performed by: INTERNAL MEDICINE

## 2019-09-06 PROCEDURE — 86255 FLUORESCENT ANTIBODY SCREEN: CPT | Mod: ZL | Performed by: INTERNAL MEDICINE

## 2019-09-06 PROCEDURE — 86038 ANTINUCLEAR ANTIBODIES: CPT | Mod: ZL | Performed by: INTERNAL MEDICINE

## 2019-09-06 PROCEDURE — 86160 COMPLEMENT ANTIGEN: CPT | Mod: ZL | Performed by: INTERNAL MEDICINE

## 2019-09-06 PROCEDURE — 36415 COLL VENOUS BLD VENIPUNCTURE: CPT | Mod: ZL | Performed by: INTERNAL MEDICINE

## 2019-09-06 PROCEDURE — 86039 ANTINUCLEAR ANTIBODIES (ANA): CPT | Mod: ZL | Performed by: INTERNAL MEDICINE

## 2019-09-06 PROCEDURE — 85652 RBC SED RATE AUTOMATED: CPT | Mod: ZL | Performed by: INTERNAL MEDICINE

## 2019-09-06 RX ORDER — SULFAMETHOXAZOLE/TRIMETHOPRIM 800-160 MG
1 TABLET ORAL 2 TIMES DAILY
Qty: 6 TABLET | Refills: 0 | Status: SHIPPED | OUTPATIENT
Start: 2019-09-06 | End: 2019-10-01

## 2019-09-06 ASSESSMENT — PAIN SCALES - GENERAL: PAINLEVEL: NO PAIN (0)

## 2019-09-06 ASSESSMENT — MIFFLIN-ST. JEOR: SCORE: 1247.21

## 2019-09-06 NOTE — NURSING NOTE
"Patient presents to the clinic today for a follow up testing.  Darcie Alvarado LPN 9/6/2019   11:38 AM    Chief Complaint   Patient presents with     RECHECK     Testing       Initial /82 (BP Location: Right arm, Patient Position: Sitting, Cuff Size: Adult Regular)   Pulse 86   Temp 97.2  F (36.2  C) (Tympanic)   Resp 16   Ht 1.676 m (5' 6\")   Wt 65 kg (143 lb 6.4 oz)   LMP 03/07/2003   Breastfeeding? No   BMI 23.15 kg/m   Estimated body mass index is 23.15 kg/m  as calculated from the following:    Height as of this encounter: 1.676 m (5' 6\").    Weight as of this encounter: 65 kg (143 lb 6.4 oz).  Medication Reconciliation: complete    "

## 2019-09-06 NOTE — PROGRESS NOTES
Chief Complaint   Patient presents with     RECHECK     Testing         HPI: Ms. Aiken is a 59 year old female who presents today for follow up of multiple symptoms.    She reports that she has continued to have urinary symptoms with dysuria and vaginal/urine odor.  Urine previously was negative.  She has not noted any new vaginal bleeding, hematuria or suprapubic pain.    She does report some right-sided abdominal pain.  She did undergo CT of the abdomen that did not show any significant abnormality.  She has a history of hysterectomy although ovaries are still in place.    She has noted some discoloration (black/purple) of fingers.  She reports this lasts 3-4 days.  She has had it happen multiple times recently with in the past week right pointer finger July, right ring finger in august, now left ring finger.  S it does wells and occasionally painful.  Had prior over the last 3-4 years.  No injury.  No correlation to cold.    She has noted a new erythematous lump on her anterior shin over the last couple weeks..  She previously had something very similar that was removed and found to be squamous cell carcinoma.    She  reports that she has never smoked. She has never used smokeless tobacco.    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      ROS  Pertinent ROS was performed and was negative, including for fever, chills, chest pain, shortness of breath, increased lower  "extremity edema, changes in bowel, blood in the stool, difficulty swallowing, sores in the mouth. No other concerns, with exception of HPI above.      EXAM:   /82 (BP Location: Right arm, Patient Position: Sitting, Cuff Size: Adult Regular)   Pulse 86   Temp 97.2  F (36.2  C) (Tympanic)   Resp 16   Ht 1.676 m (5' 6\")   Wt 65 kg (143 lb 6.4 oz)   LMP 03/07/2003   Breastfeeding? No   BMI 23.15 kg/m      Estimated body mass index is 23.15 kg/m  as calculated from the following:    Height as of this encounter: 1.676 m (5' 6\").    Weight as of this encounter: 65 kg (143 lb 6.4 oz).      GEN: Vitals reviewed. Healthy appearing. Patient is in no acute distress. Cooperative with exam.  HEENT: Normocephalic atraumatic.  Pupils equally round.  No scleral icterus, no conjunctival erythema. Oropharynx with no erythema or exudates. Dentition adequate.  NECK: Supple; no thyromegaly.  No neck, cervical LAD.   CV: Heart regular in rate and rhythm with no murmur.    LUNGS: Lungs clear to auscultation bilaterally.  Chest rise equal bilaterally.  No accessory muscle use.  ABD: Nondistended.    SKIN: Warm and dry to touch.  No rash on face, arms and legs.  Minimal discoloration of the fingers at this time.  Erythematous raised lesion on the left anterior shin that appears very similar to prior squamous cell carcinoma.  EXT: No clubbing or cyanosis.  No peripheral edema.  PSYCH: Mood is good.  Affect appropriate. Speech fluent. Answers questions appropriately and thought process normal.     ASSESSMENT AND PLAN:    Skin lesion of left leg  - Given the similarity of this unknown skin lesion to her prior squamous cell carcinoma she is referred to general surgery for an excisional biopsyn  - GENERAL SURG ADULT REFERRAL    History of SCC (squamous cell carcinoma) of skin  - GENERAL SURG ADULT REFERRAL    Vaginal odor  - Wet prep done today was negative.  - Wet Prep, Genital    Urinary symptom or sign  - UA is positive  - *UA " reflex to Microscopic    Discoloration of skin of finger  -Etiology of her symptoms is unknown.  Does have a appearance of vasculitis possibly Raynauds however does not have consistent triggers for that.  Testing is done and unrevealing overall other than a minimal elevation of NENITA which is nonspecific.  - Anti Nuclear Kate IgG by IFA with Reflex  - ANCA IgG by IFA with Reflex to Titer  - Complement C4  - Complement C3  - Sedimentation Rate (ESR)  - CRP inflammation    Acute cystitis without hematuria  - exam and UA consistent with cystitis  - will treat with antibiotics; encouraged intake of probiotics/yogurt while on antibiotics and for 1 week after  - educated on prevention including urination after intercourse, avoidance of bubble baths, douching, scented underware/pads/cosemetics  - may drink cranberry juice to help prevent  - Return/call as needed for follow-up should any new symptoms develop, for worsening of current symptoms or if symptoms do not resolve with above plan.  - sulfamethoxazole-trimethoprim (BACTRIM DS/SEPTRA DS) 800-160 MG tablet  Dispense: 6 tablet; Refill: 0    Positive NENITA  - although likely normal variant or nonspecific given her other symptoms, will check a dsDNA and DONALD panel.       Return if symptoms worsen or fail to improve.    A total of 27 minutes spent with in face-to-face consultation of this patient with greater than 50% spent in counseling and care coordination of above listed medical problems including diagnosis, treatment options with emphasis on risks and benefits of each,prognosis and importance of compliance for each.      TORY MALONE DO   9/6/2019 12:03 PM    This document was prepared using voice generated softwear. While every attempt was made for accuracy, grammatical errors may exist.

## 2019-09-09 LAB
ANA PAT SER IF-IMP: ABNORMAL
ANA SER QL IF: ABNORMAL
ANA TITR SER IF: ABNORMAL {TITER}
ANCA AB PATTERN SER IF-IMP: NORMAL
C-ANCA TITR SER IF: NORMAL {TITER}
C3 SERPL-MCNC: 76 MG/DL (ref 76–169)
C4 SERPL-MCNC: 21 MG/DL (ref 15–50)

## 2019-09-10 ENCOUNTER — MYC MEDICAL ADVICE (OUTPATIENT)
Dept: INTERNAL MEDICINE | Facility: OTHER | Age: 59
End: 2019-09-10

## 2019-09-10 DIAGNOSIS — R10.31 ABDOMINAL PAIN, RIGHT LOWER QUADRANT: ICD-10-CM

## 2019-09-10 DIAGNOSIS — D72.819 LEUKOPENIA, UNSPECIFIED TYPE: Primary | ICD-10-CM

## 2019-09-19 ENCOUNTER — OFFICE VISIT (OUTPATIENT)
Dept: SURGERY | Facility: OTHER | Age: 59
End: 2019-09-19
Attending: INTERNAL MEDICINE
Payer: COMMERCIAL

## 2019-09-19 VITALS
RESPIRATION RATE: 14 BRPM | WEIGHT: 139 LBS | HEIGHT: 66 IN | DIASTOLIC BLOOD PRESSURE: 86 MMHG | SYSTOLIC BLOOD PRESSURE: 124 MMHG | BODY MASS INDEX: 22.34 KG/M2 | HEART RATE: 64 BPM | TEMPERATURE: 98.7 F

## 2019-09-19 DIAGNOSIS — Z85.828 HISTORY OF SCC (SQUAMOUS CELL CARCINOMA) OF SKIN: ICD-10-CM

## 2019-09-19 DIAGNOSIS — R10.31 ABDOMINAL PAIN, RIGHT LOWER QUADRANT: ICD-10-CM

## 2019-09-19 DIAGNOSIS — D72.819 LEUKOPENIA, UNSPECIFIED TYPE: ICD-10-CM

## 2019-09-19 DIAGNOSIS — L98.9 SKIN LESION OF LEFT LEG: Primary | ICD-10-CM

## 2019-09-19 PROCEDURE — 86235 NUCLEAR ANTIGEN ANTIBODY: CPT | Mod: ZL | Performed by: INTERNAL MEDICINE

## 2019-09-19 PROCEDURE — 88305 TISSUE EXAM BY PATHOLOGIST: CPT

## 2019-09-19 PROCEDURE — 11401 EXC TR-EXT B9+MARG 0.6-1 CM: CPT | Performed by: SURGERY

## 2019-09-19 PROCEDURE — 86235 NUCLEAR ANTIGEN ANTIBODY: CPT | Mod: ZL,91 | Performed by: INTERNAL MEDICINE

## 2019-09-19 PROCEDURE — 86225 DNA ANTIBODY NATIVE: CPT | Mod: ZL | Performed by: INTERNAL MEDICINE

## 2019-09-19 PROCEDURE — 36415 COLL VENOUS BLD VENIPUNCTURE: CPT | Mod: ZL | Performed by: INTERNAL MEDICINE

## 2019-09-19 PROCEDURE — 12031 INTMD RPR S/A/T/EXT 2.5 CM/<: CPT | Performed by: SURGERY

## 2019-09-19 ASSESSMENT — PAIN SCALES - GENERAL: PAINLEVEL: NO PAIN (1)

## 2019-09-19 ASSESSMENT — MIFFLIN-ST. JEOR: SCORE: 1227.25

## 2019-09-19 NOTE — NURSING NOTE
"Chief Complaint   Patient presents with     Procedure     left leg skin lesion       Initial /86 (BP Location: Left arm, Patient Position: Sitting, Cuff Size: Adult Regular)   Pulse 64   Temp 98.7  F (37.1  C) (Tympanic)   Resp 14   Ht 1.676 m (5' 6\")   Wt 63 kg (139 lb)   LMP 03/07/2003   BMI 22.44 kg/m   Estimated body mass index is 22.44 kg/m  as calculated from the following:    Height as of this encounter: 1.676 m (5' 6\").    Weight as of this encounter: 63 kg (139 lb).  Medication Reconciliation: complete    Rafaela Jesus LPN    "

## 2019-09-19 NOTE — PROGRESS NOTES
"SUBJECTIVE:  59 year old female presents for lesion removal. This lesion has been present for a month or so. She had a previous skin cancer taken off distal to this lesion -that healed up fine and hasn't come back.    OBJECTIVE:  /86 (BP Location: Left arm, Patient Position: Sitting, Cuff Size: Adult Regular)   Pulse 64   Temp 98.7  F (37.1  C) (Tympanic)   Resp 14   Ht 1.676 m (5' 6\")   Wt 63 kg (139 lb)   LMP 03/07/2003   BMI 22.44 kg/m    General: no acute distress  Skin: 0.4 cm pink scaly lesion left shin    ASSESSMENT:  Lesion size: as above  Defect size: lesion and margin : 0.9 x 0.6 x 0.2 cm-intermediate closure    PROCEDURE:  The pathophysiology of skin lesions and skin cancers was discussed with the patient. The risks, benefits and alternatives to excision of the lesion were discussed with the patient, including the risks of infection,scarring, bruising, bleeding and the possible need for further procedures. The patient expressed understanding and wishes to proceed. Informed consent paperwork was completed.    Chloraprep was used to cleanse the skinin the area of the lesion. 1% Lidocaine with epinephrine was infiltrated in the skin and subcutaneous tissue in the area of the lesion. When appropriate anesthesia had been achieved, the lesion was sharply excised with a margin of grossly normal tissue. The wound was closed using  4-0 Vicryl in the subcutaneous tissue and 4-0 Ethilon for the skin. Sterile dressing was applied. The specimen was labelled and sent to pathology for evaluation. The procedure was well tolerated without complications. Patient was given post procedure instructions and denied further questions. We will call the patient with pathology results.    "

## 2019-09-19 NOTE — PROGRESS NOTES
Prior to the start of the procedure and with procedural staff participation, I verbally confirmed the patient s identity using two indicators, relevant allergies, that the procedure was appropriate and matched the consent or emergent situation, and that the correct equipment/implants were available. Immediately prior to starting the procedure I conducted the Time Out with the procedural staff and re-confirmed the patient s name, procedure, and site/side. (The Joint Commission universal protocol was followed.)  Yes    Sedation (Moderate or Deep): None  Rafaela Jesus LPN .......9/19/2019 9:11 AM

## 2019-09-19 NOTE — PATIENT INSTRUCTIONS
Your incision was closed with stitches that will need to be removed.     It is ok to remove the dressing and get the incision wet in the shower on the day after your procedure.     Don't soak in a tub, pool or lake for 5 days.      If you have concerns, please call.

## 2019-09-20 LAB
DSDNA AB SER-ACNC: 1 IU/ML
ENA RNP IGG SER IA-ACNC: 0.4 AI (ref 0–0.9)
ENA SCL70 IGG SER IA-ACNC: <0.2 AI (ref 0–0.9)
ENA SM IGG SER-ACNC: <0.2 AI (ref 0–0.9)
ENA SS-A IGG SER IA-ACNC: <0.2 AI (ref 0–0.9)
ENA SS-B IGG SER IA-ACNC: <0.2 AI (ref 0–0.9)

## 2019-10-01 ENCOUNTER — OFFICE VISIT (OUTPATIENT)
Dept: SURGERY | Facility: OTHER | Age: 59
End: 2019-10-01
Attending: SURGERY
Payer: COMMERCIAL

## 2019-10-01 VITALS
DIASTOLIC BLOOD PRESSURE: 88 MMHG | SYSTOLIC BLOOD PRESSURE: 138 MMHG | HEART RATE: 60 BPM | TEMPERATURE: 97.9 F | BODY MASS INDEX: 22.34 KG/M2 | RESPIRATION RATE: 14 BRPM | HEIGHT: 66 IN | WEIGHT: 139 LBS

## 2019-10-01 DIAGNOSIS — L57.0 ACTINIC KERATOSES: ICD-10-CM

## 2019-10-01 DIAGNOSIS — Z48.817 AFTERCARE FOLLOWING SURGERY OF THE SKIN OR SUBCUTANEOUS TISSUE: Primary | ICD-10-CM

## 2019-10-01 PROCEDURE — 17000 DESTRUCT PREMALG LESION: CPT | Performed by: SURGERY

## 2019-10-01 PROCEDURE — 17003 DESTRUCT PREMALG LES 2-14: CPT | Performed by: SURGERY

## 2019-10-01 ASSESSMENT — MIFFLIN-ST. JEOR: SCORE: 1227.25

## 2019-10-01 ASSESSMENT — PAIN SCALES - GENERAL: PAINLEVEL: NO PAIN (1)

## 2019-10-01 NOTE — PROGRESS NOTES
"Patient presents for post procedure visit after excision of an actinic keratosis from her left leg on 9/19. Patient has done well. No problems with incision.  She also notes 3 areas of rough pink skin two on her left hand and one on her right hand. These itch and peel but don't go away    /88 (BP Location: Right arm, Patient Position: Sitting, Cuff Size: Adult Regular)   Pulse 60   Temp 97.9  F (36.6  C) (Tympanic)   Resp 14   Ht 1.676 m (5' 6\")   Wt 63 kg (139 lb)   LMP 03/07/2003   BMI 22.44 kg/m    General: NAD, pleasant and cooperative with exam and interview.  Skin: healing incision left lower leg. No sign of infection. No pain with palpation. Sutures removed without difficulty.  Back of right hand with 2 mm rough pink lesion and left hand with 1 mm and 3 mm areas of rough pink skin-all with evidence of AK  Psychiatry: awake, alert and oriented. Appropriate affect.  Pathology results: hypertrophic actinic keratosis  Assessment/Plan: Discussed procedure and pathology results. Discussed cryotherapy therapy of lesions on the back of her hands. She wishes to proceed. See procedure note below. Patient can return to normal activities. Continue to monitor for new or changing lesions. Encouraged sunscreen use, SPF 30 or greater. Patient will call with questions or concerns.  Procedure:  We specifically discussed the risks of infection, discoloration and the possible need for further treatments.   Procedure:  The area of the skin lesions on the back of right and left hands were treated with liquid nitrogen for 2 freeze thaw cycles. The patient tolerated the procedure with no immediately apparent complications. We reviewed discharge instructions. The patient will call for any concerns. The patient will follow up for a recheck of the area if there isn't complete resolution. The patient denies questions at this time.      "

## 2019-10-01 NOTE — NURSING NOTE
"Chief Complaint   Patient presents with     RECHECK     follow up left leg skin lesion       Initial /88 (BP Location: Right arm, Patient Position: Sitting, Cuff Size: Adult Regular)   Pulse 60   Temp 97.9  F (36.6  C) (Tympanic)   Resp 14   Ht 1.676 m (5' 6\")   Wt 63 kg (139 lb)   LMP 03/07/2003   BMI 22.44 kg/m   Estimated body mass index is 22.44 kg/m  as calculated from the following:    Height as of this encounter: 1.676 m (5' 6\").    Weight as of this encounter: 63 kg (139 lb).  Medication Reconciliation: complete    Rafaela Jesus LPN      "

## 2019-10-01 NOTE — PATIENT INSTRUCTIONS
What to Expect Following Cryosurgery (Liquid Nitrogen)   What isCryosurgery?   Cryosurgery is a technique for removing skin lesions that primarily involve the surface of the skin, such as warts, seborrheic keratosis, or actinic keratosis. It is a quick method of removing the lesion with minimal scarring.   The liquid nitrogen needs isaiah applied long enough to freeze the affected skin. By freezing the skin, a blister is created underneath the lesion. Ideally, as the new skin forms underneath the blister, the abnormal skin on the roof of the blister peelsoff. Occasionally, if the lesion is very thick (such as a large wart), only the surface is blistered off. The base or residual lesion may need to be frozen at another visit.   It takes about one to two weeks for the scabto fall off, which is when the new layer of skin has formed under the blister. Areas of thinner skin, such as the face, may heal a little faster.      What to Expect Over the Next Few Weeks     During Treatment - Area being treated will sting, burn, and then possibly itch.     Immediately After Treatment - Area will be red, sore, and swollen.     Next Day - Blister or blood blister has formed, tendernessstarts to subside. Apply a Band-Aid if necessary.     7 Days - Surface is dark red/brown and scab-like. You can apply an antibacterial ointment, such as Polysporin , but you don't have to.     2 to 4 Weeks - The surface starts to peel off. This may be encouraged gently during bathing, when the scab is softened.     No makeup should be applied until area is fully healed.      How to Take Care of the Skin after Cryosurgery   A Band-Aid can be used for larger blisters or blisters in areas that are more likely to betraumatized -such as fingers and toes. If the area becomes dry or crusted, an ointment (Vaseline , Bacitracin , or Polysporin ) can also be applied.   Cleanse area with a mild cleanser and cool water.   Patthe area dry with a lint-free cloth.    Avoid glycolic acids, Vitamin C, scrubs, Tretinoin (Retin-A), and Retinol creams for 7 to 10 days.  The area may get wet while bathing, but swimming or hot tub use should beavoided for one week following a treatment or while the skin is open.   Within 24 hours, you can expect the area to be swollen and/or blistered. The blister may not be visible to the naked eye.   Within one week, theswelling goes down. The top becomes dark red and scab-like. The scab will loosen over the next weeks, and should fall off within one month.      Adverse Effects   The most common adverse effects are pain,swelling/blistering, potential for infection, and discoloration of the skin after it heals.     Blisters  Anytime a blister surfaces, whether from ill-fitting shoes, an oven burn, or liquid nitrogencryosurgery, it will be a bit painful. For most patients, the pain is a temporary sting with some discomfort periodically over the next day as the blister forms.   The goal is to achieve a blister. This means, mostcommonly, patients will have a blister form following treatment. Sometimes, the blister is so thin that it can't be seen and may have minimal swelling. Occasionally, a blood blister forms that can be quite dramatic but isharmless.   Rarely, the blister may become infected. When this happens, the blister becomes unusually tender, the fluid becomes cloudy, and the redness around it becomes more extensive (and may even form streaks). If this happens, contact our office.   Some lesions, especially those on the face, may leave a slight palediscoloration.   True scarring, involving deeper layers of the skin is unlikely.

## 2019-11-25 ENCOUNTER — MYC MEDICAL ADVICE (OUTPATIENT)
Dept: INTERNAL MEDICINE | Facility: OTHER | Age: 59
End: 2019-11-25

## 2019-11-25 DIAGNOSIS — E55.9 VITAMIN D DEFICIENCY: Primary | ICD-10-CM

## 2019-11-25 DIAGNOSIS — R39.9 URINARY SYMPTOM OR SIGN: ICD-10-CM

## 2020-01-30 ENCOUNTER — OFFICE VISIT (OUTPATIENT)
Dept: INTERNAL MEDICINE | Facility: OTHER | Age: 60
End: 2020-01-30
Attending: INTERNAL MEDICINE
Payer: COMMERCIAL

## 2020-01-30 VITALS
HEART RATE: 65 BPM | BODY MASS INDEX: 22.5 KG/M2 | DIASTOLIC BLOOD PRESSURE: 80 MMHG | OXYGEN SATURATION: 99 % | SYSTOLIC BLOOD PRESSURE: 132 MMHG | WEIGHT: 140 LBS | RESPIRATION RATE: 16 BRPM | TEMPERATURE: 97.9 F | HEIGHT: 66 IN

## 2020-01-30 DIAGNOSIS — F51.04 CHRONIC INSOMNIA: Primary | ICD-10-CM

## 2020-01-30 DIAGNOSIS — H92.01 OTALGIA, RIGHT: ICD-10-CM

## 2020-01-30 DIAGNOSIS — J01.90 ACUTE SINUSITIS WITH SYMPTOMS > 10 DAYS: ICD-10-CM

## 2020-01-30 PROCEDURE — 99214 OFFICE O/P EST MOD 30 MIN: CPT | Performed by: INTERNAL MEDICINE

## 2020-01-30 RX ORDER — METHOCARBAMOL 750 MG/1
750 TABLET, FILM COATED ORAL 3 TIMES DAILY PRN
Qty: 270 TABLET | Refills: 1 | Status: SHIPPED | OUTPATIENT
Start: 2020-01-30 | End: 2020-04-29

## 2020-01-30 RX ORDER — LORAZEPAM 1 MG/1
1 TABLET ORAL
Qty: 30 TABLET | Refills: 5 | Status: SHIPPED | OUTPATIENT
Start: 2020-01-30 | End: 2020-04-29

## 2020-01-30 ASSESSMENT — PAIN SCALES - GENERAL: PAINLEVEL: NO PAIN (0)

## 2020-01-30 ASSESSMENT — MIFFLIN-ST. JEOR: SCORE: 1231.79

## 2020-01-30 NOTE — PATIENT INSTRUCTIONS
- maintain a routine, go to bed and wake up at the same time every day  - make sure that your bedroom is comfortable, keep your bedroom quiet, dark,comfortable and cool  - include a warm beverage and/or hot bath 1-2 hours before bedtime  - avoid naps  - expose yourself to bright light during the day  - do not watch the clock  - avoid caffeine and nicotine for at least 4-6 hours prior to bed and consider cutting out all caffeine (coffee, chocolate, tea) if able  - avoid alcohol before bedtime   - exercise regularly but avoid exercising right before bed  - avoid looking at illuminated screens (phones, computers, TV) in the 1-2 hours before bedtime.  - If you haven t been able to get to sleep after about 20 minutes or more, get up and do something calming or boring until you feel sleepy, then return to bed and try again. Avoid doing anything that is too stimulating or interesting, as this will wake you up even more.  - use bed only for sleep and sex, do not use it as a place to watch TV, eat, read, work on your laptop, pay bills, and other things

## 2020-01-30 NOTE — PROGRESS NOTES
"Chief Complaint   Patient presents with     RECHECK         HPI: Ms. Aiken is a 59 year old female who presents today for follow up of chronic insomnia.    She is on lorazepam which she takes nightly for sleep.  She denies any obvious side effects for this.  She is able to sleep for about 6 hours with the medication.    She has had sinus pressure for the last couple weeks, low grade fevers at night.  Lost voice a couple days ago.  No cough or SOB.  She has had ear pain bilaterally, worst on the right.  No regular meds for this.  She was exposed to young children recently.  She has a history of recurrent sinus infection.  She also has a history of chronic ear pain.  She has continued on Robaxin which has helped the ear pain overall.  She requests a refill of this today.    She  reports that she has never smoked. She has never used smokeless tobacco.    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      ROS  Pertinent ROS was performed and was negative, including for fever, chills, chest pain, shortness of breath, increased lower extremity edema, changes in bowel or bladder, blood in the stool, difficulty swallowing, sores in the mouth. No other concerns, with exception of HPI above.      EXAM:   /80 (BP Location: Right arm, Patient Position: Sitting, Cuff Size: Adult Regular)   Pulse 65   Temp 97.9  F (36.6  C) (Tympanic)   Resp 16   Ht 1.676 m (5' 6\")   Wt 63.5 kg " "(140 lb)   LMP 03/07/2003   SpO2 99%   BMI 22.60 kg/m      Estimated body mass index is 22.6 kg/m  as calculated from the following:    Height as of this encounter: 1.676 m (5' 6\").    Weight as of this encounter: 63.5 kg (140 lb).      GEN: Vitals reviewed. Healthy appearing. Patient is in no acute distress. Cooperative with exam.  HEENT: Normocephalic atraumatic.  Pupils equally round.  No scleral icterus, no conjunctival erythema. Oropharynx with no erythema or exudates. Dentition adequate.  External auditory canals with slight fluid noted bilaterally.  Sinus pressure with palpation.  NECK: Supple; no thyromegaly.  No neck, cervical LAD.   CV: Heart regular in rate and rhythm with no murmur.    LUNGS: Lungs clear to auscultation bilaterally.  Chest rise equal bilaterally.  No accessory muscle use.  ABD: Nondistended  SKIN: Warm and dry to touch.  No rash on face, arms and legs.  EXT: No clubbing or cyanosis.  No peripheral edema.  PSYCH: Mood is good.  Affect appropriate. Speech fluent. Answers questions appropriately and thought process normal.     ASSESSMENT AND PLAN:    Chronic insomnia  -We discussed her medication.  She was again reminded that the long run we will need to taper off of her lorazepam.  She was also cautioned that it can lead to memory issues.  She is to try to minimize this as much as possible.  She is to call with any side effects.  - LORazepam (ATIVAN) 1 MG tablet  Dispense: 30 tablet; Refill: 5    Otalgia, right  - stable continue current med  - methocarbamol (ROBAXIN) 750 MG tablet  Dispense: 270 tablet; Refill: 1    Acute sinusitis with symptoms > 10 days  - most consistent with bacterial illness due to duration of illness  - will treat with antibiotics, patient to treat symptoms as instructed below, call if she has any ADR's or worsening symptoms  - recommend eating yogurt/kefir 1-2 times daily while on antibiotics  - amoxicillin-clavulanate (AUGMENTIN) 875-125 MG tablet  Dispense: 20 " tablet; Refill: 0                 Return in about 6 months (around 7/30/2020) for Annual Review.       TORY MALONE DO   1/30/2020 2:33 PM    This document was prepared using voice generated softwear. While every attempt was made for accuracy, grammatical errors may exist.

## 2020-01-30 NOTE — NURSING NOTE
Chief Complaint   Patient presents with     RECHECK   Patient presents to the clinic today for a 6 month follow up on sleep. Patient also reports she feels like she has a sinus infection, Started about 4 or 5 days ago, headache, low grade fever, sore throat    Medication Reconciliation: completed   Neelam Worthington LPN  1/30/2020 2:25 PM

## 2020-02-11 ENCOUNTER — TRANSFERRED RECORDS (OUTPATIENT)
Dept: HEALTH INFORMATION MANAGEMENT | Facility: OTHER | Age: 60
End: 2020-02-11

## 2020-03-02 ENCOUNTER — HEALTH MAINTENANCE LETTER (OUTPATIENT)
Age: 60
End: 2020-03-02

## 2020-03-11 ENCOUNTER — MYC MEDICAL ADVICE (OUTPATIENT)
Dept: INTERNAL MEDICINE | Facility: OTHER | Age: 60
End: 2020-03-11
Payer: COMMERCIAL

## 2020-03-11 DIAGNOSIS — R39.15 URGENCY OF URINATION: Primary | ICD-10-CM

## 2020-03-11 DIAGNOSIS — N30.00 ACUTE CYSTITIS WITHOUT HEMATURIA: ICD-10-CM

## 2020-03-12 DIAGNOSIS — R39.15 URGENCY OF URINATION: ICD-10-CM

## 2020-03-12 LAB
ALBUMIN UR-MCNC: NEGATIVE MG/DL
APPEARANCE UR: CLEAR
BACTERIA #/AREA URNS HPF: ABNORMAL /HPF
BILIRUB UR QL STRIP: NEGATIVE
COLOR UR AUTO: YELLOW
GLUCOSE UR STRIP-MCNC: NEGATIVE MG/DL
HGB UR QL STRIP: NEGATIVE
KETONES UR STRIP-MCNC: NEGATIVE MG/DL
LEUKOCYTE ESTERASE UR QL STRIP: ABNORMAL
MUCOUS THREADS #/AREA URNS LPF: PRESENT /LPF
NITRATE UR QL: NEGATIVE
PH UR STRIP: 6 PH (ref 5–7)
RBC #/AREA URNS AUTO: 1 /HPF (ref 0–2)
SOURCE: ABNORMAL
SP GR UR STRIP: 1 (ref 1–1.03)
UROBILINOGEN UR STRIP-MCNC: NORMAL MG/DL (ref 0–2)
WBC #/AREA URNS AUTO: 8 /HPF (ref 0–5)

## 2020-03-12 PROCEDURE — 81001 URINALYSIS AUTO W/SCOPE: CPT | Mod: ZL | Performed by: INTERNAL MEDICINE

## 2020-03-12 PROCEDURE — 87086 URINE CULTURE/COLONY COUNT: CPT | Mod: ZL | Performed by: INTERNAL MEDICINE

## 2020-03-12 PROCEDURE — 87088 URINE BACTERIA CULTURE: CPT | Mod: ZL | Performed by: INTERNAL MEDICINE

## 2020-03-14 LAB
BACTERIA SPEC CULT: ABNORMAL
SPECIMEN SOURCE: ABNORMAL

## 2020-03-15 RX ORDER — SULFAMETHOXAZOLE/TRIMETHOPRIM 800-160 MG
1 TABLET ORAL 2 TIMES DAILY
Qty: 6 TABLET | Refills: 0 | Status: SHIPPED | OUTPATIENT
Start: 2020-03-15 | End: 2020-04-29

## 2020-03-20 ENCOUNTER — MYC REFILL (OUTPATIENT)
Dept: INTERNAL MEDICINE | Facility: OTHER | Age: 60
End: 2020-03-20

## 2020-03-20 DIAGNOSIS — G43.009 MIGRAINE WITHOUT AURA AND WITHOUT STATUS MIGRAINOSUS, NOT INTRACTABLE: ICD-10-CM

## 2020-03-20 RX ORDER — SUMATRIPTAN 100 MG/1
100 TABLET, FILM COATED ORAL
Qty: 9 TABLET | Refills: 11 | Status: SHIPPED | OUTPATIENT
Start: 2020-03-20 | End: 2020-04-29

## 2020-04-27 ENCOUNTER — MYC REFILL (OUTPATIENT)
Dept: INTERNAL MEDICINE | Facility: OTHER | Age: 60
End: 2020-04-27

## 2020-04-27 DIAGNOSIS — G43.009 MIGRAINE WITHOUT AURA AND WITHOUT STATUS MIGRAINOSUS, NOT INTRACTABLE: ICD-10-CM

## 2020-04-27 DIAGNOSIS — F51.04 CHRONIC INSOMNIA: ICD-10-CM

## 2020-04-27 DIAGNOSIS — H92.01 OTALGIA, RIGHT: ICD-10-CM

## 2020-04-29 ENCOUNTER — VIRTUAL VISIT (OUTPATIENT)
Dept: FAMILY MEDICINE | Facility: OTHER | Age: 60
End: 2020-04-29
Attending: PHYSICIAN ASSISTANT
Payer: COMMERCIAL

## 2020-04-29 DIAGNOSIS — H92.01 OTALGIA, RIGHT: ICD-10-CM

## 2020-04-29 DIAGNOSIS — F51.04 CHRONIC INSOMNIA: ICD-10-CM

## 2020-04-29 DIAGNOSIS — G43.009 MIGRAINE WITHOUT AURA AND WITHOUT STATUS MIGRAINOSUS, NOT INTRACTABLE: ICD-10-CM

## 2020-04-29 PROCEDURE — 99213 OFFICE O/P EST LOW 20 MIN: CPT | Mod: TEL | Performed by: PHYSICIAN ASSISTANT

## 2020-04-29 RX ORDER — METHOCARBAMOL 750 MG/1
750 TABLET, FILM COATED ORAL 3 TIMES DAILY PRN
Qty: 270 TABLET | Refills: 1 | Status: SHIPPED | OUTPATIENT
Start: 2020-04-29 | End: 2020-07-31

## 2020-04-29 RX ORDER — SUMATRIPTAN 100 MG/1
100 TABLET, FILM COATED ORAL
Qty: 9 TABLET | Refills: 11 | Status: SHIPPED | OUTPATIENT
Start: 2020-04-29 | End: 2021-03-01

## 2020-04-29 RX ORDER — SUMATRIPTAN 100 MG/1
100 TABLET, FILM COATED ORAL
Qty: 9 TABLET | Refills: 4 | OUTPATIENT
Start: 2020-04-29

## 2020-04-29 RX ORDER — METHOCARBAMOL 750 MG/1
750 TABLET, FILM COATED ORAL 3 TIMES DAILY PRN
Qty: 270 TABLET | OUTPATIENT
Start: 2020-04-29

## 2020-04-29 RX ORDER — LORAZEPAM 1 MG/1
1 TABLET ORAL
Qty: 30 TABLET | OUTPATIENT
Start: 2020-04-29

## 2020-04-29 RX ORDER — LORAZEPAM 1 MG/1
1 TABLET ORAL
Qty: 30 TABLET | Refills: 5 | Status: SHIPPED | OUTPATIENT
Start: 2020-04-29 | End: 2020-07-05

## 2020-04-29 ASSESSMENT — PAIN SCALES - GENERAL: PAINLEVEL: NO PAIN (0)

## 2020-04-29 NOTE — TELEPHONE ENCOUNTER
Pt called and informed of message below. Pt soft transferred to scheduling to set up appt. Silvia Styles RN on 4/29/2020 at 9:13 AM

## 2020-04-29 NOTE — TELEPHONE ENCOUNTER
-- Schedule video appointment with available provider today / tomorrow - for controlled med refills --  Segundo Alfaro MD

## 2020-04-29 NOTE — TELEPHONE ENCOUNTER
Can you please call the pharmacy to clarify the refill requests prior to the visit this afternoon?   She should still have refills of lorazepam and imitrex per the last refill dates.   Does she need a refill of methocarbamol?  Carley Jones PA-C ..................4/29/2020 10:40 AM

## 2020-04-29 NOTE — TELEPHONE ENCOUNTER
[facility] pharmacist states patient has 1 more month of refills for Lorazepam, Imitrex and Methocarbamol.    Tati Garnett LPN............4/29/2020 3:00 PM

## 2020-04-29 NOTE — PROGRESS NOTES
"Alessandra Aiken is a 60 year old female who is being evaluated via a billable telephone visit.      The patient has been notified of following:     \"This telephone visit will be conducted via a call between you and your physician/provider. We have found that certain health care needs can be provided without the need for a physical exam.  This service lets us provide the care you need with a short phone conversation.  If a prescription is necessary we can send it directly to your pharmacy.  If lab work is needed we can place an order for that and you can then stop by our lab to have the test done at a later time.    Telephone visits are billed at different rates depending on your insurance coverage. During this emergency period, for some insurers they may be billed the same as an in-person visit.  Please reach out to your insurance provider with any questions.    If during the course of the call the physician/provider feels a telephone visit is not appropriate, you will not be charged for this service.\"    Patient has given verbal consent for Telephone visit?  Yes    What phone number would you like to be contacted at? [phone]    How would you like to obtain your AVS? Carmelita Ang     Alessandra Aiken is a 60 year old female who presents to clinic today for the following health issues:    HPI    Insomnia: Needs a refill of ativan. Do not sleep if she takes the ativan. Helps her stay asleep.  Helps mind from racing. Take 1/2 to 1 at bedtime.  No side effects.     Migraines: Needs a refill of imitrex. Works well.  Able to go to the chiropractor this afternoon.  No side effects with the medication.  Usually lay down after taking the medication.  No changes are appreciated.    Methocarbamol refill: Have chronic ear pain for 8 years.  Hx 3 sinus surgeries. Hx [place], [facility], ENT providers.  Take 3 a day if she feels an ear pain coming on.  Goes up and down.  No side effects noted.  Tolerating medication well.  " No acute concerns.    Want them to mail the meds.        Patient Active Problem List   Diagnosis     Chronic sinusitis     Chronic insomnia     Hypertension     Migraine without aura     S/P vaginal hysterectomy     Past Surgical History:   Procedure Laterality Date     ARTHROSCOPY SHOULDER  07/2009     AS NASAL/SINUS SCOPE W SPHENOIDOTOMY      2011/2016 Right for [facility]oma removal at [facility]     COLONOSCOPY  06/05/2012    Sigmoid diverticulosis; follow up 10 years     CYSTOSCOPY N/A 8/9/2018    Procedure: CYSTOSCOPY;;  Surgeon: Ashleigh Fregoso MD;  Location: [facility] OR     ENDOSCOPIC SINUS SURGERY      repeat clearing     HYSTERECTOMY VAGINAL Bilateral 8/9/2018    Procedure: HYSTERECTOMY VAGINAL;  Total Vaginal Hysterectomy & Bilateral Salpingectomy, Cystoscopy;  Surgeon: Ashleigh Fregoso MD;  Location:  OR     LEEP TX, CERVICAL  04/18/2018    Dr Fregoso     MYRINGOTOMY, INSERT TUBE, COMBINED  09/2016       Social History     Tobacco Use     Smoking status: Never Smoker     Smokeless tobacco: Never Used   Substance Use Topics     Alcohol use: No     Alcohol/week: 0.0 standard drinks     Comment: Alcoholic Drinks/day: very rarely     Family History   Problem Relation Age of Onset     No Known Problems Father      No Known Problems Mother      Heart Disease Paternal Grandfather         Heart Disease,MI at 65     No Known Problems Brother      No Known Problems Brother      No Known Problems Brother      Other - See Comments No family hx of         History is negative for diabetes, thyroid problems, cancer, anxiety, depression and asthma     Breast Cancer No family hx of         Cancer-breast         Current Outpatient Medications   Medication Sig Dispense Refill     LORazepam (ATIVAN) 1 MG tablet Take 1 tablet (1 mg) by mouth nightly as needed for sleep 30 tablet 5     methocarbamol (ROBAXIN) 750 MG tablet Take 1 tablet (750 mg) by mouth 3 times daily as needed for muscle spasms 270 tablet 1     SUMAtriptan  (IMITREX) 100 MG tablet Take 1 tablet (100 mg) by mouth every 2 hours as needed for migraine . maxillary dose: 200mg per 24 hrs. 9 tablet 11     Allergies   Allergen Reactions     Fentanyl Anxiety and Itching     Patient describes feeling creepy/crawly to Dr. Saenz.      BP Readings from Last 3 Encounters:   01/30/20 132/80   10/01/19 138/88   09/19/19 124/86    Wt Readings from Last 3 Encounters:   01/30/20 63.5 kg (140 lb)   10/01/19 63 kg (139 lb)   09/19/19 63 kg (139 lb)                    Reviewed and updated as needed this visit by Provider         Review of Systems   ROS COMP: Constitutional, HEENT, cardiovascular, pulmonary, gi and gu systems are negative, except as otherwise noted.       Objective   Reported vitals:  LMP 03/07/2003    healthy, alert and no distress  PSYCH: Alert and oriented times 3; coherent speech, normal   rate and volume, able to articulate logical thoughts, able   to abstract reason, no tangential thoughts, no hallucinations   or delusions  Her affect is normal  RESP: No cough, no audible wheezing, able to talk in full sentences  Remainder of exam unable to be completed due to telephone visits    Diagnostic Test Results:  none         Assessment/Plan:  1. Chronic insomnia  Refilled lorazepam 1 mg quantity 30 with 5 refills.  No acute concerns at this time.  Tolerating medication well.  Gave side effect profile.    Patient was given the side effect profile and the safety concerns with using the controlled medication prescription.   Unable to complete urine drug screen today as this is a telephone visit.  Will complete at the next visit.  Patient should not share the controlled medication with other people.    website was reviewed and printed.     - LORazepam (ATIVAN) 1 MG tablet; Take 1 tablet (1 mg) by mouth nightly as needed for sleep  Dispense: 30 tablet; Refill: 5    2. Otalgia, right  Refill methocarbamol.  No acute concerns at this time.  - methocarbamol (ROBAXIN) 750 MG  tablet; Take 1 tablet (750 mg) by mouth 3 times daily as needed for muscle spasms  Dispense: 270 tablet; Refill: 1    3. Migraine without aura and without status migrainosus, not intractable  Refilled sumatriptan.  No acute concerns at this time.  Recheck as needed.  Gave side effect profile.  - SUMAtriptan (IMITREX) 100 MG tablet; Take 1 tablet (100 mg) by mouth every 2 hours as needed for migraine . maxillary dose: 200mg per 24 hrs.  Dispense: 9 tablet; Refill: 11    No follow-ups on file.      Phone call duration:  7 minutes    Carley Jones PA-C

## 2020-04-29 NOTE — TELEPHONE ENCOUNTER
Routing refill request to provider for review/approval because:  Drug not on the [facility] refill protocol     Last refill  Robaxin 750mg  1/30/2020  270# 1 Refill    Ativan 1mg tablet  1/30/2020  30# 5 Refill    Imitrex 100mg tablet  3/20/2020  9# 11 Refills     LOV: 1/30/20  Future: 7/31/2020  Silvia Styles RN on 4/29/2020 at 8:09 AM

## 2020-05-20 ENCOUNTER — OFFICE VISIT (OUTPATIENT)
Dept: NEUROLOGY | Facility: OTHER | Age: 60
End: 2020-05-20
Attending: PSYCHIATRY & NEUROLOGY
Payer: COMMERCIAL

## 2020-05-20 VITALS
DIASTOLIC BLOOD PRESSURE: 82 MMHG | WEIGHT: 146.4 LBS | HEART RATE: 52 BPM | HEIGHT: 66 IN | BODY MASS INDEX: 23.53 KG/M2 | TEMPERATURE: 98 F | SYSTOLIC BLOOD PRESSURE: 142 MMHG | OXYGEN SATURATION: 98 % | RESPIRATION RATE: 16 BRPM

## 2020-05-20 DIAGNOSIS — M79.641 PAIN OF RIGHT HAND: Primary | ICD-10-CM

## 2020-05-20 PROCEDURE — 95886 MUSC TEST DONE W/N TEST COMP: CPT | Performed by: PSYCHIATRY & NEUROLOGY

## 2020-05-20 PROCEDURE — 99204 OFFICE O/P NEW MOD 45 MIN: CPT | Mod: 25 | Performed by: PSYCHIATRY & NEUROLOGY

## 2020-05-20 PROCEDURE — 95909 NRV CNDJ TST 5-6 STUDIES: CPT | Performed by: PSYCHIATRY & NEUROLOGY

## 2020-05-20 ASSESSMENT — MIFFLIN-ST. JEOR: SCORE: 1255.82

## 2020-05-20 ASSESSMENT — PAIN SCALES - GENERAL: PAINLEVEL: MODERATE PAIN (5)

## 2020-05-20 NOTE — NURSING NOTE
"Chief Complaint   Patient presents with     Pain     Patient is here for pain in the right thumb.     Initial BP (!) 142/82   Pulse 52   Temp 98  F (36.7  C) (Tympanic)   Resp 16   Ht 1.676 m (5' 6\")   Wt 66.4 kg (146 lb 6.4 oz)   LMP 03/07/2003   SpO2 98%   BMI 23.63 kg/m   Estimated body mass index is 23.63 kg/m  as calculated from the following:    Height as of this encounter: 1.676 m (5' 6\").    Weight as of this encounter: 66.4 kg (146 lb 6.4 oz).  Medication Reconciliation: complete    Kassandra Dupree LPN  "

## 2020-05-20 NOTE — PROGRESS NOTES
Visit Date:   05/20/2020      NEURODIAGNOSTICS CONSULTATION      REFERRING PHYSICIAN:  Chris Sylvester MD      HISTORY OF PRESENT ILLNESS:  The patient is a 60-year-old who relates that for more than a year, she has had problems with pain in the first digit of the right hand extending to the wrist and sometimes aching pain in the palm of the hand.  She is not convinced of any loss of strength and does not complain of more proximal discomfort.  She has no complaints of neck pain.  Her symptoms sometimes awaken her from sleep.  Over the last few months, she started to develop some pain in the first digit of the left hand.      PAST MEDICAL HISTORY:  Relatively unremarkable.  The patient has generally been healthy.  There is a history of hypertension.      REVIEW OF SYSTEMS:  Other than the above is completely unremarkable.      FAMILY HISTORY:  Negative for focal and generalized neuropathy.      SOCIAL HISTORY:  The patient performs predominantly sedentary work.  She does not use tobacco.      PHYSICAL EXAMINATION:     GENERAL:  Reveals a healthy-appearing 60-year-old.     NEUROLOGIC:  Her strength is 5/5 bilaterally for the intrinsic hand muscles, finger extensors, finger flexors, biceps and triceps.  Reflexes are 2/4 bilaterally at the biceps, triceps and brachioradialis tendons.  Pinprick is intact in the cervical dermatomes of the upper extremities.     CONSTITUTIONAL:  The patient is 66 inches tall and weighs 146 pounds, blood pressure is 142/82.      NERVE CONDUCTION STUDIES:  Motor nerve conduction testing was performed for the right median and ulnar nerves.  Distal latencies, amplitudes, conduction velocities and H-reflex latencies were well within normal limits.      Orthodromic mixed conduction studies were performed for the median and ulnar nerves.  Distal latencies, amplitudes, and conduction velocities were normal.      MONOPOLAR EMG NEEDLE EXAMINATION:  Monopolar needle examination was performed for  the right first dorsal interosseous, extensor digitorum communis, brachioradialis, flexor carpi radialis and triceps.  All the tested muscles showed normal insertional activity.  Motor units were stable and of normal size with normal recruitment and interference.      IMPRESSION:  The patient appears to be neurologically healthy.  She does not have neurodiagnostic evidence for focal or generalized neuropathy affecting the upper extremity.  There are no EMG or H-reflex findings to suggest radiculopathy.      Findings were reviewed with the patient.         MAXI TRUJILLO MD             D: 2020   T: 2020   MT: ILANA      Name:     MOUNA ABDUL   MRN:      [id number]        Account:      [id number]   :      1960           Visit Date:   2020      Document: [id number]

## 2020-05-20 NOTE — LETTER
5/20/2020         RE: Alessandra Aiken  [address]  [facility] [address]        Dear Colleague,    Thank you for referring your patient, Alessandra Aiken, to [facility]. Please see a copy of my visit note below.    Visit Date:   05/20/2020      NEURODIAGNOSTICS CONSULTATION      REFERRING PHYSICIAN:  Chris Sylvester MD      HISTORY OF PRESENT ILLNESS:  The patient is a 60-year-old who relates that for more than a year, she has had problems with pain in the first digit of the right hand extending to the wrist and sometimes aching pain in the palm of the hand.  She is not convinced of any loss of strength and does not complain of more proximal discomfort.  She has no complaints of neck pain.  Her symptoms sometimes awaken her from sleep.  Over the last few months, she started to develop some pain in the first digit of the left hand.      PAST MEDICAL HISTORY:  Relatively unremarkable.  The patient has generally been healthy.  There is a history of hypertension.      REVIEW OF SYSTEMS:  Other than the above is completely unremarkable.      FAMILY HISTORY:  Negative for focal and generalized neuropathy.      SOCIAL HISTORY:  The patient performs predominantly sedentary work.  She does not use tobacco.      PHYSICAL EXAMINATION:     GENERAL:  Reveals a healthy-appearing 60-year-old.     NEUROLOGIC:  Her strength is 5/5 bilaterally for the intrinsic hand muscles, finger extensors, finger flexors, biceps and triceps.  Reflexes are 2/4 bilaterally at the biceps, triceps and brachioradialis tendons.  Pinprick is intact in the cervical dermatomes of the upper extremities.     CONSTITUTIONAL:  The patient is 66 inches tall and weighs 146 pounds, blood pressure is 142/82.      NERVE CONDUCTION STUDIES:  Motor nerve conduction testing was performed for the right median and ulnar nerves.  Distal latencies, amplitudes, conduction velocities and H-reflex latencies were well within normal  limits.      Orthodromic mixed conduction studies were performed for the median and ulnar nerves.  Distal latencies, amplitudes, and conduction velocities were normal.      MONOPOLAR EMG NEEDLE EXAMINATION:  Monopolar needle examination was performed for the right first dorsal interosseous, extensor digitorum communis, brachioradialis, flexor carpi radialis and triceps.  All the tested muscles showed normal insertional activity.  Motor units were stable and of normal size with normal recruitment and interference.      IMPRESSION:  The patient appears to be neurologically healthy.  She does not have neurodiagnostic evidence for focal or generalized neuropathy affecting the upper extremity.  There are no EMG or H-reflex findings to suggest radiculopathy.      Findings were reviewed with the patient.         MIKE TRUJILLO MD             D: 2020   T: 2020   MT: ILANA      Name:     MOUNA ABDUL   MRN:      [id number]        Account:      [id number]   :      1960           Visit Date:   2020      Document: [id number]       Again, thank you for allowing me to participate in the care of your patient.        Sincerely,        Mike Trujillo MD

## 2020-05-29 ENCOUNTER — OFFICE VISIT (OUTPATIENT)
Dept: ORTHOPEDICS | Facility: OTHER | Age: 60
End: 2020-05-29
Attending: SPECIALIST
Payer: COMMERCIAL

## 2020-05-29 DIAGNOSIS — M18.9 ARTHROSIS OF FIRST CARPOMETACARPAL JOINT: Primary | ICD-10-CM

## 2020-05-29 DIAGNOSIS — G56.01 CARPAL TUNNEL SYNDROME OF RIGHT WRIST: ICD-10-CM

## 2020-05-29 PROCEDURE — 20526 THER INJECTION CARP TUNNEL: CPT | Performed by: SPECIALIST

## 2020-05-29 PROCEDURE — 25000125 ZZHC RX 250: Performed by: SPECIALIST

## 2020-05-29 PROCEDURE — 25000128 H RX IP 250 OP 636: Performed by: SPECIALIST

## 2020-05-29 PROCEDURE — 99213 OFFICE O/P EST LOW 20 MIN: CPT | Mod: 25 | Performed by: SPECIALIST

## 2020-05-29 RX ORDER — METHYLPREDNISOLONE ACETATE 40 MG/ML
20 INJECTION, SUSPENSION INTRA-ARTICULAR; INTRALESIONAL; INTRAMUSCULAR; SOFT TISSUE ONCE
Status: COMPLETED | OUTPATIENT
Start: 2020-05-29 | End: 2020-05-29

## 2020-05-29 RX ORDER — BUPIVACAINE HYDROCHLORIDE 5 MG/ML
1 INJECTION, SOLUTION EPIDURAL; INTRACAUDAL ONCE
Status: COMPLETED | OUTPATIENT
Start: 2020-05-29 | End: 2020-05-29

## 2020-05-29 RX ORDER — METHYLPREDNISOLONE ACETATE 40 MG/ML
40 INJECTION, SUSPENSION INTRA-ARTICULAR; INTRALESIONAL; INTRAMUSCULAR; SOFT TISSUE ONCE
Status: COMPLETED | OUTPATIENT
Start: 2020-05-29 | End: 2020-05-29

## 2020-05-29 RX ADMIN — BUPIVACAINE HYDROCHLORIDE 5 MG: 5 INJECTION, SOLUTION EPIDURAL; INTRACAUDAL at 10:28

## 2020-05-29 RX ADMIN — METHYLPREDNISOLONE ACETATE 40 MG: 40 INJECTION, SUSPENSION INTRA-ARTICULAR; INTRALESIONAL; INTRAMUSCULAR; SOFT TISSUE at 10:27

## 2020-05-29 RX ADMIN — METHYLPREDNISOLONE ACETATE 20 MG: 40 INJECTION, SUSPENSION INTRA-ARTICULAR; INTRALESIONAL; INTRAMUSCULAR; SOFT TISSUE at 10:27

## 2020-05-29 RX ADMIN — BUPIVACAINE HYDROCHLORIDE 5 MG: 5 INJECTION, SOLUTION EPIDURAL; INTRACAUDAL at 10:29

## 2020-05-29 NOTE — PROGRESS NOTES
Patient is here for follow up on her thumbs and carpal tunnel.  Jacquelin Benedict LPN .....................5/29/2020 10:02 AM

## 2020-06-08 ENCOUNTER — MYC REFILL (OUTPATIENT)
Dept: FAMILY MEDICINE | Facility: OTHER | Age: 60
End: 2020-06-08

## 2020-06-08 DIAGNOSIS — F51.04 CHRONIC INSOMNIA: ICD-10-CM

## 2020-06-09 RX ORDER — LORAZEPAM 1 MG/1
1 TABLET ORAL
Qty: 30 TABLET | Refills: 5 | OUTPATIENT
Start: 2020-06-09

## 2020-06-09 NOTE — TELEPHONE ENCOUNTER
Refill denied    Medication fileld on 4/29/2020 30# 5 Refills Silvia Styles RN on 6/9/2020 at 3:44 PM

## 2020-06-12 NOTE — PROGRESS NOTES
Visit Date:   2020      INTERVAL HISTORY:  Mouna returns for followup.  She had an EMG performed by Dr. Gonzalez on 2020.  This showed no evidence of compressive neuropathy.  She does have a history that is consistent with median nerve compression.  She is here today for a carpal tunnel injection as well as bilateral basilar joint injections.      PHYSICAL EXAMINATION:  Today, the patient is alert and oriented x 3 and appropriate.  Gait and station are appropriate.  She is well groomed and well kempt.  She is 59 years old.  Examination of both upper extremities reveals full and symmetric range of motion of elbows, wrists.  Examination of both hands shows no evidence of trophic changes, adenopathy or focal weakness.  She does have bilateral basilar joint tenderness.      ASSESSMENT AND PLAN:     1. Possible carpal tunnel syndrome, negative EMG:  Plan will be to proceed with an injection.  A sterile prep was performed and the right carpal canal was injected with 40 mg of Depo-Medrol and 1 mL of 0.5% Marcaine without epinephrine.  There were no complications.     2.  Basal joint arthrosis bilaterally:  A sterile prep was performed and each basilar joint was injected with 40 mg of Depo-Medrol and 1 mL of 1% lidocaine with epinephrine.  There were no complications.        Our plan will be to follow her clinically, and she will contact us and let us know if the injection gave her significant relief.         CROW HOGAN MD             D: 2020   T: 2020   MT:       Name:     MOUNA ABDUL   MRN:      [id number]        Account:      [id number]   :      1960           Visit Date:   2020      Document: [id number]

## 2020-07-31 ENCOUNTER — OFFICE VISIT (OUTPATIENT)
Dept: INTERNAL MEDICINE | Facility: OTHER | Age: 60
End: 2020-07-31
Attending: INTERNAL MEDICINE
Payer: COMMERCIAL

## 2020-07-31 VITALS
HEART RATE: 64 BPM | BODY MASS INDEX: 22.05 KG/M2 | OXYGEN SATURATION: 98 % | RESPIRATION RATE: 16 BRPM | HEIGHT: 66 IN | SYSTOLIC BLOOD PRESSURE: 142 MMHG | WEIGHT: 137.2 LBS | TEMPERATURE: 98.5 F | DIASTOLIC BLOOD PRESSURE: 102 MMHG

## 2020-07-31 DIAGNOSIS — H92.01 OTALGIA, RIGHT: ICD-10-CM

## 2020-07-31 DIAGNOSIS — Z12.4 SCREENING FOR CERVICAL CANCER: ICD-10-CM

## 2020-07-31 DIAGNOSIS — F51.04 CHRONIC INSOMNIA: Primary | ICD-10-CM

## 2020-07-31 DIAGNOSIS — Z12.31 ENCOUNTER FOR SCREENING MAMMOGRAM FOR BREAST CANCER: ICD-10-CM

## 2020-07-31 DIAGNOSIS — B97.7 HPV (HUMAN PAPILLOMA VIRUS) INFECTION: ICD-10-CM

## 2020-07-31 DIAGNOSIS — I10 ESSENTIAL HYPERTENSION: ICD-10-CM

## 2020-07-31 PROCEDURE — G0123 SCREEN CERV/VAG THIN LAYER: HCPCS | Performed by: INTERNAL MEDICINE

## 2020-07-31 PROCEDURE — 99396 PREV VISIT EST AGE 40-64: CPT | Performed by: INTERNAL MEDICINE

## 2020-07-31 PROCEDURE — 87624 HPV HI-RISK TYP POOLED RSLT: CPT | Mod: ZL | Performed by: INTERNAL MEDICINE

## 2020-07-31 PROCEDURE — 40001026 ZZHCL STATISTICAL PAP TEST QC: Performed by: INTERNAL MEDICINE

## 2020-07-31 RX ORDER — AMITRIPTYLINE HYDROCHLORIDE 10 MG/1
10 TABLET ORAL AT BEDTIME
Qty: 30 TABLET | Refills: 1 | Status: SHIPPED | OUTPATIENT
Start: 2020-07-31 | End: 2020-08-13

## 2020-07-31 ASSESSMENT — MIFFLIN-ST. JEOR: SCORE: 1210.12

## 2020-07-31 NOTE — PROGRESS NOTES
Chief Complaint   Patient presents with     Physical         HPI: Ms. Aiken is a 60 year old female who presents today for yearly physical.  She overall is feeling good.      She has ongoing issues with sleep.  She has traditionally used Ativan however feels this is not working any longer.  She has been under a lot of stress recently with COVID-19, work changes and other concerns.  She is trying to care for her mother-in-law which has been difficult.    She continues to have issues with right ear pain.  She previously used Robaxin which was helpful however this stopped working.    She has an elevated blood pressure today.  She has had issues with hypertension off and on and has required medication in the past however she often has been able to get off of these meds due to improvement.  She feels a lot of her  blood pressure issues are secondary to stress.     She is due for a mammogram.  She has a history of HSIL status post hysterectomy in 2018.  Pap smear with HPV testing in 2019 showed positive high-risk HPV, not 16 or 18.  She is due for recheck today.  We did discuss the pros and cons of additional testing in depth today.    She is due for a mammogram.    History is discussed and updated on 7/31/2020 with patient.  It is current to the best of my knowledge as below.    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    Right     Ureterolithiasis 12/04/2014    right pyelo, mod right hydro, 2 mm right renal stone        Past Surgical History:   Procedure Laterality  Date     ARTHROSCOPY SHOULDER  2009     AS NASAL/SINUS SCOPE W SPHENOIDOTOMY       Right for mycetoma removal at [place]~2012     COLONOSCOPY  2012    Sigmoid diverticulosis; follow up 10 years     CYSTOSCOPY N/A 2018    Procedure: CYSTOSCOPY;;  Surgeon: Ashleigh Fregoso MD;  Location:  OR     ENDOSCOPIC SINUS SURGERY      repeat clearing     HYSTERECTOMY VAGINAL Bilateral 2018    Procedure: HYSTERECTOMY VAGINAL;  Total Vaginal Hysterectomy & Bilateral Salpingectomy, Cystoscopy;  Surgeon: Ashleigh Fregoso MD;  Location:  OR     LEEP TX, CERVICAL  2018    Dr Fregoso     MYRINGOTOMY, INSERT TUBE, COMBINED  2016         Current Outpatient Medications   Medication Sig Dispense Refill     amitriptyline (ELAVIL) 10 MG tablet Take 1 tablet (10 mg) by mouth At Bedtime 30 tablet 1     LORazepam (ATIVAN) 1 MG tablet Take 1 tablet (1 mg) by mouth nightly as needed for sleep 30 tablet 5     SUMAtriptan (IMITREX) 100 MG tablet Take 1 tablet (100 mg) by mouth every 2 hours as needed for migraine . maxillary dose: 200mg per 24 hrs. 9 tablet 11       Allergies   Allergen Reactions     Fentanyl Anxiety and Itching     Patient describes feeling creepy/crawly to Dr. Saenz.         Family History   Problem Relation Age of Onset     No Known Problems Father      No Known Problems Mother      Heart Disease Paternal Grandfather         Heart Disease,MI at 65     No Known Problems Brother      No Known Problems Brother      No Known Problems Brother      Other - See Comments No family hx of         History is negative for diabetes, thyroid problems, cancer, anxiety, depression and asthma     Breast Cancer No family hx of         Cancer-breast       Family Status   Relation Name Status     Fa  Alive        post lyme syndrome     Mo  Alive     PGFa   at age 65     Bro 1 Alive     Bro 2 Alive     Bro 3 Alive     Neg  (Not Specified)        Social History     Tobacco Use     Smoking status:  "Never Smoker     Smokeless tobacco: Never Used   Substance Use Topics     Alcohol use: No     Alcohol/week: 0.0 standard drinks     Comment: Alcoholic Drinks/day: very rarely       Social History     Social History Narrative    Lives with spouse, in [place].  Patient has been  and remarried.  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  Benny -  does hair  3 sons, Mike - lives in , [facility], [facility], 5 grandkids             ROS  GEN:-fevers/-chills/-night sweats/+wt change - 10 lbs  NEURO: -headaches/-vision changes  EARS: -hearing changes/-tinnitus  NOSE: -drainage/-congestion  MOUTH/THROAT: - sore throat/-dysphagia/-sores  LUNGS: -sob/-cough  CARDIOVASCULAR: -cp/-palpitations  GI: -pain/-change in bowels/-bloody stools  : -change in bladder/-vaginal discharge or bleeding  ENDOCRINE: -skin or hair changes  HEMATOLOGIC/LYMPHATIC: -swollen nodes  SKIN: -rashes/-lesions  MSK/RHEUM: -joint pain/-swelling  NEURO: -weakness/-parasthesias  PSYCH:-depression/-anxiety     EXAM:   BP (!) 142/102 (BP Location: Right arm, Patient Position: Sitting, Cuff Size: Adult Regular)   Pulse 64   Temp 98.5  F (36.9  C) (Tympanic)   Resp 16   Ht 1.67 m (5' 5.75\")   Wt 62.2 kg (137 lb 3.2 oz)   LMP 03/07/2003   SpO2 98%   BMI 22.31 kg/m    Estimated body mass index is 22.31 kg/m  as calculated from the following:    Height as of this encounter: 1.67 m (5' 5.75\").    Weight as of this encounter: 62.2 kg (137 lb 3.2 oz).      GEN: Vitals reviewed. Healthy appearing. Patient is in no acute distress. Cooperative with exam.  HEENT: Normocephalic atraumatic.  Eyes grossly normal to inspection.  No discharge or erythema, or obvious scleral/conjunctival abnormalities. Oropharynx with no erythema or exudates. Dentition adequate.  EACs clear bilaterally, TM gray with normal landmarks.  NECK: Supple; no thyromegaly or masses noted.  No cervical or supraclavicular " lymphadenopathy.  CV: Heart regular in rate and rhythm with no murmur.    LUNGS: No audible wheeze, cough, or visible cyanosis.  No visible retractions or increased work of breathing.  Lungs clear to auscultation bilaterally.    ABD:  Soft, nontender, and nondistended.  No rebound. Bowel sounds positive.  : Normal female external genitalia.  Atrophy present.  Vaginal cuff Pap smear obtained.  Chronic hyperpigmented lesion on the right medial labial minora noted.  Approximately 3 to 4 mm across.  This is unchanged from prior.  SKIN: Warm and dry to touch.  Visible skin clear. No significant rash, abnormal pigmentation or lesions.  EXT: No clubbing or cyanosis.  No peripheral edema.  NEURO: Alert and oriented to person, place, and time.  Cranial nerves II-XII grossly intact with no focal or lateralizing deficits.  Muscle tone normal.  Gait normal. No tremor.   MSK: ROM of upper and lower ext symmetric and full.  PSYCH: Mood is good.  Mentation appears normal, affect normal/bright, judgement and insight intact, normal speech and appearance well-groomed.       ASSESSMENT AND PLAN:    Chronic insomnia  At this time we will try low-dose amitriptyline.  She is to call with any side effects.  We will follow-up in 2 weeks to see if any dose adjustment is needed.  - amitriptyline (ELAVIL) 10 MG tablet  Dispense: 30 tablet; Refill: 1    Otalgia, right  We will see if amitriptyline helps with the pain at all.  Let me know if any further evaluation is desired.    Essential hypertension  - Blood pressure today of (!) 142/102   is not at the goal of <140/90 with mild/moderate exacerbation.  -  Instructed to check BP at home.  We will follow-up in a couple weeks to see what her readings show.  If elevated she will need to consider restarting medications.  - Cautioned patient to monitor with antibiotics, herbals and any OTC medications    Encounter for screening mammogram for breast cancer  - MA Screen Bilateral  w/Yadiel    Screening for cervical cancer  Pap smear obtained.  Will refer to gynecology if indicated.  - HPV High Risk Types DNA Cervical  - Pap Screen Thin Prep with HPV - recommended age 30 - 65 years (select HPV order below)    HPV (human papilloma virus) infection  Pap smear obtained  - HPV High Risk Types DNA Cervical  - Pap Screen Thin Prep with HPV - recommended age 30 - 65 years (select HPV order below)      Return in about 2 weeks (around 8/14/2020) for Recheck, medications, BP Recheck.      TORY MALONE,    7/31/2020 9:50 AM    This document was prepared using voice generated softwear. While every attempt was made for accuracy, spelling and grammatical errors may exist.

## 2020-07-31 NOTE — NURSING NOTE
Patient presents to clinic today for physical with vaginal pap smear.  Had hysterectomy in 2018.  Medication reconciliation completed.  Bronwyn Maria CMA([facility])..................7/31/2020   9:13 AM

## 2020-07-31 NOTE — PATIENT INSTRUCTIONS
"Please check your blood pressure daily at various times, recordthis and bring it to your follow up appointment.  Your blood pressure goal is greater than 110/50 and less than 140/90.  Please call if it is consistently outside this range.       High Blood Pressure: Essential Hypertension   ________________________________________________________________________  KEY POINTS    High blood pressure means that your blood pressure is higher than normal. It is called essential hypertension when no cause for it can be found.    Weight loss, changes in your diet, and exercise may be the only treatment you need. If lifestyle changes don t lower your blood pressure enough, yourhealthcare provider may prescribe medicine. Many people need to take 2 or more medicines to bring their blood pressure down to a healthy level.    Talk to your healthcare provider aboutyour personal and family medical history and your lifestyle habits. This will help you know what you can do to lower your risk for high blood pressure.  ________________________________________________________________________  What is high blood pressure?  Blood pressure is the force of bloodagainst artery walls as the heart pumps blood through the body. You may be told that you have high blood pressure (hypertension) if your blood pressure is higher than normal. Hypertension is called essential when no causefor it can be found. When the cause of hypertension is known, such as from kidney disease or a tumor, it is called secondary hypertension.  Blood pressure can rise and fall with exercise, rest, or emotions.    Normal resting blood pressure ranges up to 120/80 (\"120 over 80\"). The first number (120 in this example) is thepressure when the heart beats and pushes blood out to the rest of the body. The second number (80 in this example) is the pressure when the heart rests between beats.    Blood pressureis borderline high if it is 120/80 or higher but less than " 140/90.    High blood pressure is 140/90 or higher for most people. If you have chronic kidney disease, 130/80 or higher isconsidered high blood pressure.    Why is high blood pressure a problem?  High blood pressure is a problem in many ways.    Your heart has to work harder to pumpblood through your body. The added workload on the heart causes thickening of the heart muscle. Over time, the thickening damages the heart muscle so that it can no longer pump normally. This can lead to a disease calledheart failure.    The higher pressure in your arteries may cause them to weaken and bleed, resulting in a stroke.    As you get older, bloodvessels may become hardened. High blood pressure speeds up this process. Hardened or narrowed arteries may not be able to supply enough blood to all parts of your body.    High bloodpressure may lead to atherosclerosis, which is when deposits of cholesterol, fatty substances, and blood cells clog up an artery. Atherosclerosis is the leading cause of heart attacks. It can also cause strokes.    Your kidneys, brain, and eyes may be damaged.  You may need treatment for high blood pressure for the rest of your life.However, proper treatment can control your blood pressure and help prevent heart disease, heart attack, or stroke. It can also help prevent long-term health problems, such as heart failure, kidney failure, blindness, anddementia.  If you already have some complications, such as breathing problems or chest pain, lowering your blood pressure may make these problems less severe.    What is the cause?  There are no clear causes of essential hypertension. However, many things can increase blood pressure, such as:    Being overweight    Smoking    Eating a diet high in salt    Drinking a lot of alcohol  Other important factors include:    Race.  Americans are more likely to have high blood pressure.    Gender. Males have a greater chance of developing high blood pressure  thanwomen until age 55. After the age of 75, women are more likely to develop high blood pressure than men.    Heredity. If you have parents with high blood pressure, you are more at risk.    Age. The older you get, the more likely you are to have high blood pressure.  Also, some medicines increase bloodpressure.  Stress and drinking caffeine can make blood pressure go up temporarily, but it s not clear that they have any long-term effects on blood pressure.    What are the symptoms?  You may have high blood pressure for a long time without symptoms. You may not be able to tell by the way you feel that your bloodpressure is high. The only way to find out if your blood pressure is high is to have it measured. That's why it s important to have your blood pressure checked at least once a year.  When high blood pressure does cause symptoms, they may include:    Headaches    Nosebleeds    Getting tired easily    Blurred vision    Dizziness    Lightheadedness    Feeling like your heart is racing or fluttering    Shortness of breath    Chest pain    Memory problems    Daytime sleepiness    How is it diagnosed?  Blood pressure is checked at most healthcare visits. High blood pressure is usually discovered during one of these visits. Ifyour blood pressure is high, you will be asked to return for follow-up checks. Your healthcare provider will ask about your personal and family medical history and examine you.  Tests to look for a possible cause of highblood pressure may include:    Urine and blood tests    ChestX-ray    Electrocardiogram (ECG), which measures and records your heartbeat  You may be asked to use a portableblood-pressure measuring device, which will take your pressure at different times during day and night.    How is it treated?  If your bloodpressure is borderline high, you may be able to bring it down to a normal level without medicine. Weight loss, changes in your diet, and exercise may be the only  treatment you need.  If lifestyle changes don t lower your blood pressure enough, your healthcare provider may prescribe medicine. Many people need to take 2 or more medicines to bring their blood pressure down to a healthy level. It may takeseveral weeks or months to find the best treatment for you.    How can I take care of myself?  If you have high blood pressure, there are thingsyou can do now to take care of yourself and to prevent problems in the future:    Follow your treatmentplan and know how to take your medicines.    Work with your healthcare provider to find what lifestylechanges and medicines are right for you.    Follow the directions that come with your medicine, including information about food or alcohol. Make sure you know how and when to take yourmedicine. Do not take more or less than you are supposed to take.    Many medicines have side effects. A side effect is a symptom or problem that is caused by the medicine. Ask yourhealthcare provider or pharmacist what side effects your medicine may cause and what you should do if you have side effects. Ask if you should avoid some nonprescription medicines.    Be careful with nonprescription medicines or herbal supplements. Some can raise blood pressure. This includes diet pills, cold and pain medicines, and energy boosters. Read labels or ask your pharmacist if the medicine orsupplement affects blood pressure. Some illegal drugs, like cocaine, can also affect blood pressure.    Check your blood pressure (or have it checked) as often as your provider advises.Keep a diary of the readings. A diary is also a good place to note your exercise, weight, salt intake, types of food you are eating, and your feelings. This can help you learn how these things can affect your blood pressure.Take your diary with you when you visit your provider. It may help you and your provider manage your blood pressure and adjust your medicines if needed.    Don t smoke.    Eat a  healthy diet that is low in salt, saturated fat, trans fat, andcholesterol. Include lots of fruits, vegetables, and fat-free or low-fat milk and milk products.    Get regular exercise, according to your healthcare provider's advice. For example,you might walk, bike, or swim at least 30 minutes 3 to 5 times a week.    Limit the amount of alcohol you drink. Moderate drinking is up to 1 drink a day for women and up to 2 drinksfor men.    Lose weight if you need to.    Try to reduce the stress in your life or learn how to deal better with situations that make you feelanxious.    Ask your healthcare provider:    How and when youwill get your test results    How long it will take to recover    If there are activities you should avoid and when you can return to your normalactivities    How to take care of yourself at home    What symptoms or problems you should watch for and what to do if you have them    Make sure you know when you should come back for a checkup. Keep all appointments for provider visits or tests.    How can I help prevent high blood pressure?  You can helpprevent this disease with a heart-healthy lifestyle:    Eat a healthy diet and keep a healthy weight.    Stay fit with the right kind of exercise for you.    Decrease stress.    Don t smoke.    Limit your use of alcohol.  Talk to your healthcare provider about your personal and familymedical history and your lifestyle habits. This will help you know what you can do to lower your risk for high blood pressure.    Developed by [employer].  Adult Advisor 2016.3 published by [employer].  Lastmodified: 2016-04-18  Last reviewed: 2016-04-15  This content is reviewed periodically and is subject to change as new health information becomes available. The information is intended to inform and educate and is nota replacement for medical evaluation, advice, diagnosis or treatment by a healthcare professional.  References   Adult Advisor 2016.3 Index    Copyright    2016 [employer], a division of [employer]. All rights reserved.

## 2020-08-05 ENCOUNTER — MYC MEDICAL ADVICE (OUTPATIENT)
Dept: INTERNAL MEDICINE | Facility: OTHER | Age: 60
End: 2020-08-05

## 2020-08-05 DIAGNOSIS — I10 ESSENTIAL HYPERTENSION: Primary | ICD-10-CM

## 2020-08-05 RX ORDER — HYDROCHLOROTHIAZIDE 12.5 MG/1
12.5 TABLET ORAL DAILY
Qty: 10 TABLET | Refills: 0 | Status: SHIPPED | OUTPATIENT
Start: 2020-08-05 | End: 2020-08-13

## 2020-08-05 NOTE — TELEPHONE ENCOUNTER
We can start a low dose hydrochlorothiazide and she has to continue to monitor her BP's. Follow up in one week with PCP/NP, sooner if symptoms worsen. RX sent to [facility] pharmacy. Please help her schedule appointment.

## 2020-08-05 NOTE — TELEPHONE ENCOUNTER
She already has an appointment on 8/14/20.  Message sent back to her on [facility].  Bronwyn Maria [facility])..................8/5/2020   1:50 PM

## 2020-08-07 LAB
COPATH REPORT: NORMAL
PAP: NORMAL

## 2020-08-11 ENCOUNTER — MYC MEDICAL ADVICE (OUTPATIENT)
Dept: INTERNAL MEDICINE | Facility: OTHER | Age: 60
End: 2020-08-11

## 2020-08-12 LAB
FINAL DIAGNOSIS: ABNORMAL
HPV HR 12 DNA CVX QL NAA+PROBE: POSITIVE
HPV16 DNA SPEC QL NAA+PROBE: NEGATIVE
HPV18 DNA SPEC QL NAA+PROBE: NEGATIVE
SPECIMEN DESCRIPTION: ABNORMAL
SPECIMEN SOURCE CVX/VAG CYTO: ABNORMAL

## 2020-08-13 ENCOUNTER — VIRTUAL VISIT (OUTPATIENT)
Dept: INTERNAL MEDICINE | Facility: OTHER | Age: 60
End: 2020-08-13
Attending: INTERNAL MEDICINE
Payer: COMMERCIAL

## 2020-08-13 VITALS — SYSTOLIC BLOOD PRESSURE: 142 MMHG | DIASTOLIC BLOOD PRESSURE: 103 MMHG

## 2020-08-13 DIAGNOSIS — Z79.899 HIGH RISK MEDICATION USE: ICD-10-CM

## 2020-08-13 DIAGNOSIS — I10 ESSENTIAL HYPERTENSION: ICD-10-CM

## 2020-08-13 DIAGNOSIS — F51.04 CHRONIC INSOMNIA: Primary | ICD-10-CM

## 2020-08-13 PROCEDURE — 99213 OFFICE O/P EST LOW 20 MIN: CPT | Mod: TEL | Performed by: INTERNAL MEDICINE

## 2020-08-13 RX ORDER — HYDROCHLOROTHIAZIDE 25 MG/1
25 TABLET ORAL DAILY
Qty: 90 TABLET | Refills: 0 | Status: SHIPPED | OUTPATIENT
Start: 2020-08-13 | End: 2021-01-12 | Stop reason: DRUGHIGH

## 2020-08-13 ASSESSMENT — PAIN SCALES - GENERAL: PAINLEVEL: NO PAIN (0)

## 2020-08-13 NOTE — PROGRESS NOTES
"Alessandra Aiken is a 60 year old female who is being evaluated via a billable telephone visit.      The patient has been notified of following:     \"This telephone visit will be conducted via a call between you and your physician/provider. We have found that certain health care needs can be provided without the need for a physical exam.  This service lets us provide the care you need with a short phone conversation.  If a prescription is necessary we can send it directly to your pharmacy.  If lab work is needed we can place an order for that and you can then stop by our lab to have the test done at a later time.    Telephone visits are billed at different rates depending on your insurance coverage. During this emergency period, for some insurers they may be billed the same as an in-person visit.  Please reach out to your insurance provider with any questions.    If during the course of the call the physician/provider feels a telephone visit is not appropriate, you will not be charged for this service.\"    Patient has given verbal consent for Telephone visit?  Yes    What phone number would you like to be contacted at? [phone]    How would you like to obtain your AVS? Carmelita Ang     Alessandra Aiken is a 60 year old female who presents via phone visit today for the following health issues:    Her blood pressure has been elevated.  She has been checking it at home and it has been 143-153/106.  She is on hydrochlorothiazide 12.5 mg daily.    She also has had issues with sleep.  She started amitriptyline 10 mg daily and although she feels that she has not had any side effects from this she has not had a ton of effect from it.  She finds she needs to take it with her lorazepam to get any sleep.    Reviewed and updated as needed this visit by Provider  Tobacco  Allergies  Meds  Problems  Med Hx  Surg Hx  Fam Hx         Review of Systems   Denies any fevers, chills, SOB, chest pain, increased lower " extremity edema, changes in bowel or bladder or other concerns.        Objective          Vitals:  No vitals were obtained today due to virtual visit. See BP above    healthy, alert and no distress  PSYCH: Alert and oriented times 3; coherent speech, normal   rate and volume, able to articulate logical thoughts, able   to abstract reason, no tangential thoughts, no hallucinations   or delusions  Her affect is normal  RESP: No cough, no audible wheezing, able to talk in full sentences  Remainder of exam unable to be completed due to telephone visits    Diagnostic Test Results:  Labs reviewed in Epic        Assessment/Plan:  1. Chronic insomnia  -Increase amitriptyline to 25 mg daily.  Call with any side effects.  - amitriptyline (ELAVIL) 25 MG tablet; Take 1 tablet (25 mg) by mouth At Bedtime Increase in dose  Dispense: 90 tablet; Refill: 0    2. Essential hypertension  -Given uncontrolled, elevated blood pressures we will increase hydrochlorothiazide to 25 mg daily.  She is to call with continued elevated blood pressures.  She will need a repeat metabolic panel in approximately 1 week after increasing the dose to follow-up on potassium, sodium and renal function.  She is to send me an update in approximately 4 weeks regarding how she is doing on these medications so renewals can be sent in.  - hydrochlorothiazide (HYDRODIURIL) 25 MG tablet; Take 1 tablet (25 mg) by mouth daily  Dispense: 90 tablet; Refill: 0    3. High risk medication use  - Basic Metabolic Panel; Future      DO ITZ Cuevas [facility]    Phone call duration:  15 minutes

## 2020-08-13 NOTE — TELEPHONE ENCOUNTER
Appointment changed.  Bronwyn Maria CMA([facility])..................8/13/2020   1:37 PM

## 2020-08-13 NOTE — NURSING NOTE
"Chief Complaint   Patient presents with     Follow Up     Blood pressure     Patient calling for a telephone visit for follow up blood pressure.  Initial BP (!) 142/103   LMP 03/07/2003  Estimated body mass index is 22.31 kg/m  as calculated from the following:    Height as of 7/31/20: 1.67 m (5' 5.75\").    Weight as of 7/31/20: 62.2 kg (137 lb 3.2 oz).  Medication Reconciliation: complete    Gwen Goncalves LPN  "

## 2020-08-21 ENCOUNTER — OFFICE VISIT (OUTPATIENT)
Dept: ORTHOPEDICS | Facility: OTHER | Age: 60
End: 2020-08-21
Attending: SPECIALIST
Payer: COMMERCIAL

## 2020-08-21 DIAGNOSIS — M18.9 ARTHROSIS OF FIRST CARPOMETACARPAL JOINT: ICD-10-CM

## 2020-08-21 DIAGNOSIS — G56.01 CARPAL TUNNEL SYNDROME OF RIGHT WRIST: Primary | ICD-10-CM

## 2020-08-21 PROCEDURE — 25000125 ZZHC RX 250: Performed by: SPECIALIST

## 2020-08-21 PROCEDURE — 25000128 H RX IP 250 OP 636: Performed by: SPECIALIST

## 2020-08-21 PROCEDURE — 20600 DRAIN/INJ JOINT/BURSA W/O US: CPT | Mod: 50 | Performed by: SPECIALIST

## 2020-08-21 RX ORDER — METHYLPREDNISOLONE ACETATE 40 MG/ML
40 INJECTION, SUSPENSION INTRA-ARTICULAR; INTRALESIONAL; INTRAMUSCULAR; SOFT TISSUE ONCE
Status: COMPLETED | OUTPATIENT
Start: 2020-08-21 | End: 2020-08-21

## 2020-08-21 RX ORDER — BUPIVACAINE HYDROCHLORIDE 5 MG/ML
1 INJECTION, SOLUTION EPIDURAL; INTRACAUDAL ONCE
Status: COMPLETED | OUTPATIENT
Start: 2020-08-21 | End: 2020-08-21

## 2020-08-21 RX ADMIN — BUPIVACAINE HYDROCHLORIDE 5 MG: 5 INJECTION, SOLUTION EPIDURAL; INTRACAUDAL at 08:42

## 2020-08-21 RX ADMIN — METHYLPREDNISOLONE ACETATE 40 MG: 40 INJECTION, SUSPENSION INTRA-ARTICULAR; INTRALESIONAL; INTRAMUSCULAR; SOFT TISSUE at 08:42

## 2020-08-21 NOTE — PROGRESS NOTES
Patient is here for follow up on her hands.  Jacquelin Benedict LPN .....................8/21/2020 8:33 AM

## 2020-08-21 NOTE — PROGRESS NOTES
Visit Date:   2020      HISTORY OF PRESENT ILLNESS:  Alessandra presents today for followup as she has had a basilar joint injections, as well as a carpal tunnel injection.  The carpal tunnel gave her excellent relief of symptoms for about 2 months and then recurred.  Currently, her carpal tunnel is less symptomatic, but she would like to proceed with bilateral basilar joint injections today.      PHYSICAL EXAMINATION:  Reveals a 60-year-old female.  She is alert and oriented x 3 and appropriate.  Gait and station are appropriate.  She is well groomed and well kempt.  Examination of both upper extremities reveals full and symmetric range of motion of elbows and wrists.  Examination of both basilar joints.  There is no evidence of triggering.      IMPRESSION:     1.  Carpal tunnel syndrome with good response to injection.  Currently, minimally symptomatic.   2.  Basilar joint arthrosis bilaterally.  We discussed proceeding with basilar joint injection.     3.  A sterile prep was performed, and each basilar joint was injected with 40 mg of Depo-Medrol and 1 mL of 0.5% Marcaine with epinephrine.  There were no complications.  We will see her back if symptoms warrant.         CROW HOGAN MD             D: 2020   T: 2020   MT: JAXON      Name:     ALESSANDRA ABDUL   MRN:      [id number]        Account:      [id number]   :      1960           Visit Date:   2020      Document: [id number]

## 2020-11-02 DIAGNOSIS — I10 ESSENTIAL HYPERTENSION: ICD-10-CM

## 2020-11-02 DIAGNOSIS — F51.04 CHRONIC INSOMNIA: ICD-10-CM

## 2020-11-03 RX ORDER — HYDROCHLOROTHIAZIDE 25 MG/1
25 TABLET ORAL DAILY
Qty: 90 TABLET | Refills: 0 | Status: SHIPPED | OUTPATIENT
Start: 2020-11-03 | End: 2021-03-01

## 2020-11-03 NOTE — TELEPHONE ENCOUNTER
Routing refill request to provider for review/approval because:    Per OV   Assessment/Plan:  1. Chronic insomnia  -Increase amitriptyline to 25 mg daily.  Call with any side effects.  - amitriptyline (ELAVIL) 25 MG tablet; Take 1 tablet (25 mg) by mouth At Bedtime Increase in dose  Dispense: 90 tablet; Refill  She is to send me an update in approximately 4 weeks regarding how she is doing on these medications so renewals can be sent in.    Called and left message for patient to return call to review above.  poke with patient and patient  has been doing fine with dose increase on amitriptyline and on hydrochlorothiazide .  [facility] has not been tracking blood pressures but states has not had headaches or that pressure in head.    Patient informed due for lab only appointment for BMP orders noted as placed, patient transferred to scheduling.  Aster Fregoso RN on 11/3/2020 at 10:34 AM

## 2020-11-03 NOTE — TELEPHONE ENCOUNTER
Routing refill request to provider for review/approval because:  Per OV : 8/13/2020  2. Essential hypertension  -Given uncontrolled, elevated blood pressures we will increase hydrochlorothiazide to 25 mg daily.  She is to call with continued elevated blood pressures.  She will need a repeat metabolic panel in approximately 1 week after increasing the dose to follow-up on potassium, sodium and renal function.  She is to send me an update in approximately 4 weeks regarding how she is doing on these medications so renewals can be sent in.    Request is for hydrochlorothiazide 12.5 mg daily.    Called patient and left message to return call.    Spoke with patient and patient  has been doing fine with dose increase on amitriptyline and on hydrochlorothiazide .  [facility] has not been tracking blood pressures but  has not had headaches or that pressure in head.    Patient informed due for lab only appointment for BMP orders noted as placed, patient transferred to scheduling.       Aster Fregoso RN on 11/3/2020 at 10:32 AM

## 2020-11-06 ENCOUNTER — OFFICE VISIT (OUTPATIENT)
Dept: ORTHOPEDICS | Facility: OTHER | Age: 60
End: 2020-11-06
Attending: SPECIALIST
Payer: COMMERCIAL

## 2020-11-06 DIAGNOSIS — M18.9 ARTHROSIS OF FIRST CARPOMETACARPAL JOINT: Primary | ICD-10-CM

## 2020-11-06 DIAGNOSIS — G56.01 CARPAL TUNNEL SYNDROME OF RIGHT WRIST: ICD-10-CM

## 2020-11-06 PROCEDURE — 250N000011 HC RX IP 250 OP 636: Performed by: SPECIALIST

## 2020-11-06 PROCEDURE — 20526 THER INJECTION CARP TUNNEL: CPT | Mod: RT | Performed by: SPECIALIST

## 2020-11-06 PROCEDURE — 250N000009 HC RX 250: Performed by: SPECIALIST

## 2020-11-06 PROCEDURE — 20600 DRAIN/INJ JOINT/BURSA W/O US: CPT | Mod: 50 | Performed by: SPECIALIST

## 2020-11-06 RX ORDER — BUPIVACAINE HYDROCHLORIDE 5 MG/ML
1 INJECTION, SOLUTION EPIDURAL; INTRACAUDAL ONCE
Status: COMPLETED | OUTPATIENT
Start: 2020-11-06 | End: 2020-11-06

## 2020-11-06 RX ORDER — METHYLPREDNISOLONE ACETATE 40 MG/ML
40 INJECTION, SUSPENSION INTRA-ARTICULAR; INTRALESIONAL; INTRAMUSCULAR; SOFT TISSUE ONCE
Status: COMPLETED | OUTPATIENT
Start: 2020-11-06 | End: 2020-11-06

## 2020-11-06 RX ADMIN — METHYLPREDNISOLONE ACETATE 40 MG: 40 INJECTION, SUSPENSION INTRA-ARTICULAR; INTRALESIONAL; INTRAMUSCULAR; SOFT TISSUE at 08:36

## 2020-11-06 RX ADMIN — BUPIVACAINE HYDROCHLORIDE 5 MG: 5 INJECTION, SOLUTION EPIDURAL; INTRACAUDAL at 08:37

## 2020-11-06 RX ADMIN — BUPIVACAINE HYDROCHLORIDE 5 MG: 5 INJECTION, SOLUTION EPIDURAL; INTRACAUDAL at 08:36

## 2020-11-06 NOTE — PROGRESS NOTES
Visit Date:   2020      HISTORY OF PRESENT ILLNESS:  Alessandra returns for followup requesting bilateral basilar joint injections as well as her right carpal tunnel injection.  She has excellent relief of symptoms, unfortunately they have recurred.  She is here today requesting the same.      PHYSICAL EXAMINATION:  A 60-year-old female, alert and oriented x 3, and appropriate.  Gait and station are appropriate, well groomed and well kempt.  Examination in the office today shows a basilar joint prominence bilaterally.  Digital range of motion is full.  She does have numbness with provocative maneuvers, which compress the median nerve.      IMPRESSION:     1.  Right wrist carpal tunnel syndrome.  We discussed proceeding with carpal tunnel release.  At this point, she would like to proceed with injection.  A sterile prep was performed and the right carpal canal was injected with 40 mg of Depo-Medrol and 1 mL of 0.5% Marcaine with epinephrine.  There were no complications.   2.  Bilateral basilar joint arthrosis.  We discussed injections here as well.  A sterile prep was performed and each basilar joint was injected with 40 mg of Depo-Medrol and 1 mL of 0.5% Marcaine with epinephrine.  There were no complications.        We will see her back if symptoms warrant on an as-needed basis.         CROW HOGAN MD             D: 2020   T: 2020   MT: LINETTE      Name:     ALESSANDRA ABDUL   MRN:      [id number]        Account:      [id number]   :      1960           Visit Date:   2020      Document: [id number]

## 2020-11-06 NOTE — PROGRESS NOTES
Patient is here for follow up on her hands.   Jacquelin Benedict LPN .....................11/6/2020 8:28 AM

## 2020-12-20 ENCOUNTER — HEALTH MAINTENANCE LETTER (OUTPATIENT)
Age: 60
End: 2020-12-20

## 2021-01-12 ENCOUNTER — OFFICE VISIT (OUTPATIENT)
Dept: SURGERY | Facility: OTHER | Age: 61
End: 2021-01-12
Attending: SURGERY
Payer: COMMERCIAL

## 2021-01-12 VITALS
WEIGHT: 150 LBS | HEART RATE: 77 BPM | SYSTOLIC BLOOD PRESSURE: 144 MMHG | RESPIRATION RATE: 16 BRPM | TEMPERATURE: 97.6 F | DIASTOLIC BLOOD PRESSURE: 90 MMHG | BODY MASS INDEX: 24.4 KG/M2 | OXYGEN SATURATION: 100 %

## 2021-01-12 DIAGNOSIS — L57.0 ACTINIC KERATOSES: ICD-10-CM

## 2021-01-12 DIAGNOSIS — L98.9 SKIN LESION OF HAND: Primary | ICD-10-CM

## 2021-01-12 PROCEDURE — 88305 TISSUE EXAM BY PATHOLOGIST: CPT

## 2021-01-12 PROCEDURE — 17003 DESTRUCT PREMALG LES 2-14: CPT | Performed by: SURGERY

## 2021-01-12 PROCEDURE — 17000 DESTRUCT PREMALG LESION: CPT | Mod: XS | Performed by: SURGERY

## 2021-01-12 PROCEDURE — 11422 EXC H-F-NK-SP B9+MARG 1.1-2: CPT | Performed by: SURGERY

## 2021-01-12 ASSESSMENT — PAIN SCALES - GENERAL: PAINLEVEL: NO PAIN (0)

## 2021-01-12 NOTE — NURSING NOTE
Prior to the start of the procedure and with procedural staff participation, I verbally confirmed the patient s identity using two indicators, relevant allergies, that the procedure was appropriate and matched the consent or emergent situation, and that the correct equipment/implants were available. Immediately prior to starting the procedure I conducted the Time Out with the procedural staff and re-confirmed the patient s name, procedure, and site/side. (The Joint Commission universal protocol was followed.)  Yes    Sedation (Moderate or Deep): None  Rafaela Jesus LPN .......1/12/2021 2:55 PM

## 2021-01-12 NOTE — NURSING NOTE
"Chief Complaint   Patient presents with     Procedure     skin lesion       Initial BP (!) 144/90 (BP Location: Right arm, Patient Position: Sitting, Cuff Size: Adult Regular)   Pulse 77   Temp 97.6  F (36.4  C) (Tympanic)   Resp 16   Wt 68 kg (150 lb)   LMP 03/07/2003   SpO2 100%   BMI 24.40 kg/m   Estimated body mass index is 24.4 kg/m  as calculated from the following:    Height as of 7/31/20: 1.67 m (5' 5.75\").    Weight as of this encounter: 68 kg (150 lb).  Medication Reconciliation: complete    Rafaela Jesus LPN    "

## 2021-01-12 NOTE — PATIENT INSTRUCTIONS
Your incision was closed with stitches that will dissolve.     It is ok to take the bandage off and get the incision wet in the shower on the day after your procedure.     Don't soak in a tub, pool or lake for 5 days. The small butterfly strips will start to peel off in 5-7 days it is ok to remove them. If you have concerns, please call.     What to Expect Following Cryosurgery (Liquid Nitrogen)   What isCryosurgery?   Cryosurgery is a technique for removing skin lesions that primarily involve the surface of the skin, such as warts, seborrheic keratosis, or actinic keratosis. It is a quick method of removing the lesion with minimal scarring.   The liquid nitrogen needs isaiah applied long enough to freeze the affected skin. By freezing the skin, a blister is created underneath the lesion. Ideally, as the new skin forms underneath the blister, the abnormal skin on the roof of the blister peelsoff. Occasionally, if the lesion is very thick (such as a large wart), only the surface is blistered off. The base or residual lesion may need to be frozen at another visit.   It takes about one to two weeks for the scabto fall off, which is when the new layer of skin has formed under the blister. Areas of thinner skin, such as the face, may heal a little faster.      What to Expect Over the Next Few Weeks     During Treatment - Area being treated will sting, burn, and then possibly itch.     Immediately After Treatment - Area will be red, sore, and swollen.     Next Day - Blister or blood blister has formed, tendernessstarts to subside. Apply a Band-Aid if necessary.     7 Days - Surface is dark red/brown and scab-like. You can apply an antibacterial ointment, such as Polysporin , but you don't have to.     2 to 4 Weeks - The surface starts to peel off. This may be encouraged gently during bathing, when the scab is softened.     No makeup should be applied until area is fully healed.      How to Take Care of the Skin after  Cryosurgery   A Band-Aid can be used for larger blisters or blisters in areas that are more likely to betraumatized -such as fingers and toes. If the area becomes dry or crusted, an ointment (Vaseline , Bacitracin , or Polysporin ) can also be applied.   Cleanse area with a mild cleanser and cool water.   Patthe area dry with a lint-free cloth.   Avoid glycolic acids, Vitamin C, scrubs, Tretinoin (Retin-A), and Retinol creams for 7 to 10 days.  The area may get wet while bathing, but swimming or hot tub use should beavoided for one week following a treatment or while the skin is open.   Within 24 hours, you can expect the area to be swollen and/or blistered. The blister may not be visible to the naked eye.   Within one week, theswelling goes down. The top becomes dark red and scab-like. The scab will loosen over the next weeks, and should fall off within one month.      Adverse Effects   The most common adverse effects are pain,swelling/blistering, potential for infection, and discoloration of the skin after it heals.     Blisters  Anytime a blister surfaces, whether from ill-fitting shoes, an oven burn, or liquid nitrogencryosurgery, it will be a bit painful. For most patients, the pain is a temporary sting with some discomfort periodically over the next day as the blister forms.   The goal is to achieve a blister. This means, mostcommonly, patients will have a blister form following treatment. Sometimes, the blister is so thin that it can't be seen and may have minimal swelling. Occasionally, a blood blister forms that can be quite dramatic but isharmless.   Rarely, the blister may become infected. When this happens, the blister becomes unusually tender, the fluid becomes cloudy, and the redness around it becomes more extensive (and may even form streaks). If this happens, contact our office.   Some lesions, especially those on the face, may leave a slight palediscoloration.   True scarring, involving deeper  layers of the skin is unlikely.

## 2021-01-12 NOTE — PROGRESS NOTES
SUBJECTIVE:   60 year old female presents for lesion removal from her right hand. This lesion has been present for months but is growing and getting sore. She has a history of skin cancer and AK. She also has two small pink, rough spots one more lateral and one more medial.   OBJECTIVE:   BP (!) 144/90 (BP Location: Right arm, Patient Position: Sitting, Cuff Size: Adult Regular)   Pulse 77   Temp 97.6  F (36.4  C) (Tympanic)   Resp 16   Wt 68 kg (150 lb)   LMP 03/07/2003   SpO2 100%   BMI 24.40 kg/m    General: no acute distress  Right hand-over the 2nd metacarpal 1.0 x 0.8 x 0.1 cm rough, nodule with pink coloration. No ulcer.  2-AK -each 2 mm  One proximal and toward the thumb, the other proximal and over the 3rd MC  ASSESSMENT:   Lesion size: see above   Defect size: 1.6 x 1.2 x 0.3 cm     PROCEDURE:   The pathophysiology of skin lesions and skin cancer was discussed with the patient. The risks, benefits and alternatives to excision of the largest lesion were discussed with the patient, including the risks of infection, scarring, bruising, bleeding and the possible need for further procedures.   We discussed cryotherapy of the smaller lesions. We specifically discussed the risks of infection, discoloration and the possible need for further treatments. The patient expressed understanding and the patient wishes to proceed.  Procedure:   Chloraprep was used to cleanse the skin in the area of the lesion  On the right hand over the 2nd MC. 1% Lidocaine with epinephrine was infiltrated in the skin and subcutaneous tissue in the area of the lesion. When appropriate anesthesia had been achieved, the lesion was sharply excised with a margin of grossly normal tissue. The skin edges were approximated using 4-0 monocryl in a layered fashion. Sterile dressing was applied. The specimen was labelled and sent topathology for evaluation. The procedure was well tolerated without complications.  The area of the AKs on the  right hand were treated with liquid nitrogen for 2 freeze thaw cycles. The patient tolerated the procedure with no immediately apparent complications. Patient was given post procedure instructions and denied further questions. We will call the patient with pathology results.

## 2021-01-18 ENCOUNTER — TELEPHONE (OUTPATIENT)
Dept: SURGERY | Facility: OTHER | Age: 61
End: 2021-01-18

## 2021-03-01 ENCOUNTER — MYC MEDICAL ADVICE (OUTPATIENT)
Dept: INTERNAL MEDICINE | Facility: OTHER | Age: 61
End: 2021-03-01

## 2021-03-01 DIAGNOSIS — G43.009 MIGRAINE WITHOUT AURA AND WITHOUT STATUS MIGRAINOSUS, NOT INTRACTABLE: ICD-10-CM

## 2021-03-01 DIAGNOSIS — F51.04 CHRONIC INSOMNIA: ICD-10-CM

## 2021-03-01 DIAGNOSIS — I10 ESSENTIAL HYPERTENSION: ICD-10-CM

## 2021-03-01 RX ORDER — LORAZEPAM 1 MG/1
1 TABLET ORAL
Qty: 10 TABLET | Refills: 0 | Status: SHIPPED | OUTPATIENT
Start: 2021-03-01 | End: 2021-03-19 | Stop reason: ALTCHOICE

## 2021-03-01 RX ORDER — HYDROCHLOROTHIAZIDE 25 MG/1
25 TABLET ORAL DAILY
Qty: 90 TABLET | Refills: 0 | Status: SHIPPED | OUTPATIENT
Start: 2021-03-01 | End: 2021-07-05

## 2021-03-01 RX ORDER — SUMATRIPTAN 100 MG/1
100 TABLET, FILM COATED ORAL
Qty: 9 TABLET | Refills: 0 | Status: SHIPPED | OUTPATIENT
Start: 2021-03-01 | End: 2021-07-05

## 2021-03-01 NOTE — TELEPHONE ENCOUNTER
Aidan'd up limited quantities. Will address full year's worth of all medications at visit.  Bronwyn Maria CMA([facility])..................3/1/2021   5:19 PM

## 2021-03-05 ENCOUNTER — OFFICE VISIT (OUTPATIENT)
Dept: ORTHOPEDICS | Facility: OTHER | Age: 61
End: 2021-03-05
Attending: SPECIALIST
Payer: COMMERCIAL

## 2021-03-05 DIAGNOSIS — M18.9 ARTHROSIS OF FIRST CARPOMETACARPAL JOINT: ICD-10-CM

## 2021-03-05 DIAGNOSIS — G56.01 CARPAL TUNNEL SYNDROME OF RIGHT WRIST: Primary | ICD-10-CM

## 2021-03-05 PROCEDURE — 20526 THER INJECTION CARP TUNNEL: CPT | Mod: 50 | Performed by: SPECIALIST

## 2021-03-05 PROCEDURE — 20600 DRAIN/INJ JOINT/BURSA W/O US: CPT | Mod: 50 | Performed by: SPECIALIST

## 2021-03-05 PROCEDURE — 250N000011 HC RX IP 250 OP 636: Performed by: SPECIALIST

## 2021-03-05 PROCEDURE — 250N000009 HC RX 250: Performed by: SPECIALIST

## 2021-03-05 RX ORDER — METHYLPREDNISOLONE ACETATE 40 MG/ML
40 INJECTION, SUSPENSION INTRA-ARTICULAR; INTRALESIONAL; INTRAMUSCULAR; SOFT TISSUE ONCE
Status: COMPLETED | OUTPATIENT
Start: 2021-03-05 | End: 2021-03-05

## 2021-03-05 RX ORDER — BUPIVACAINE HYDROCHLORIDE 5 MG/ML
2 INJECTION, SOLUTION EPIDURAL; INTRACAUDAL ONCE
Status: COMPLETED | OUTPATIENT
Start: 2021-03-05 | End: 2021-03-05

## 2021-03-05 RX ADMIN — METHYLPREDNISOLONE ACETATE 40 MG: 40 INJECTION, SUSPENSION INTRA-ARTICULAR; INTRALESIONAL; INTRAMUSCULAR; SOFT TISSUE at 08:45

## 2021-03-05 RX ADMIN — BUPIVACAINE HYDROCHLORIDE 10 MG: 5 INJECTION, SOLUTION EPIDURAL; INTRACAUDAL at 08:45

## 2021-03-05 NOTE — PROGRESS NOTES
Visit Date:   2021      Alessandra returns for followup.  I last saw her on  and performed injections of the right carpal tunnel, as well as bilateral basilar joints.  She had good relief of symptoms and is requesting repeat basilar joint and carpal tunnel injections today.      PHYSICAL EXAMINATION:  Reveals a 60-year-old female, alert and oriented x 3, and appropriate.  Gait and station are appropriate.  She is well groomed and well kempt.  Examination of both upper extremities reveals full and symmetric range of motion of elbows, shoulders, elbows.  Examination of both hands and wrists shows no evidence of intrinsic atrophy, trophic skin change, adenopathy or focal weakness.  She has a positive grind maneuver bilaterally.  She has numbness with provocative maneuvers, which compressed the median nerve.      IMPRESSION:     1.  Bilateral carpal tunnel syndrome.  We discussed proceeding with an injection.  A sterile prep was performed, and each carpal canal was injected with 40 mg of Depo-Medrol and 1 mL of 0.5% Marcaine with epinephrine.  There were no complications.   2.  Bilateral thumb basilar joint arthrosis.  A sterile prep was performed.  Each basilar joint was injected with 40 mg of Depo-Medrol and 1 mL of 0.5% Marcaine with epinephrine.  There were no complications.      PLAN:  Our plan will be to follow her clinically and proceed as indicated.         CROW HOGAN MD             D: 2021   T: 2021   MT: JAXON      Name:     ALESSANDRA ABDUL   MRN:      [id number]        Account:      [id number]   :      1960           Visit Date:   2021      Document: [id number]

## 2021-03-05 NOTE — PROGRESS NOTES
Patient is here for follow up on her hands.   Jacquelin Benedict LPN .....................3/5/2021 8:35 AM

## 2021-03-19 ENCOUNTER — OFFICE VISIT (OUTPATIENT)
Dept: INTERNAL MEDICINE | Facility: OTHER | Age: 61
End: 2021-03-19
Attending: NURSE PRACTITIONER
Payer: COMMERCIAL

## 2021-03-19 VITALS
DIASTOLIC BLOOD PRESSURE: 88 MMHG | RESPIRATION RATE: 16 BRPM | SYSTOLIC BLOOD PRESSURE: 132 MMHG | WEIGHT: 149.4 LBS | OXYGEN SATURATION: 97 % | BODY MASS INDEX: 24.3 KG/M2 | HEART RATE: 58 BPM | TEMPERATURE: 96.9 F

## 2021-03-19 DIAGNOSIS — G47.09 OTHER INSOMNIA: ICD-10-CM

## 2021-03-19 DIAGNOSIS — J01.10 ACUTE FRONTAL SINUSITIS, RECURRENCE NOT SPECIFIED: Primary | ICD-10-CM

## 2021-03-19 DIAGNOSIS — R09.89 SINUS SYMPTOM: ICD-10-CM

## 2021-03-19 PROCEDURE — 99213 OFFICE O/P EST LOW 20 MIN: CPT | Performed by: NURSE PRACTITIONER

## 2021-03-19 RX ORDER — TRAZODONE HYDROCHLORIDE 50 MG/1
50 TABLET, FILM COATED ORAL AT BEDTIME
Qty: 30 TABLET | Refills: 0 | Status: SHIPPED | OUTPATIENT
Start: 2021-03-19 | End: 2021-03-29

## 2021-03-19 ASSESSMENT — ENCOUNTER SYMPTOMS
PHOTOPHOBIA: 0
SINUS PRESSURE: 1
SINUS PAIN: 1
EYE PAIN: 1
SORE THROAT: 1

## 2021-03-19 ASSESSMENT — PAIN SCALES - GENERAL: PAINLEVEL: NO PAIN (0)

## 2021-03-19 NOTE — NURSING NOTE
Chief Complaint   Patient presents with     Sinus Problem     Medication Question   Patient presents to the clinic today for a sinu infection, patient states she has had symptoms headache, runny eyes for about 3 weeks.    Medication Reconciliation: completed   Neelam Worthington LPN  3/19/2021 9:29 AM

## 2021-03-19 NOTE — PROGRESS NOTES
Alessandra Aiken  : 1960 Age: 60 year old Sex: female MRN: [id number]    CC:   Chief Complaint   Patient presents with     Sinus Problem     Medication Question     IJEOMA Score:    IJEOMA-7 SCORE 2018   Total Score 0      PHQ-2 Score:     PHQ-2 (  [employer]) 3/19/2021 2021   Q1: Little interest or pleasure in doing things 0 0   Q2: Feeling down, depressed or hopeless 0 0   PHQ-2 Score 0 0        Tobacco Use      Smoking status: Never Smoker      Smokeless tobacco: Never Used    NURSE'S NOTES:    Nursing Notes:   Neelam Worthington LPN  3/19/2021  9:37 AM  Signed  Chief Complaint   Patient presents with     Sinus Problem     Medication Question   Patient presents to the clinic today for a sinu infection, patient states she has had symptoms headache, runny eyes for about 3 weeks.    Medication Reconciliation: completed   Neelam Worthington LPN  3/19/2021 9:29 AM      Nursing note reviewed with patient.  Accuracy and completeness verified.    Alessandra Aiken also presents with:    Patient Active Problem List   Diagnosis     Chronic sinusitis     Chronic insomnia     Hypertension     Migraine without aura     S/P vaginal hysterectomy     HPI:     She reports a history of 3 sinus surgeries prior. Hasn't had a sinus infection for quite some time. Symptoms started 2-3 weeks ago and she has been ignoring it. She is leaving for [place] tomorrow and doesn't want to travel feeling sick.    Pressure in neck, behind her eyes bilaterally, facial pain, right ear pain.    SUBJECTIVE:                                                      Current Code Status:  Prior    REVIEW OF SYSTEMS:    Review of Systems   HENT: Positive for congestion, postnasal drip, sinus pressure, sinus pain, sneezing and sore throat.    Eyes: Positive for pain. Negative for photophobia and visual disturbance.   All other systems reviewed and are negative.      Problem List/PMH: Reviewed in EMR, and made relevant updates today.  Medications: Reviewed in  EMR, and made relevant updates today.  Allergies: Reviewed in EMR, and made relevant updates today.    OBJECTIVE:                                                      /88 (BP Location: Right arm, Patient Position: Sitting, Cuff Size: Adult Regular)   Pulse 58   Temp 96.9  F (36.1  C) (Tympanic)   Resp 16   Wt 67.8 kg (149 lb 6.4 oz)   LMP 03/07/2003   SpO2 97%   BMI 24.30 kg/m      Current Pain Score: No Pain (0)     Physical Exam  Vitals signs and nursing note reviewed.   Constitutional:       Appearance: Normal appearance.   HENT:      Head: Normocephalic and atraumatic.      Right Ear: Tympanic membrane, ear canal and external ear normal.      Left Ear: Tympanic membrane, ear canal and external ear normal.      Nose:      Right Turbinates: Swollen.      Left Turbinates: Swollen.      Right Sinus: Maxillary sinus tenderness and frontal sinus tenderness present.      Left Sinus: Maxillary sinus tenderness and frontal sinus tenderness present.      Mouth/Throat:      Mouth: Mucous membranes are moist.      Pharynx: Oropharynx is clear.   Eyes:      Extraocular Movements: Extraocular movements intact.      Conjunctiva/sclera: Conjunctivae normal.      Pupils: Pupils are equal, round, and reactive to light.   Neck:      Musculoskeletal: Muscular tenderness (bilaterally) present.   Cardiovascular:      Rate and Rhythm: Normal rate and regular rhythm.      Heart sounds: Normal heart sounds.   Pulmonary:      Effort: Pulmonary effort is normal.      Breath sounds: Normal breath sounds.   Abdominal:      General: Bowel sounds are normal.      Palpations: Abdomen is soft.   Musculoskeletal: Normal range of motion.   Lymphadenopathy:      Cervical: Cervical adenopathy (bilaterally) present.   Skin:     General: Skin is warm and dry.   Neurological:      Mental Status: She is alert and oriented to person, place, and time. Mental status is at baseline.   Psychiatric:         Mood and Affect: Mood normal.          Behavior: Behavior normal.         Thought Content: Thought content normal.         Judgment: Judgment normal.          BP Readings from Last 3 Encounters:   03/19/21 132/88   01/12/21 (!) 144/90   08/13/20 (!) 142/103        Vitals:    03/19/21 0930   Weight: 67.8 kg (149 lb 6.4 oz)        The ASCVD Risk score (Elyse SULLIVAN Jr., et al., 2013) failed to calculate for the following reasons:    Cannot find a previous HDL lab    Cannot find a previous total cholesterol lab    Diagnostics Completed at this Visit:    No results found for any visits on 03/19/21.     ASSESSMENT AND PLAN:    Sinus symptom  Acute frontal sinusitis, recurrence not specified  Continue use of home treatments. Will give empirical treatment due to history of chronic sinusitis and symptoms reported in addition to exam findings..  - amoxicillin-clavulanate (AUGMENTIN) 875-125 MG tablet  Dispense: 14 tablet; Refill: 0    Other insomnia  Wishes to stop ambien. Change to trazodone to see if that offers her more benefits. Follow up in one month for review of this.  - traZODone (DESYREL) 50 MG tablet  Dispense: 30 tablet; Refill: 0       I explained my diagnostic considerations and recommendations to the patient, who voiced understanding and agreement with the treatment plan. All questions were answered. We discussed potential side effects of any prescribed or recommended therapies, as well as expectations for response to treatments.    FOLLOW-UP:    Return if symptoms worsen or fail to improve. Recheck on trazodone therapy in one month.    Clinic : [phone]  Appointment line: [phone]     CLARENCE Madrid, AGNP-C  Internal Medicine  03/22/2021 1:43 PM  ________________________________________________________________________________________________________________________________________________________    Disclaimer:  This note consists of words and symbols derived from keyboarding, dictation, or using voice recognition software. As a  result, there may be errors in the script that have gone undetected. Please consider this when interpreting information found in this note. Please contact me if there are any concerns regarding the accuracy of the dictation.     Total time spent with this patient was 23 minutes which included chart review, visualization and interpretation of labs and/or images, time spent with patient, and documentation.    In an effort to reduce environmental footprint, your After Visit Summary for today's visit will be available on [employer] [facility] for your review.

## 2021-04-20 ENCOUNTER — OFFICE VISIT (OUTPATIENT)
Dept: OTOLARYNGOLOGY | Facility: OTHER | Age: 61
End: 2021-04-20
Attending: OTOLARYNGOLOGY
Payer: COMMERCIAL

## 2021-04-20 DIAGNOSIS — J32.3 CHRONIC SPHENOIDAL SINUSITIS: Primary | ICD-10-CM

## 2021-04-20 PROCEDURE — G0463 HOSPITAL OUTPT CLINIC VISIT: HCPCS

## 2021-04-20 NOTE — PROGRESS NOTES
document embedded image  Patient Name: Alessandra Aiken    Address: [address] [place] [address]     YOB: 1960    [place] [id number]    MR Number: [id number]    Phone: [phone]  PCP: Abena Adkins MD            Appointment Date: 04/20/21   Visit Provider: Manuel Davila MD    cc: Abena Adkins MD; ~    ENT Progress Note  Visit Reasons: Sinus issues/no films yet    HPI  History of Present Illness  Chief complaint:  Flare chronic sinus disease    History  The patient is a 60-year-old female who in 2009 underwent compression of a sphenoid sinusitis at the [facility].  She underwent similar procedures under my care in 2013 and in 2016.  She feels this may be flaring.  She is having some low-grade headache, nasal congestion, and postnasal drainage.  She denies crown of headache at this time.    Exam  Nasal-no obstruction or purulence noted anteriorly  Indirect nasopharyngoscopy-some erythema in her nasopharynx no purulence  Neck-mass adenopathy  With the oropharynx-lesion or inflammation  Head neck integument-Clear  General-the patient appears well and in no distress  Neuro-there are no focal cranial nerve deficits appreciated     Home Medications     - Last Reconciled 04/21/21 by Lorraine Fregoso, Med Assist    fluticasone propionate 50 mcg/actuation nasal spray,suspension  2 sprays intranasal DAILY    Allergies    No Known Allergies Allergy (Unverified 04/21/21 12:56)    PFSH  PFSH:     Medical History (Updated 04/21/21 @ 12:56 by Lorraine Fregoso Med Assist)    Ear pressure  Headache     Surgical History (Updated 04/21/21 @ 12:56 by Lorraine Fregoso Med Assist)    H/O shoulder surgery  H/O sinus surgery     Social History (Updated 04/21/21 @ 12:56 by Lorraine Fregoso Med Assist)  Smoking Status:  Never smoker  second hand exposure:  No       A&P  Assessment & Plan  (1) Sinusitis chronic, sphenoidal:        Status: Acute        Code(s):  J32.3 - Chronic sphenoidal sinusitis  Additional Plan Details  Plan  Details: Given her previous history the relative danger of isolated sphenoid sinusitis, I would advise proceeding with imaging at this time.  In the meantime she will begin saline irrigations and nasal steroids.  Departure  Other Medications:        New:    fluticasone propionate 50 mcg/actuation     administer into each nostril 2 sprays intranasal DAILY 47.4 grams 3RF            Manuel Davila MD    04/20/21 0803    <Electronically signed by Manuel Davila MD> 04/21/21 5340

## 2021-04-23 ENCOUNTER — HOSPITAL ENCOUNTER (OUTPATIENT)
Dept: CT IMAGING | Facility: OTHER | Age: 61
Discharge: HOME | End: 2021-04-23
Attending: OTOLARYNGOLOGY | Admitting: RADIOLOGY
Payer: COMMERCIAL

## 2021-04-23 DIAGNOSIS — J32.3 CHRONIC SPHENOIDAL SINUSITIS: ICD-10-CM

## 2021-04-23 PROCEDURE — 70486 CT MAXILLOFACIAL W/O DYE: CPT

## 2021-06-11 ENCOUNTER — OFFICE VISIT (OUTPATIENT)
Dept: ORTHOPEDICS | Facility: OTHER | Age: 61
End: 2021-06-11
Attending: SPECIALIST
Payer: COMMERCIAL

## 2021-06-11 DIAGNOSIS — G56.01 CARPAL TUNNEL SYNDROME OF RIGHT WRIST: Primary | ICD-10-CM

## 2021-06-11 DIAGNOSIS — M18.9 ARTHROSIS OF FIRST CARPOMETACARPAL JOINT: ICD-10-CM

## 2021-06-11 PROCEDURE — 250N000011 HC RX IP 250 OP 636: Performed by: SPECIALIST

## 2021-06-11 PROCEDURE — 20600 DRAIN/INJ JOINT/BURSA W/O US: CPT | Mod: 50 | Performed by: SPECIALIST

## 2021-06-11 PROCEDURE — 20526 THER INJECTION CARP TUNNEL: CPT | Mod: 50 | Performed by: SPECIALIST

## 2021-06-11 PROCEDURE — 250N000009 HC RX 250: Performed by: SPECIALIST

## 2021-06-11 RX ORDER — METHYLPREDNISOLONE ACETATE 40 MG/ML
40 INJECTION, SUSPENSION INTRA-ARTICULAR; INTRALESIONAL; INTRAMUSCULAR; SOFT TISSUE ONCE
Status: COMPLETED | OUTPATIENT
Start: 2021-06-11 | End: 2021-06-11

## 2021-06-11 RX ORDER — BUPIVACAINE HYDROCHLORIDE 5 MG/ML
2 INJECTION, SOLUTION EPIDURAL; INTRACAUDAL ONCE
Status: COMPLETED | OUTPATIENT
Start: 2021-06-11 | End: 2021-06-11

## 2021-06-11 RX ADMIN — BUPIVACAINE HYDROCHLORIDE 10 MG: 5 INJECTION, SOLUTION EPIDURAL; INTRACAUDAL at 10:31

## 2021-06-11 RX ADMIN — METHYLPREDNISOLONE ACETATE 40 MG: 40 INJECTION, SUSPENSION INTRA-ARTICULAR; INTRALESIONAL; INTRAMUSCULAR; SOFT TISSUE at 10:30

## 2021-06-11 NOTE — PROGRESS NOTES
Patient is here for follow up on her hands.   Jacquelin Benedict LPN .....................6/11/2021 9:37 AM

## 2021-06-11 NOTE — PROGRESS NOTES
Visit Date: 2021    HISTORY OF PRESENT ILLNESS:  Alessandra returns for followup.  She has bilateral basilar joint arthrosis and carpal tunnel syndrome.  She is requesting bilateral basilar joint and carpal tunnel injections today.  Previously, this has improved her symptoms.  Her last visit injection noted a significant improvement on the left, but not on the right.    PHYSICAL EXAMINATION:  Physical examination confirms a 61-year-old female.  Alert and oriented x3, and appropriate.  Gait and station are appropriate.  Well-groomed and well kempt.  Examination of both upper extremities reveals full and symmetric range of motion of shoulders, elbows.  Examination of both hands and wrists shows basal joint prominence with a positive grind maneuver.  There is no evidence of intrinsic atrophy.  She does have numbness with provocative maneuvers, which compress the median nerve.    IMPRESSION:   1.  Bilateral carpal tunnel syndrome.  We discussed proceeding with injection.  Sterile prep was performed and both carpal canals were each injected with 40 mg of Depo-Medrol and 1 mL of 0.5% Marcaine with epinephrine.  There were no complications.   2.  Bilateral basal joint arthrosis.  We discussed injecting here too.  Sterile prep was performed and each basilar joint was injected with 40 mg of Depo-Medrol and 1 mL of 0.5% Marcaine with epinephrine.  There were no complications.    PLAN:  We will see her back as symptoms warrant on an as-needed basis.    Chris Sylvester MD        D: 2021   T: 2021   MT: LAURIE    Name:     ALESSANDRA ABDUL  MRN:      [id number]        Account:    [id number]   :      1960           Visit Date: 2021     Document: [id number]

## 2021-07-05 ENCOUNTER — OFFICE VISIT (OUTPATIENT)
Dept: INTERNAL MEDICINE | Facility: OTHER | Age: 61
End: 2021-07-05
Attending: INTERNAL MEDICINE
Payer: COMMERCIAL

## 2021-07-05 VITALS
WEIGHT: 143 LBS | RESPIRATION RATE: 16 BRPM | TEMPERATURE: 98.3 F | HEART RATE: 56 BPM | OXYGEN SATURATION: 99 % | SYSTOLIC BLOOD PRESSURE: 146 MMHG | DIASTOLIC BLOOD PRESSURE: 96 MMHG | BODY MASS INDEX: 23.26 KG/M2

## 2021-07-05 DIAGNOSIS — N90.89 VULVAR ODOR: ICD-10-CM

## 2021-07-05 DIAGNOSIS — R39.15 URGENCY OF URINATION: Primary | ICD-10-CM

## 2021-07-05 DIAGNOSIS — F51.04 CHRONIC INSOMNIA: ICD-10-CM

## 2021-07-05 DIAGNOSIS — Z12.31 ENCOUNTER FOR SCREENING MAMMOGRAM FOR BREAST CANCER: ICD-10-CM

## 2021-07-05 DIAGNOSIS — I10 ESSENTIAL HYPERTENSION: ICD-10-CM

## 2021-07-05 DIAGNOSIS — G43.009 MIGRAINE WITHOUT AURA AND WITHOUT STATUS MIGRAINOSUS, NOT INTRACTABLE: ICD-10-CM

## 2021-07-05 LAB
ALBUMIN UR-MCNC: NEGATIVE MG/DL
APPEARANCE UR: CLEAR
BILIRUB UR QL STRIP: NEGATIVE
COLOR UR AUTO: ABNORMAL
GLUCOSE UR STRIP-MCNC: NEGATIVE MG/DL
HGB UR QL STRIP: NEGATIVE
HYALINE CASTS #/AREA URNS LPF: 1 /LPF (ref 0–2)
KETONES UR STRIP-MCNC: NEGATIVE MG/DL
LEUKOCYTE ESTERASE UR QL STRIP: ABNORMAL
NITRATE UR QL: NEGATIVE
PH UR STRIP: 5.5 PH (ref 5–7)
RBC #/AREA URNS AUTO: <1 /HPF (ref 0–2)
SOURCE: ABNORMAL
SP GR UR STRIP: 1.02 (ref 1–1.03)
SPECIMEN SOURCE: NORMAL
SQUAMOUS #/AREA URNS AUTO: 3 /HPF (ref 0–1)
UROBILINOGEN UR STRIP-MCNC: NORMAL MG/DL (ref 0–2)
WBC #/AREA URNS AUTO: 3 /HPF (ref 0–5)
WET PREP SPEC: NORMAL

## 2021-07-05 PROCEDURE — 81001 URINALYSIS AUTO W/SCOPE: CPT | Mod: ZL | Performed by: INTERNAL MEDICINE

## 2021-07-05 PROCEDURE — 87210 SMEAR WET MOUNT SALINE/INK: CPT | Mod: ZL | Performed by: INTERNAL MEDICINE

## 2021-07-05 PROCEDURE — 99214 OFFICE O/P EST MOD 30 MIN: CPT | Performed by: INTERNAL MEDICINE

## 2021-07-05 RX ORDER — DOXEPIN 3 MG/1
3-6 TABLET, FILM COATED ORAL
Qty: 60 TABLET | Refills: 1 | Status: SHIPPED | OUTPATIENT
Start: 2021-07-05 | End: 2021-07-16

## 2021-07-05 RX ORDER — SUMATRIPTAN 100 MG/1
100 TABLET, FILM COATED ORAL
Qty: 9 TABLET | Refills: 5 | Status: SHIPPED | OUTPATIENT
Start: 2021-07-05 | End: 2021-11-12

## 2021-07-05 RX ORDER — LORAZEPAM 0.5 MG/1
.25-.5 TABLET ORAL
Qty: 30 TABLET | Refills: 2 | Status: SHIPPED | OUTPATIENT
Start: 2021-07-05 | End: 2021-11-12

## 2021-07-05 RX ORDER — HYDROCHLOROTHIAZIDE 25 MG/1
25 TABLET ORAL DAILY
Qty: 90 TABLET | Refills: 1 | Status: SHIPPED | OUTPATIENT
Start: 2021-07-05 | End: 2021-11-12

## 2021-07-05 ASSESSMENT — PAIN SCALES - GENERAL: PAINLEVEL: SEVERE PAIN (7)

## 2021-07-05 NOTE — NURSING NOTE
Patient presents to clinic today for possible UTIs and issues with sleep.  Medication reconciliation completed.    FOOD SECURITY SCREENING QUESTIONS  Hunger Vital Signs:  Within the past 12 months we worried whether our food would run out before we got money to buy more. Never  Within the past 12 months the food we bought just didn't last and we didn't have money to get more. Never    Bronwyn Maria CMA([facility])..................7/5/2021   3:04 PM

## 2021-07-16 DIAGNOSIS — F51.04 CHRONIC INSOMNIA: Primary | ICD-10-CM

## 2021-07-16 RX ORDER — DOXEPIN HYDROCHLORIDE 150 MG/1
150 CAPSULE ORAL AT BEDTIME
Status: CANCELLED | OUTPATIENT
Start: 2021-07-16

## 2021-07-16 RX ORDER — DOXEPIN HYDROCHLORIDE 10 MG/ML
10 SOLUTION ORAL
Qty: 118 ML | Refills: 0 | Status: SHIPPED | OUTPATIENT
Start: 2021-07-16 | End: 2021-11-12 | Stop reason: ALTCHOICE

## 2021-07-16 NOTE — TELEPHONE ENCOUNTER
"Letter received from [employer] states the PA for Doxepin is not approved because \"documentation has not shown that patient is unable to use capsule or solution versus tablet form\".    I called the pharmacy to see if they had already switched the Doxepin to a capsule from and they say that Doxepin 3 mg doesn't come any other way but in a tablet form.    When I search for Doxepin in the system, the only capsule or solution form is for 150 mg strength.    Do you want to change this?  Bronwyn Maria CMA([facility])..................7/16/2021   8:24 AM     "

## 2021-08-20 ENCOUNTER — OFFICE VISIT (OUTPATIENT)
Dept: ORTHOPEDICS | Facility: OTHER | Age: 61
End: 2021-08-20
Attending: SPECIALIST
Payer: COMMERCIAL

## 2021-08-20 DIAGNOSIS — G56.01 CARPAL TUNNEL SYNDROME OF RIGHT WRIST: Primary | ICD-10-CM

## 2021-08-20 DIAGNOSIS — M18.9 ARTHROSIS OF FIRST CARPOMETACARPAL JOINT: ICD-10-CM

## 2021-08-20 PROCEDURE — 20600 DRAIN/INJ JOINT/BURSA W/O US: CPT | Mod: RT | Performed by: SPECIALIST

## 2021-08-20 PROCEDURE — 250N000011 HC RX IP 250 OP 636: Performed by: SPECIALIST

## 2021-08-20 PROCEDURE — 250N000009 HC RX 250: Performed by: SPECIALIST

## 2021-08-20 PROCEDURE — 20526 THER INJECTION CARP TUNNEL: CPT | Mod: RT | Performed by: SPECIALIST

## 2021-08-20 RX ORDER — BUPIVACAINE HYDROCHLORIDE 5 MG/ML
1 INJECTION, SOLUTION EPIDURAL; INTRACAUDAL ONCE
Status: COMPLETED | OUTPATIENT
Start: 2021-08-20 | End: 2021-08-20

## 2021-08-20 RX ORDER — METHYLPREDNISOLONE ACETATE 40 MG/ML
40 INJECTION, SUSPENSION INTRA-ARTICULAR; INTRALESIONAL; INTRAMUSCULAR; SOFT TISSUE ONCE
Status: COMPLETED | OUTPATIENT
Start: 2021-08-20 | End: 2021-08-20

## 2021-08-20 RX ADMIN — BUPIVACAINE HYDROCHLORIDE 5 MG: 5 INJECTION, SOLUTION EPIDURAL; INTRACAUDAL at 08:48

## 2021-08-20 RX ADMIN — METHYLPREDNISOLONE ACETATE 40 MG: 40 INJECTION, SUSPENSION INTRA-ARTICULAR; INTRALESIONAL; INTRAMUSCULAR; SOFT TISSUE at 08:48

## 2021-08-20 NOTE — PROGRESS NOTES
Visit Date: 2021    HISTORY OF PRESENT ILLNESS:  Alessandra returns for followup.  She is requesting repeat injection of both the right basilar joint and carpal canal.  This gave her excellent relief of symptoms.    PHYSICAL EXAMINATION:  Reveals a 61-year-old female, alert and oriented x 3, and appropriate.  Gait and station are appropriate, well-groomed and well kempt.  Examination of both upper extremities reveals full and symmetric range of motion of shoulders, elbows, wrists.  Examination of both hands shows basal joint prominence, a positive grind maneuver on the right, minimal on the left.  There is numbness with maneuvers which compress the median nerve.    IMPRESSION:  Right carpal tunnel syndrome and right basal joint arthrosis, which is improved with previous injection.  She requests repeat injection.  Sterile prep was performed and the right carpal canal and basilar joint region injected with 40 mg of Depo-Medrol and 1 mL of 0.5% Marcaine with epinephrine.  There were no complications.    PLAN:  Will be to follow up clinically and see her back if symptoms warrant on an as needed basis.    Chris Sylvester MD        D: 2021   T: 2021   MT: RYAN    Name:     ALESSANDRA ABDUL  MRN:      [id number]        Account:    [id number]   :      1960           Visit Date: 2021     Document: [id number]

## 2021-10-03 ENCOUNTER — HEALTH MAINTENANCE LETTER (OUTPATIENT)
Age: 61
End: 2021-10-03

## 2021-10-12 NOTE — PATIENT INSTRUCTIONS
Your incision was closed with stitches that will need to be removed. It is ok to remove the dressing and get the incision wet in the shower on the day after your procedure. Don't soak in a tub, pool or lakefor 5 days.If you have concerns, please call.      100% of the time

## 2021-11-12 ENCOUNTER — OFFICE VISIT (OUTPATIENT)
Dept: INTERNAL MEDICINE | Facility: OTHER | Age: 61
End: 2021-11-12
Attending: INTERNAL MEDICINE
Payer: COMMERCIAL

## 2021-11-12 VITALS
HEIGHT: 67 IN | TEMPERATURE: 97.8 F | DIASTOLIC BLOOD PRESSURE: 74 MMHG | BODY MASS INDEX: 21.82 KG/M2 | HEART RATE: 70 BPM | OXYGEN SATURATION: 97 % | RESPIRATION RATE: 17 BRPM | SYSTOLIC BLOOD PRESSURE: 126 MMHG | WEIGHT: 139 LBS

## 2021-11-12 DIAGNOSIS — I10 ESSENTIAL HYPERTENSION: ICD-10-CM

## 2021-11-12 DIAGNOSIS — N87.1 DYSPLASIA OF CERVIX, HIGH GRADE CIN 2: ICD-10-CM

## 2021-11-12 DIAGNOSIS — Z12.31 ENCOUNTER FOR SCREENING MAMMOGRAM FOR BREAST CANCER: ICD-10-CM

## 2021-11-12 DIAGNOSIS — J01.90 ACUTE SINUSITIS WITH SYMPTOMS > 10 DAYS: ICD-10-CM

## 2021-11-12 DIAGNOSIS — Z13.220 SCREENING FOR HYPERLIPIDEMIA: ICD-10-CM

## 2021-11-12 DIAGNOSIS — G43.009 MIGRAINE WITHOUT AURA AND WITHOUT STATUS MIGRAINOSUS, NOT INTRACTABLE: ICD-10-CM

## 2021-11-12 DIAGNOSIS — Z13.1 SCREENING FOR DIABETES MELLITUS: Primary | ICD-10-CM

## 2021-11-12 DIAGNOSIS — F51.04 CHRONIC INSOMNIA: ICD-10-CM

## 2021-11-12 LAB
ALBUMIN SERPL-MCNC: 4.5 G/DL (ref 3.5–5.7)
ALP SERPL-CCNC: 72 U/L (ref 34–104)
ALT SERPL W P-5'-P-CCNC: 11 U/L (ref 7–52)
ANION GAP SERPL CALCULATED.3IONS-SCNC: 6 MMOL/L (ref 3–14)
AST SERPL W P-5'-P-CCNC: 19 U/L (ref 13–39)
BILIRUB SERPL-MCNC: 0.9 MG/DL (ref 0.3–1)
BUN SERPL-MCNC: 17 MG/DL (ref 7–25)
CALCIUM SERPL-MCNC: 9.8 MG/DL (ref 8.6–10.3)
CHLORIDE BLD-SCNC: 103 MMOL/L (ref 98–107)
CHOLEST SERPL-MCNC: 187 MG/DL
CO2 SERPL-SCNC: 29 MMOL/L (ref 21–31)
CREAT SERPL-MCNC: 0.88 MG/DL (ref 0.6–1.2)
ERYTHROCYTE [DISTWIDTH] IN BLOOD BY AUTOMATED COUNT: 11.8 % (ref 10–15)
FASTING STATUS PATIENT QL REPORTED: ABNORMAL
GFR SERPL CREATININE-BSD FRML MDRD: 72 ML/MIN/1.73M2
GLUCOSE BLD-MCNC: 88 MG/DL (ref 70–105)
HCT VFR BLD AUTO: 39.1 % (ref 35–47)
HDLC SERPL-MCNC: 54 MG/DL (ref 23–92)
HGB BLD-MCNC: 13.3 G/DL (ref 11.7–15.7)
LDLC SERPL CALC-MCNC: 119 MG/DL
MCH RBC QN AUTO: 28.7 PG (ref 26.5–33)
MCHC RBC AUTO-ENTMCNC: 34 G/DL (ref 31.5–36.5)
MCV RBC AUTO: 84 FL (ref 78–100)
NONHDLC SERPL-MCNC: 133 MG/DL
PLATELET # BLD AUTO: 207 10E3/UL (ref 150–450)
POTASSIUM BLD-SCNC: 4.1 MMOL/L (ref 3.5–5.1)
PROT SERPL-MCNC: 6.8 G/DL (ref 6.4–8.9)
RBC # BLD AUTO: 4.63 10E6/UL (ref 3.8–5.2)
SODIUM SERPL-SCNC: 138 MMOL/L (ref 134–144)
TRIGL SERPL-MCNC: 70 MG/DL
WBC # BLD AUTO: 4.9 10E3/UL (ref 4–11)

## 2021-11-12 PROCEDURE — 80053 COMPREHEN METABOLIC PANEL: CPT | Mod: ZL | Performed by: INTERNAL MEDICINE

## 2021-11-12 PROCEDURE — 85027 COMPLETE CBC AUTOMATED: CPT | Mod: ZL | Performed by: INTERNAL MEDICINE

## 2021-11-12 PROCEDURE — 80061 LIPID PANEL: CPT | Mod: ZL | Performed by: INTERNAL MEDICINE

## 2021-11-12 PROCEDURE — 99396 PREV VISIT EST AGE 40-64: CPT | Performed by: INTERNAL MEDICINE

## 2021-11-12 PROCEDURE — 36415 COLL VENOUS BLD VENIPUNCTURE: CPT | Mod: ZL | Performed by: INTERNAL MEDICINE

## 2021-11-12 RX ORDER — LORAZEPAM 0.5 MG/1
0.5 TABLET ORAL
Qty: 30 TABLET | Refills: 5 | Status: SHIPPED | OUTPATIENT
Start: 2021-11-12 | End: 2022-05-20

## 2021-11-12 RX ORDER — SUMATRIPTAN 100 MG/1
100 TABLET, FILM COATED ORAL
Qty: 9 TABLET | Refills: 11 | Status: SHIPPED | OUTPATIENT
Start: 2021-11-12 | End: 2023-08-17

## 2021-11-12 RX ORDER — HYDROCHLOROTHIAZIDE 25 MG/1
25 TABLET ORAL DAILY
Qty: 90 TABLET | Refills: 3 | Status: SHIPPED | OUTPATIENT
Start: 2021-11-12 | End: 2022-09-01

## 2021-11-12 RX ORDER — ESZOPICLONE 1 MG/1
1-2 TABLET, FILM COATED ORAL AT BEDTIME
Qty: 60 TABLET | Refills: 5 | Status: SHIPPED | OUTPATIENT
Start: 2021-11-12 | End: 2022-05-20

## 2021-11-12 ASSESSMENT — PAIN SCALES - GENERAL: PAINLEVEL: NO PAIN (0)

## 2021-11-12 ASSESSMENT — MIFFLIN-ST. JEOR: SCORE: 1233.13

## 2021-11-12 NOTE — NURSING NOTE
"Chief Complaint   Patient presents with     Physical       Initial /74   Pulse 70   Temp 97.8  F (36.6  C) (Tympanic)   Resp 17   Wt 63 kg (139 lb)   LMP 03/07/2003   SpO2 97%   Breastfeeding No   BMI 22.61 kg/m   Estimated body mass index is 22.61 kg/m  as calculated from the following:    Height as of 7/31/20: 1.67 m (5' 5.75\").    Weight as of this encounter: 63 kg (139 lb).  Medication Reconciliation: complete    FOOD SECURITY SCREENING QUESTIONS  Hunger Vital Signs:  Within the past 12 months we worried whether our food would run out before we got money to buy more. Never  Within the past 12 months the food we bought just didn't last and we didn't have money to get more. Never  Lorraine Hobbs LPN 11/12/2021 10:04 AM       Advance care directive on file? No   Advance care directive provided to patient?  Yes- information given       Lorraine Hobbs LPN  "

## 2021-11-19 ENCOUNTER — OFFICE VISIT (OUTPATIENT)
Dept: ORTHOPEDICS | Facility: OTHER | Age: 61
End: 2021-11-19
Attending: SPECIALIST
Payer: COMMERCIAL

## 2021-11-19 DIAGNOSIS — G56.01 CARPAL TUNNEL SYNDROME OF RIGHT WRIST: Primary | ICD-10-CM

## 2021-11-19 DIAGNOSIS — M18.9 ARTHROSIS OF FIRST CARPOMETACARPAL JOINT: ICD-10-CM

## 2021-11-19 PROCEDURE — 250N000011 HC RX IP 250 OP 636: Performed by: SPECIALIST

## 2021-11-19 PROCEDURE — 20526 THER INJECTION CARP TUNNEL: CPT | Mod: 50 | Performed by: SPECIALIST

## 2021-11-19 PROCEDURE — 250N000009 HC RX 250: Performed by: SPECIALIST

## 2021-11-19 PROCEDURE — 20600 DRAIN/INJ JOINT/BURSA W/O US: CPT | Mod: 50 | Performed by: SPECIALIST

## 2021-11-19 RX ORDER — METHYLPREDNISOLONE ACETATE 40 MG/ML
40 INJECTION, SUSPENSION INTRA-ARTICULAR; INTRALESIONAL; INTRAMUSCULAR; SOFT TISSUE ONCE
Status: COMPLETED | OUTPATIENT
Start: 2021-11-19 | End: 2021-11-19

## 2021-11-19 RX ORDER — BUPIVACAINE HYDROCHLORIDE 5 MG/ML
2 INJECTION, SOLUTION EPIDURAL; INTRACAUDAL ONCE
Status: COMPLETED | OUTPATIENT
Start: 2021-11-19 | End: 2021-11-19

## 2021-11-19 RX ADMIN — METHYLPREDNISOLONE ACETATE 40 MG: 40 INJECTION, SUSPENSION INTRA-ARTICULAR; INTRALESIONAL; INTRAMUSCULAR; SOFT TISSUE at 08:33

## 2021-11-19 RX ADMIN — BUPIVACAINE HYDROCHLORIDE 10 MG: 5 INJECTION, SOLUTION EPIDURAL; INTRACAUDAL; PERINEURAL at 08:34

## 2021-11-19 RX ADMIN — METHYLPREDNISOLONE ACETATE 40 MG: 40 INJECTION, SUSPENSION INTRA-ARTICULAR; INTRALESIONAL; INTRAMUSCULAR; SOFT TISSUE at 08:34

## 2021-11-19 NOTE — PROGRESS NOTES
Visit Date: 2021    Alessandra returns for followup.  She is now 61 years old.  She is heading out for the winter and she is requesting bilateral basilar joint and carpal tunnel injections.  These have given her good relief, last done in 2020.    PHYSICAL EXAMINATION:  This is a 61-year-old female, alert and oriented x3, and appropriate.  Gait and station are appropriate.  She is well groomed and well kempt.  Examination of both upper extremities reveals full and symmetric range of motion of elbows, wrists.  She has basilar joint prominence bilaterally.    IMPRESSION AND PLAN:  Bilateral carpal tunnel syndrome and basilar joint arthrosis.  We discussed proceeding with injection at both sites.  Sterile prep was performed and each basilar joint was injected with 40 mg of Depo-Medrol and 1 mL of 0.5% Marcaine with epinephrine.  There were no complications.  Similar injections were performed and both carpal canals.  There were no complications.  We will see her back as symptoms warrant.    Chris Sylvester MD        D: 2021   T: 2021   MT: PRESLEY    Name:     ALESSANDRA ABDUL  MRN:      [id number]        Account:    [id number]   :      1960           Visit Date: 2021     Document: [id number]

## 2021-11-19 NOTE — PROGRESS NOTES
Patient is here for follow up on her hands.   Jacquelin Benedict LPN .....................11/19/2021 8:16 AM

## 2021-12-07 ENCOUNTER — HOSPITAL ENCOUNTER (OUTPATIENT)
Dept: MAMMOGRAPHY | Facility: OTHER | Age: 61
Discharge: HOME | End: 2021-12-07
Attending: INTERNAL MEDICINE | Admitting: INTERNAL MEDICINE
Payer: COMMERCIAL

## 2021-12-07 DIAGNOSIS — Z12.31 ENCOUNTER FOR SCREENING MAMMOGRAM FOR BREAST CANCER: ICD-10-CM

## 2021-12-07 PROCEDURE — 77063 BREAST TOMOSYNTHESIS BI: CPT

## 2021-12-20 NOTE — PROGRESS NOTES
Chief Complaint   Patient presents with     Physical         HPI: Ms. Aiken is a 61 year old female who presents today for yearly physical.      She has been having some sinus congestion.  This has been going on over the last 2 to 3 weeks.  She reports some pressure in her head and neck which is her usual symptom with sinusitis.  She denies any fever or chills.    She has a history of migraines.  She continues on Imitrex.  She feels overall this has been stable.  She is on Lunesta for insomnia.  She typically takes 2 mg nightly.  She has not noted any significant side effects with this.  She also has lorazepam at home which she uses occasionally at night.  She was encouraged to avoid use of this with her Lunesta and she was 1 or the other.    Her blood pressure overall has been controlled.  She continues on hydrochlorothiazide with no side effects    She is due for cholesterol, diabetes and mammogram screening.  She was encouraged to get the Covid vaccine and a flu vaccine.    History is discussed and updated on 11/12/2021 with patient.  It is current to the best of my knowledge as below.    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       diverticulosis; follow up 10 years     CYSTOSCOPY N/A 2018    Procedure: CYSTOSCOPY;;  Surgeon: Ashleigh Fregoso MD;  Location:  OR     ENDOSCOPIC SINUS SURGERY      repeat clearing     HYSTERECTOMY VAGINAL Bilateral 2018    Procedure: HYSTERECTOMY VAGINAL;  Total Vaginal Hysterectomy & Bilateral Salpingectomy, Cystoscopy;  Surgeon: Ashleigh Fregoso MD;  Location:  OR     LEEP TX, CERVICAL  2018    Dr Fregoso     MYRINGOTOMY, INSERT TUBE, COMBINED  2016         Current Outpatient Medications   Medication Sig Dispense Refill     amoxicillin-clavulanate (AUGMENTIN) 875-125 MG tablet Take 1 tablet by mouth 2 times daily for 10 days 20 tablet 0     eszopiclone (LUNESTA) 1 MG tablet Take 1-2 tablets (1-2 mg) by mouth At Bedtime 60 tablet 5     hydrochlorothiazide (HYDRODIURIL) 25 MG tablet Take 1 tablet (25 mg) by mouth daily 90 tablet 3     LORazepam (ATIVAN) 0.5 MG tablet Take 1 tablet (0.5 mg) by mouth nightly as needed for anxiety or sleep 30 tablet 5     SUMAtriptan (IMITREX) 100 MG tablet Take 1 tablet (100 mg) by mouth every 2 hours as needed for migraine . maxillary dose: 200mg per 24 hrs. 9 tablet 11       Allergies   Allergen Reactions     Fentanyl Anxiety and Itching     Patient describes feeling creepy/crawly to Dr. Saenz.         Family History   Problem Relation Age of Onset     No Known Problems Mother      No Known Problems Father      Depression Brother      Septicemia Brother      No Known Problems Brother      No Known Problems Brother      Heart Disease Paternal Grandfather         Heart Disease,MI at 65     Other - See Comments No family hx of         History is negative for diabetes, thyroid problems, cancer, anxiety, depression and asthma     Breast Cancer No family hx of         Cancer-breast       Family Status   Relation Name Status     Mo  Alive     Fa  Alive        post lyme syndrome     Bro 1 Alive     Bro 2 Alive     Bro 3 Alive     PGFa   at age 65     Neg  " (Not Specified)        Social History     Tobacco Use     Smoking status: Never Smoker     Smokeless tobacco: Never Used   Substance Use Topics     Alcohol use: No     Alcohol/week: 0.0 standard drinks     Comment: Alcoholic Drinks/day: very rarely       Social History     Social History Narrative    Lives with spouse, in [place].  Patient has been  and remarried.  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  Benny -  does hair  3 sons, Mike - lives [place] [facility], [facility], 5 grandkids             ROS  GEN:-fevers/-chills/-night sweats/-significant wt change from 1 year ago  NEURO: +headaches/-vision changes  EARS: -hearing changes/-tinnitus  NOSE: -drainage/+congestion  MOUTH/THROAT: - sore throat/-dysphagia/-sores  LUNGS: -sob/-cough  CARDIOVASCULAR: -cp/-palpitations  GI: -pain/-change in bowels/-bloody stools  : -change in bladder/-vaginal discharge or bleeding  HEMATOLOGIC/LYMPHATIC: -swollen nodes  SKIN: -rashes/+lesions - right hand  MSK/RHEUM: +joint pain bilateral MCP/-swelling  NEURO: -weakness/-parasthesias  PSYCH:-depression/-anxiety     EXAM:   /74   Pulse 70   Temp 97.8  F (36.6  C) (Tympanic)   Resp 17   Ht 1.702 m (5' 7\")   Wt 63 kg (139 lb)   LMP 03/07/2003   SpO2 97%   Breastfeeding No   BMI 21.77 kg/m    Estimated body mass index is 21.77 kg/m  as calculated from the following:    Height as of this encounter: 1.702 m (5' 7\").    Weight as of this encounter: 63 kg (139 lb).      GEN: Vitals reviewed. Healthy appearing. Patient is in no acute distress. Cooperative with exam.  HEENT: Normocephalic atraumatic.  Eyes grossly normal to inspection.  No discharge or erythema, or obvious scleral/conjunctival abnormalities. EACs clear bilaterally, TM gray with normal landmarks.  NECK: Supple; no thyromegaly or masses noted.  No cervical or supraclavicular lymphadenopathy.  CV: Heart regular in rate and rhythm with no " murmur.    LUNGS: No audible wheeze, cough, or visible cyanosis.  No visible retractions or increased work of breathing.  Lungs clear to auscultation bilaterally.    ABD: Soft, nontender, and nondistended.  No rebound. Bowel sounds positive.  SKIN: Warm and dry to touch.  Visible skin clear. No significant rash, abnormal pigmentation or lesions.  EXT: No clubbing or cyanosis.  No peripheral edema.  NEURO: Alert and oriented to person, place, and time.  Cranial nerves II-XII grossly intact with no focal or lateralizing deficits.  Muscle tone normal.  Gait normal. No tremor.   MSK: ROM of upper and lower ext symmetric and full.  PSYCH: Mood is good.  Mentation appears normal, affect normal/bright, judgement and insight intact, normal speech and appearance well-groomed.       ASSESSMENT AND PLAN:    Chronic insomnia  Encouraged to choose between Lunesta and lorazepam and not use simultaneously.  - LORazepam (ATIVAN) 0.5 MG tablet  Dispense: 30 tablet; Refill: 5    Essential hypertension  - Blood pressure today of 126/74   is at the goal of <140/90 with no exacerbation.  - Continue current regimen.  Instructed to check BP at home.  - Cautioned patient to monitor with antibiotics, herbals and any OTC medications  - electrolytes and renal function done and okay  - hydrochlorothiazide (HYDRODIURIL) 25 MG tablet  Dispense: 90 tablet; Refill: 3  - CBC with platelets    Migraine without aura and without status migrainosus, not intractable  - Controlled.  Continue current regimen.    - SUMAtriptan (IMITREX) 100 MG tablet  Dispense: 9 tablet; Refill: 11    Screening for diabetes mellitus  - Comprehensive metabolic panel    Screening for hyperlipidemia  - Lipid Profile    Encounter for screening mammogram for breast cancer  - MA Screen Bilateral w/Yadiel    Acute sinusitis with symptoms > 10 days  - most consistent with bacterial illness due to duration of illness  - will treat with antibiotics, patient to treat symptoms as  instructed below, call if she has any ADR's or worsening symptoms  - recommend eating yogurt/kefir 1-2 times daily while on antibiotics  - amoxicillin-clavulanate (AUGMENTIN) 875-125 MG tablet  Dispense: 20 tablet; Refill: 0    Dysplasia of the cervix, high-grade MAICOL-2-3  We discussed her Pap smears today.  She has a history of MAICOL 2-3 s/p full hysterectomy in 2018.  Since then she has had recurrent positive HPV on vaginal cuff Pap smear including in July 2020.  It was recommended at that time to see gynecology again for possible repeat colposcopy.  She has not completed this.  At this time she is not interested in further surveillance as she is unsure what would be done for her ongoing symptoms/abnormal testing.  We held on Pap smear today, I will reach out to gynecology for any further advice/input in this.        Return in about 1 year (around 11/12/2022) for Annual Review with renewal of all medications.      TORY MALONE,    11/12/2021 10:59 AM

## 2022-01-28 ENCOUNTER — OFFICE VISIT (OUTPATIENT)
Dept: ORTHOPEDICS | Facility: OTHER | Age: 62
End: 2022-01-28
Attending: SPECIALIST
Payer: COMMERCIAL

## 2022-01-28 ENCOUNTER — HOSPITAL ENCOUNTER (OUTPATIENT)
Dept: GENERAL RADIOLOGY | Facility: OTHER | Age: 62
End: 2022-01-28
Attending: SPECIALIST
Payer: COMMERCIAL

## 2022-01-28 DIAGNOSIS — G56.01 CARPAL TUNNEL SYNDROME OF RIGHT WRIST: ICD-10-CM

## 2022-01-28 DIAGNOSIS — G56.01 CARPAL TUNNEL SYNDROME OF RIGHT WRIST: Primary | ICD-10-CM

## 2022-01-28 DIAGNOSIS — M18.9 ARTHROSIS OF FIRST CARPOMETACARPAL JOINT: ICD-10-CM

## 2022-01-28 PROCEDURE — 250N000009 HC RX 250: Performed by: SPECIALIST

## 2022-01-28 PROCEDURE — 20605 DRAIN/INJ JOINT/BURSA W/O US: CPT | Performed by: SPECIALIST

## 2022-01-28 PROCEDURE — 73110 X-RAY EXAM OF WRIST: CPT | Mod: RT

## 2022-01-28 PROCEDURE — 99214 OFFICE O/P EST MOD 30 MIN: CPT | Mod: 25 | Performed by: SPECIALIST

## 2022-01-28 PROCEDURE — 250N000011 HC RX IP 250 OP 636: Performed by: SPECIALIST

## 2022-01-28 RX ORDER — METHYLPREDNISOLONE ACETATE 40 MG/ML
40 INJECTION, SUSPENSION INTRA-ARTICULAR; INTRALESIONAL; INTRAMUSCULAR; SOFT TISSUE ONCE
Status: COMPLETED | OUTPATIENT
Start: 2022-01-28 | End: 2022-01-28

## 2022-01-28 RX ORDER — METHYLPREDNISOLONE ACETATE 40 MG/ML
40 INJECTION, SUSPENSION INTRA-ARTICULAR; INTRALESIONAL; INTRAMUSCULAR; SOFT TISSUE ONCE
Status: CANCELLED | OUTPATIENT
Start: 2022-01-28 | End: 2022-01-28

## 2022-01-28 RX ORDER — BUPIVACAINE HYDROCHLORIDE 5 MG/ML
1 INJECTION, SOLUTION EPIDURAL; INTRACAUDAL ONCE
Status: COMPLETED | OUTPATIENT
Start: 2022-01-28 | End: 2022-01-28

## 2022-01-28 RX ORDER — BUPIVACAINE HYDROCHLORIDE 5 MG/ML
1 INJECTION, SOLUTION EPIDURAL; INTRACAUDAL ONCE
Status: CANCELLED | OUTPATIENT
Start: 2022-01-28 | End: 2022-01-28

## 2022-01-28 RX ADMIN — METHYLPREDNISOLONE ACETATE 40 MG: 40 INJECTION, SUSPENSION INTRA-ARTICULAR; INTRALESIONAL; INTRAMUSCULAR; INTRASYNOVIAL; SOFT TISSUE at 10:28

## 2022-01-28 RX ADMIN — BUPIVACAINE HYDROCHLORIDE 5 MG: 5 INJECTION, SOLUTION EPIDURAL; INTRACAUDAL at 10:29

## 2022-01-28 NOTE — PROGRESS NOTES
Patient is here for follow up on her hands.  Jacquelin Benedict LPN .....................1/28/2022 9:39 AM

## 2022-01-28 NOTE — PROGRESS NOTES
Visit Date: 2022    HISTORY OF PRESENT ILLNESS:  Alessandra returns for followup.  She is here today because of bilateral thumb pain, right greater than left.  She had a carpal tunnel injection in the past, which had given her good relief of symptoms.  Her EMG previously showed no evidence of carpal tunnel, but she had definite symptoms very suggestive of this.    PHYSICAL EXAMINATION:  The patient's physical examination reveals a 61-year-old female.  She is alert and oriented x3, and appropriate.  Examination of both upper extremities reveals full and symmetric range of motion of both shoulders and elbows.  Examination of both hands shows mild tenderness with palpation over the pisotriquetral joint and hamulus.  She has basal joint tenderness, right greater than left, but this is really not that symptomatic for her.  Neurovascular examination is otherwise intact.    IMAGING:  X-rays were reviewed; AP, lateral, and oblique views of the right wrist as well as a carpal tunnel view.  These reveal basilar joint arthrosis, but no evidence of acute abnormality.    IMPRESSION:  Symptoms suggestive of recurrent carpal tunnel syndrome.  Her basilar joint is currently is minimally symptomatic.  We discussed proceeding with an injection of the right carpal canal.  A sterile prep was performed and the right carpal canal was injected with 40 mg of Depo-Medrol and 1 mL of 0.5% Marcaine with epinephrine.  There were no complications.  If this gives her durable relief, I believe this localizes the issue to her carpal canal.  Ultimately, she may elect to proceed with endoscopic carpal tunnel release.    Chris Sylvester MD        D: 2022   T: 2022   MT: NELLY    Name:     ALESSANDRA ABDUL  MRN:      [id number]        Account:    [id number]   :      1960           Visit Date: 2022     Document: [id number]

## 2022-02-09 ENCOUNTER — VIRTUAL VISIT (OUTPATIENT)
Dept: INTERNAL MEDICINE | Facility: OTHER | Age: 62
End: 2022-02-09
Attending: NURSE PRACTITIONER
Payer: COMMERCIAL

## 2022-02-09 DIAGNOSIS — J01.90 ACUTE SINUSITIS WITH SYMPTOMS > 10 DAYS: Primary | ICD-10-CM

## 2022-02-09 PROCEDURE — 99212 OFFICE O/P EST SF 10 MIN: CPT | Mod: TEL | Performed by: NURSE PRACTITIONER

## 2022-02-09 NOTE — PROGRESS NOTES
Alessandra is a 61 year old who is being evaluated via a billable telephone visit.      What phone number would you like to be contacted at? [phone]  How would you like to obtain your AVS? MyChart    Assessment & Plan     Acute sinusitis with symptoms > 10 days    - amoxicillin-clavulanate (AUGMENTIN) 875-125 MG tablet  Dispense: 20 tablet; Refill: 0  Seek care immediately if:    You have chest pain or trouble breathing.  Contact your healthcare provider if:    You have a fever over 102 F (39 C).     Your sore throat gets worse or you see white or yellow spots in your throat.     Your symptoms get worse after 3 to 5 days or your cold is not better in 14 days.     You have a rash anywhere on your skin.     You have large, tender lumps in your neck.     You have thick, green, or yellow drainage from your nose.     You cough up thick yellow, green, or bloody mucus.     You are vomiting for more than 24 hours and cannot keep fluids down.     You have a bad earache.     You have questions or concerns about your condition or care.    Medicines:  Recommend OTC products for symptomatic relief, increased fluid intake, humidified air and rest as much as possible.  You may need any of the following:    Decongestants help reduce nasal congestion and help you breathe more easily. If you take decongestant pills, they may make you feel restless or cause problems with your sleep. Do not use decongestant sprays for more than a few days.     Cough suppressants help reduce coughing.     NSAIDs, such as ibuprofen, help decrease swelling, pain, and fever. NSAIDs can cause stomach bleeding or kidney problems in certain people. Do not use if you take blood thinner medicine. Always read the medicine label and follow directions.     Acetaminophen decreases pain and fever. It is available without a doctor's order. Follow directions. Read the labels of all other medicines you are using to see if they also contain acetaminophen, or ask your  doctor or pharmacist. Acetaminophen can cause liver damage if not taken correctly. Do not use more than 4 grams (4,000 milligrams) total of acetaminophen in one day.     Take your medicine as directed. Contact your healthcare provider if you think your medicine is not helping or if you have side effects. Tell him or her if you are allergic to any medicine. Keep a list of the medicines, vitamins, and herbs you take. Include the amounts, and when and why you take them. Bring the list or the pill bottles to follow-up visits. Carry your medicine list with you in case of an emergency.    Prescription drug management  10 minutes spent on the date of the encounter doing chart review, history and exam, documentation and further activities per the note     Return if symptoms worsen or fail to improve.    Leny Higginbotham NP  [facility]    Sav Aparicio is a 61 year old who presents for the following health issues:  Sinus issues    HPI     Acute Illness  Acute illness concerns:   Sinus infection  Onset/Duration: 2/5/22  Symptoms:  Fever: no  Chills/Sweats: no  Headache (location?): YES - forehead, face , teeth  Sinus Pressure: YES - face, head  Conjunctivitis:  no  Ear Pain: no  Rhinorrhea: no  Congestion: YES - face, head  Sore Throat: no  Cough: no  Wheeze: no  Decreased Appetite: no  Nausea: no  Vomiting: no  Diarrhea: no  Dysuria/Freq.: no  Dysuria or Hematuria: no  Fatigue/Achiness: no  Sick/Strep Exposure: no  Therapies tried and outcome:     Review of Systems   CONSTITUTIONAL: NEGATIVE for fever, chills, change in weight  ENT/MOUTH: POSITIVE for Hx sinus infections, nasal congestion, postnasal drainage, sinus pressure and tooth pain; Hx of sinus surgery  RESP: NEGATIVE for significant cough or SOB  CV: NEGATIVE for chest pain, palpitations or peripheral edema      Objective    Vitals - Patient Reported  Pain Score: Severe Pain (6)  Pain Loc: Head (headache, pressure behind eyes, burning  eyes, face)    Physical Exam   healthy, alert and no distress  PSYCH: Alert and oriented times 3; coherent speech, normal   rate and volume, able to articulate logical thoughts, able   to abstract reason, no tangential thoughts, no hallucinations   or delusions  Her affect is normal  RESP: No cough, no audible wheezing, able to talk in full sentences  Remainder of exam unable to be completed due to telephone visits        Phone call duration: 5 minutes

## 2022-02-25 DIAGNOSIS — F51.04 CHRONIC INSOMNIA: ICD-10-CM

## 2022-02-25 RX ORDER — LORAZEPAM 1 MG/1
1 TABLET ORAL
Qty: 10 TABLET | Refills: 0 | OUTPATIENT
Start: 2022-02-25

## 2022-02-25 NOTE — TELEPHONE ENCOUNTER
Redundant refill request refused: Too soon:    LORazepam (ATIVAN) 0.5 MG tablet 30 tablet 5 11/12/2021  No   Sig - Route: Take 1 tablet (0.5 mg) by mouth nightly as needed for anxiety or sleep - Oral   Sent to pharmacy as: LORazepam 0.5 MG Oral Tablet (ATIVAN)   Class: E-Prescribe   Order: [id number]   E-Prescribing Status: Receipt confirmed by pharmacy (11/12/2021 11:31 AM CST)     [facility] - [place], [place] - [address]     Unable to complete prescription refill per RN Medication Refill Policy. Tati Phillips RN .............. 2/25/2022  11:07 AM

## 2022-03-30 RX ORDER — METHYLPREDNISOLONE ACETATE 40 MG/ML
40 INJECTION, SUSPENSION INTRA-ARTICULAR; INTRALESIONAL; INTRAMUSCULAR; SOFT TISSUE ONCE
Status: CANCELLED | OUTPATIENT
Start: 2022-04-01 | End: 2022-04-01

## 2022-04-01 ENCOUNTER — OFFICE VISIT (OUTPATIENT)
Dept: ORTHOPEDICS | Facility: OTHER | Age: 62
End: 2022-04-01
Attending: SPECIALIST
Payer: COMMERCIAL

## 2022-04-01 DIAGNOSIS — G56.01 CARPAL TUNNEL SYNDROME OF RIGHT WRIST: ICD-10-CM

## 2022-04-01 DIAGNOSIS — M18.9 ARTHROSIS OF FIRST CARPOMETACARPAL JOINT: Primary | ICD-10-CM

## 2022-04-01 PROCEDURE — 250N000009 HC RX 250: Performed by: SPECIALIST

## 2022-04-01 PROCEDURE — 250N000011 HC RX IP 250 OP 636: Performed by: SPECIALIST

## 2022-04-01 PROCEDURE — 20600 DRAIN/INJ JOINT/BURSA W/O US: CPT | Mod: 50 | Performed by: SPECIALIST

## 2022-04-01 PROCEDURE — 96372 THER/PROPH/DIAG INJ SC/IM: CPT | Performed by: SPECIALIST

## 2022-04-01 PROCEDURE — 20526 THER INJECTION CARP TUNNEL: CPT | Mod: 50 | Performed by: SPECIALIST

## 2022-04-01 RX ORDER — BUPIVACAINE HYDROCHLORIDE 5 MG/ML
1 INJECTION, SOLUTION EPIDURAL; INTRACAUDAL ONCE
Status: COMPLETED | OUTPATIENT
Start: 2022-04-01 | End: 2022-04-01

## 2022-04-01 RX ORDER — METHYLPREDNISOLONE ACETATE 40 MG/ML
40 INJECTION, SUSPENSION INTRA-ARTICULAR; INTRALESIONAL; INTRAMUSCULAR; SOFT TISSUE ONCE
Status: COMPLETED | OUTPATIENT
Start: 2022-04-01 | End: 2022-04-01

## 2022-04-01 RX ORDER — BUPIVACAINE HYDROCHLORIDE 5 MG/ML
2 INJECTION, SOLUTION EPIDURAL; INTRACAUDAL ONCE
Status: DISCONTINUED | OUTPATIENT
Start: 2022-04-01 | End: 2022-04-01

## 2022-04-01 RX ADMIN — METHYLPREDNISOLONE ACETATE 40 MG: 40 INJECTION, SUSPENSION INTRA-ARTICULAR; INTRALESIONAL; INTRAMUSCULAR; INTRASYNOVIAL; SOFT TISSUE at 12:11

## 2022-04-01 RX ADMIN — BUPIVACAINE HYDROCHLORIDE 1 ML: 5 INJECTION, SOLUTION EPIDURAL; INTRACAUDAL; PERINEURAL at 12:10

## 2022-04-01 RX ADMIN — METHYLPREDNISOLONE ACETATE 40 MG: 40 INJECTION, SUSPENSION INTRA-ARTICULAR; INTRALESIONAL; INTRAMUSCULAR; INTRASYNOVIAL; SOFT TISSUE at 12:00

## 2022-04-01 RX ADMIN — BUPIVACAINE HYDROCHLORIDE 1 ML: 5 INJECTION, SOLUTION EPIDURAL; INTRACAUDAL; PERINEURAL at 12:00

## 2022-04-01 NOTE — PROGRESS NOTES
Visit Date: 2022    Alessandra Aiken returns for followup.  She is here today requesting bilateral basilar joint injections as well as bilateral carpal tunnel injections.    PHYSICAL EXAMINATION:  Exam today reveals a 62-year-old female.  She is alert and oriented x3, and appropriate.  Gait and station are appropriate.  She is well groomed and well kempt.  Examination of both upper extremities reveals tenderness with palpation over the basilar joint with a positive grind maneuver.  Palmar abduction is mildly limited.  She has numbness with provocative maneuvers, which compress the median nerve.    IMPRESSION:     1.  Bilateral carpal tunnel syndrome.  2.  Bilateral basilar joint arthrosis.      PROCEDURES:  We discussed proceeding with injection.  Sterile prep was performed and each carpal canal was injected with 40 mg of Depo-Medrol and 1 mL of 0.5% Marcaine with epinephrine.  There were no complications.    A sterile prep was performed and each basilar joint was injected with 40 mg of Depo-Medrol and 1 mL of 0.5% Marcaine with epinephrine.  There were no complications.    PLAN:  Our plan will be to follow her clinically with repeat examination as symptoms warrant it.    Chris Sylvester MD        D: 2022   T: 2022   MT: PRESLEY    Name:     ALESSANDRA AIKEN  MRN:      [id number]        Account:    [id number]   :      1960           Visit Date: 2022     Document: [id number]

## 2022-04-01 NOTE — PROGRESS NOTES
Patient is here for follow up on her hands.   Jacquelin Benedict LPN .....................4/1/2022 11:23 AM

## 2022-06-23 RX ORDER — METHYLPREDNISOLONE ACETATE 40 MG/ML
40 INJECTION, SUSPENSION INTRA-ARTICULAR; INTRALESIONAL; INTRAMUSCULAR; SOFT TISSUE ONCE
Status: CANCELLED | OUTPATIENT
Start: 2022-07-01

## 2022-07-01 ENCOUNTER — OFFICE VISIT (OUTPATIENT)
Dept: ORTHOPEDICS | Facility: OTHER | Age: 62
End: 2022-07-01
Attending: SPECIALIST
Payer: COMMERCIAL

## 2022-07-01 VITALS — BODY MASS INDEX: 21.69 KG/M2 | WEIGHT: 135 LBS | HEIGHT: 66 IN

## 2022-07-01 DIAGNOSIS — G56.01 CARPAL TUNNEL SYNDROME OF RIGHT WRIST: ICD-10-CM

## 2022-07-01 DIAGNOSIS — M18.9 ARTHROSIS OF FIRST CARPOMETACARPAL JOINT: Primary | ICD-10-CM

## 2022-07-01 PROCEDURE — 250N000011 HC RX IP 250 OP 636: Performed by: SPECIALIST

## 2022-07-01 PROCEDURE — 20526 THER INJECTION CARP TUNNEL: CPT | Mod: 50 | Performed by: SPECIALIST

## 2022-07-01 PROCEDURE — 96372 THER/PROPH/DIAG INJ SC/IM: CPT | Performed by: SPECIALIST

## 2022-07-01 PROCEDURE — 20600 DRAIN/INJ JOINT/BURSA W/O US: CPT | Mod: 50 | Performed by: SPECIALIST

## 2022-07-01 RX ORDER — BUPIVACAINE HYDROCHLORIDE 2.5 MG/ML
2 INJECTION, SOLUTION EPIDURAL; INFILTRATION; INTRACAUDAL ONCE
Status: COMPLETED | OUTPATIENT
Start: 2022-07-01 | End: 2022-07-01

## 2022-07-01 RX ORDER — METHYLPREDNISOLONE ACETATE 40 MG/ML
40 INJECTION, SUSPENSION INTRA-ARTICULAR; INTRALESIONAL; INTRAMUSCULAR; SOFT TISSUE ONCE
Status: COMPLETED | OUTPATIENT
Start: 2022-07-01 | End: 2022-07-01

## 2022-07-01 RX ORDER — TRIAMCINOLONE ACETONIDE 40 MG/ML
40 INJECTION, SUSPENSION INTRA-ARTICULAR; INTRAMUSCULAR ONCE
Status: COMPLETED | OUTPATIENT
Start: 2022-07-01 | End: 2022-07-01

## 2022-07-01 RX ADMIN — METHYLPREDNISOLONE ACETATE 40 MG: 40 INJECTION, SUSPENSION INTRA-ARTICULAR; INTRALESIONAL; INTRAMUSCULAR; INTRASYNOVIAL; SOFT TISSUE at 09:32

## 2022-07-01 RX ADMIN — BUPIVACAINE HYDROCHLORIDE 5 MG: 2.5 INJECTION, SOLUTION EPIDURAL; INFILTRATION; INTRACAUDAL; PERINEURAL at 09:32

## 2022-07-01 RX ADMIN — TRIAMCINOLONE ACETONIDE 40 MG: 40 INJECTION, SUSPENSION INTRA-ARTICULAR; INTRAMUSCULAR at 09:33

## 2022-07-01 NOTE — PROGRESS NOTES
Visit Date: 2022    HISTORY OF PRESENT ILLNESS:  Alessandra returns for followup requesting bilateral basilar joint injections, as well as bilateral carpal tunnel injections.  She has had a number of these.  She would like to proceed with surgery, but not at this time.    PHYSICAL EXAMINATION:  Reveals a 62-year-old female, alert and oriented x3 and appropriate.  Gait and station are appropriate.  She is well-groomed and well kempt.  Examination of both upper extremities reveals full and symmetric range of motion of elbows, wrists.  She has tenderness with palpation with the basilar joint with a positive grind maneuver.  Palmar abduction is mildly limited.  She also has numbness with provocative maneuvers, which compress the median nerve.    IMPRESSION:    1.  Bilateral carpal tunnel syndrome.  2.  Bilateral basal joint arthrosis.    PLAN:  We will proceed with injection.  Sterile prep was performed and each carpal canal was injected with 40 mg of Kenalog and 1 mL of 0.5% Marcaine with epinephrine.  There were no complications.  Following this, a sterile prep was performed and each basilar joint was injected with 40 mg of Depo-Medrol and 1 mL of 0.5% Marcaine with epinephrine.  There were no complications.  We will see her back if symptoms warrant it.    Chris Sylvester MD        D: 2022   T: 2022   MT: RYAN    Name:     ALESSANDRA ABDUL  MRN:      [id number]        Account:    [id number]   :      1960           Visit Date: 2022     Document: [id number]

## 2022-07-13 ENCOUNTER — TRANSFERRED RECORDS (OUTPATIENT)
Dept: HEALTH INFORMATION MANAGEMENT | Facility: OTHER | Age: 62
End: 2022-07-13

## 2022-09-01 ENCOUNTER — OFFICE VISIT (OUTPATIENT)
Dept: INTERNAL MEDICINE | Facility: OTHER | Age: 62
End: 2022-09-01
Attending: STUDENT IN AN ORGANIZED HEALTH CARE EDUCATION/TRAINING PROGRAM
Payer: COMMERCIAL

## 2022-09-01 VITALS
HEART RATE: 79 BPM | RESPIRATION RATE: 16 BRPM | WEIGHT: 140 LBS | OXYGEN SATURATION: 98 % | BODY MASS INDEX: 22.6 KG/M2 | TEMPERATURE: 97.9 F | SYSTOLIC BLOOD PRESSURE: 136 MMHG | DIASTOLIC BLOOD PRESSURE: 88 MMHG

## 2022-09-01 DIAGNOSIS — G57.61 MORTON NEUROMA, RIGHT: Primary | ICD-10-CM

## 2022-09-01 PROCEDURE — 99213 OFFICE O/P EST LOW 20 MIN: CPT | Performed by: STUDENT IN AN ORGANIZED HEALTH CARE EDUCATION/TRAINING PROGRAM

## 2022-09-01 ASSESSMENT — PAIN SCALES - GENERAL: PAINLEVEL: NO PAIN (0)

## 2022-09-01 NOTE — PROGRESS NOTES
Assessment & Plan         ICD-10-CM    1. Hany neuroma, right  G57.61 Occupational Medicine Referral     Leach neuroma: Patient with a painful lump at the third metatarsal head.  Consistent with Leach's neuroma versus possible tendon sheath as it seems to move with flexion extension of the toe.  Recommend insoles refer to Dr. Serrano.  If pain is not improving in 4 to 6 weeks she will contact me and I will refer to podiatry.            No follow-ups on file.    Jh Viramontes MD  [facility]   Alessandra is a 62 year old, presenting for the following health issues:  Foot Pain (Lump on bottom of right foot   started about 2 weeks ago)      History of Present Illness       Reason for visit:  Spot on the bottom of right foot    She eats 0-1 servings of fruits and vegetables daily.She consumes 0 sweetened beverage(s) daily.She exercises with enough effort to increase her heart rate 10 to 19 minutes per day.  She exercises with enough effort to increase her heart rate 3 or less days per week.   She is taking medications regularly.       Lump on bottom of right foot for about 2 weeks   painful    Pain is there with walking and catching the bottom of her foot on things patient does not wear high heels often or overly tight shoes.  She has sandals on today.  Pain seems to be at the head of the metatarsal      Review of Systems         Objective    /88 (BP Location: Right arm, Patient Position: Sitting, Cuff Size: Adult Regular)   Pulse 79   Temp 97.9  F (36.6  C) (Temporal)   Resp 16   Wt 63.5 kg (140 lb)   LMP 03/07/2003   SpO2 98%   Breastfeeding No   BMI 22.60 kg/m    Body mass index is 22.6 kg/m .  Physical Exam   General: Pleasant 62-year-old woman sitting in clinic no acute distress  MSK: Right foot with 0.5 cm mobile mass at the head of the third metatarsal appears to be mobile with flexion and extension of the third toe.                .  ..

## 2022-09-01 NOTE — NURSING NOTE
"Chief Complaint   Patient presents with     Foot Pain     Lump on bottom of right foot   started about 2 weeks ago       Medication reconciliation completed.    FOOD SECURITY SCREENING QUESTIONS:    The next two questions are to help us understand your food security.  If you are feeling you need any assistance in this area, we have resources available to support you today.    Hunger Vital Signs:  Within the past 12 months we worried whether our food would run out before we got money to buy more. Never  Within the past 12 months the food we bought just didn't last and we didn't have money to get more. Never    Initial /88 (BP Location: Right arm, Patient Position: Sitting, Cuff Size: Adult Regular)   Pulse 79   Temp 97.9  F (36.6  C) (Temporal)   Resp 16   Wt 63.5 kg (140 lb)   LMP 03/07/2003   SpO2 98%   Breastfeeding No   BMI 22.60 kg/m   Estimated body mass index is 22.6 kg/m  as calculated from the following:    Height as of 7/1/22: 1.676 m (5' 6\").    Weight as of this encounter: 63.5 kg (140 lb).       Jaki Solis LPN .......  9/1/2022  8:09 AM  "

## 2022-09-04 ENCOUNTER — HEALTH MAINTENANCE LETTER (OUTPATIENT)
Age: 62
End: 2022-09-04

## 2022-09-08 ENCOUNTER — OFFICE VISIT (OUTPATIENT)
Dept: ORTHOPEDICS | Facility: OTHER | Age: 62
End: 2022-09-08
Attending: SPECIALIST
Payer: COMMERCIAL

## 2022-09-08 VITALS — HEIGHT: 66 IN | RESPIRATION RATE: 16 BRPM | WEIGHT: 140 LBS | BODY MASS INDEX: 22.5 KG/M2

## 2022-09-08 DIAGNOSIS — M18.9 ARTHROSIS OF FIRST CARPOMETACARPAL JOINT: Primary | ICD-10-CM

## 2022-09-08 PROCEDURE — 99213 OFFICE O/P EST LOW 20 MIN: CPT | Performed by: SPECIALIST

## 2022-09-08 ASSESSMENT — PAIN SCALES - GENERAL: PAINLEVEL: SEVERE PAIN (7)

## 2022-09-08 NOTE — PROGRESS NOTES
Visit Date: 2022    HISTORY OF PRESENT ILLNESS:  Alessandra returns for followup.  She is interested in proceeding with surgery at this point.  She has had multiple corticosteroid injections in both carpal canals as well as basilar joints.  At this time, she would like to proceed with a basilar joint reconstruction as well as carpal tunnel on the right.    PHYSICAL EXAMINATION:  Exam today shows a 62-year-old female.  She is alert and oriented x 3, and appropriate.  Gait and station are appropriate.  She is well groomed and well kempt.  Examination of both upper extremities reveals full and symmetric range of motion of elbows, wrists.  Examination of both hands shows no evidence of significant atrophic skin change, adenopathy or focal weakness.      IMAGING:  Previous plain film radiographs were reviewed, dated 2002.  The films are of the right wrist and reveal basilar joint arthrosis with subluxation.    IMPRESSION:  Right carpal tunnel syndrome with superimposed basilar joint arthrosis.    PLAN:  The plan will be to proceed with right thumb basal joint reconstruction and right endoscopic tunnel release.  Risks, complications, and benefits reviewed, and this can be scheduled at her convenience.    Chris Sylvester MD        D: 2022   T: 2022   MT: PRESLEY    Name:     ALESSANDRA ABDUL  MRN:      [id number]        Account:    [id number]   :      1960           Visit Date: 2022     Document: [id number]

## 2022-09-28 ENCOUNTER — MEDICAL CORRESPONDENCE (OUTPATIENT)
Dept: MRI IMAGING | Facility: HOSPITAL | Age: 62
End: 2022-09-28

## 2022-10-03 ENCOUNTER — MYC MEDICAL ADVICE (OUTPATIENT)
Dept: FAMILY MEDICINE | Facility: OTHER | Age: 62
End: 2022-10-03

## 2022-10-03 DIAGNOSIS — Z12.11 COLON CANCER SCREENING: Primary | ICD-10-CM

## 2022-10-05 ENCOUNTER — OFFICE VISIT (OUTPATIENT)
Dept: INTERNAL MEDICINE | Facility: OTHER | Age: 62
End: 2022-10-05
Attending: NURSE PRACTITIONER
Payer: COMMERCIAL

## 2022-10-05 ENCOUNTER — HOSPITAL ENCOUNTER (OUTPATIENT)
Dept: MRI IMAGING | Facility: OTHER | Age: 62
Discharge: HOME | End: 2022-10-05
Attending: ORTHOPAEDIC SURGERY
Payer: COMMERCIAL

## 2022-10-05 VITALS
SYSTOLIC BLOOD PRESSURE: 164 MMHG | OXYGEN SATURATION: 97 % | RESPIRATION RATE: 14 BRPM | HEIGHT: 66 IN | DIASTOLIC BLOOD PRESSURE: 98 MMHG | WEIGHT: 136 LBS | TEMPERATURE: 97.7 F | BODY MASS INDEX: 21.86 KG/M2 | HEART RATE: 58 BPM

## 2022-10-05 DIAGNOSIS — M25.511 RIGHT SHOULDER PAIN: ICD-10-CM

## 2022-10-05 DIAGNOSIS — R53.1 WEAKNESS: ICD-10-CM

## 2022-10-05 DIAGNOSIS — Z23 NEED FOR VACCINATION: Chronic | ICD-10-CM

## 2022-10-05 DIAGNOSIS — Z01.818 PREOP GENERAL PHYSICAL EXAM: Primary | ICD-10-CM

## 2022-10-05 DIAGNOSIS — M25.311 INSTABILITY OF SHOULDER JOINT, RIGHT: ICD-10-CM

## 2022-10-05 LAB
ALBUMIN SERPL-MCNC: 4.5 G/DL (ref 3.5–5.7)
ALP SERPL-CCNC: 63 U/L (ref 34–104)
ALT SERPL W P-5'-P-CCNC: 10 U/L (ref 7–52)
ANION GAP SERPL CALCULATED.3IONS-SCNC: 6 MMOL/L (ref 3–14)
AST SERPL W P-5'-P-CCNC: 16 U/L (ref 13–39)
ATRIAL RATE - MUSE: 59 BPM
BASOPHILS # BLD AUTO: 0 10E3/UL (ref 0–0.2)
BASOPHILS NFR BLD AUTO: 1 %
BILIRUB SERPL-MCNC: 0.7 MG/DL (ref 0.3–1)
BUN SERPL-MCNC: 13 MG/DL (ref 7–25)
CALCIUM SERPL-MCNC: 9.8 MG/DL (ref 8.6–10.3)
CHLORIDE BLD-SCNC: 105 MMOL/L (ref 98–107)
CO2 SERPL-SCNC: 30 MMOL/L (ref 21–31)
CREAT SERPL-MCNC: 0.87 MG/DL (ref 0.6–1.2)
DIASTOLIC BLOOD PRESSURE - MUSE: NORMAL MMHG
EOSINOPHIL # BLD AUTO: 0 10E3/UL (ref 0–0.7)
EOSINOPHIL NFR BLD AUTO: 1 %
ERYTHROCYTE [DISTWIDTH] IN BLOOD BY AUTOMATED COUNT: 12.2 % (ref 10–15)
GFR SERPL CREATININE-BSD FRML MDRD: 75 ML/MIN/1.73M2
GLUCOSE BLD-MCNC: 95 MG/DL (ref 70–105)
HCT VFR BLD AUTO: 39.5 % (ref 35–47)
HGB BLD-MCNC: 13.6 G/DL (ref 11.7–15.7)
IMM GRANULOCYTES # BLD: 0 10E3/UL
IMM GRANULOCYTES NFR BLD: 0 %
INTERPRETATION ECG - MUSE: NORMAL
LYMPHOCYTES # BLD AUTO: 1 10E3/UL (ref 0.8–5.3)
LYMPHOCYTES NFR BLD AUTO: 29 %
MCH RBC QN AUTO: 29.1 PG (ref 26.5–33)
MCHC RBC AUTO-ENTMCNC: 34.4 G/DL (ref 31.5–36.5)
MCV RBC AUTO: 84 FL (ref 78–100)
MONOCYTES # BLD AUTO: 0.2 10E3/UL (ref 0–1.3)
MONOCYTES NFR BLD AUTO: 6 %
NEUTROPHILS # BLD AUTO: 2.3 10E3/UL (ref 1.6–8.3)
NEUTROPHILS NFR BLD AUTO: 63 %
NRBC # BLD AUTO: 0 10E3/UL
NRBC BLD AUTO-RTO: 0 /100
P AXIS - MUSE: 59 DEGREES
PLATELET # BLD AUTO: 174 10E3/UL (ref 150–450)
POTASSIUM BLD-SCNC: 4 MMOL/L (ref 3.5–5.1)
PR INTERVAL - MUSE: 156 MS
PROT SERPL-MCNC: 7.2 G/DL (ref 6.4–8.9)
QRS DURATION - MUSE: 78 MS
QT - MUSE: 398 MS
QTC - MUSE: 394 MS
R AXIS - MUSE: 24 DEGREES
RBC # BLD AUTO: 4.68 10E6/UL (ref 3.8–5.2)
SODIUM SERPL-SCNC: 141 MMOL/L (ref 134–144)
SYSTOLIC BLOOD PRESSURE - MUSE: NORMAL MMHG
T AXIS - MUSE: 39 DEGREES
VENTRICULAR RATE- MUSE: 59 BPM
WBC # BLD AUTO: 3.6 10E3/UL (ref 4–11)

## 2022-10-05 PROCEDURE — 85025 COMPLETE CBC W/AUTO DIFF WBC: CPT | Mod: ZL | Performed by: NURSE PRACTITIONER

## 2022-10-05 PROCEDURE — 93000 ELECTROCARDIOGRAM COMPLETE: CPT | Performed by: INTERNAL MEDICINE

## 2022-10-05 PROCEDURE — 99214 OFFICE O/P EST MOD 30 MIN: CPT | Performed by: NURSE PRACTITIONER

## 2022-10-05 PROCEDURE — C9803 HOPD COVID-19 SPEC COLLECT: HCPCS

## 2022-10-05 PROCEDURE — 73221 MRI JOINT UPR EXTREM W/O DYE: CPT | Mod: RT

## 2022-10-05 PROCEDURE — U0005 INFEC AGEN DETEC AMPLI PROBE: HCPCS | Mod: ZL | Performed by: NURSE PRACTITIONER

## 2022-10-05 PROCEDURE — 36415 COLL VENOUS BLD VENIPUNCTURE: CPT | Mod: ZL | Performed by: NURSE PRACTITIONER

## 2022-10-05 PROCEDURE — 80053 COMPREHEN METABOLIC PANEL: CPT | Mod: ZL | Performed by: NURSE PRACTITIONER

## 2022-10-05 ASSESSMENT — PAIN SCALES - GENERAL: PAINLEVEL: EXTREME PAIN (8)

## 2022-10-05 NOTE — NURSING NOTE
Patient presents to clinic today for Pre - op.   Medication reconciliation completed.    ACP on file? No, form previously provided.     FOOD SECURITY SCREENING QUESTIONS    The next two questions are to help us understand your food security.  If you are feeling you need any assistance in this area, we have resources available to support you today.    Hunger Vital Signs:  Within the past 12 months we worried whether our food would run out before we got money to buy more. Never  Within the past 12 months the food we bought just didn't last and we didn't have money to get more. Never    Bronwyn Maria CMA(AAMA)..................10/5/2022   8:17 AM

## 2022-10-05 NOTE — PROGRESS NOTES
[facility] AND [facility]  [address]  [place] [address]  Phone: [phone]  Primary Provider: Abena Adkins  Pre-op Performing Provider: MEGAN KAUFMAN    PREOPERATIVE EVALUATION:  Today's date: 10/5/2022    Alessandra Aiken is a 62 year old female who presents for a preoperative evaluation.    Surgical Information:  Surgery/Procedure:  Thumb Surgery and carpal tunnel release  Surgery Location:      [facility]  Surgeon:                    Dr. Sylvester  Surgery Date:            10/10/22   Time of Surgery:  TBD  Where patient plans to recover: At home with family  Fax number for surgical facility: [facility] in [place]    Type of Anesthesia Anticipated: to be determined    Assessment & Plan     The proposed surgical procedure is considered INTERMEDIATE risk.    Preop general physical exam  - CBC with Platelets & Differential ([facility] Only)  - Comprehensive Metabolic Panel  - EKG 12-lead, tracing only  - Asymptomatic COVID-19 Virus (Coronavirus) by PCR    Need for vaccination      Risks and Recommendations:  The patient has the following additional risks and recommendations for perioperative complications:   - No identified additional risk factors other than previously addressed    Medication Instructions:  Patient is to take all scheduled medications on the day of surgery EXCEPT for modifications listed below:  NO ibuprofen, Aleve, or supplements.    RECOMMENDATION:  APPROVAL GIVEN to proceed with proposed procedure, without further diagnostic evaluation.    30 minutes spent on the date of the encounter doing chart review, history and exam, documentation and further activities per the note    Subjective     HPI related to upcoming procedure: thumb pain and carpal tunnel syndrome    Preop Questions 9/30/2022   1. Have you ever had a heart attack or stroke? No   2. Have you ever had surgery on your heart or blood vessels, such as a stent placement, a  coronary artery bypass, or surgery on an artery in your head, neck, heart, or legs? No   3. Do you have chest pain with activity? No   4. Do you have a history of  heart failure? No   5. Do you currently have a cold, bronchitis or symptoms of other infection? No   6. Do you have a cough, shortness of breath, or wheezing? No   7. Do you or anyone in your family have previous history of blood clots? No   8. Do you or does anyone in your family have a serious bleeding problem such as prolonged bleeding following surgeries or cuts? No   9. Have you ever had problems with anemia or been told to take iron pills? No   10. Have you had any abnormal blood loss such as black, tarry or bloody stools, or abnormal vaginal bleeding? No   11. Have you ever had a blood transfusion? No   12. Are you willing to have a blood transfusion if it is medically needed before, during, or after your surgery? Yes   13. Have you or any of your relatives ever had problems with anesthesia? No   14. Do you have sleep apnea, excessive snoring or daytime drowsiness? No   15. Do you have any artifical heart valves or other implanted medical devices like a pacemaker, defibrillator, or continuous glucose monitor? No   16. Do you have artificial joints? No   17. Are you allergic to latex? No     Health Care Directive:  Patient does not have a Health Care Directive or Living Will: Discussed advance care planning with patient; however, patient declined at this time.    Preoperative Review of :   reviewed - controlled substances reflected in medication list.     Status of Chronic Conditions:  See problem list for active medical problems.  Problems all longstanding and stable, except as noted/documented.  See ROS for pertinent symptoms related to these conditions.    Review of Systems  CONSTITUTIONAL: NEGATIVE for fever, chills, change in weight  ENT/MOUTH: NEGATIVE for ear, mouth and throat problems  RESP: NEGATIVE for significant cough or SOB  CV:  NEGATIVE for chest pain, palpitations or peripheral edema  ROS otherwise negative    Patient Active Problem List    Diagnosis Date Noted     Sinus symptom 03/19/2021     Priority: Medium     S/P vaginal hysterectomy 08/09/2018     Priority: Medium     Chronic insomnia 10/30/2017     Priority: Medium     Migraine without aura 10/30/2017     Priority: Medium     Overview:   triggers are alcohol and dairy products       Chronic sinusitis 01/11/2017     Priority: Medium     Hypertension 05/13/2015     Priority: Medium      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    Right     Ureterolithiasis 12/04/2014    right pyelo, mod right hydro, 2 mm right renal stone     Past Surgical History:   Procedure Laterality Date     ARTHROSCOPY SHOULDER  07/2009     AS NASAL/SINUS SCOPE W SPHENOIDOTOMY      2011/2016 Right for mycetoma removal at [facility]     COLONOSCOPY  06/05/2012    Sigmoid diverticulosis; follow up 10 years     CYSTOSCOPY N/A 8/9/2018    Procedure: CYSTOSCOPY;;  Surgeon: Ashleigh Fregoso MD;  Location:  OR     ENDOSCOPIC SINUS SURGERY      repeat clearing     HYSTERECTOMY VAGINAL Bilateral 8/9/2018    Procedure: HYSTERECTOMY VAGINAL;  Total Vaginal Hysterectomy & Bilateral Salpingectomy, Cystoscopy;  Surgeon: Ashleigh Fregoso MD;  Location:  OR     LEEP TX, CERVICAL  04/18/2018    Dr Fregoso     MYRINGOTOMY, INSERT TUBE, COMBINED  09/2016     Current Outpatient Medications   Medication Sig Dispense Refill     eszopiclone (LUNESTA) 1 MG tablet Take  "1-2 tablets (1-2 mg) by mouth At Bedtime 60 tablet 5     LORazepam (ATIVAN) 0.5 MG tablet Take 1 tablet (0.5 mg) by mouth nightly as needed for anxiety or sleep 30 tablet 5     SUMAtriptan (IMITREX) 100 MG tablet Take 1 tablet (100 mg) by mouth every 2 hours as needed for migraine . maxillary dose: 200mg per 24 hrs. 9 tablet 11     Allergies   Allergen Reactions     Fentanyl Anxiety and Itching     Patient describes feeling creepy/crawly to Dr. Saenz.       Social History     Tobacco Use     Smoking status: Never Smoker     Smokeless tobacco: Never Used   Substance Use Topics     Alcohol use: No     Alcohol/week: 0.0 standard drinks     Comment: Alcoholic Drinks/day: very rarely     Family History   Problem Relation Age of Onset     No Known Problems Mother      No Known Problems Father      Depression Brother      Septicemia Brother      No Known Problems Brother      No Known Problems Brother      Heart Disease Paternal Grandfather         Heart Disease,MI at 65     Other - See Comments No family hx of         History is negative for diabetes, thyroid problems, cancer, anxiety, depression and asthma     Breast Cancer No family hx of         Cancer-breast     History   Drug Use No         Objective     BP (!) 162/98 (BP Location: Right arm, Patient Position: Sitting, Cuff Size: Adult Regular)   Pulse 58   Temp 97.7  F (36.5  C) (Temporal)   Resp 14   Ht 1.664 m (5' 5.5\")   Wt 61.7 kg (136 lb)   LMP 03/07/2003   SpO2 97%   BMI 22.29 kg/m      Physical Exam  GENERAL APPEARANCE: healthy, alert and no distress  RESP: lungs clear to auscultation - no rales, rhonchi or wheezes  CV: regular rate and rhythm, normal S1 S2, no S3 or S4 and no murmur, click or rub   ABDOMEN: soft, nontender, no HSM or masses and bowel sounds normal  NEURO: Normal strength and tone, sensory exam grossly normal, mentation intact and speech normal    Recent Labs   Lab Test 11/12/21  1138   HGB 13.3         POTASSIUM 4.1   CR " 0.88      Diagnostics:  Results for orders placed or performed during the hospital encounter of 10/05/22   MR Shoulder Right w/o Contrast     Status: None    Narrative    Exam: MR SHOULDER RIGHT W/O CONTRAST    HISTORY:  Right shoulder pain; Instability of shoulder joint, right;  Weakness.     Technique: Axial, coronal and sagittal images were obtained with  combination of T1, proton density and T2-weighted images.    Findings: There is only very mild hypertrophic change in the  acromioclavicular joint. Acromiohumeral distance is normal.    No full-thickness tear of the rotator cuff is seen. There is mild  undersurface tearing of the distal supraspinatus tendon with some  tendinosis in the adjacent supraspinatus tendon. Remainder the cuff is  intact. There is no significant muscle atrophy.    Biceps tendon is normally positioned in the bicipital groove. The  intra-articular portion of the tendon is intact. No definite labral  tear is seen.             Impression    Impression: Tendinosis supraspinatus tendon with some mild  undersurface tearing of the distal supraspinatus tendon but no  full-thickness tear.    OLU HOGAN MD         SYSTEM ID:  [id number]   Results for orders placed or performed in visit on 10/05/22   Comprehensive Metabolic Panel     Status: Normal   Result Value Ref Range    Sodium 141 134 - 144 mmol/L    Potassium 4.0 3.5 - 5.1 mmol/L    Chloride 105 98 - 107 mmol/L    Carbon Dioxide (CO2) 30 21 - 31 mmol/L    Anion Gap 6 3 - 14 mmol/L    Urea Nitrogen 13 7 - 25 mg/dL    Creatinine 0.87 0.60 - 1.20 mg/dL    Calcium 9.8 8.6 - 10.3 mg/dL    Glucose 95 70 - 105 mg/dL    Alkaline Phosphatase 63 34 - 104 U/L    AST 16 13 - 39 U/L    ALT 10 7 - 52 U/L    Protein Total 7.2 6.4 - 8.9 g/dL    Albumin 4.5 3.5 - 5.7 g/dL    Bilirubin Total 0.7 0.3 - 1.0 mg/dL    GFR Estimate 75 >60 mL/min/1.73m2   CBC with platelets and differential     Status: Abnormal   Result Value Ref Range    WBC Count 3.6 (L)  4.0 - 11.0 10e3/uL    RBC Count 4.68 3.80 - 5.20 10e6/uL    Hemoglobin 13.6 11.7 - 15.7 g/dL    Hematocrit 39.5 35.0 - 47.0 %    MCV 84 78 - 100 fL    MCH 29.1 26.5 - 33.0 pg    MCHC 34.4 31.5 - 36.5 g/dL    RDW 12.2 10.0 - 15.0 %    Platelet Count 174 150 - 450 10e3/uL    % Neutrophils 63 %    % Lymphocytes 29 %    % Monocytes 6 %    % Eosinophils 1 %    % Basophils 1 %    % Immature Granulocytes 0 %    NRBCs per 100 WBC 0 <1 /100    Absolute Neutrophils 2.3 1.6 - 8.3 10e3/uL    Absolute Lymphocytes 1.0 0.8 - 5.3 10e3/uL    Absolute Monocytes 0.2 0.0 - 1.3 10e3/uL    Absolute Eosinophils 0.0 0.0 - 0.7 10e3/uL    Absolute Basophils 0.0 0.0 - 0.2 10e3/uL    Absolute Immature Granulocytes 0.0 <=0.4 10e3/uL    Absolute NRBCs 0.0 10e3/uL   Asymptomatic COVID-19 Virus (Coronavirus) by PCR Nose     Status: Normal    Specimen: Nose; Swab   Result Value Ref Range    SARS CoV2 PCR Negative Negative    Narrative    Testing was performed using the Xpert Xpress SARS-CoV-2 Assay on the  Cepheid Gene-Xpert Instrument Systems. Additional information about  this Emergency Use Authorization (EUA) assay can be found via the Lab  Guide. This test should be ordered for the detection of SARS-CoV-2 in  individuals who meet SARS-CoV-2 clinical and/or epidemiological  criteria. Test performance is unknown in asymptomatic patients. This  test is for in vitro diagnostic use under the FDA EUA for  laboratories certified under CLIA to perform high complexity testing.  This test has not been FDA cleared or approved. A negative result  does not rule out the presence of PCR inhibitors in the specimen or  target RNA in concentration below the limit of detection for the  assay. The possibility of a false negative should be considered if  the patient's recent exposure or clinical presentation suggests  COVID-19. This test was validated by the [facility]. This laboratory is certified under  the  Clinical Laboratory Improvement Amendments of 1988 (CLIA-88) as  qualified to perform high complexity laboratory testing.     EKG 12-lead, tracing only     Status: None   Result Value Ref Range    Systolic Blood Pressure  mmHg    Diastolic Blood Pressure  mmHg    Ventricular Rate 59 BPM    Atrial Rate 59 BPM    MD Interval 156 ms    QRS Duration 78 ms     ms    QTc 394 ms    P Axis 59 degrees    R AXIS 24 degrees    T Axis 39 degrees    Interpretation ECG       Sinus bradycardia  Otherwise normal ECG  No previous ECGs available  Confirmed by MD BRITO DARIN ([id number]) on 10/5/2022 4:33:30 PM     CBC with Platelets & Differential ([facility] Only)     Status: Abnormal    Narrative    The following orders were created for panel order CBC with Platelets & Differential ([facility] Only).  Procedure                               Abnormality         Status                     ---------                               -----------         ------                     CBC with platelets and d...[[id number]]  Abnormal            Final result                 Please view results for these tests on the individual orders.       EKG: sinus bradycardia    Revised Cardiac Risk Index (RCRI):  The patient has the following serious cardiovascular risks for perioperative complications:   - No serious cardiac risks = 0 points     RCRI Interpretation: 0 points: Class I (very low risk - 0.4% complication rate)     Signed Electronically by: Leny Higginbotham NP  Copy of this evaluation report is provided to requesting physician.

## 2022-10-05 NOTE — PATIENT INSTRUCTIONS
Preparing for Your Surgery  Getting started  A nurse will call you to review your health history and instructions. They will give you an arrival time based on your scheduled surgery time. Please be ready to share:    Your doctor's clinic name and phone number    Your medical, surgical and anesthesia history    A list of allergies and sensitivities    A list of medicines, including herbal treatments and over-the-counter drugs    Whether the patient has a legal guardian (ask how to send us the papers in advance)  Please tell us if you're pregnant--or if there's any chance you might be pregnant. Some surgeries may injure a fetus (unborn baby), so they require a pregnancy test. Surgeries that are safe for a fetus don't always need a test, and you can choose whether to have one.   If you have a child who's having surgery, please ask for a copy of Preparing for Your Child's Surgery.    Preparing for surgery    Within 10 to 30 days of surgery: Have a pre-op exam (sometimes called an H&P, or History and Physical). This can be done at a clinic or pre-operative center.  ? If you're having a , you may not need this exam. Talk to your care team.    At your pre-op exam, talk to your care team about all medicines you take. If you need to stop any medicines before surgery, ask when to start taking them again.  ? We do this for your safety. Many medicines can make you bleed too much during surgery. Some change how well surgery (anesthesia) drugs work.    Call your insurance company to let them know you're having surgery. (If you don't have insurance, call [phone].)    Call your clinic if there's any change in your health. This includes signs of a cold or flu (sore throat, runny nose, cough, rash, fever). It also includes a scrape or scratch near the surgery site.    If you have questions on the day of surgery, call your hospital or surgery center.  COVID testing  You may need to be tested for COVID-19 before having  surgery. If so, we will give you instructions (or click here).  Eating and drinking guidelines  For your safety: Unless your surgeon tells you otherwise, follow the guidelines below.    Eat and drink as usual until 8 hours before surgery. After that, no food or milk.    Drink clear liquids until 2 hours before surgery. These are liquids you can see through, like water, Gatorade and Propel Water. You may also have black coffee and tea (no cream or milk).    Nothing by mouth within 2 hours of surgery. This includes gum, candy and breath mints.    If you drink alcohol: Stop drinking it the night before surgery.    If your care team tells you to take medicine on the morning of surgery, it's okay to take it with a sip of water.  Preventing infection    Shower or bathe the night before and morning of your surgery. Follow the instructions your clinic gave you. (If no instructions, use regular soap.)    Don't shave or clip hair near your surgery site. We'll remove the hair if needed.    Don't smoke or vape the morning of surgery. You may chew nicotine gum up to 2 hours before surgery. A nicotine patch is okay.  ? Note: Some surgeries require you to completely quit smoking and nicotine. Check with your surgeon.    Your care team will make every effort to keep you safe from infection. We will:  ? Clean our hands often with soap and water (or an alcohol-based hand rub).  ? Clean the skin at your surgery site with a special soap that kills germs.  ? Give you a special gown to keep you warm. (Cold raises the risk of infection.)  ? Wear special hair covers, masks, gowns and gloves during surgery.  ? Give antibiotic medicine, if prescribed. Not all surgeries need antibiotics.  What to bring on the day of surgery    Photo ID and insurance card    Copy of your health care directive, if you have one    Glasses and hearing aides (bring cases)  ? You can't wear contacts during surgery    Inhaler and eye drops, if you use them (tell us  about these when you arrive)    CPAP machine or breathing device, if you use them    A few personal items, if spending the night    If you have . . .  ? A pacemaker, ICD (cardiac defibrillator) or other implant: Bring the ID card.  ? An implanted stimulator: Bring the remote control.  ? A legal guardian: Bring a copy of the certified (court-stamped) guardianship papers.  Please remove any jewelry, including body piercings. Leave jewelry and other valuables at home.  If you're going home the day of surgery    You must have a responsible adult drive you home. They should stay with you overnight as well.    If you don't have someone to stay with you, and you aren't safe to go home alone, we may keep you overnight. Insurance often won't pay for this.  After surgery  If it's hard to control your pain or you need more pain medicine, please call your surgeon's office.  Questions?   If you have any questions for your care team, list them here: _________________________________________________________________________________________________________________________________________________________________________ ____________________________________ ____________________________________ ____________________________________  For informational purposes only. Not to replace the advice of your health care provider. Copyright   2003, 2019 [facility]. All rights reserved. Clinically reviewed by Tracie Palmer MD. [employer] [id number] - REV 07/22.

## 2022-10-06 LAB — SARS-COV-2 RNA RESP QL NAA+PROBE: NEGATIVE

## 2022-10-07 ENCOUNTER — MYC MEDICAL ADVICE (OUTPATIENT)
Dept: INTERNAL MEDICINE | Facility: OTHER | Age: 62
End: 2022-10-07

## 2022-10-07 DIAGNOSIS — I10 ESSENTIAL HYPERTENSION: Primary | ICD-10-CM

## 2022-10-07 NOTE — TELEPHONE ENCOUNTER
Patient is having surgery Monday and BP was high at pre-op. Patient was told to take a couple times Thursday and Friday. Advised patient to contact [facility] and report her BP readings to them for Anesthesia to review and plan accordingly. Mirna Krishnan RN on 10/7/2022 at 12:49 PM

## 2022-10-09 RX ORDER — HYDROCHLOROTHIAZIDE 12.5 MG/1
12.5-25 TABLET ORAL DAILY
Qty: 90 TABLET | Refills: 1 | Status: SHIPPED | OUTPATIENT
Start: 2022-10-09 | End: 2023-01-16

## 2022-10-09 RX ORDER — HYDROCHLOROTHIAZIDE 12.5 MG/1
12.5-25 TABLET ORAL DAILY
Qty: 90 TABLET | Refills: 1 | Status: SHIPPED | OUTPATIENT
Start: 2022-10-09 | End: 2022-10-09

## 2022-10-17 LAB — NONINV COLON CA DNA+OCC BLD SCRN STL QL: NEGATIVE

## 2022-10-21 ENCOUNTER — TRANSFERRED RECORDS (OUTPATIENT)
Dept: HEALTH INFORMATION MANAGEMENT | Facility: OTHER | Age: 62
End: 2022-10-21

## 2022-11-18 ENCOUNTER — OFFICE VISIT (OUTPATIENT)
Dept: ORTHOPEDICS | Facility: OTHER | Age: 62
End: 2022-11-18
Attending: SPECIALIST
Payer: COMMERCIAL

## 2022-11-18 DIAGNOSIS — M18.9 ARTHROSIS OF FIRST CARPOMETACARPAL JOINT: Primary | ICD-10-CM

## 2022-11-18 PROCEDURE — 250N000011 HC RX IP 250 OP 636: Performed by: SPECIALIST

## 2022-11-18 PROCEDURE — 20600 DRAIN/INJ JOINT/BURSA W/O US: CPT | Mod: LT | Performed by: SPECIALIST

## 2022-11-18 PROCEDURE — 250N000009 HC RX 250: Performed by: SPECIALIST

## 2022-11-18 RX ORDER — METHYLPREDNISOLONE ACETATE 40 MG/ML
40 INJECTION, SUSPENSION INTRA-ARTICULAR; INTRALESIONAL; INTRAMUSCULAR; SOFT TISSUE ONCE
Status: COMPLETED | OUTPATIENT
Start: 2022-11-18 | End: 2022-11-18

## 2022-11-18 RX ORDER — BUPIVACAINE HYDROCHLORIDE 5 MG/ML
1 INJECTION, SOLUTION EPIDURAL; INTRACAUDAL ONCE
Status: COMPLETED | OUTPATIENT
Start: 2022-11-18 | End: 2022-11-18

## 2022-11-18 RX ADMIN — BUPIVACAINE HYDROCHLORIDE 5 MG: 5 INJECTION, SOLUTION EPIDURAL; INTRACAUDAL; PERINEURAL at 09:05

## 2022-11-18 RX ADMIN — METHYLPREDNISOLONE ACETATE 40 MG: 40 INJECTION, SUSPENSION INTRA-ARTICULAR; INTRALESIONAL; INTRAMUSCULAR; INTRASYNOVIAL; SOFT TISSUE at 09:05

## 2022-11-18 NOTE — PROGRESS NOTES
Patient is here for follow up on her right thumb.  Jacquelin Benedict LPN .....................11/18/2022 9:00 AM    
Visit Date: 2022    SUBJECTIVE:  Jenny Aiken returns for followup.  She is status post right endoscopic carpal tunnel release and basilar joint reconstruction on the right.  Her date of surgery was 10/10/2022.  She is about 5-1/2 weeks out.  Overall, she is doing well.  She is requesting an injection of the left basilar joint last performed in July.    PHYSICAL EXAMINATION:  Examination today shows all incisions to be healing nicely.  She has tenderness with palpation over the basilar joint on the left.    IMPRESSION AND PLAN:    1.  Status post right thumb basilar joint reconstruction with endoscopic carpal tunnel release, 10/10/2022, doing well.  At this point, we will follow her clinically.  Repeat examination in about 4 weeks, sooner if any problems occur.  2.  Left thumb basilar joint arthrosis.  Plan will be to do an injection.  Sterile prep was performed, and left basilar joint was injected with 40 mg of Depo-Medrol and 1 mL of 0.5% Marcaine with epinephrine.  There were no complications.  Our plan will be to follow her clinically with repeat examination if symptoms warrant it.    Chris Sylvester MD        D: 2022   T: 2022   MT: RYAN    Name:     MOUNA AIKEN  MRN:      [id number]        Account:    [id number]   :      1960           Visit Date: 2022     Document: [id number]  
(4) no limitation

## 2022-12-09 ENCOUNTER — TELEPHONE (OUTPATIENT)
Dept: ORTHOPEDICS | Facility: OTHER | Age: 62
End: 2022-12-09

## 2022-12-14 ENCOUNTER — MYC MEDICAL ADVICE (OUTPATIENT)
Dept: INTERNAL MEDICINE | Facility: OTHER | Age: 62
End: 2022-12-14

## 2022-12-14 DIAGNOSIS — F51.04 CHRONIC INSOMNIA: ICD-10-CM

## 2022-12-14 DIAGNOSIS — I10 ESSENTIAL HYPERTENSION: ICD-10-CM

## 2022-12-14 RX ORDER — HYDROCHLOROTHIAZIDE 12.5 MG/1
12.5-25 TABLET ORAL DAILY
Qty: 90 TABLET | Refills: 1 | Status: CANCELLED | OUTPATIENT
Start: 2022-12-14

## 2022-12-14 NOTE — TELEPHONE ENCOUNTER
Has an appointment for physical on 1/16/23.  Bronwyn Maria CMA([facility])..................12/14/2022   1:26 PM

## 2022-12-15 RX ORDER — LORAZEPAM 0.5 MG/1
0.5 TABLET ORAL
Qty: 30 TABLET | Refills: 1 | Status: SHIPPED | OUTPATIENT
Start: 2022-12-15 | End: 2023-01-16

## 2022-12-15 RX ORDER — ESZOPICLONE 1 MG/1
1-2 TABLET, FILM COATED ORAL AT BEDTIME
Qty: 60 TABLET | Refills: 0 | Status: SHIPPED | OUTPATIENT
Start: 2022-12-15 | End: 2023-01-16

## 2022-12-16 ENCOUNTER — OFFICE VISIT (OUTPATIENT)
Dept: ORTHOPEDICS | Facility: OTHER | Age: 62
End: 2022-12-16
Attending: SPECIALIST
Payer: COMMERCIAL

## 2022-12-16 DIAGNOSIS — M18.9 ARTHROSIS OF FIRST CARPOMETACARPAL JOINT: Primary | ICD-10-CM

## 2022-12-16 PROCEDURE — 99024 POSTOP FOLLOW-UP VISIT: CPT | Performed by: SPECIALIST

## 2022-12-16 RX ORDER — HYDROCODONE BITARTRATE AND ACETAMINOPHEN 5; 325 MG/1; MG/1
1 TABLET ORAL EVERY 6 HOURS PRN
Qty: 10 TABLET | Refills: 0 | Status: SHIPPED | OUTPATIENT
Start: 2022-12-16 | End: 2022-12-21

## 2022-12-16 NOTE — PROGRESS NOTES
Patient is here for follow up on her right thumb.  Jacquelin Benedict LPN .....................12/16/2022 11:18 AM

## 2022-12-17 NOTE — PROGRESS NOTES
Visit Date: 2022    HISTORY OF PRESENT ILLNESS:  Alessandra returns for followup 9-1/2 weeks status post basal joint reconstruction on the right with endoscopic carpal tunnel release.  Recently, she has been having some neuritic type pain.  She would like to avoid pain medications.  She is going on a trip with her  and has requested a short supply in case she needs it.    PHYSICAL EXAMINATION:  Shows neuritic sensation issues with stroke and on the distribution of the superficial radial nerve.  Her digital range of motion is mildly limited.  There is no evidence of infection.    IMPRESSION:  Status post right endoscopic carpal tunnel release with basilar joint reconstruction 9-1/2 weeks out.  I did give her 10 pain pills as she will be driving to [place] to visit family.  I would like to see her in 4 weeks, hopefully at that point, her symptoms have improved.    Chris Sylvester MD        D: 2022   T: 2022   MT: GAUTAM    Name:     ALESSANDRA ABDUL  MRN:      [id number]        Account:    [id number]   :      1960           Visit Date: 2022     Document: [id number]

## 2023-01-13 ENCOUNTER — TRANSFERRED RECORDS (OUTPATIENT)
Dept: HEALTH INFORMATION MANAGEMENT | Facility: OTHER | Age: 63
End: 2023-01-13
Payer: COMMERCIAL

## 2023-01-13 ASSESSMENT — ENCOUNTER SYMPTOMS
PARESTHESIAS: 0
EYE PAIN: 0
FEVER: 0
HEADACHES: 0
NERVOUS/ANXIOUS: 0
DYSURIA: 0
ARTHRALGIAS: 0
WEAKNESS: 0
NAUSEA: 0
JOINT SWELLING: 0
HEMATOCHEZIA: 0
COUGH: 0
DIZZINESS: 0
DIARRHEA: 0
CHILLS: 0
BREAST MASS: 0
HEMATURIA: 0
FREQUENCY: 0
SHORTNESS OF BREATH: 0
MYALGIAS: 0
ABDOMINAL PAIN: 0
PALPITATIONS: 0
SORE THROAT: 0
CONSTIPATION: 0
HEARTBURN: 0

## 2023-01-15 ENCOUNTER — HEALTH MAINTENANCE LETTER (OUTPATIENT)
Age: 63
End: 2023-01-15

## 2023-01-16 ENCOUNTER — OFFICE VISIT (OUTPATIENT)
Dept: INTERNAL MEDICINE | Facility: OTHER | Age: 63
End: 2023-01-16
Attending: INTERNAL MEDICINE
Payer: COMMERCIAL

## 2023-01-16 VITALS
HEIGHT: 66 IN | RESPIRATION RATE: 12 BRPM | DIASTOLIC BLOOD PRESSURE: 84 MMHG | TEMPERATURE: 96.5 F | WEIGHT: 139 LBS | HEART RATE: 68 BPM | BODY MASS INDEX: 22.34 KG/M2 | SYSTOLIC BLOOD PRESSURE: 116 MMHG | OXYGEN SATURATION: 99 %

## 2023-01-16 DIAGNOSIS — L98.9 SKIN LESION OF CHEST WALL: ICD-10-CM

## 2023-01-16 DIAGNOSIS — Z12.31 ENCOUNTER FOR SCREENING MAMMOGRAM FOR BREAST CANCER: Primary | ICD-10-CM

## 2023-01-16 DIAGNOSIS — F51.04 CHRONIC INSOMNIA: ICD-10-CM

## 2023-01-16 DIAGNOSIS — I10 ESSENTIAL HYPERTENSION: ICD-10-CM

## 2023-01-16 DIAGNOSIS — G43.009 MIGRAINE WITHOUT AURA AND WITHOUT STATUS MIGRAINOSUS, NOT INTRACTABLE: ICD-10-CM

## 2023-01-16 PROCEDURE — 99396 PREV VISIT EST AGE 40-64: CPT | Performed by: INTERNAL MEDICINE

## 2023-01-16 RX ORDER — ESZOPICLONE 1 MG/1
1-2 TABLET, FILM COATED ORAL AT BEDTIME
Qty: 60 TABLET | Refills: 5 | Status: SHIPPED | OUTPATIENT
Start: 2023-01-16 | End: 2023-07-20

## 2023-01-16 RX ORDER — HYDROCHLOROTHIAZIDE 25 MG/1
25 TABLET ORAL DAILY
Qty: 90 TABLET | Refills: 4 | Status: SHIPPED | OUTPATIENT
Start: 2023-01-16 | End: 2024-01-25

## 2023-01-16 RX ORDER — LORAZEPAM 0.5 MG/1
0.5 TABLET ORAL
Qty: 30 TABLET | Refills: 5 | Status: SHIPPED | OUTPATIENT
Start: 2023-01-16 | End: 2023-07-20

## 2023-01-16 ASSESSMENT — ENCOUNTER SYMPTOMS
HEARTBURN: 0
BREAST MASS: 0
FEVER: 0
MYALGIAS: 0
FREQUENCY: 0
HEADACHES: 0
DYSURIA: 0
SORE THROAT: 0
DIZZINESS: 0
PALPITATIONS: 0
CONSTIPATION: 0
SHORTNESS OF BREATH: 0
JOINT SWELLING: 0
NERVOUS/ANXIOUS: 0
ARTHRALGIAS: 0
NAUSEA: 0
DIARRHEA: 0
COUGH: 0
ABDOMINAL PAIN: 0
EYE PAIN: 0
PARESTHESIAS: 0
WEAKNESS: 0
CHILLS: 0
HEMATURIA: 0
HEMATOCHEZIA: 0

## 2023-01-16 ASSESSMENT — PAIN SCALES - GENERAL: PAINLEVEL: MILD PAIN (3)

## 2023-01-16 NOTE — NURSING NOTE
Patient presents to clinic today for annual physical.     Medication review completed.    Advanced Care Planning discussed/reviewed.    FOOD SECURITY SCREENING QUESTIONS    The next two questions are to help us understand your food security.  If you are feeling you need any assistance in this area, we have resources available to support you today.    Hunger Vital Signs:  Within the past 12 months we worried whether our food would run out before we got money to buy more. Never  Within the past 12 months the food we bought just didn't last and we didn't have money to get more. Never    Bronwyn Maria CMA(AAMA)..................1/16/2023   4:06 PM

## 2023-01-16 NOTE — PROGRESS NOTES
SUBJECTIVE:   CC: Alessandra is an 62 year old who presents for preventive health visit.   Patient has been advised of split billing requirements and indicates understanding: Yes     Patient has had a multitude of family issues recently.  Her mom was recently hospitalized and they do think she had possibly has pancreatic cancer.  This has been causing a lot of stress for her.    She has been caring for her brother who has ongoing medical needs.    She has a history of hypertension and has been taking 25 mg of hydrochlorothiazide.  She denies any obvious side effects.  Her blood pressure today is well controlled.    She has had ongoing issues with insomnia and anxiety.  Her current medications have been adequate.  She denies any obvious side effects    She has a skin lesion on her chest that she would like looked at.    She has not had any ongoing issues with migraines since controlling her blood pressure.    She is due for a mammogram.  She declines vaccination.    Healthy Habits:     Getting at least 3 servings of Calcium per day:  Yes    Bi-annual eye exam:  Yes    Dental care twice a year:  NO    Sleep apnea or symptoms of sleep apnea:  None    Diet:  Regular (no restrictions)    Frequency of exercise:  None    Taking medications regularly:  Yes    Medication side effects:  None    PHQ-2 Total Score: 0    Additional concerns today:  No    Today's PHQ-2 Score:   PHQ-2 ( 1999 Pfizer) 1/13/2023   Q1: Little interest or pleasure in doing things 0   Q2: Feeling down, depressed or hopeless 0   PHQ-2 Score 0   PHQ-2 Total Score (12-17 Years)- Positive if 3 or more points; Administer PHQ-A if positive -   Q1: Little interest or pleasure in doing things Not at all   Q2: Feeling down, depressed or hopeless Not at all   PHQ-2 Score 0       Social History     Tobacco Use     Smoking status: Never     Smokeless tobacco: Never   Substance Use Topics     Alcohol use: No     Alcohol/week: 0.0 standard drinks     Comment:  Alcoholic Drinks/day: very rarely         Alcohol Use 1/13/2023   Prescreen: >3 drinks/day or >7 drinks/week? No       Reviewed orders with patient.  Reviewed health maintenance and updated orders accordingly - Yes    Breast Cancer Screening:  Any new diagnosis of family breast, ovarian, or bowel cancer? No    FHS-7:   Breast CA Risk Assessment (FHS-7) 12/7/2021   Did any of your first-degree relatives have breast or ovarian cancer? No   Did any of your relatives have bilateral breast cancer? No   Did any man in your family have breast cancer? No   Did any woman in your family have breast and ovarian cancer? No   Did any woman in your family have breast cancer before age 50 y? No   Do you have 2 or more relatives with breast and/or ovarian cancer? No   Do you have 2 or more relatives with breast and/or bowel cancer? No     Mammogram Screening: Recommended annual mammography  Pertinent mammograms are reviewed under the imaging tab.    History of abnormal Pap smear: Pap has been abnormal of most recent.  She has followed with gynecology.  PAP / HPV Latest Ref Rng & Units 7/31/2020 7/26/2019   PAP (Historical) - NIL NIL   HPV16 NEG:Negative Negative Negative   HPV18 NEG:Negative Negative Negative   HRHPV NEG:Negative Positive(A) Positive(A)     Reviewed and updated as needed this visit by clinical staff   Tobacco  Allergies  Meds  Problems  Med Hx  Surg Hx  Fam Hx  Soc   Hx        Reviewed and updated as needed this visit by Provider   Tobacco  Allergies  Meds  Problems  Med Hx  Surg Hx  Fam Hx         Current Outpatient Medications   Medication     eszopiclone (LUNESTA) 1 MG tablet     hydrochlorothiazide (HYDRODIURIL) 25 MG tablet     LORazepam (ATIVAN) 0.5 MG tablet     SUMAtriptan (IMITREX) 100 MG tablet     No current facility-administered medications for this visit.     Review of Systems   Constitutional: Negative for chills and fever.   HENT: Negative for congestion, ear pain, hearing loss and sore  "throat.    Eyes: Negative for pain and visual disturbance.   Respiratory: Negative for cough and shortness of breath.    Cardiovascular: Negative for chest pain, palpitations and peripheral edema.   Gastrointestinal: Negative for abdominal pain, constipation, diarrhea, heartburn, hematochezia and nausea.   Breasts:  Negative for tenderness, breast mass and discharge.   Genitourinary: Negative for dysuria, frequency, genital sores, hematuria, pelvic pain, urgency, vaginal bleeding and vaginal discharge.   Musculoskeletal: Negative for arthralgias, joint swelling and myalgias.   Skin: Negative for rash.   Neurological: Negative for dizziness, weakness, headaches and paresthesias.   Psychiatric/Behavioral: Negative for mood changes. The patient is not nervous/anxious.       OBJECTIVE:   /84 (BP Location: Right arm, Patient Position: Sitting, Cuff Size: Adult Regular)   Pulse 68   Temp (!) 96.5  F (35.8  C) (Temporal)   Resp 12   Ht 1.676 m (5' 6\")   Wt 63 kg (139 lb)   LMP 03/07/2003   SpO2 99%   BMI 22.44 kg/m    Physical Exam  GEN: Vitals reviewed. Healthy appearing. Patient is in no acute distress. Cooperative with exam.  HEENT: Normocephalic atraumatic.  Eyes grossly normal to inspection.  No discharge or erythema, or obvious scleral/conjunctival abnormalities. Oropharynx with no erythema or exudates. Dentition adequate.  EACs clear bilaterally, TM gray with normal landmarks.  NECK: Supple; no thyromegaly or masses noted.  No cervical or supraclavicular lymphadenopathy.  CV: Heart regular in rate and rhythm with no murmur.    LUNGS: No audible wheeze, cough, or visible cyanosis.  No visible retractions or increased work of breathing.  Lungs clear to auscultation bilaterally.    ABD: Nondistended  SKIN: Warm and dry to touch.  Visible skin clear. No significant rash, abnormal pigmentation.  Anterior chest with area a flesh-colored lesion protruding consistent with advanced actinic keratosis versus " early squamous cell versus other carcinoma  EXT: No clubbing or cyanosis.  No peripheral edema.  NEURO: Alert and oriented to person, place, and time.  Cranial nerves II-XII grossly intact with no focal or lateralizing deficits.  Muscle tone normal.  Gait normal. No tremor.   MSK: ROM of upper and lower ext symmetric and full.  PSYCH: Mood is good.  Mentation appears normal, affect normal/bright, judgement and insight intact, normal speech and appearance well-groomed.      Diagnostic Test Results:  Labs reviewed in Epic -including CBC and CMP from October.    ASSESSMENT/PLAN:   1. Chronic insomnia  - Controlled.  Continue current regimen.    - eszopiclone (LUNESTA) 1 MG tablet; Take 1-2 tablets (1-2 mg) by mouth At Bedtime  Dispense: 60 tablet; Refill: 5  - LORazepam (ATIVAN) 0.5 MG tablet; Take 1 tablet (0.5 mg) by mouth nightly as needed for anxiety or sleep  Dispense: 30 tablet; Refill: 5    2. Essential hypertension  - Blood pressure today of 116/84   is at the goal of <140/90 with no exacerbation.  - Continue current regimen.  Instructed to check BP at home.  - Cautioned patient to monitor with antibiotics, herbals and any OTC medications  - electrolytes and renal function done recently and ok  - hydrochlorothiazide (HYDRODIURIL) 25 MG tablet; Take 1 tablet (25 mg) by mouth daily  Dispense: 90 tablet; Refill: 4    3. Migraine without aura and without status migrainosus, not intractable  Denies need for refills at this time as she has not had ongoing migraine    4. Encounter for screening mammogram for breast cancer  - MA Screening Bilateral w/ Yadiel; Future    5. Skin lesion of chest wall  Referral placed to dermatology both to address the lesion on anterior chest and to get set up for regular skin checks given her skin to  - Adult Dermatology Referral; Future      COUNSELING:  Reviewed preventive health counseling, as reflected in patient instructions    She reports that she has never smoked. She has never used  smokeless tobacco.    Abena Adkins, [facility]

## 2023-01-20 ENCOUNTER — E-VISIT (OUTPATIENT)
Dept: INTERNAL MEDICINE | Facility: OTHER | Age: 63
End: 2023-01-20
Attending: NURSE PRACTITIONER
Payer: COMMERCIAL

## 2023-01-20 DIAGNOSIS — R30.0 DYSURIA: ICD-10-CM

## 2023-01-20 DIAGNOSIS — R35.0 URINARY FREQUENCY: ICD-10-CM

## 2023-01-20 PROCEDURE — 99421 OL DIG E/M SVC 5-10 MIN: CPT | Performed by: NURSE PRACTITIONER

## 2023-01-20 RX ORDER — ESZOPICLONE 1 MG/1
1-2 TABLET, FILM COATED ORAL AT BEDTIME
Qty: 60 TABLET | Refills: 0 | OUTPATIENT
Start: 2023-01-20

## 2023-01-20 NOTE — PATIENT INSTRUCTIONS
Dear Alessandra Aiken,     After reviewing your responses, I would like you to come in for a urine test to make sure we treat you correctly. This urine test is to evaluate you for a possible urinary tract infection, and should be scheduled for today or tomorrow. Schedule a Lab Only appointment here.     Lab appointments are not available at most locations on the weekends. If no Lab Only appointment is available, you should be seen in any of our convenient Walk-in or Urgent Care Centers, which can be found on our website here.     You will receive instructions with your results in [facility] once they are available.     If your symptoms worsen, you develop pain in your back or stomach, develop fevers, or are not improving in 5 days, please contact your primary care provider for an appointment or visit a Walk-in or Urgent Care Center to be seen.     Thanks again for choosing us as your health care partner,     Leny Higginbotham NP    Dysuria with Uncertain Cause (Adult)    The urethra is the tube that allows urine to pass out of the body. In a woman, the urethra is the opening above the vagina. In men, the urethra is the opening on the tip of the penis. Dysuria is the feeling of pain or burning in the urethra when passing urine.   Dysuria can be caused by anything that irritates or inflames the urethra. An infection or chemical irritation can cause this reaction. A bladder infection is the most common cause of dysuria in adults. A urine test can diagnose this. A bladder infection needs antibiotic treatment.   Soaps, lotions, colognes, and feminine hygiene products can cause dysuria. So can birth control jellies, creams, and foams. It will go away 1 to 3 days after using these irritants.   Sexually transmitted infections (STIs) such as chlamydia or gonorrhea can cause dysuria. Your healthcare provider may take a culture sample. Your provider may start you on antibiotic medicine before the culture test returns.   In women  who have gone through menopause, dysuria can be from dryness in the lining of the urethra. This can be treated with hormones. Dysuria becomes long-term (chronic) when it lasts for weeks or months. You may need to see a specialist (urologist) to diagnose and treat chronic dysuria.   Home care  These home care tips may help:    Don't use any chemicals or products that you think may be causing your symptoms.    If you were given a prescription medicine, take as directed. Take it until it is all used up.    If a culture was taken, don't have sex until you have been told that it is negative. A negative culture means you don't have an infection. Then follow your healthcare provider's advice to treat your condition.  If a culture was done and it is positive:     Both you and your sexual partner may need to be treated. This is true even if your partner has no symptoms.    Contact your healthcare provider or go to an urgent care clinic or the public health department to be looked at and treated.    Don't have sex until both you and your partner have finished all antibiotics and your healthcare provider says you are no longer contagious.    Learn about and use safe sex practices. The safest sex is with a partner who has tested negative and only has sex with you. Condoms can prevent STIs from spreading, but they aren't a guarantee.  Follow-up care  Follow up with your healthcare provider, or as advised. If a culture was taken, you may call as directed for the results. If you have an STI, follow up with your provider or the public health department for a complete STI screening, including HIV testing. For more information, contact CDC-INFO at 978-515-1799.   When to get medical advice  Call your healthcare provider right away if any of these occur:    You aren't better after 3 days of treatment    Fever of 100.4 F (38 C) or higher, or as directed by your provider    Back or belly pain that gets worse    You can't urinate because  of pain    New discharge from the urethra, vagina, or penis    Painful sores on the penis    Rash or joint pain    Painful lumps (lymph nodes) in the groin    Testicle pain or swelling of the scrotum  Naty last reviewed this educational content on 10/1/2019    4428-8221 [employer]. All rights reserved. This information is not intended as a substitute for professional medical care. Always follow your healthcare professional's instructions.

## 2023-01-20 NOTE — TELEPHONE ENCOUNTER
Patient's chart was accessed to determine the status of a refill request - nothing needed at this time as the request was previously addressed.    Tati Phillips RN .............. 1/20/2023  10:25 AM

## 2023-02-06 DIAGNOSIS — I10 ESSENTIAL HYPERTENSION: ICD-10-CM

## 2023-02-09 ENCOUNTER — OFFICE VISIT (OUTPATIENT)
Dept: ORTHOPEDICS | Facility: OTHER | Age: 63
End: 2023-02-09
Attending: SPECIALIST
Payer: COMMERCIAL

## 2023-02-09 ENCOUNTER — HOSPITAL ENCOUNTER (OUTPATIENT)
Dept: GENERAL RADIOLOGY | Facility: OTHER | Age: 63
Discharge: HOME | End: 2023-02-09
Attending: SPECIALIST
Payer: COMMERCIAL

## 2023-02-09 DIAGNOSIS — M79.644 THUMB PAIN, RIGHT: ICD-10-CM

## 2023-02-09 DIAGNOSIS — M79.644 THUMB PAIN, RIGHT: Primary | ICD-10-CM

## 2023-02-09 PROCEDURE — 73140 X-RAY EXAM OF FINGER(S): CPT | Mod: RT

## 2023-02-09 PROCEDURE — 250N000011 HC RX IP 250 OP 636: Performed by: SPECIALIST

## 2023-02-09 PROCEDURE — 250N000009 HC RX 250: Performed by: SPECIALIST

## 2023-02-09 PROCEDURE — 20600 DRAIN/INJ JOINT/BURSA W/O US: CPT | Mod: LT | Performed by: SPECIALIST

## 2023-02-09 PROCEDURE — 99213 OFFICE O/P EST LOW 20 MIN: CPT | Mod: 25 | Performed by: SPECIALIST

## 2023-02-09 RX ORDER — METHYLPREDNISOLONE ACETATE 40 MG/ML
40 INJECTION, SUSPENSION INTRA-ARTICULAR; INTRALESIONAL; INTRAMUSCULAR; SOFT TISSUE ONCE
Status: COMPLETED | OUTPATIENT
Start: 2023-02-09 | End: 2023-02-09

## 2023-02-09 RX ORDER — BUPIVACAINE HYDROCHLORIDE 5 MG/ML
1 INJECTION, SOLUTION EPIDURAL; INTRACAUDAL ONCE
Status: COMPLETED | OUTPATIENT
Start: 2023-02-09 | End: 2023-02-09

## 2023-02-09 RX ADMIN — METHYLPREDNISOLONE ACETATE 40 MG: 40 INJECTION, SUSPENSION INTRA-ARTICULAR; INTRALESIONAL; INTRAMUSCULAR; INTRASYNOVIAL; SOFT TISSUE at 09:56

## 2023-02-09 RX ADMIN — BUPIVACAINE HYDROCHLORIDE 5 MG: 5 INJECTION, SOLUTION EPIDURAL; INTRACAUDAL; PERINEURAL at 09:56

## 2023-02-09 NOTE — PROGRESS NOTES
Patient is here for follow up on her right thumb s/p 4 months.    Kennedi Chapman LPN .......  2/9/2023  9:19 AM

## 2023-02-10 RX ORDER — HYDROCHLOROTHIAZIDE 12.5 MG/1
12.5-25 TABLET ORAL DAILY
Qty: 90 TABLET | Refills: 1 | OUTPATIENT
Start: 2023-02-10

## 2023-02-10 NOTE — TELEPHONE ENCOUNTER
[id number] [employer] [facility] [place] sent Rx request for the following:      hydrochlorothiazide (HYDRODIURIL) 12.5 MG tablet (Discontinued) 90 tablet 1 10/9/2022 1/16/2023 No   Sig - Route: Take 1-2 tablets (12.5-25 mg) by mouth daily - Oral     hydrochlorothiazide (HYDRODIURIL) 25 MG tablet 90 tablet 4 1/16/2023  No   Sig - Route: Take 1 tablet (25 mg) by mouth daily - Oral     [facility] - [place], [place] - [address]     Called and spoke to Patient after verifying last name and date of birth. Pt confirmed [facility] and is now using [facility]. She is taking 1 tablet (25 mg) by mouth daily. Pharmacy notified. Tati Phillips RN .............. 2/10/2023  9:39 AM

## 2023-02-10 NOTE — PROGRESS NOTES
Visit Date: 2023    SUBJECTIVE:  Alessandra Aiken returns for followup.  She had a basilar joint reconstruction about 4 months ago.  At her last visit in December, she had some neuritic issues.  She is back today having had the endoscopic carpal tunnel release with basilar joint reconstruction, and she has been having ongoing discomfort as well as stiffness.  She localizes this at the basilar joint.    PHYSICAL EXAMINATION:  Shows her incisions have healed nicely.  The MCP joint of the thumb shows limited range of motion.  IP joint is intact.  The basilar joint does not feel focally tender with compression, though she does have tenderness with palpation over this area.  The scar appears adherent as well.    IMAGING:  I did review x-rays of the thumb obtained in the office today.  These do show some diffuse osteopenia.  The pseudo-space is well preserved.    IMPRESSION:  Four months status post left thumb basilar joint reconstruction.  At this point, I think her symptoms are primarily related to scarring.  Therapy has been working on trying to improve this.  We discussed a single injection of the subcutaneous tissue to see if this may help break things loose.  At this point, she is interested in proceeding with this.  A sterile prep was performed and this was injected with 40 mg of Depo-Medrol and 1 mL of 0.5% Marcaine without epinephrine.  Hopefully, this will give her relief.  We will continue to work aggressively with therapy and see her back in several weeks to monitor progress.    Chris Sylvester MD        D: 2023   T: 2023   MT: BOGDAN    Name:     ALESSANDRA AIKEN  MRN:      [id number]        Account:    [id number]   :      1960           Visit Date: 2023     Document: [id number]

## 2023-02-16 ENCOUNTER — MYC MEDICAL ADVICE (OUTPATIENT)
Dept: INTERNAL MEDICINE | Facility: OTHER | Age: 63
End: 2023-02-16
Payer: COMMERCIAL

## 2023-02-20 DIAGNOSIS — M18.9 ARTHROSIS OF FIRST CARPOMETACARPAL JOINT: ICD-10-CM

## 2023-02-21 ENCOUNTER — TRANSFERRED RECORDS (OUTPATIENT)
Dept: HEALTH INFORMATION MANAGEMENT | Facility: OTHER | Age: 63
End: 2023-02-21
Payer: COMMERCIAL

## 2023-02-22 ENCOUNTER — TRANSFERRED RECORDS (OUTPATIENT)
Dept: HEALTH INFORMATION MANAGEMENT | Facility: OTHER | Age: 63
End: 2023-02-22
Payer: COMMERCIAL

## 2023-02-22 RX ORDER — HYDROCODONE BITARTRATE AND ACETAMINOPHEN 5; 325 MG/1; MG/1
1 TABLET ORAL EVERY 6 HOURS PRN
Qty: 10 TABLET | Refills: 0 | OUTPATIENT
Start: 2023-02-22

## 2023-03-01 ENCOUNTER — MYC MEDICAL ADVICE (OUTPATIENT)
Dept: INTERNAL MEDICINE | Facility: OTHER | Age: 63
End: 2023-03-01
Payer: COMMERCIAL

## 2023-03-10 ENCOUNTER — OFFICE VISIT (OUTPATIENT)
Dept: FAMILY MEDICINE | Facility: OTHER | Age: 63
End: 2023-03-10
Attending: NURSE PRACTITIONER
Payer: COMMERCIAL

## 2023-03-10 VITALS
WEIGHT: 136.4 LBS | RESPIRATION RATE: 16 BRPM | OXYGEN SATURATION: 100 % | BODY MASS INDEX: 21.92 KG/M2 | DIASTOLIC BLOOD PRESSURE: 80 MMHG | HEART RATE: 64 BPM | HEIGHT: 66 IN | TEMPERATURE: 97.4 F | SYSTOLIC BLOOD PRESSURE: 126 MMHG

## 2023-03-10 DIAGNOSIS — M79.644 CHRONIC PAIN OF RIGHT THUMB: ICD-10-CM

## 2023-03-10 DIAGNOSIS — Z01.818 PREOP GENERAL PHYSICAL EXAM: Primary | ICD-10-CM

## 2023-03-10 DIAGNOSIS — I10 PRIMARY HYPERTENSION: ICD-10-CM

## 2023-03-10 DIAGNOSIS — G89.29 CHRONIC PAIN OF RIGHT THUMB: ICD-10-CM

## 2023-03-10 PROBLEM — Z23 NEED FOR VACCINATION: Chronic | Status: RESOLVED | Noted: 2022-10-05 | Resolved: 2023-03-10

## 2023-03-10 LAB
ANION GAP SERPL CALCULATED.3IONS-SCNC: 6 MMOL/L (ref 7–15)
BUN SERPL-MCNC: 14.6 MG/DL (ref 8–23)
CALCIUM SERPL-MCNC: 9.6 MG/DL (ref 8.8–10.2)
CHLORIDE SERPL-SCNC: 100 MMOL/L (ref 98–107)
CREAT SERPL-MCNC: 0.81 MG/DL (ref 0.51–0.95)
DEPRECATED HCO3 PLAS-SCNC: 31 MMOL/L (ref 22–29)
GFR SERPL CREATININE-BSD FRML MDRD: 82 ML/MIN/1.73M2
GLUCOSE SERPL-MCNC: 96 MG/DL (ref 70–99)
HGB BLD-MCNC: 13.6 G/DL (ref 11.7–15.7)
HOLD SPECIMEN: NORMAL
POTASSIUM SERPL-SCNC: 3.8 MMOL/L (ref 3.4–5.3)
SODIUM SERPL-SCNC: 137 MMOL/L (ref 136–145)

## 2023-03-10 PROCEDURE — 36415 COLL VENOUS BLD VENIPUNCTURE: CPT | Mod: ZL | Performed by: NURSE PRACTITIONER

## 2023-03-10 PROCEDURE — 85018 HEMOGLOBIN: CPT | Mod: ZL | Performed by: NURSE PRACTITIONER

## 2023-03-10 PROCEDURE — 80048 BASIC METABOLIC PNL TOTAL CA: CPT | Mod: ZL | Performed by: NURSE PRACTITIONER

## 2023-03-10 PROCEDURE — 99214 OFFICE O/P EST MOD 30 MIN: CPT | Performed by: NURSE PRACTITIONER

## 2023-03-10 ASSESSMENT — PAIN SCALES - GENERAL: PAINLEVEL: SEVERE PAIN (6)

## 2023-03-10 NOTE — NURSING NOTE
Patient presents today for pre op exam.    Medication Reconciliation Complete    Milagro Mahajan LPN  3/10/2023 8:03 AM

## 2023-03-10 NOTE — PATIENT INSTRUCTIONS
Hold hydrochlorothiazide the morning of surgery      For informational purposes only. Not to replace the advice of your health care provider. Copyright   ,  [facility]. All rights reserved. Clinically reviewed by Tracie Palmer MD. [employer] [id number] - REV .  Preparing for Your Surgery  Getting started  A nurse will call you to review your health history and instructions. They will give you an arrival time based on your scheduled surgery time. Please be ready to share:  Your doctor's clinic name and phone number  Your medical, surgical, and anesthesia history  A list of allergies and sensitivities  A list of medicines, including herbal treatments and over-the-counter drugs  Whether the patient has a legal guardian (ask how to send us the papers in advance)  Please tell us if you're pregnant--or if there's any chance you might be pregnant. Some surgeries may injure a fetus (unborn baby), so they require a pregnancy test. Surgeries that are safe for a fetus don't always need a test, and you can choose whether to have one.   If you have a child who's having surgery, please ask for a copy of Preparing for Your Child's Surgery.    Preparing for surgery  Within 10 to 30 days of surgery: Have a pre-op exam (sometimes called an H&P, or History and Physical). This can be done at a clinic or pre-operative center.  If you're having a , you may not need this exam. Talk to your care team.  At your pre-op exam, talk to your care team about all medicines you take. If you need to stop any medicines before surgery, ask when to start taking them again.  We do this for your safety. Many medicines can make you bleed too much during surgery. Some change how well surgery (anesthesia) drugs work.  Call your insurance company to let them know you're having surgery. (If you don't have insurance, call [phone].)  Call your clinic if there's any change in your health. This includes signs of a cold or flu  (sore throat, runny nose, cough, rash, fever). It also includes a scrape or scratch near the surgery site.  If you have questions on the day of surgery, call your hospital or surgery center.  Eating and drinking guidelines  For your safety: Unless your surgeon tells you otherwise, follow the guidelines below.  Eat and drink as usual until 8 hours before you arrive for surgery. After that, no food or milk.  Drink clear liquids until 2 hours before you arrive. These are liquids you can see through, like water, Gatorade, and Propel Water. They also include plain black coffee and tea (no cream or milk), candy, and breath mints. You can spit out gum when you arrive.  If you drink alcohol: Stop drinking it the night before surgery.  If your care team tells you to take medicine on the morning of surgery, it's okay to take it with a sip of water.  Preventing infection  Shower or bathe the night before and morning of your surgery. Follow the instructions your clinic gave you. (If no instructions, use regular soap.)  Don't shave or clip hair near your surgery site. We'll remove the hair if needed.  Don't smoke or vape the morning of surgery. You may chew nicotine gum up to 2 hours before surgery. A nicotine patch is okay.  Note: Some surgeries require you to completely quit smoking and nicotine. Check with your surgeon.  Your care team will make every effort to keep you safe from infection. We will:  Clean our hands often with soap and water (or an alcohol-based hand rub).  Clean the skin at your surgery site with a special soap that kills germs.  Give you a special gown to keep you warm. (Cold raises the risk of infection.)  Wear special hair covers, masks, gowns and gloves during surgery.  Give antibiotic medicine, if prescribed. Not all surgeries need antibiotics.  What to bring on the day of surgery  Photo ID and insurance card  Copy of your health care directive, if you have one  Glasses and hearing aids (bring  cases)  You can't wear contacts during surgery  Inhaler and eye drops, if you use them (tell us about these when you arrive)  CPAP machine or breathing device, if you use them  A few personal items, if spending the night  If you have . . .  A pacemaker, ICD (cardiac defibrillator) or other implant: Bring the ID card.  An implanted stimulator: Bring the remote control.  A legal guardian: Bring a copy of the certified (court-stamped) guardianship papers.  Please remove any jewelry, including body piercings. Leave jewelry and other valuables at home.  If you're going home the day of surgery  You must have a responsible adult drive you home. They should stay with you overnight as well.  If you don't have someone to stay with you, and you aren't safe to go home alone, we may keep you overnight. Insurance often won't pay for this.  After surgery  If it's hard to control your pain or you need more pain medicine, please call your surgeon's office.  Questions?   If you have any questions for your care team, list them here: _________________________________________________________________________________________________________________________________________________________________________ ____________________________________ ____________________________________ ____________________________________

## 2023-03-10 NOTE — PROGRESS NOTES
[facility] AND [facility]  [address]  [place] [address]  Phone: [phone]  Fax: [phone]  Primary Provider: Abena Adkins  Pre-op Performing Provider: MCKENNA MONTANA      PREOPERATIVE EVALUATION:  Today's date: 3/10/2023    Alessandra Aiken is a 62 year old female who presents for a preoperative evaluation.    Surgical Information:  Surgery/Procedure: rt thumb  Surgery Location: [facility]  Surgeon: Dr. Sylvester  Surgery Date: 03/13/23  Time of Surgery:    Where patient plans to recover: At home with family  Fax number for surgical facility: [phone]    Type of Anesthesia Anticipated: to be determined    Assessment & Plan     The proposed surgical procedure is considered INTERMEDIATE risk.    Problem List Items Addressed This Visit        Circulatory    Hypertension   Other Visit Diagnoses     Preop general physical exam    -  Primary    Chronic pain of right thumb            Optimized for upcoming procedure as requested.       Risks and Recommendations:  The patient has the following additional risks and recommendations for perioperative complications:   - No identified additional risk factors other than previously addressed    Medication Instructions:   - Diuretics: HOLD on the day of surgery.    RECOMMENDATION:  APPROVAL GIVEN to proceed with proposed procedure, without further diagnostic evaluation.    Review of the result(s) of each unique test - Hgb, BMP  Ordering of each unique test  23 minutes spent on the date of the encounter doing chart review, history and exam, documentation and further activities per the note      Subjective     HPI related to upcoming procedure: She comes in today for preoperative clearance for right thumb surgery.  She has had previous surgeries to this thumb, scar tissue adherent to the tendons causing decreased mobility and pain.  They are going to be going in and getting this repaired.  She has no other health concerns today.   Feels well.    Preop Questions 3/7/2023   1. Have you ever had a heart attack or stroke? No   2. Have you ever had surgery on your heart or blood vessels, such as a stent placement, a coronary artery bypass, or surgery on an artery in your head, neck, heart, or legs? No   3. Do you have chest pain with activity? No   4. Do you have a history of  heart failure? No   5. Do you currently have a cold, bronchitis or symptoms of other infection? No   6. Do you have a cough, shortness of breath, or wheezing? No   7. Do you or anyone in your family have previous history of blood clots? No   8. Do you or does anyone in your family have a serious bleeding problem such as prolonged bleeding following surgeries or cuts? No   9. Have you ever had problems with anemia or been told to take iron pills? No   10. Have you had any abnormal blood loss such as black, tarry or bloody stools, or abnormal vaginal bleeding? No   11. Have you ever had a blood transfusion? No   12. Are you willing to have a blood transfusion if it is medically needed before, during, or after your surgery? Yes   13. Have you or any of your relatives ever had problems with anesthesia? No   14. Do you have sleep apnea, excessive snoring or daytime drowsiness? No   15. Do you have any artifical heart valves or other implanted medical devices like a pacemaker, defibrillator, or continuous glucose monitor? No   16. Do you have artificial joints? No   17. Are you allergic to latex? No       Health Care Directive:  Patient does not have a Health Care Directive or Living Will: Discussed advance care planning with patient; information given to patient to review.    Preoperative Review of :   reviewed - controlled substances reflected in medication list.      Status of Chronic Conditions:  See problem list for active medical problems.  Problems all longstanding and stable, except as noted/documented.  See ROS for pertinent symptoms related to these  conditions.      Review of Systems  CONSTITUTIONAL: NEGATIVE for fever, chills, change in weight  ENT/MOUTH: NEGATIVE for ear, mouth and throat problems  RESP: NEGATIVE for significant cough or SOB  CV: NEGATIVE for chest pain, palpitations or peripheral edema    Patient Active Problem List    Diagnosis Date Noted     Need for vaccination 10/05/2022     Priority: Medium     Sinus symptom 03/19/2021     Priority: Medium     S/P vaginal hysterectomy 08/09/2018     Priority: Medium     Chronic insomnia 10/30/2017     Priority: Medium     Migraine without aura 10/30/2017     Priority: Medium     Overview:   triggers are alcohol and dairy products       Chronic sinusitis 01/11/2017     Priority: Medium     Hypertension 05/13/2015     Priority: Medium      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    Right     Ureterolithiasis 12/04/2014    right pyelo, mod right hydro, 2 mm right renal stone     Past Surgical History:   Procedure Laterality Date     ARTHROSCOPY SHOULDER  07/2009     AS NASAL/SINUS SCOPE W SPHENOIDOTOMY      2011/2016 Right for mycetoma removal at [facility]/2012     COLONOSCOPY  06/05/2012    Sigmoid diverticulosis; follow up 10 years     CYSTOSCOPY N/A 8/9/2018    Procedure: CYSTOSCOPY;;  Surgeon: Ashleigh Fregoso MD;  Location:  OR     ENDOSCOPIC SINUS SURGERY      repeat clearing     HYSTERECTOMY VAGINAL Bilateral 8/9/2018    Procedure: HYSTERECTOMY VAGINAL;  Total Vaginal Hysterectomy & Bilateral Salpingectomy, Cystoscopy;  Surgeon: Ildefonso  "Ashleigh PAGAN MD;  Location:  OR     LEE TX, CERVICAL  04/18/2018    Dr Fregoso     MYRINGOTOMY, INSERT TUBE, COMBINED  09/2016     Current Outpatient Medications   Medication Sig Dispense Refill     eszopiclone (LUNESTA) 1 MG tablet Take 1-2 tablets (1-2 mg) by mouth At Bedtime 60 tablet 5     hydrochlorothiazide (HYDRODIURIL) 25 MG tablet Take 1 tablet (25 mg) by mouth daily 90 tablet 4     LORazepam (ATIVAN) 0.5 MG tablet Take 1 tablet (0.5 mg) by mouth nightly as needed for anxiety or sleep 30 tablet 5     SUMAtriptan (IMITREX) 100 MG tablet Take 1 tablet (100 mg) by mouth every 2 hours as needed for migraine . maxillary dose: 200mg per 24 hrs. 9 tablet 11       Allergies   Allergen Reactions     Fentanyl Anxiety and Itching     Patient describes feeling creepy/crawly to Dr. Saenz.         Social History     Tobacco Use     Smoking status: Never     Smokeless tobacco: Never   Substance Use Topics     Alcohol use: No     Alcohol/week: 0.0 standard drinks     Comment: Alcoholic Drinks/day: very rarely     Family History   Problem Relation Age of Onset     No Known Problems Mother      Memory loss Father      Depression Brother      Septicemia Brother      No Known Problems Brother      No Known Problems Brother      Heart Disease Paternal Grandfather         Heart Disease,MI at 65     Other - See Comments No family hx of         History is negative for diabetes, thyroid problems, cancer, anxiety, depression and asthma     Breast Cancer No family hx of         Cancer-breast     History   Drug Use No         Objective     /80   Pulse 64   Temp 97.4  F (36.3  C)   Resp 16   Ht 1.676 m (5' 6\")   Wt 61.9 kg (136 lb 6.4 oz)   LMP 03/07/2003   SpO2 100%   BMI 22.02 kg/m      Physical Exam    GENERAL APPEARANCE: healthy, alert and no distress     EYES: EOMI, PERRL     HENT: ear canals and TM's normal and nose and mouth without ulcers or lesions     NECK: no adenopathy, no asymmetry, masses, or scars and " thyroid normal to palpation     RESP: lungs clear to auscultation - no rales, rhonchi or wheezes     CV: regular rates and rhythm, normal S1 S2, no S3 or S4 and no murmur, click or rub     ABDOMEN:  soft, nontender, no HSM or masses and bowel sounds normal     MS: extremities normal- no gross deformities noted, no evidence of inflammation in joints, FROM in all extremities.     SKIN: no suspicious lesions or rashes     NEURO: Normal strength and tone, sensory exam grossly normal, mentation intact and speech normal     PSYCH: mentation appears normal. and affect normal/bright     LYMPHATICS: No cervical adenopathy    Recent Labs   Lab Test 10/05/22  0830 11/12/21  1138   HGB 13.6 13.3    207    138   POTASSIUM 4.0 4.1   CR 0.87 0.88        Diagnostics:  Recent Results (from the past 24 hour(s))   Hemoglobin    Collection Time: 03/10/23  8:15 AM   Result Value Ref Range    Hemoglobin 13.6 11.7 - 15.7 g/dL   Basic Metabolic Panel    Collection Time: 03/10/23  8:15 AM   Result Value Ref Range    Sodium 137 136 - 145 mmol/L    Potassium 3.8 3.4 - 5.3 mmol/L    Chloride 100 98 - 107 mmol/L    Carbon Dioxide (CO2) 31 (H) 22 - 29 mmol/L    Anion Gap 6 (L) 7 - 15 mmol/L    Urea Nitrogen 14.6 8.0 - 23.0 mg/dL    Creatinine 0.81 0.51 - 0.95 mg/dL    Calcium 9.6 8.8 - 10.2 mg/dL    Glucose 96 70 - 99 mg/dL    GFR Estimate 82 >60 mL/min/1.73m2   Extra Serum Separator Tube (SST)    Collection Time: 03/10/23  8:15 AM   Result Value Ref Range    Hold Specimen JIC       No EKG this visit, completed in the last 90 days.    Revised Cardiac Risk Index (RCRI):  The patient has the following serious cardiovascular risks for perioperative complications:   - No serious cardiac risks = 0 points     RCRI Interpretation: 0 points: Class I (very low risk - 0.4% complication rate)           Signed Electronically by: CLARENCE Moscoso CNP  Copy of this evaluation report is provided to requesting physician.

## 2023-03-14 ENCOUNTER — TRANSFERRED RECORDS (OUTPATIENT)
Dept: HEALTH INFORMATION MANAGEMENT | Facility: OTHER | Age: 63
End: 2023-03-14
Payer: COMMERCIAL

## 2023-03-17 ENCOUNTER — OFFICE VISIT (OUTPATIENT)
Dept: ORTHOPEDICS | Facility: OTHER | Age: 63
End: 2023-03-17
Attending: SPECIALIST
Payer: COMMERCIAL

## 2023-03-17 DIAGNOSIS — M79.644 THUMB PAIN, RIGHT: Primary | ICD-10-CM

## 2023-03-17 PROCEDURE — 99024 POSTOP FOLLOW-UP VISIT: CPT | Performed by: SPECIALIST

## 2023-03-17 NOTE — PROGRESS NOTES
Visit Date: 2023    HISTORY OF PRESENT ILLNESS:  Alessandra returns for followup 4 days status post right thumb extensor tenosynovectomy.  She also had a left thumb injection.  She is back today pleased with her early progress.    PHYSICAL EXAMINATION:  Examination today confirms her incision to be healing nicely.  Her range of motion has improved substantially.    IMPRESSION:  Status post right thumb extensor tenosynovectomy, doing well.  At this point, she will continue therapy with lenea who will remove her sutures in the next week.    PLAN:  We will see her for a final visit in about 4 weeks as symptoms warrant it.    Chris Sylvester MD        D: 2023   T: 2023   MT: RYAN    Name:     ALESSANDRA ABDUL  MRN:      [id number]        Account:    [id number]   :      1960           Visit Date: 2023     Document: [id number]

## 2023-03-17 NOTE — PROGRESS NOTES
Patient is here for follow up on her right thumb.  Jacquelin Benedict LPN .....................3/17/2023 11:41 AM

## 2023-06-06 ENCOUNTER — TRANSFERRED RECORDS (OUTPATIENT)
Dept: HEALTH INFORMATION MANAGEMENT | Facility: OTHER | Age: 63
End: 2023-06-06
Payer: COMMERCIAL

## 2023-06-16 ENCOUNTER — OFFICE VISIT (OUTPATIENT)
Dept: ORTHOPEDICS | Facility: OTHER | Age: 63
End: 2023-06-16
Attending: SPECIALIST
Payer: COMMERCIAL

## 2023-06-16 ENCOUNTER — HOSPITAL ENCOUNTER (OUTPATIENT)
Dept: GENERAL RADIOLOGY | Facility: OTHER | Age: 63
Discharge: HOME | End: 2023-06-16
Attending: SPECIALIST
Payer: COMMERCIAL

## 2023-06-16 DIAGNOSIS — M79.644 THUMB PAIN, RIGHT: ICD-10-CM

## 2023-06-16 DIAGNOSIS — M18.9 ARTHROSIS OF FIRST CARPOMETACARPAL JOINT: Primary | Chronic | ICD-10-CM

## 2023-06-16 PROCEDURE — 20526 THER INJECTION CARP TUNNEL: CPT | Mod: LT | Performed by: SPECIALIST

## 2023-06-16 PROCEDURE — 73140 X-RAY EXAM OF FINGER(S): CPT | Mod: RT

## 2023-06-16 PROCEDURE — 20600 DRAIN/INJ JOINT/BURSA W/O US: CPT | Mod: LT | Performed by: SPECIALIST

## 2023-06-16 PROCEDURE — 250N000009 HC RX 250: Performed by: SPECIALIST

## 2023-06-16 PROCEDURE — 250N000011 HC RX IP 250 OP 636: Performed by: SPECIALIST

## 2023-06-16 RX ORDER — METHYLPREDNISOLONE ACETATE 40 MG/ML
40 INJECTION, SUSPENSION INTRA-ARTICULAR; INTRALESIONAL; INTRAMUSCULAR; SOFT TISSUE ONCE
Status: COMPLETED | OUTPATIENT
Start: 2023-06-16 | End: 2023-06-16

## 2023-06-16 RX ORDER — BUPIVACAINE HYDROCHLORIDE 5 MG/ML
1 INJECTION, SOLUTION EPIDURAL; INTRACAUDAL ONCE
Status: COMPLETED | OUTPATIENT
Start: 2023-06-16 | End: 2023-06-16

## 2023-06-16 RX ADMIN — BUPIVACAINE HYDROCHLORIDE 5 MG: 5 INJECTION, SOLUTION EPIDURAL; INTRACAUDAL; PERINEURAL at 09:08

## 2023-06-16 RX ADMIN — METHYLPREDNISOLONE ACETATE 40 MG: 40 INJECTION, SUSPENSION INTRA-ARTICULAR; INTRALESIONAL; INTRAMUSCULAR; INTRASYNOVIAL; SOFT TISSUE at 09:08

## 2023-06-16 NOTE — PROGRESS NOTES
Patient is here for follow up on her right thumb.  Jacquelin Benedict LPN .....................6/16/2023 8:51 AM

## 2023-06-16 NOTE — PROGRESS NOTES
Visit Date: 2023    Alessandra Aiken returns for followup.  Unfortunately, her right thumb extensor tenosynovectomy has not given her durable relief.  She has decided to retire from her activities at worked.  She is here today to try a left thumb basilar joint injection as well as a left carpal tunnel injection.    PHYSICAL EXAMINATION:  Today shows her incisions to have healed nicely.  There is no evidence of infection.  She has tenderness over the base of the thumb.  Examination of the left thumb reveals basal joint tenderness with a positive grind maneuver.      X-rays were reviewed including AP and lateral views of the right thumb obtained in the office today.  The thumb X-rays showed absent trapezium.  There is no evidence of significant narrowing of the pseudo-space.    IMPRESSION:    1.  Status post right thumb basal joint reconstruction with extensor tenolysis.  At this point, she is quite symptomatic.  I do not think therapy will improve her symptoms significantly, so we will stop this.  2.  Left basilar joint symptoms.  We discussed proceeding with an injection.  A sterile prep was performed and the left basilar joint was injected with 40 mg of Depo-Medrol and 1 mL of 0.5% Marcaine with epinephrine.  There were no complications.    3.  Possible left carpal tunnel syndrome.  She would like to proceed with an injection of the carpal canal.  A sterile prep was performed. The left carpal canal was injected with 40 mg of Depo-Medrol and 1 mL of 0.5% Marcaine with epinephrine.  There were no complications.    Chris Sylvester MD        D: 2023   T: 2023   MT: brenda    Name:     ALESSANDRA AIKEN  MRN:      [id number]        Account:    [id number]   :      1960           Visit Date: 2023     Document: [id number]

## 2023-07-17 ENCOUNTER — TRANSFERRED RECORDS (OUTPATIENT)
Dept: HEALTH INFORMATION MANAGEMENT | Facility: OTHER | Age: 63
End: 2023-07-17
Payer: COMMERCIAL

## 2023-07-21 ENCOUNTER — MYC MEDICAL ADVICE (OUTPATIENT)
Dept: INTERNAL MEDICINE | Facility: OTHER | Age: 63
End: 2023-07-21
Payer: COMMERCIAL

## 2023-08-11 ENCOUNTER — TELEPHONE (OUTPATIENT)
Dept: ORTHOPEDICS | Facility: OTHER | Age: 63
End: 2023-08-11
Payer: COMMERCIAL

## 2023-08-11 NOTE — TELEPHONE ENCOUNTER
States she is losing insurance 10/1, would like to know what she should do for her thumb before then

## 2023-08-15 DIAGNOSIS — G43.009 MIGRAINE WITHOUT AURA AND WITHOUT STATUS MIGRAINOSUS, NOT INTRACTABLE: ICD-10-CM

## 2023-08-17 RX ORDER — SUMATRIPTAN 100 MG/1
TABLET, FILM COATED ORAL
Qty: 9 TABLET | Refills: 6 | Status: SHIPPED | OUTPATIENT
Start: 2023-08-17 | End: 2024-01-25

## 2023-08-17 NOTE — TELEPHONE ENCOUNTER
Worked patient in tomorrow with Dr. Sylvester for a follow up .  Jacquelin Benedict LPN .....................8/17/2023 3:30 PM

## 2023-08-17 NOTE — TELEPHONE ENCOUNTER
Routing to covering provider for refill consideration, as PCP/provider is out of clinic >48 hours or Pt is completely out of medication and provider is out of the clinic today.    Tati Phillips RN .............. 8/17/2023  2:25 PM

## 2023-08-17 NOTE — TELEPHONE ENCOUNTER
[facility] sent Rx request for the following:      Requested Prescriptions   Pending Prescriptions Disp Refills    SUMAtriptan (IMITREX) 100 MG tablet [Pharmacy Med Name: sumatriptan 100 mg tablet] 9 tablet 11     Sig: Take 1 tablet (100 mg) by mouth every 2 hours as needed for migraine. Max daily dose: 200mg per 24 hrs.       Serotonin Agonists Failed - 8/17/2023  1:52 PM        Failed - Serotonin Agonist request needs review.     Please review patient's record. If patient has had 8 or more treatments in the past month, please forward to provider.     Last Prescription Date:   11/21/21  Last Fill Qty/Refills:         9, R-11    Last Office Visit:              3/10/23   Future Office visit:           None    Tati Phillips RN .............. 8/17/2023  2:01 PM

## 2023-08-18 ENCOUNTER — OFFICE VISIT (OUTPATIENT)
Dept: ORTHOPEDICS | Facility: OTHER | Age: 63
End: 2023-08-18
Attending: SPECIALIST
Payer: COMMERCIAL

## 2023-08-18 DIAGNOSIS — M79.644 THUMB PAIN, RIGHT: Primary | ICD-10-CM

## 2023-08-18 DIAGNOSIS — M18.9 ARTHROSIS OF FIRST CARPOMETACARPAL JOINT: ICD-10-CM

## 2023-08-18 PROCEDURE — 20600 DRAIN/INJ JOINT/BURSA W/O US: CPT | Mod: RT | Performed by: SPECIALIST

## 2023-08-18 PROCEDURE — 250N000011 HC RX IP 250 OP 636: Performed by: SPECIALIST

## 2023-08-18 RX ORDER — BUPIVACAINE HYDROCHLORIDE 5 MG/ML
1 INJECTION, SOLUTION EPIDURAL; INTRACAUDAL ONCE
Status: COMPLETED | OUTPATIENT
Start: 2023-08-18 | End: 2023-08-18

## 2023-08-18 RX ADMIN — BUPIVACAINE HYDROCHLORIDE 5 MG: 5 INJECTION, SOLUTION EPIDURAL; INTRACAUDAL; PERINEURAL at 13:24

## 2023-08-18 NOTE — PROGRESS NOTES
Subjective:    Alessandra returns for follow-up she continues to note right thumb basilar joint discomfort.  She describes some Tinel's type sensations.    Objective:    Physical examination shows no evidence of significant swelling.  She has mild limitation in range of motion.  She does have a positive Tinel's over the superficial radial nerve.  Imaging:     No new imaging  Assessment:    Possible superficial radial nerve neuritis.    Plan:    Local anesthetic into the superficial radial nerve distribution to see if this reduces her symptoms.Procedure note: The right thumb was prepped with alcohol.  The area of the superficial radial nerve proximal to her incision at the basilar joint of the thumb was injected with3 cc of 0.25% Marcaine without epinephrine.  A Band-Aid was applied.  There were no complications.  After a period of observation she noted significant decrease in her neuritic symptoms.  We discussed this at length.  I told her to watch this closely over the next several hours to see how much relief she had.  Surgical options to treat this would include neurotomy with bearing of the proximal stump of the superficial radial nerve.  I explained to the patient that this is a procedure which does not always give good results.  She will think about things and let us know how she would like to proceed.    Chris Sylvester MD

## 2023-08-28 ENCOUNTER — TELEPHONE (OUTPATIENT)
Dept: INTERNAL MEDICINE | Facility: OTHER | Age: 63
End: 2023-08-28
Payer: COMMERCIAL

## 2023-08-28 NOTE — TELEPHONE ENCOUNTER
Reason for call: Patient wanting a work in appointment.    Is the appointment for a Hospital Follow up?  No     (If yes - Unable to find an appointment with any provider during the time frame needed. Nurse/Provider - Can this patient be worked into a schedule with PCP or team member?)    Patient is having the following symptoms:  DOS 09/13/2023 - Dr Sylvester - Tete GALLOWAY Thumb     The patient is requesting an appointment with  Anyone    Was an appointment offered for this call? No    If Yes, what is the date of the appointment?  Soonest available is 9/13     Preferred method for responding to this message: Telephone Call    Phone number patient can be reached at? Home number on file [phone]    If we can't reach you directly, may we leave a detailed response at the number you provided? Yes    Can this message wait until your PCP/provider returns if unavailable today? No      Terrance Che on 8/28/2023 at 2:15 PM

## 2023-08-30 NOTE — TELEPHONE ENCOUNTER
After proper verification, patient stated that she needs to get in for a pre op and has called everywhere and cannot get in. Patient is hoping Provider could squeeze her in somewhere? Her surgery is on 09/13/23 with Dr. Sylvester.   Please Advise.  Neelam Worthington LPN on 8/30/2023 at 2:09 PM

## 2023-08-31 NOTE — TELEPHONE ENCOUNTER
Patient was contacted and an appointment was made with Corinna Bermudez for 9.7.2023.  Jenni Potts on 8/31/2023 at 9:58 AM

## 2023-09-07 ENCOUNTER — OFFICE VISIT (OUTPATIENT)
Dept: FAMILY MEDICINE | Facility: OTHER | Age: 63
End: 2023-09-07
Attending: NURSE PRACTITIONER
Payer: COMMERCIAL

## 2023-09-07 VITALS
TEMPERATURE: 97.8 F | BODY MASS INDEX: 22.82 KG/M2 | WEIGHT: 142 LBS | DIASTOLIC BLOOD PRESSURE: 74 MMHG | RESPIRATION RATE: 16 BRPM | SYSTOLIC BLOOD PRESSURE: 112 MMHG | HEART RATE: 72 BPM | OXYGEN SATURATION: 98 % | HEIGHT: 66 IN

## 2023-09-07 DIAGNOSIS — M79.644 CHRONIC PAIN OF RIGHT THUMB: ICD-10-CM

## 2023-09-07 DIAGNOSIS — Z01.818 PREOP GENERAL PHYSICAL EXAM: Primary | ICD-10-CM

## 2023-09-07 DIAGNOSIS — I10 PRIMARY HYPERTENSION: ICD-10-CM

## 2023-09-07 DIAGNOSIS — G89.29 CHRONIC PAIN OF RIGHT THUMB: ICD-10-CM

## 2023-09-07 LAB
ANION GAP SERPL CALCULATED.3IONS-SCNC: 8 MMOL/L (ref 7–15)
BUN SERPL-MCNC: 16.1 MG/DL (ref 8–23)
CALCIUM SERPL-MCNC: 9.7 MG/DL (ref 8.8–10.2)
CHLORIDE SERPL-SCNC: 100 MMOL/L (ref 98–107)
CREAT SERPL-MCNC: 0.83 MG/DL (ref 0.51–0.95)
DEPRECATED HCO3 PLAS-SCNC: 31 MMOL/L (ref 22–29)
EGFRCR SERPLBLD CKD-EPI 2021: 79 ML/MIN/1.73M2
GLUCOSE SERPL-MCNC: 100 MG/DL (ref 70–99)
HGB BLD-MCNC: 13.2 G/DL (ref 11.7–15.7)
POTASSIUM SERPL-SCNC: 3.3 MMOL/L (ref 3.4–5.3)
SODIUM SERPL-SCNC: 139 MMOL/L (ref 136–145)

## 2023-09-07 PROCEDURE — 36415 COLL VENOUS BLD VENIPUNCTURE: CPT | Mod: ZL | Performed by: NURSE PRACTITIONER

## 2023-09-07 PROCEDURE — 80048 BASIC METABOLIC PNL TOTAL CA: CPT | Mod: ZL | Performed by: NURSE PRACTITIONER

## 2023-09-07 PROCEDURE — 85018 HEMOGLOBIN: CPT | Mod: ZL | Performed by: NURSE PRACTITIONER

## 2023-09-07 PROCEDURE — 99214 OFFICE O/P EST MOD 30 MIN: CPT | Performed by: NURSE PRACTITIONER

## 2023-09-07 PROCEDURE — 93000 ELECTROCARDIOGRAM COMPLETE: CPT | Performed by: STUDENT IN AN ORGANIZED HEALTH CARE EDUCATION/TRAINING PROGRAM

## 2023-09-07 ASSESSMENT — PAIN SCALES - GENERAL: PAINLEVEL: MODERATE PAIN (5)

## 2023-09-07 NOTE — PROGRESS NOTES
[facility] AND [facility]  [address]  [place] [address]  Phone: [phone]  Fax: [phone]  Primary Provider: Abena Adkins  Pre-op Performing Provider: MCKENNA MONTANA      PREOPERATIVE EVALUATION:  Today's date: 9/7/2023    Alessandra Aiken is a 63 year old female who presents for a preoperative evaluation.      Surgical Information:  Surgery/Procedure: rt thumb  Surgery Location: [facility]  Surgeon: Dr. Sylvester  Surgery Date: 09/13/23  Time of Surgery:    Where patient plans to recover: At home with family  Fax number for surgical facility: [phone]    Assessment & Plan     The proposed surgical procedure is considered INTERMEDIATE risk.    Problem List Items Addressed This Visit          Circulatory    Hypertension     Other Visit Diagnoses       Preop general physical exam    -  Primary    Relevant Orders    Basic Metabolic Panel (Completed)    Hemoglobin (Completed)    EKG 12-lead, tracing only (Completed)    Chronic pain of right thumb                She is optimized for upcoming procedure as requested.        - No identified additional risk factors other than previously addressed    Antiplatelet or Anticoagulation Medication Instructions:   - Patient is on no antiplatelet or anticoagulation medications.    Additional Medication Instructions:   - Diuretics: HOLD on the day of surgery.    RECOMMENDATION:  APPROVAL GIVEN to proceed with proposed procedure, without further diagnostic evaluation.    Review of the result(s) of each unique test - EKG, bmp, hgb  Ordering of each unique test        Subjective       HPI related to upcoming procedure: She comes in today for preoperative clearance for right thumb surgery. She did have surgery on this thumb in March 2023, has continued with pain so she is going to have a third surgery.  She denies any other health concerns today.      9/4/2023     7:45 PM   Preop Questions   1. Have you ever had a heart attack or  stroke? No   2. Have you ever had surgery on your heart or blood vessels, such as a stent placement, a coronary artery bypass, or surgery on an artery in your head, neck, heart, or legs? No   3. Do you have chest pain with activity? No   4. Do you have a history of  heart failure? No   5. Do you currently have a cold, bronchitis or symptoms of other infection? No   6. Do you have a cough, shortness of breath, or wheezing? No   7. Do you or anyone in your family have previous history of blood clots? No   8. Do you or does anyone in your family have a serious bleeding problem such as prolonged bleeding following surgeries or cuts? No   9. Have you ever had problems with anemia or been told to take iron pills? No   10. Have you had any abnormal blood loss such as black, tarry or bloody stools, or abnormal vaginal bleeding? No   11. Have you ever had a blood transfusion? UNKNOWN -    12. Are you willing to have a blood transfusion if it is medically needed before, during, or after your surgery? Yes   13. Have you or any of your relatives ever had problems with anesthesia? No   14. Do you have sleep apnea, excessive snoring or daytime drowsiness? No   15. Do you have any artifical heart valves or other implanted medical devices like a pacemaker, defibrillator, or continuous glucose monitor? No   16. Do you have artificial joints? No   17. Are you allergic to latex? No       Health Care Directive:  Patient does not have a Health Care Directive or Living Will: Discussed advance care planning with patient; however, patient declined at this time.    Preoperative Review of :   reviewed - controlled substances reflected in medication list.      Status of Chronic Conditions:  See problem list for active medical problems.  Problems all longstanding and stable, except as noted/documented.  See ROS for pertinent symptoms related to these conditions.    Review of Systems  Constitutional, neuro, ENT, endocrine, pulmonary,  cardiac, gastrointestinal, genitourinary, musculoskeletal, integument and psychiatric systems are negative, except as otherwise noted.    Patient Active Problem List    Diagnosis Date Noted    Sinus symptom 03/19/2021     Priority: Medium    S/P vaginal hysterectomy 08/09/2018     Priority: Medium    Chronic insomnia 10/30/2017     Priority: Medium    Migraine without aura 10/30/2017     Priority: Medium     Overview:   triggers are alcohol and dairy products      Chronic sinusitis 01/11/2017     Priority: Medium    Hypertension 05/13/2015     Priority: Medium      Past Medical History:   Diagnosis Date    Allergic rhinitis     Chronic sinusitis     Essential (primary) hypertension     High grade squamous intraepithelial cervical dysplasia 01/2018    Migraine without status migrainosus, not intractable     Osteoarthritis of first metatarsophalangeal joint     Otalgia of right ear     chronic    Pregnancy     G3, P3 - all NSVDs    Psychophysiologic insomnia     Squamous cell carcinoma of skin 06/2018    leg    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    Right    Ureterolithiasis 12/04/2014    right pyelo, mod right hydro, 2 mm right renal stone     Past Surgical History:   Procedure Laterality Date    ARTHROSCOPY SHOULDER  07/2009    AS NASAL/SINUS SCOPE W SPHENOIDOTOMY      2011/2016 Right for mycetoma removal [facility]/2012    COLONOSCOPY  06/05/2012    Sigmoid diverticulosis; follow up 10 years    CYSTOSCOPY N/A 8/9/2018    Procedure: CYSTOSCOPY;;  Surgeon: Ashleigh Fregoso MD;  Location:  OR    ENDOSCOPIC SINUS SURGERY      repeat clearing    HYSTERECTOMY VAGINAL Bilateral 8/9/2018    Procedure: HYSTERECTOMY VAGINAL;  Total Vaginal Hysterectomy & Bilateral Salpingectomy, Cystoscopy;  Surgeon: Ashleigh Fregoso MD;  Location:  OR    LEEP TX, CERVICAL  04/18/2018    Dr Fregoso    MYRINGOTOMY, INSERT TUBE, COMBINED  09/2016     Current Outpatient Medications   Medication Sig Dispense Refill    eszopiclone  "(LUNESTA) 1 MG tablet Take 1-2 tablets (1-2 mg) by mouth At Bedtime 60 tablet 5    hydrochlorothiazide (HYDRODIURIL) 25 MG tablet Take 1 tablet (25 mg) by mouth daily 90 tablet 4    LORazepam (ATIVAN) 0.5 MG tablet Take 1 tablet (0.5 mg) by mouth nightly as needed for anxiety or sleep 30 tablet 5    SUMAtriptan (IMITREX) 100 MG tablet Take 1 tablet (100 mg) by mouth every 2 hours as needed for migraine. Max daily dose: 200mg per 24 hrs. 9 tablet 6       Allergies   Allergen Reactions    Fentanyl Anxiety and Itching     Patient describes feeling creepy/crawly to Dr. Saenz.         Social History     Tobacco Use    Smoking status: Never    Smokeless tobacco: Never   Substance Use Topics    Alcohol use: No     Alcohol/week: 0.0 standard drinks of alcohol     Comment: Alcoholic Drinks/day: very rarely     Family History   Problem Relation Age of Onset    No Known Problems Mother     Memory loss Father     Depression Brother     Septicemia Brother     No Known Problems Brother     No Known Problems Brother     Heart Disease Paternal Grandfather         Heart Disease,MI at 65    Other - See Comments No family hx of         History is negative for diabetes, thyroid problems, cancer, anxiety, depression and asthma    Breast Cancer No family hx of         Cancer-breast     History   Drug Use No         Objective     /74   Pulse 72   Temp 97.8  F (36.6  C)   Resp 16   Ht 1.676 m (5' 6\")   Wt 64.4 kg (142 lb)   LMP 03/07/2003   SpO2 98%   BMI 22.92 kg/m      Physical Exam    GENERAL APPEARANCE: healthy, alert and no distress     EYES: EOMI, PERRL     HENT: ear canals and TM's normal and nose and mouth without ulcers or lesions     NECK: no adenopathy, no asymmetry, masses, or scars and thyroid normal to palpation     RESP: lungs clear to auscultation - no rales, rhonchi or wheezes     CV: regular rates and rhythm, normal S1 S2, no S3 or S4 and no murmur, click or rub     ABDOMEN:  soft, nontender, no HSM or " masses and bowel sounds normal     MS: extremities normal- no gross deformities noted, no evidence of inflammation in joints, FROM in all extremities.     SKIN: no suspicious lesions or rashes     NEURO: Normal strength and tone, sensory exam grossly normal, mentation intact and speech normal     PSYCH: mentation appears normal. and affect normal/bright     LYMPHATICS: No cervical adenopathy    Recent Labs   Lab Test 03/10/23  0815 10/05/22  0830 11/12/21  1138   HGB 13.6 13.6 13.3   PLT  --  174 207    141 138   POTASSIUM 3.8 4.0 4.1   CR 0.81 0.87 0.88        Diagnostics:  Recent Results (from the past 24 hour(s))   EKG 12-lead, tracing only    Collection Time: 09/07/23  1:39 PM   Result Value Ref Range    Systolic Blood Pressure  mmHg    Diastolic Blood Pressure  mmHg    Ventricular Rate 70 BPM    Atrial Rate 70 BPM    NH Interval 142 ms    QRS Duration 84 ms     ms    QTc 436 ms    P Axis 66 degrees    R AXIS 42 degrees    T Axis 63 degrees    Interpretation ECG       Sinus rhythm with sinus arrhythmia  Normal ECG  When compared with ECG of 05-OCT-2022 08:25,  No significant change was found     Basic Metabolic Panel    Collection Time: 09/07/23  1:48 PM   Result Value Ref Range    Sodium 139 136 - 145 mmol/L    Potassium 3.3 (L) 3.4 - 5.3 mmol/L    Chloride 100 98 - 107 mmol/L    Carbon Dioxide (CO2) 31 (H) 22 - 29 mmol/L    Anion Gap 8 7 - 15 mmol/L    Urea Nitrogen 16.1 8.0 - 23.0 mg/dL    Creatinine 0.83 0.51 - 0.95 mg/dL    Calcium 9.7 8.8 - 10.2 mg/dL    Glucose 100 (H) 70 - 99 mg/dL    GFR Estimate 79 >60 mL/min/1.73m2   Hemoglobin    Collection Time: 09/07/23  1:48 PM   Result Value Ref Range    Hemoglobin 13.2 11.7 - 15.7 g/dL      EKG: appears normal, NSR, normal axis, normal intervals, no acute ST/T changes c/w ischemia, no LVH by voltage criteria, unchanged from previous tracings    Revised Cardiac Risk Index (RCRI):  The patient has the following serious cardiovascular risks for  perioperative complications:   - No serious cardiac risks = 0 points     RCRI Interpretation: 0 points: Class I (very low risk - 0.4% complication rate)         Signed Electronically by: CLARENCE Moscoso CNP  Copy of this evaluation report is provided to requesting physician.

## 2023-09-07 NOTE — Clinical Note
Surgery 9/13/2023 at [facility] with Dr. Sylvester.  Fax number [phone]

## 2023-09-07 NOTE — PATIENT INSTRUCTIONS
For informational purposes only. Not to replace the advice of your health care provider. Copyright   2003,  [employer] [facility]. All rights reserved. Clinically reviewed by Tracie Palmer MD. [employer] [id number] - REV .  Preparing for Your Surgery  Getting started  A nurse will call you to review your health history and instructions. They will give you an arrival time based on your scheduled surgery time. Please be ready to share:  Your doctor's clinic name and phone number  Your medical, surgical, and anesthesia history  A list of allergies and sensitivities  A list of medicines, including herbal treatments and over-the-counter drugs  Whether the patient has a legal guardian (ask how to send us the papers in advance)  Please tell us if you're pregnant--or if there's any chance you might be pregnant. Some surgeries may injure a fetus (unborn baby), so they require a pregnancy test. Surgeries that are safe for a fetus don't always need a test, and you can choose whether to have one.   If you have a child who's having surgery, please ask for a copy of Preparing for Your Child's Surgery.    Preparing for surgery  Within 10 to 30 days of surgery: Have a pre-op exam (sometimes called an H&P, or History and Physical). This can be done at a clinic or pre-operative center.  If you're having a , you may not need this exam. Talk to your care team.  At your pre-op exam, talk to your care team about all medicines you take. If you need to stop any medicines before surgery, ask when to start taking them again.  We do this for your safety. Many medicines can make you bleed too much during surgery. Some change how well surgery (anesthesia) drugs work.  Call your insurance company to let them know you're having surgery. (If you don't have insurance, call [phone].)  Call your clinic if there's any change in your health. This includes signs of a cold or flu (sore throat, runny nose, cough, rash, fever). It also  includes a scrape or scratch near the surgery site.  If you have questions on the day of surgery, call your hospital or surgery center.  Eating and drinking guidelines  For your safety: Unless your surgeon tells you otherwise, follow the guidelines below.  Eat and drink as usual until 8 hours before you arrive for surgery. After that, no food or milk.  Drink clear liquids until 2 hours before you arrive. These are liquids you can see through, like water, Gatorade, and Propel Water. They also include plain black coffee and tea (no cream or milk), candy, and breath mints. You can spit out gum when you arrive.  If you drink alcohol: Stop drinking it the night before surgery.  If your care team tells you to take medicine on the morning of surgery, it's okay to take it with a sip of water.  Preventing infection  Shower or bathe the night before and morning of your surgery. Follow the instructions your clinic gave you. (If no instructions, use regular soap.)  Don't shave or clip hair near your surgery site. We'll remove the hair if needed.  Don't smoke or vape the morning of surgery. You may chew nicotine gum up to 2 hours before surgery. A nicotine patch is okay.  Note: Some surgeries require you to completely quit smoking and nicotine. Check with your surgeon.  Your care team will make every effort to keep you safe from infection. We will:  Clean our hands often with soap and water (or an alcohol-based hand rub).  Clean the skin at your surgery site with a special soap that kills germs.  Give you a special gown to keep you warm. (Cold raises the risk of infection.)  Wear special hair covers, masks, gowns and gloves during surgery.  Give antibiotic medicine, if prescribed. Not all surgeries need antibiotics.  What to bring on the day of surgery  Photo ID and insurance card  Copy of your health care directive, if you have one  Glasses and hearing aids (bring cases)  You can't wear contacts during surgery  Inhaler and eye  drops, if you use them (tell us about these when you arrive)  CPAP machine or breathing device, if you use them  A few personal items, if spending the night  If you have . . .  A pacemaker, ICD (cardiac defibrillator) or other implant: Bring the ID card.  An implanted stimulator: Bring the remote control.  A legal guardian: Bring a copy of the certified (court-stamped) guardianship papers.  Please remove any jewelry, including body piercings. Leave jewelry and other valuables at home.  If you're going home the day of surgery  You must have a responsible adult drive you home. They should stay with you overnight as well.  If you don't have someone to stay with you, and you aren't safe to go home alone, we may keep you overnight. Insurance often won't pay for this.  After surgery  If it's hard to control your pain or you need more pain medicine, please call your surgeon's office.  Questions?   If you have any questions for your care team, list them here: _________________________________________________________________________________________________________________________________________________________________________ ____________________________________ ____________________________________ ____________________________________    How to Take Your Medication Before Surgery  - HOLD (do not take) Hydrochlorothiazide am of surgery

## 2023-09-08 LAB
ATRIAL RATE - MUSE: 70 BPM
DIASTOLIC BLOOD PRESSURE - MUSE: NORMAL MMHG
INTERPRETATION ECG - MUSE: NORMAL
P AXIS - MUSE: 66 DEGREES
PR INTERVAL - MUSE: 142 MS
QRS DURATION - MUSE: 84 MS
QT - MUSE: 404 MS
QTC - MUSE: 436 MS
R AXIS - MUSE: 42 DEGREES
SYSTOLIC BLOOD PRESSURE - MUSE: NORMAL MMHG
T AXIS - MUSE: 63 DEGREES
VENTRICULAR RATE- MUSE: 70 BPM

## 2023-09-26 ENCOUNTER — TRANSFERRED RECORDS (OUTPATIENT)
Dept: HEALTH INFORMATION MANAGEMENT | Facility: OTHER | Age: 63
End: 2023-09-26

## 2023-12-15 ENCOUNTER — OFFICE VISIT (OUTPATIENT)
Dept: ORTHOPEDICS | Facility: OTHER | Age: 63
End: 2023-12-15
Attending: SPECIALIST
Payer: COMMERCIAL

## 2023-12-15 DIAGNOSIS — M79.644 THUMB PAIN, RIGHT: Primary | ICD-10-CM

## 2023-12-15 PROCEDURE — 20605 DRAIN/INJ JOINT/BURSA W/O US: CPT | Mod: RT | Performed by: SPECIALIST

## 2023-12-15 PROCEDURE — 250N000011 HC RX IP 250 OP 636: Performed by: SPECIALIST

## 2023-12-15 PROCEDURE — 250N000009 HC RX 250: Performed by: SPECIALIST

## 2023-12-15 RX ORDER — BUPIVACAINE HYDROCHLORIDE 5 MG/ML
1 INJECTION, SOLUTION EPIDURAL; INTRACAUDAL ONCE
Status: COMPLETED | OUTPATIENT
Start: 2023-12-15 | End: 2023-12-15

## 2023-12-15 RX ORDER — LIDOCAINE HYDROCHLORIDE 10 MG/ML
1 INJECTION, SOLUTION EPIDURAL; INFILTRATION; INTRACAUDAL; PERINEURAL ONCE
Status: COMPLETED | OUTPATIENT
Start: 2023-12-15 | End: 2023-12-15

## 2023-12-15 RX ORDER — OXYCODONE HYDROCHLORIDE 5 MG/1
5 TABLET ORAL EVERY 6 HOURS PRN
Qty: 30 TABLET | Refills: 0 | Status: SHIPPED | OUTPATIENT
Start: 2023-12-15 | End: 2024-01-25

## 2023-12-15 RX ADMIN — LIDOCAINE HYDROCHLORIDE 1 ML: 10 INJECTION, SOLUTION EPIDURAL; INFILTRATION; INTRACAUDAL; PERINEURAL at 09:14

## 2023-12-15 RX ADMIN — BUPIVACAINE HYDROCHLORIDE 5 MG: 5 INJECTION, SOLUTION EPIDURAL; INTRACAUDAL at 09:14

## 2023-12-15 NOTE — PROGRESS NOTES
Subjective:    Patient returns for follow-up she had been scheduled for neurotomy of the superficial radial nerve.  Her previous injection with lidocaine gave her about 30 minutes of relief.  Based on that I recommended she come in for repeat injection.  Her  accompanies her today.  She is tearful and very frustrated.    Objective:    Her incisions healed nicely gentle stroking about the incision does not cause obvious neuritic pain.  She describes the thumb is feeling full and stiff however she is able to touch the base of the thumb to the metacarpal head of the small finger.  Imaging:     No new imaging  Assessment:    Status post B basilar joint reconstruction and neurolysis with ongoing pain.    Plan:    We discussed a repeat injection.  If this did not relieve her symptoms I would consider pain referral.    Procedure note: The right forearm was prepped with alcohol.  The right t radial forearm was injected with 1 cc of a mixture of lidocaine 1% and Marcaine 1.25% without epinephrine.    A Band-Aid was applied.  There were no complications.  After period of observation she noted some improvement.  I called her later in the day she indicated that the injection lasted about 30 minutes.  Based on the mixture of lidocaine and Marcaine not sure what to make of this.  I am going to review her case with one of the pain stimulator rep's to see if she might be a candidate for this.  Time with patient 45 minutes    Chris Sylvester MD

## 2023-12-27 DIAGNOSIS — M79.644 THUMB PAIN, RIGHT: ICD-10-CM

## 2023-12-27 RX ORDER — OXYCODONE HYDROCHLORIDE 5 MG/1
5 TABLET ORAL EVERY 6 HOURS PRN
Qty: 30 TABLET | Refills: 0 | Status: CANCELLED | OUTPATIENT
Start: 2023-12-27

## 2023-12-29 ENCOUNTER — MYC MEDICAL ADVICE (OUTPATIENT)
Dept: INTERNAL MEDICINE | Facility: OTHER | Age: 63
End: 2023-12-29
Payer: COMMERCIAL

## 2024-01-02 NOTE — TELEPHONE ENCOUNTER
Called [facility] to schedule physical in hold spot per Dr. Adkins.     [facility]    Alize Warren RN on 1/2/2024 at 8:34 AM

## 2024-01-18 ASSESSMENT — ENCOUNTER SYMPTOMS
HEADACHES: 0
DIARRHEA: 0
NERVOUS/ANXIOUS: 1
HEMATOCHEZIA: 0
HEARTBURN: 0
DYSURIA: 0
WEAKNESS: 0
SORE THROAT: 0
HEMATURIA: 0
SHORTNESS OF BREATH: 0
CHILLS: 0
PALPITATIONS: 0
DIZZINESS: 0
MYALGIAS: 0
CONSTIPATION: 0
ARTHRALGIAS: 0
FEVER: 0
FREQUENCY: 0
COUGH: 0
PARESTHESIAS: 0
JOINT SWELLING: 0
BREAST MASS: 0
NAUSEA: 0
EYE PAIN: 0
ABDOMINAL PAIN: 0

## 2024-01-25 ENCOUNTER — OFFICE VISIT (OUTPATIENT)
Dept: INTERNAL MEDICINE | Facility: OTHER | Age: 64
End: 2024-01-25
Attending: INTERNAL MEDICINE
Payer: COMMERCIAL

## 2024-01-25 VITALS
BODY MASS INDEX: 21.98 KG/M2 | TEMPERATURE: 97.7 F | RESPIRATION RATE: 14 BRPM | HEART RATE: 58 BPM | HEIGHT: 66 IN | WEIGHT: 136.8 LBS | SYSTOLIC BLOOD PRESSURE: 114 MMHG | DIASTOLIC BLOOD PRESSURE: 80 MMHG | OXYGEN SATURATION: 98 %

## 2024-01-25 DIAGNOSIS — M79.2 NERVE PAIN: ICD-10-CM

## 2024-01-25 DIAGNOSIS — G43.009 MIGRAINE WITHOUT AURA AND WITHOUT STATUS MIGRAINOSUS, NOT INTRACTABLE: ICD-10-CM

## 2024-01-25 DIAGNOSIS — Z13.1 SCREENING FOR DIABETES MELLITUS: ICD-10-CM

## 2024-01-25 DIAGNOSIS — I10 ESSENTIAL HYPERTENSION: ICD-10-CM

## 2024-01-25 DIAGNOSIS — F51.04 CHRONIC INSOMNIA: Primary | ICD-10-CM

## 2024-01-25 DIAGNOSIS — Z12.31 ENCOUNTER FOR SCREENING MAMMOGRAM FOR BREAST CANCER: ICD-10-CM

## 2024-01-25 DIAGNOSIS — Z13.220 SCREENING FOR HYPERLIPIDEMIA: ICD-10-CM

## 2024-01-25 DIAGNOSIS — M79.644 THUMB PAIN, RIGHT: ICD-10-CM

## 2024-01-25 DIAGNOSIS — C44.609 MALIGNANT NEOPLASM OF SKIN OF LEFT UPPER EXTREMITY: ICD-10-CM

## 2024-01-25 DIAGNOSIS — Z85.828 HISTORY OF SCC (SQUAMOUS CELL CARCINOMA) OF SKIN: ICD-10-CM

## 2024-01-25 DIAGNOSIS — Z85.828 HISTORY OF BASAL CELL CARCINOMA: ICD-10-CM

## 2024-01-25 LAB
ALBUMIN SERPL BCG-MCNC: 4.8 G/DL (ref 3.5–5.2)
ALP SERPL-CCNC: 84 U/L (ref 40–150)
ALT SERPL W P-5'-P-CCNC: 12 U/L (ref 0–50)
ANION GAP SERPL CALCULATED.3IONS-SCNC: 8 MMOL/L (ref 7–15)
AST SERPL W P-5'-P-CCNC: 22 U/L (ref 0–45)
BILIRUB SERPL-MCNC: 0.7 MG/DL
BUN SERPL-MCNC: 10.6 MG/DL (ref 8–23)
CALCIUM SERPL-MCNC: 9.7 MG/DL (ref 8.8–10.2)
CHLORIDE SERPL-SCNC: 99 MMOL/L (ref 98–107)
CHOLEST SERPL-MCNC: 225 MG/DL
CREAT SERPL-MCNC: 0.81 MG/DL (ref 0.51–0.95)
CRP SERPL-MCNC: <3 MG/L
DEPRECATED HCO3 PLAS-SCNC: 30 MMOL/L (ref 22–29)
EGFRCR SERPLBLD CKD-EPI 2021: 82 ML/MIN/1.73M2
ERYTHROCYTE [DISTWIDTH] IN BLOOD BY AUTOMATED COUNT: 12.5 % (ref 10–15)
ERYTHROCYTE [SEDIMENTATION RATE] IN BLOOD BY WESTERGREN METHOD: 1 MM/HR (ref 0–30)
FASTING STATUS PATIENT QL REPORTED: YES
GLUCOSE SERPL-MCNC: 96 MG/DL (ref 70–99)
HCT VFR BLD AUTO: 39.4 % (ref 35–47)
HDLC SERPL-MCNC: 73 MG/DL
HGB BLD-MCNC: 13.8 G/DL (ref 11.7–15.7)
LDLC SERPL CALC-MCNC: 139 MG/DL
MAGNESIUM SERPL-MCNC: 2.1 MG/DL (ref 1.7–2.3)
MCH RBC QN AUTO: 29.6 PG (ref 26.5–33)
MCHC RBC AUTO-ENTMCNC: 35 G/DL (ref 31.5–36.5)
MCV RBC AUTO: 84 FL (ref 78–100)
NONHDLC SERPL-MCNC: 152 MG/DL
PLATELET # BLD AUTO: 181 10E3/UL (ref 150–450)
POTASSIUM SERPL-SCNC: 3.8 MMOL/L (ref 3.4–5.3)
PROT SERPL-MCNC: 7.4 G/DL (ref 6.4–8.3)
RBC # BLD AUTO: 4.67 10E6/UL (ref 3.8–5.2)
SODIUM SERPL-SCNC: 137 MMOL/L (ref 135–145)
TRIGL SERPL-MCNC: 67 MG/DL
TSH SERPL DL<=0.005 MIU/L-ACNC: 0.74 UIU/ML (ref 0.3–4.2)
VIT B12 SERPL-MCNC: 1138 PG/ML (ref 232–1245)
WBC # BLD AUTO: 4.2 10E3/UL (ref 4–11)

## 2024-01-25 PROCEDURE — 85652 RBC SED RATE AUTOMATED: CPT | Mod: ZL | Performed by: INTERNAL MEDICINE

## 2024-01-25 PROCEDURE — 84478 ASSAY OF TRIGLYCERIDES: CPT | Mod: ZL | Performed by: INTERNAL MEDICINE

## 2024-01-25 PROCEDURE — 82374 ASSAY BLOOD CARBON DIOXIDE: CPT | Mod: ZL | Performed by: INTERNAL MEDICINE

## 2024-01-25 PROCEDURE — 86140 C-REACTIVE PROTEIN: CPT | Mod: ZL | Performed by: INTERNAL MEDICINE

## 2024-01-25 PROCEDURE — 99396 PREV VISIT EST AGE 40-64: CPT | Performed by: INTERNAL MEDICINE

## 2024-01-25 PROCEDURE — 36415 COLL VENOUS BLD VENIPUNCTURE: CPT | Mod: ZL | Performed by: INTERNAL MEDICINE

## 2024-01-25 PROCEDURE — 85027 COMPLETE CBC AUTOMATED: CPT | Mod: ZL | Performed by: INTERNAL MEDICINE

## 2024-01-25 PROCEDURE — 82607 VITAMIN B-12: CPT | Mod: ZL | Performed by: INTERNAL MEDICINE

## 2024-01-25 PROCEDURE — 82247 BILIRUBIN TOTAL: CPT | Mod: ZL | Performed by: INTERNAL MEDICINE

## 2024-01-25 PROCEDURE — 84443 ASSAY THYROID STIM HORMONE: CPT | Mod: ZL | Performed by: INTERNAL MEDICINE

## 2024-01-25 PROCEDURE — 83735 ASSAY OF MAGNESIUM: CPT | Mod: ZL | Performed by: INTERNAL MEDICINE

## 2024-01-25 RX ORDER — LORAZEPAM 0.5 MG/1
0.5 TABLET ORAL
Qty: 30 TABLET | Refills: 5 | Status: SHIPPED | OUTPATIENT
Start: 2024-01-25 | End: 2024-06-20

## 2024-01-25 RX ORDER — SUMATRIPTAN 100 MG/1
100 TABLET, FILM COATED ORAL
Qty: 10 TABLET | Refills: 6 | Status: SHIPPED | OUTPATIENT
Start: 2024-01-25

## 2024-01-25 RX ORDER — ESZOPICLONE 1 MG/1
1-2 TABLET, FILM COATED ORAL AT BEDTIME
Qty: 60 TABLET | Refills: 5 | Status: SHIPPED | OUTPATIENT
Start: 2024-01-25 | End: 2024-06-20

## 2024-01-25 RX ORDER — OXYCODONE HYDROCHLORIDE 5 MG/1
5 TABLET ORAL 2 TIMES DAILY PRN
Qty: 30 TABLET | Refills: 0 | Status: SHIPPED | OUTPATIENT
Start: 2024-01-25 | End: 2024-02-15

## 2024-01-25 RX ORDER — DULOXETIN HYDROCHLORIDE 30 MG/1
30 CAPSULE, DELAYED RELEASE ORAL DAILY
Qty: 90 CAPSULE | Refills: 0 | Status: SHIPPED | OUTPATIENT
Start: 2024-01-25 | End: 2024-02-15

## 2024-01-25 RX ORDER — HYDROCHLOROTHIAZIDE 25 MG/1
25 TABLET ORAL DAILY
Qty: 90 TABLET | Refills: 4 | Status: SHIPPED | OUTPATIENT
Start: 2024-01-25

## 2024-01-25 ASSESSMENT — PAIN SCALES - GENERAL: PAINLEVEL: SEVERE PAIN (6)

## 2024-01-25 ASSESSMENT — ENCOUNTER SYMPTOMS
SHORTNESS OF BREATH: 0
NERVOUS/ANXIOUS: 1
DIZZINESS: 0
ABDOMINAL PAIN: 0
PALPITATIONS: 0
COUGH: 0
JOINT SWELLING: 0
NAUSEA: 0
ARTHRALGIAS: 0
SORE THROAT: 0
DYSURIA: 0
CHILLS: 0
MYALGIAS: 0
FEVER: 0
EYE PAIN: 0
HEMATURIA: 0
CONSTIPATION: 0
DIARRHEA: 0
FREQUENCY: 0
HEADACHES: 0
WEAKNESS: 0

## 2024-01-25 NOTE — NURSING NOTE
"Chief Complaint   Patient presents with    Wellness Visit    Refill Request       Initial /80 (BP Location: Right arm, Patient Position: Sitting, Cuff Size: Adult Regular)   Pulse 58   Temp 97.7  F (36.5  C) (Temporal)   Resp 14   Ht 1.676 m (5' 6\")   Wt 62.1 kg (136 lb 12.8 oz)   LMP 03/07/2003   SpO2 98%   BMI 22.08 kg/m   Estimated body mass index is 22.08 kg/m  as calculated from the following:    Height as of this encounter: 1.676 m (5' 6\").    Weight as of this encounter: 62.1 kg (136 lb 12.8 oz).  Medication Review: complete    The next two questions are to help us understand your food security.  If you are feeling you need any assistance in this area, we have resources available to support you today.          1/18/2024   SDOH- Food Insecurity   Within the past 12 months, did you worry that your food would run out before you got money to buy more? N   Within the past 12 months, did the food you bought just not last and you didn t have money to get more? N         Health Care Directive:  Patient does not have a Health Care Directive or Living Will: Discussed advance care planning with patient; information given to patient to review.    Raisa Dasilva LPN      "

## 2024-01-25 NOTE — PATIENT INSTRUCTIONS
Start taking Duloxetine 30mg daily for 2-3 weeks and then increase to 60mg daily; watch for side effects

## 2024-01-25 NOTE — PROGRESS NOTES
Preventive Care Visit  [facility] AND [facility]  Abena Adkins DO, Internal Medicine  2024     SUBJECTIVE:   Alessandra is a 63 year old, presenting for the following:  Wellness Visit and Refill Request    Healthy Habits:     Getting at least 3 servings of Calcium per day:  Yes    Bi-annual eye exam:  Yes    Dental care twice a year:  NO    Sleep apnea or symptoms of sleep apnea:  Daytime drowsiness    Diet:  Breakfast skipped and Other    Frequency of exercise:  2-3 days/week    Duration of exercise:  15-30 minutes    Taking medications regularly:  Yes    Patient reports that overall she has had a difficult year.  Her mother with pancreatic cancer last spring.  Her brother  this fall suddenly and her father had a fall and brain bleed and is now in assisted living which has been difficult.    In addition to that she has had continued issues with right hand pain.  She has had ongoing issues with her hand and has been following with orthopedics for several years.  She has had multiple surgeries in the past.  Most recently she has started having hyperalgesia, significant pain and ongoing issues.  She is very frustrated with this.  She has been using lorazepam as needed for anxiety.  She did try using some gabapentin which was not helpful.  She tried a Medrol Dosepak several months ago and did not notice any change in her pain.  She has been using oxycodone off-and-on in the evenings to help with her discomfort however has been unable to get a hold of orthopedics to get an additional refill.  She feels overall that they have given up on her and there is no further intervention that would be beneficial for her from their standpoint.    She has had multiple skin cancers removed.  She does feel that she is having regrowth of 1 done this past September on her left arm.  In reviewing pathology from last February it does appear that margins were still present on her biopsy from the lesions on her right arm  and chest.    She does need a refill of her other medications including her hydrochlorothiazide.  Her blood pressure is well-controlled.  She also continues on Lunesta for sleep.  She denies any side effects.    She is due for a mammogram.  She is due for diabetes and cholesterol screening.  She declines all vaccinations at this time.      Today's PHQ-2 Score:       1/25/2024    11:26 AM   PHQ-2 ( 1999 Pf[employer])   Q1: Little interest or pleasure in doing things 0   Q2: Feeling down, depressed or hopeless 0   PHQ-2 Score 0   Q1: Little interest or pleasure in doing things Not at all   Q2: Feeling down, depressed or hopeless Not at all   PHQ-2 Score 0       Social History     Tobacco Use    Smoking status: Never    Smokeless tobacco: Never   Substance Use Topics    Alcohol use: No     Alcohol/week: 0.0 standard drinks of alcohol     Comment: Alcoholic Drinks/day: very rarely           1/18/2024     9:20 AM   Alcohol Use   Prescreen: >3 drinks/day or >7 drinks/week? No     Reviewed orders with patient.  Reviewed health maintenance and updated orders accordingly - Yes      Breast Cancer Screening:  Any new diagnosis of family breast, ovarian, or bowel cancer? No    FHS-7:       12/7/2021     8:04 AM   Breast CA Risk Assessment (FHS-7)   Did any of your first-degree relatives have breast or ovarian cancer? No   Did any of your relatives have bilateral breast cancer? No   Did any man in your family have breast cancer? No   Did any woman in your family have breast and ovarian cancer? No   Did any woman in your family have breast cancer before age 50 y? No   Do you have 2 or more relatives with breast and/or ovarian cancer? No   Do you have 2 or more relatives with breast and/or bowel cancer? No     Annual screen by mutual decision with patient  Pertinent mammograms are reviewed under the imaging tab.    History of abnormal Pap smear: Patient has declined further evaluation - will continue to discuss      Latest Ref Rng &  "Units 7/31/2020    12:01 PM 7/26/2019    10:40 AM   PAP / HPV   PAP (Historical)  NIL  NIL    HPV 16 DNA NEG^Negative Negative  Negative    HPV 18 DNA NEG^Negative Negative  Negative    Other HR HPV NEG^Negative Positive  Positive      Reviewed and updated as needed this visit by clinical staff   Tobacco  Allergies  Meds  Problems  Med Hx  Surg Hx  Fam Hx          Reviewed and updated as needed this visit by Provider   Tobacco  Allergies  Meds  Problems  Med Hx  Surg Hx  Fam Hx            Current Outpatient Medications   Medication    DULoxetine (CYMBALTA) 30 MG capsule    eszopiclone (LUNESTA) 1 MG tablet    hydrochlorothiazide (HYDRODIURIL) 25 MG tablet    LORazepam (ATIVAN) 0.5 MG tablet    oxyCODONE (ROXICODONE) 5 MG tablet    SUMAtriptan (IMITREX) 100 MG tablet     No current facility-administered medications for this visit.       Review of Systems   Constitutional:  Negative for chills and fever.   HENT:  Positive for ear pain. Negative for congestion, hearing loss and sore throat.    Eyes:  Negative for pain and visual disturbance.   Respiratory:  Negative for cough and shortness of breath.    Cardiovascular:  Negative for chest pain and palpitations.   Gastrointestinal:  Negative for abdominal pain, constipation, diarrhea and nausea.   Genitourinary:  Negative for dysuria, frequency, genital sores, hematuria, pelvic pain, urgency, vaginal bleeding and vaginal discharge.   Musculoskeletal:  Negative for arthralgias, joint swelling and myalgias.   Skin:  Negative for rash.   Neurological:  Negative for dizziness, weakness and headaches.   Psychiatric/Behavioral:  The patient is nervous/anxious.        OBJECTIVE:   /80 (BP Location: Right arm, Patient Position: Sitting, Cuff Size: Adult Regular)   Pulse 58   Temp 97.7  F (36.5  C) (Temporal)   Resp 14   Ht 1.676 m (5' 6\")   Wt 62.1 kg (136 lb 12.8 oz)   LMP 03/07/2003   SpO2 98%   BMI 22.08 kg/m     Estimated body mass index is 22.08 " "kg/m  as calculated from the following:    Height as of this encounter: 1.676 m (5' 6\").    Weight as of this encounter: 62.1 kg (136 lb 12.8 oz).  Physical Exam  GEN: Vitals reviewed. Healthy appearing. Patient is in no acute distress. Cooperative with exam.  HEENT: Normocephalic atraumatic.  Eyes grossly normal to inspection.  No discharge or erythema, or obvious scleral/conjunctival abnormalities. Oropharynx with no erythema or exudates. Dentition adequate.  EACs clear bilaterally, TM gray with normal landmarks.  NECK: Supple; no thyromegaly or masses noted.  No cervical or supraclavicular lymphadenopathy.  CV: Heart regular in rate and rhythm with no murmur.    LUNGS: No audible wheeze, cough, or visible cyanosis.  No visible retractions or increased work of breathing.  Lungs clear to auscultation bilaterally.    ABD:  Nondistended  SKIN: Warm and dry to touch.  Visible skin clear. No significant rash, abnormal pigmentation or lesions.  EXT: No clubbing or cyanosis.  No peripheral edema.  NEURO: Alert and oriented to person, place, and time.  Cranial nerves II-XII grossly intact with no focal or lateralizing deficits.  Muscle tone normal.  Gait normal. No tremor.   MSK: ROM of upper and lower ext symmetric and full.  PSYCH: Mood is good.  Mentation appears normal, affect normal/bright, judgement and insight intact, normal speech and appearance well-groomed.     Diagnostic Test Results:  Labs reviewed in Epic    ASSESSMENT/PLAN:   1. Chronic insomnia  - Controlled.  Continue current regimen however discussed continuing to try to get off of these medications especially in light of use of pain medications.  - eszopiclone (LUNESTA) 1 MG tablet; Take 1-2 tablets (1-2 mg) by mouth at bedtime  Dispense: 60 tablet; Refill: 5  - LORazepam (ATIVAN) 0.5 MG tablet; Take 1 tablet (0.5 mg) by mouth nightly as needed for anxiety or sleep  Dispense: 30 tablet; Refill: 5    2. Essential hypertension  - Controlled.  Continue " current regimen.    - hydrochlorothiazide (HYDRODIURIL) 25 MG tablet; Take 1 tablet (25 mg) by mouth daily  Dispense: 90 tablet; Refill: 4    3. Migraine without aura and without status migrainosus, not intractable  - Controlled.  Continue current regimen.    - SUMAtriptan (IMITREX) 100 MG tablet; Take 1 tablet (100 mg) by mouth at onset of headache for migraine  Dispense: 10 tablet; Refill: 6    4. Thumb pain, right  We had an extensive conversation regarding this.  I do have concern that she may be developing CRPS.  At this time she will be provided with additional oxycodone however we discussed that this is not the right medication in the long run to treat nerve pain.  Additional medications to be tried as below.  She was encouraged to consider continued compression.  We will continue to monitor closely.  We discussed that we could consider an additional referral for more opinions however she has seen 2 orthopedic surgeons which makes additional recommendations less likely.  - oxyCODONE (ROXICODONE) 5 MG tablet; Take 1 tablet (5 mg) by mouth 2 times daily as needed for pain  Dispense: 30 tablet; Refill: 0    5. Malignant neoplasm of skin of left upper extremity  -With regrowth on the left upper extremity there is concern for continued malignancy.  Outside records obtained and reviewed and do show positive margins.  Referral placed to general surgery to determine whether additional excision would be reasonable versus continued monitoring.  - Adult General Surg Referral; Future    6. History of SCC (squamous cell carcinoma) of skin  Given her extensive repeat history of squamous and basal cell carcinomas would recommend ongoing evaluation through dermatology  - Adult Dermatology  Referral; Future    7. History of basal cell carcinoma  See above  - Adult Dermatology  Referral; Future    8. Nerve pain  Exact etiology is unknown however suspect possible underlying CRPS.  We will obtain additional  labs to rule out other causes.  Continue with pain medications as above however will also start duloxetine to see if this gives her some benefit.  After 2 weeks she can increase to 2 capsules daily if needed.  She is to call with any side effects.  - DULoxetine (CYMBALTA) 30 MG capsule; Take 1 capsule (30 mg) by mouth daily  Dispense: 90 capsule; Refill: 0  - CBC with platelets  - Magnesium  - TSH with free T4 reflex  - CRP inflammation  - Sedimentation Rate (ESR)  - Vitamin B12    9. Encounter for screening mammogram for breast cancer  - MA Screening Bilateral w/ Yadiel; Future    10. Screening for diabetes mellitus  - Comprehensive metabolic panel    11. Screening for hyperlipidemia  - Lipid Panel          Counseling  Reviewed preventive health counseling, as reflected in patient instructions        She reports that she has never smoked. She has never used smokeless tobacco.        Signed Electronically by: Abena Adkins DO  Answers submitted by the patient for this visit:  Annual Preventive Visit (Submitted on 1/18/2024)  Chief Complaint: Annual Exam:  Blood in stool: No  heartburn: No  peripheral edema: No  mood changes: No  Skin sensation changes: No  tenderness: No  breast mass: No  breast discharge: No

## 2024-02-08 DIAGNOSIS — M79.644 THUMB PAIN, RIGHT: ICD-10-CM

## 2024-02-12 ENCOUNTER — OFFICE VISIT (OUTPATIENT)
Dept: SURGERY | Facility: OTHER | Age: 64
End: 2024-02-12
Attending: SURGERY
Payer: COMMERCIAL

## 2024-02-12 VITALS
WEIGHT: 138 LBS | SYSTOLIC BLOOD PRESSURE: 122 MMHG | RESPIRATION RATE: 14 BRPM | TEMPERATURE: 97.8 F | DIASTOLIC BLOOD PRESSURE: 80 MMHG | BODY MASS INDEX: 22.27 KG/M2 | OXYGEN SATURATION: 99 % | HEART RATE: 68 BPM

## 2024-02-12 DIAGNOSIS — L98.9 SKIN LESION OF LEFT ARM: ICD-10-CM

## 2024-02-12 PROCEDURE — 11402 EXC TR-EXT B9+MARG 1.1-2 CM: CPT | Performed by: SURGERY

## 2024-02-12 PROCEDURE — 88304 TISSUE EXAM BY PATHOLOGIST: CPT

## 2024-02-12 RX ORDER — OXYCODONE HYDROCHLORIDE 5 MG/1
5 TABLET ORAL 2 TIMES DAILY PRN
Qty: 30 TABLET | Refills: 0 | OUTPATIENT
Start: 2024-02-12

## 2024-02-12 ASSESSMENT — PAIN SCALES - GENERAL: PAINLEVEL: NO PAIN (0)

## 2024-02-12 NOTE — PROGRESS NOTES
Procedure Note     Pre/Post Operative Diagnosis:   Left upper arm skin lesion    Procedure:    Excision of Left upper arm skin lesion    Surgeon: ABRAHAM Jennings MD     Local Anesthesia: 1% lidocaine with epinephrine    Indication for the procedure:    This is a 63 year old female patient with Left upper arm skin lesion.  Patient had a squamous cell cancer removed there in the past.  States there is a nodular lesion at the superior pole of the incision.  Patient denies crusting or bleeding.  No pain or drainage.  Clinically, there is a 5 mm x 5 mm nodular skin lesion on the left upper arm, at the superior end of the previous squamous cell cancer excision scar.  After explaining the risks to include bleeding, infection, recurrence or need for re-excision, and scarring the patient wished to proceed.    Procedure:   The area was prepped and draped in usual sterile fashion with ChloraPrep. After adequate local anesthesia, an elliptical skin incision was made to encompass the lesion, 1.9cm x 0.8 cm with margins. The skin was closed with 4-0 monocryl suture in a running fashion.  Dermabond was applied.     Plan:  The patient will be called with pathology results.  Patient will followup if there any problems with the wound including redness or drainage.      ABRAHAM Jennings MD

## 2024-02-12 NOTE — NURSING NOTE
Pt presents to clinic today for spot on left upper arm.      Medication Reconciliation: complete  Taylor Bermudez LPN

## 2024-02-13 ENCOUNTER — MYC MEDICAL ADVICE (OUTPATIENT)
Dept: INTERNAL MEDICINE | Facility: OTHER | Age: 64
End: 2024-02-13
Payer: COMMERCIAL

## 2024-02-14 NOTE — TELEPHONE ENCOUNTER
Called [facility] to schedule.     Patient update on MyChart.    Alize Warren RN on 2/14/2024 at 1:39 PM

## 2024-02-15 ENCOUNTER — OFFICE VISIT (OUTPATIENT)
Dept: INTERNAL MEDICINE | Facility: OTHER | Age: 64
End: 2024-02-15
Attending: INTERNAL MEDICINE
Payer: COMMERCIAL

## 2024-02-15 VITALS
WEIGHT: 137.4 LBS | SYSTOLIC BLOOD PRESSURE: 118 MMHG | HEIGHT: 66 IN | DIASTOLIC BLOOD PRESSURE: 78 MMHG | TEMPERATURE: 98.3 F | BODY MASS INDEX: 22.08 KG/M2 | OXYGEN SATURATION: 98 % | HEART RATE: 60 BPM | RESPIRATION RATE: 16 BRPM

## 2024-02-15 DIAGNOSIS — F11.20 CONTINUOUS OPIOID DEPENDENCE (H): ICD-10-CM

## 2024-02-15 DIAGNOSIS — M79.644 THUMB PAIN, RIGHT: Primary | ICD-10-CM

## 2024-02-15 DIAGNOSIS — M79.2 NERVE PAIN: ICD-10-CM

## 2024-02-15 LAB
PATH REPORT.COMMENTS IMP SPEC: NORMAL
PATH REPORT.FINAL DX SPEC: NORMAL
PHOTO IMAGE: NORMAL

## 2024-02-15 PROCEDURE — 99214 OFFICE O/P EST MOD 30 MIN: CPT | Performed by: INTERNAL MEDICINE

## 2024-02-15 RX ORDER — DULOXETIN HYDROCHLORIDE 60 MG/1
60 CAPSULE, DELAYED RELEASE ORAL DAILY
Qty: 90 CAPSULE | Refills: 0 | Status: SHIPPED | OUTPATIENT
Start: 2024-02-15 | End: 2024-06-20

## 2024-02-15 RX ORDER — OXYCODONE HYDROCHLORIDE 5 MG/1
5 TABLET ORAL 2 TIMES DAILY PRN
Qty: 30 TABLET | Refills: 0 | Status: SHIPPED | OUTPATIENT
Start: 2024-02-15 | End: 2024-02-26

## 2024-02-15 ASSESSMENT — PAIN SCALES - GENERAL: PAINLEVEL: SEVERE PAIN (6)

## 2024-02-15 NOTE — NURSING NOTE
Patient is here for medication check for refills of pain med. No concerns at this time.     Patient's last menstrual period was 03/07/2003 (approximate).  Medication Reconciliation: complete    Lulú Abdul LPN 2/15/2024 3:34 PM       Advance care directive on file? no  Advance care directive provided to patient? no       Lulú Abdul LPN

## 2024-02-15 NOTE — PROGRESS NOTES
Assessment & Plan     Thumb pain, right  At this time we will continue with 1 tablet of oxycodone twice daily as needed.  She was encouraged to consider a small dose in the morning and then 1 to 1-1/2 pills in the evening.  She is to monitor for any side effects.  She is to be cautious with driving.  She will continue to work with compression sleeves to see what kind of benefit they provide.  - oxyCODONE (ROXICODONE) 5 MG tablet; Take 1 tablet (5 mg) by mouth 2 times daily as needed for pain    Nerve pain  At this time we will increase the duloxetine to 60 mg daily.  She can gradually increase to every other day for a week or 2.  She is to monitor for any side effects.  - DULoxetine (CYMBALTA) 60 MG capsule; Take 1 capsule (60 mg) by mouth daily    Continuous opioid dependence (H)  We will plan for a drug screen and drug contract when she is at stable dosing.    Return in about 11 days (around 2/26/2024) for as scheduled.    Sav Aparicio is a 63 year old, presenting for the following health issues:  Recheck Medication (Pain med)        2/15/2024     3:31 PM   Additional Questions   Roomed by Lulú ramos     History of Present Illness       Reason for visit:  Renew meds    She eats 2-3 servings of fruits and vegetables daily.She consumes 0 sweetened beverage(s) daily.She exercises with enough effort to increase her heart rate 10 to 19 minutes per day.  She exercises with enough effort to increase her heart rate 3 or less days per week.   She is taking medications regularly.     Patient reports that she continues to have ongoing pain.  She feels that perhaps the duloxetine has helped slightly and is curious about increasing the dose.  She has overall tolerated it.  She reports that she has been taking 1 to 2 tablets of her oxycodone in the evenings.  She tries not to take it during the day when she is driving however by the time she gets home in the evenings she then has significant increases in pain and feels  "it is difficult to get ahead of it.  She has not had any side effects from the oxycodone.    She has tried CBD in the past which has not helped.  THC has given her headaches.  She has not found benefit from NSAIDs or over-the-counter medications.      Objective    /78   Pulse 60   Temp 98.3  F (36.8  C) (Temporal)   Resp 16   Ht 1.676 m (5' 6\")   Wt 62.3 kg (137 lb 6.4 oz)   LMP 03/07/2003 (Approximate)   SpO2 98%   Breastfeeding No   BMI 22.18 kg/m    Body mass index is 22.18 kg/m .  Physical Exam   GEN: Vitals reviewed. Healthy appearing. Patient is in no acute distress. Cooperative with exam.  HEENT: Normocephalic atraumatic.  Eyes grossly normal to inspection.  No discharge or erythema, or obvious scleral/conjunctival abnormalities.   LUNGS: No audible wheeze, cough, or visible cyanosis.  No visible retractions or increased work of breathing.    ABD: Nondistended  SKIN: Warm and dry to touch.  Visible skin clear. No significant rash, abnormal pigmentation or lesions.        33 minutes spent on the date of the encounter doing chart review, history and exam, documentation and further activities as noted above    Signed Electronically by: Abena Adkins,     "

## 2024-02-18 ENCOUNTER — HEALTH MAINTENANCE LETTER (OUTPATIENT)
Age: 64
End: 2024-02-18

## 2024-02-19 ASSESSMENT — ANXIETY QUESTIONNAIRES
4. TROUBLE RELAXING: NOT AT ALL
6. BECOMING EASILY ANNOYED OR IRRITABLE: NOT AT ALL
8. IF YOU CHECKED OFF ANY PROBLEMS, HOW DIFFICULT HAVE THESE MADE IT FOR YOU TO DO YOUR WORK, TAKE CARE OF THINGS AT HOME, OR GET ALONG WITH OTHER PEOPLE?: NOT DIFFICULT AT ALL
7. FEELING AFRAID AS IF SOMETHING AWFUL MIGHT HAPPEN: NOT AT ALL
GAD7 TOTAL SCORE: 0
3. WORRYING TOO MUCH ABOUT DIFFERENT THINGS: NOT AT ALL
1. FEELING NERVOUS, ANXIOUS, OR ON EDGE: NOT AT ALL
2. NOT BEING ABLE TO STOP OR CONTROL WORRYING: NOT AT ALL
GAD7 TOTAL SCORE: 0
IF YOU CHECKED OFF ANY PROBLEMS ON THIS QUESTIONNAIRE, HOW DIFFICULT HAVE THESE PROBLEMS MADE IT FOR YOU TO DO YOUR WORK, TAKE CARE OF THINGS AT HOME, OR GET ALONG WITH OTHER PEOPLE: NOT DIFFICULT AT ALL
GAD7 TOTAL SCORE: 0
7. FEELING AFRAID AS IF SOMETHING AWFUL MIGHT HAPPEN: NOT AT ALL
5. BEING SO RESTLESS THAT IT IS HARD TO SIT STILL: NOT AT ALL

## 2024-02-19 ASSESSMENT — PATIENT HEALTH QUESTIONNAIRE - PHQ9
SUM OF ALL RESPONSES TO PHQ QUESTIONS 1-9: 0
10. IF YOU CHECKED OFF ANY PROBLEMS, HOW DIFFICULT HAVE THESE PROBLEMS MADE IT FOR YOU TO DO YOUR WORK, TAKE CARE OF THINGS AT HOME, OR GET ALONG WITH OTHER PEOPLE: NOT DIFFICULT AT ALL
SUM OF ALL RESPONSES TO PHQ QUESTIONS 1-9: 0

## 2024-02-19 ASSESSMENT — PAIN SCALES - PAIN ENJOYMENT GENERAL ACTIVITY SCALE (PEG)
INTERFERED_GENERAL_ACTIVITY: 5
PEG_TOTALSCORE: 5.67
INTERFERED_ENJOYMENT_LIFE: 5
PEG_TOTALSCORE: 5.67
AVG_PAIN_PASTWEEK: 7
AVG_PAIN_PASTWEEK: 7
INTERFERED_GENERAL_ACTIVITY: 5
INTERFERED_ENJOYMENT_LIFE: 5

## 2024-02-26 ENCOUNTER — OFFICE VISIT (OUTPATIENT)
Dept: INTERNAL MEDICINE | Facility: OTHER | Age: 64
End: 2024-02-26
Attending: INTERNAL MEDICINE
Payer: COMMERCIAL

## 2024-02-26 VITALS
WEIGHT: 135.8 LBS | RESPIRATION RATE: 17 BRPM | DIASTOLIC BLOOD PRESSURE: 79 MMHG | BODY MASS INDEX: 21.83 KG/M2 | OXYGEN SATURATION: 97 % | HEIGHT: 66 IN | SYSTOLIC BLOOD PRESSURE: 120 MMHG | TEMPERATURE: 99.1 F | HEART RATE: 59 BPM

## 2024-02-26 DIAGNOSIS — M79.644 THUMB PAIN, RIGHT: ICD-10-CM

## 2024-02-26 PROCEDURE — 99213 OFFICE O/P EST LOW 20 MIN: CPT | Performed by: INTERNAL MEDICINE

## 2024-02-26 RX ORDER — OXYCODONE HYDROCHLORIDE 5 MG/1
5 TABLET ORAL 2 TIMES DAILY PRN
Qty: 60 TABLET | Refills: 0 | Status: SHIPPED | OUTPATIENT
Start: 2024-02-26 | End: 2024-03-21

## 2024-02-26 ASSESSMENT — PAIN SCALES - GENERAL: PAINLEVEL: SEVERE PAIN (7)

## 2024-02-26 NOTE — PROGRESS NOTES
Assessment & Plan     Thumb pain, right  Continue at Cymbalta 60 mg daily and consider increasing to 90 mg daily follow-up.  At that time would recommend reducing oxycodone to once daily  - oxyCODONE (ROXICODONE) 5 MG tablet; Take 1 tablet (5 mg) by mouth 2 times daily as needed for pain    Return in about 1 month (around 3/26/2024) for Recheck pain.    Sav Aparicio is a 63 year old, presenting for the following health issues:  Pain Management (Chronic Pain Management- Oxycodone)    HPI     Patient reports that the Cymbalta she was started on seems to be helping her pain.  She does feel she can work on decreasing her opioid use.    Pain History:  When did you first notice your pain? 1+ year ago   Have you seen this provider for your pain in the past? Yes   Where in your body do you have pain? Right thumb  Are you seeing anyone else for your pain? No        2/19/2024     4:07 PM   PHQ-9 SCORE   PHQ-9 Total Score MyChart 0   PHQ-9 Total Score 0           1/29/2018     7:00 AM 2/19/2024     4:08 PM   IJEOMA-7 SCORE   Total Score  0 (minimal anxiety)   Total Score 0 0           2/26/2024     3:23 PM   PEG Score   PEG Total Score 5.67         Chronic Pain Follow Up:    Location of pain: right thumb  Analgesia/pain control:    - Recent changes:  none    - Overall control: Tolerable with discomfort    - Current treatments: Oxycodone   Adherence:     - Do you ever take more pain medicine than prescribed? No    - When did you take your last dose of pain medicine?  yesterday   Adverse effects: No   PDMP Review         Value Time User    State PDMP site checked  Yes 2/15/2024   Abena Adkins DO          Last CSA Agreement:   CSA -- Patient Level:    CSA: None found at the patient level.           3/4/2024     6:02 AM   OPIOID RISK TOOL TOTAL SCORE   Total Score 0     Low Risk (0-3)  Moderate Risk (4-7)  High Risk (>8)          Objective    /79 (BP Location: Right arm, Patient Position: Sitting, Cuff Size: Adult  "Regular)   Pulse 59   Temp 99.1  F (37.3  C) (Temporal)   Resp 17   Ht 1.676 m (5' 6\")   Wt 61.6 kg (135 lb 12.8 oz)   LMP 03/07/2003 (Approximate)   SpO2 97%   Breastfeeding No   BMI 21.92 kg/m    Body mass index is 21.92 kg/m .  Physical Exam   GEN: Vitals reviewed. Healthy appearing. Patient is in no acute distress. Cooperative with exam.  HEENT: Normocephalic atraumatic.  Eyes grossly normal to inspection.  No discharge or erythema, or obvious scleral/conjunctival abnormalities.   LUNGS: No audible wheeze, cough, or visible cyanosis.  No visible retractions or increased work of breathing.    ABD:  nondistended  SKIN: Warm and dry to touch.  Visible skin clear. No significant rash, abnormal pigmentation or lesions.          Signed Electronically by: Abena Adkins,     "

## 2024-02-26 NOTE — NURSING NOTE
"Chief Complaint   Patient presents with    Pain Management     Chronic Pain Management- Oxycodone       Initial /79 (BP Location: Right arm, Patient Position: Sitting, Cuff Size: Adult Regular)   Pulse 59   Temp 99.1  F (37.3  C) (Temporal)   Resp 17   Ht 1.676 m (5' 6\")   Wt 61.6 kg (135 lb 12.8 oz)   LMP 03/07/2003 (Approximate)   SpO2 97%   Breastfeeding No   BMI 21.92 kg/m   Estimated body mass index is 21.92 kg/m  as calculated from the following:    Height as of this encounter: 1.676 m (5' 6\").    Weight as of this encounter: 61.6 kg (135 lb 12.8 oz).  Medication Review: complete    The next two questions are to help us understand your food security.  If you are feeling you need any assistance in this area, we have resources available to support you today.          1/18/2024   SDOH- Food Insecurity   Within the past 12 months, did you worry that your food would run out before you got money to buy more? N   Within the past 12 months, did the food you bought just not last and you didn t have money to get more? N           Danielle Olivia      "

## 2024-02-26 NOTE — LETTER

## 2024-03-05 ENCOUNTER — HOSPITAL ENCOUNTER (OUTPATIENT)
Dept: MAMMOGRAPHY | Facility: OTHER | Age: 64
Discharge: HOME | End: 2024-03-05
Attending: INTERNAL MEDICINE | Admitting: INTERNAL MEDICINE
Payer: COMMERCIAL

## 2024-03-05 DIAGNOSIS — Z12.31 ENCOUNTER FOR SCREENING MAMMOGRAM FOR BREAST CANCER: ICD-10-CM

## 2024-03-05 PROCEDURE — 77063 BREAST TOMOSYNTHESIS BI: CPT

## 2024-03-20 ASSESSMENT — ANXIETY QUESTIONNAIRES
7. FEELING AFRAID AS IF SOMETHING AWFUL MIGHT HAPPEN: NOT AT ALL
GAD7 TOTAL SCORE: 0
IF YOU CHECKED OFF ANY PROBLEMS ON THIS QUESTIONNAIRE, HOW DIFFICULT HAVE THESE PROBLEMS MADE IT FOR YOU TO DO YOUR WORK, TAKE CARE OF THINGS AT HOME, OR GET ALONG WITH OTHER PEOPLE: NOT DIFFICULT AT ALL
8. IF YOU CHECKED OFF ANY PROBLEMS, HOW DIFFICULT HAVE THESE MADE IT FOR YOU TO DO YOUR WORK, TAKE CARE OF THINGS AT HOME, OR GET ALONG WITH OTHER PEOPLE?: NOT DIFFICULT AT ALL
4. TROUBLE RELAXING: NOT AT ALL
1. FEELING NERVOUS, ANXIOUS, OR ON EDGE: NOT AT ALL
6. BECOMING EASILY ANNOYED OR IRRITABLE: NOT AT ALL
5. BEING SO RESTLESS THAT IT IS HARD TO SIT STILL: NOT AT ALL
3. WORRYING TOO MUCH ABOUT DIFFERENT THINGS: NOT AT ALL
7. FEELING AFRAID AS IF SOMETHING AWFUL MIGHT HAPPEN: NOT AT ALL
2. NOT BEING ABLE TO STOP OR CONTROL WORRYING: NOT AT ALL

## 2024-03-20 ASSESSMENT — PAIN SCALES - PAIN ENJOYMENT GENERAL ACTIVITY SCALE (PEG)
INTERFERED_GENERAL_ACTIVITY: 0 - DOES NOT INTERFERE
INTERFERED_ENJOYMENT_LIFE: 2
PEG_TOTALSCORE: 2.67
INTERFERED_GENERAL_ACTIVITY: 0
PEG_TOTALSCORE: 2.67
AVG_PAIN_PASTWEEK: 6
AVG_PAIN_PASTWEEK: 6
INTERFERED_ENJOYMENT_LIFE: 2

## 2024-03-20 ASSESSMENT — PATIENT HEALTH QUESTIONNAIRE - PHQ9
SUM OF ALL RESPONSES TO PHQ QUESTIONS 1-9: 0
SUM OF ALL RESPONSES TO PHQ QUESTIONS 1-9: 0
10. IF YOU CHECKED OFF ANY PROBLEMS, HOW DIFFICULT HAVE THESE PROBLEMS MADE IT FOR YOU TO DO YOUR WORK, TAKE CARE OF THINGS AT HOME, OR GET ALONG WITH OTHER PEOPLE: NOT DIFFICULT AT ALL

## 2024-03-21 ENCOUNTER — OFFICE VISIT (OUTPATIENT)
Dept: INTERNAL MEDICINE | Facility: OTHER | Age: 64
End: 2024-03-21
Attending: INTERNAL MEDICINE
Payer: COMMERCIAL

## 2024-03-21 VITALS
SYSTOLIC BLOOD PRESSURE: 136 MMHG | WEIGHT: 136.2 LBS | OXYGEN SATURATION: 94 % | DIASTOLIC BLOOD PRESSURE: 78 MMHG | BODY MASS INDEX: 21.89 KG/M2 | RESPIRATION RATE: 18 BRPM | TEMPERATURE: 97.7 F | HEIGHT: 66 IN | HEART RATE: 82 BPM

## 2024-03-21 DIAGNOSIS — M79.644 THUMB PAIN, RIGHT: ICD-10-CM

## 2024-03-21 DIAGNOSIS — M79.2 NERVE PAIN: ICD-10-CM

## 2024-03-21 DIAGNOSIS — F51.04 CHRONIC INSOMNIA: ICD-10-CM

## 2024-03-21 PROCEDURE — 99214 OFFICE O/P EST MOD 30 MIN: CPT | Performed by: INTERNAL MEDICINE

## 2024-03-21 RX ORDER — OXYCODONE HYDROCHLORIDE 5 MG/1
5 TABLET ORAL 2 TIMES DAILY PRN
Qty: 60 TABLET | Refills: 0 | Status: SHIPPED | OUTPATIENT
Start: 2024-04-26 | End: 2024-05-29

## 2024-03-21 RX ORDER — OXYCODONE HYDROCHLORIDE 5 MG/1
5 TABLET ORAL 2 TIMES DAILY PRN
Qty: 60 TABLET | Refills: 0 | Status: SHIPPED | OUTPATIENT
Start: 2024-03-26 | End: 2024-06-20

## 2024-03-21 RX ORDER — ESZOPICLONE 1 MG/1
1-2 TABLET, FILM COATED ORAL AT BEDTIME
Qty: 60 TABLET | Refills: 5 | Status: CANCELLED | OUTPATIENT
Start: 2024-03-21

## 2024-03-21 RX ORDER — DULOXETIN HYDROCHLORIDE 60 MG/1
60 CAPSULE, DELAYED RELEASE ORAL DAILY
Qty: 90 CAPSULE | Refills: 0 | Status: CANCELLED | OUTPATIENT
Start: 2024-03-21

## 2024-03-21 RX ORDER — LORAZEPAM 0.5 MG/1
0.5 TABLET ORAL
Qty: 30 TABLET | Refills: 5 | Status: CANCELLED | OUTPATIENT
Start: 2024-03-21

## 2024-03-21 ASSESSMENT — PAIN SCALES - GENERAL: PAINLEVEL: SEVERE PAIN (6)

## 2024-03-21 NOTE — PROGRESS NOTES
"  Assessment & Plan     Thumb pain, right  We do long discussion today regarding her pain.  At this time she will work to gradually taper down on her oxycodone.  She will reduce her dose by half tablet daily gradually over the next 2 months.  She will see me back at that time to determine whether we can go off of it completely.  We may need to consider  - oxyCODONE (ROXICODONE) 5 MG tablet; Take 1 tablet (5 mg) by mouth 2 times daily as needed for pain  - oxyCODONE (ROXICODONE) 5 MG tablet; Take 1 tablet (5 mg) by mouth 2 times daily as needed for pain    Nerve pain  See above  - oxyCODONE (ROXICODONE) 5 MG tablet; Take 1 tablet (5 mg) by mouth 2 times daily as needed for pain    Chronic insomnia  Continue on current medications.  Plan for refills this summer.      Return in about 2 months (around 5/21/2024) for Recheck pain.    Sav Aparicio is a 63 year old, presenting for the following health issues:  RECHECK (Hand Pain)        3/21/2024     3:24 PM   Additional Questions   Roomed by Delmar Ratliff LPN     History of Present Illness       Reason for visit:  Meds    She eats 2-3 servings of fruits and vegetables daily.She consumes 5 sweetened beverage(s) daily.   She is taking medications regularly.     Presents for follow-up of chronic pain.  She reports that her pain overall is doing well.  She has not had to use her compression gloves over the last 4 days.  She denies any side effects from the Cymbalta.  She also denies any side effects with the oxycodone that she is on.  Per the PDMP her last refill was on 2/26.  She has been using the oxycodone late morning and in the evening.    She continues to have chronic insomnia and continues on medications for this.  She has tolerated the doses overall and at this time does not need further refills.      Objective    /78   Pulse 82   Temp 97.7  F (36.5  C)   Resp 18   Ht 1.676 m (5' 6\")   Wt 61.8 kg (136 lb 3.2 oz)   LMP 03/07/2003 (Approximate)   " SpO2 94%   BMI 21.98 kg/m    Body mass index is 21.98 kg/m .  Physical Exam   GEN: Vitals reviewed. Healthy appearing. Patient is in no acute distress. Cooperative with exam.  HEENT: Normocephalic atraumatic.  Eyes grossly normal to inspection.  No discharge or erythema, or obvious scleral/conjunctival abnormalities.   LUNGS: No audible wheeze, cough, or visible cyanosis.  No visible retractions or increased work of breathing.    ABD:  nondistended  SKIN: Warm and dry to touch.  Visible skin clear. No significant rash, abnormal pigmentation or lesions.        30 minutes spent on the date of the encounter doing chart review, history and exam, documentation and further activities as noted above    Signed Electronically by: Abena Adkins DO

## 2024-04-29 ENCOUNTER — OFFICE VISIT (OUTPATIENT)
Dept: ORTHOPEDICS | Facility: OTHER | Age: 64
End: 2024-04-29
Attending: ORTHOPAEDIC SURGERY
Payer: COMMERCIAL

## 2024-04-29 DIAGNOSIS — M25.511 RIGHT SHOULDER PAIN, UNSPECIFIED CHRONICITY: Primary | ICD-10-CM

## 2024-04-29 PROCEDURE — 250N000011 HC RX IP 250 OP 636: Performed by: ORTHOPAEDIC SURGERY

## 2024-04-29 PROCEDURE — 20610 DRAIN/INJ JOINT/BURSA W/O US: CPT | Mod: RT | Performed by: ORTHOPAEDIC SURGERY

## 2024-04-29 PROCEDURE — 250N000009 HC RX 250: Performed by: ORTHOPAEDIC SURGERY

## 2024-04-29 RX ORDER — TRIAMCINOLONE ACETONIDE 40 MG/ML
40 INJECTION, SUSPENSION INTRA-ARTICULAR; INTRAMUSCULAR ONCE
Status: COMPLETED | OUTPATIENT
Start: 2024-04-29 | End: 2024-04-29

## 2024-04-29 RX ORDER — LIDOCAINE HYDROCHLORIDE 10 MG/ML
4 INJECTION, SOLUTION EPIDURAL; INFILTRATION; INTRACAUDAL; PERINEURAL ONCE
Status: COMPLETED | OUTPATIENT
Start: 2024-04-29 | End: 2024-04-29

## 2024-04-29 RX ADMIN — LIDOCAINE HYDROCHLORIDE 4 ML: 10 INJECTION, SOLUTION INFILTRATION; PERINEURAL at 08:46

## 2024-04-29 RX ADMIN — TRIAMCINOLONE ACETONIDE 40 MG: 40 INJECTION, SUSPENSION INTRA-ARTICULAR; INTRAMUSCULAR at 08:46

## 2024-04-29 NOTE — PROGRESS NOTES
Subjective:    64-year-old female with right shoulder pain.  She has a history of significant rotator cuff tendinitis and some moderate AC joint arthritis of her right shoulder.  She scheduled to have surgery on her shoulder back in 2022 but since that time has had problems with family.  Her mother passed away then her brother passed away and now her dad is on hospice.  She comes in today requesting an injection into her right shoulder for the pain.  She does have every intention of having surgery, it is just a really bad time now    Objective:    On examination today she still is full range of motion of her shoulder.  Good strength in the rotator cuff in all planes neurovascularly intact in the right upper extremity.    Assessment:    64-year-old female with rotator cuff tendinitis and AC joint arthritis.    Plan:    We given an injection into the right shoulder today.  This was given into the subacromial space and she tolerated it well.    A steroid injection was performed at right subacromial space using 1% plain Lidocaine and 40 mg of Kenalog. This was well tolerated.

## 2024-05-21 ENCOUNTER — MYC MEDICAL ADVICE (OUTPATIENT)
Dept: INTERNAL MEDICINE | Facility: OTHER | Age: 64
End: 2024-05-21
Payer: COMMERCIAL

## 2024-05-21 DIAGNOSIS — M79.644 THUMB PAIN, RIGHT: ICD-10-CM

## 2024-05-21 DIAGNOSIS — M79.2 NERVE PAIN: ICD-10-CM

## 2024-05-29 RX ORDER — OXYCODONE HYDROCHLORIDE 5 MG/1
5 TABLET ORAL 2 TIMES DAILY PRN
Qty: 60 TABLET | Refills: 0 | Status: SHIPPED | OUTPATIENT
Start: 2024-05-29 | End: 2024-06-20

## 2024-05-29 NOTE — TELEPHONE ENCOUNTER
Abena,      How is Monday June 17th at 8:20 for work-in for her?      Alize Warren RN on 5/29/2024 at 4:23 PM

## 2024-05-29 NOTE — TELEPHONE ENCOUNTER
Appt for 5/23 cancelled due to dying father.      Patient out of Oxy.  I have rosy'd this up.      Need to fit in for medication review.  Should Abena suggest a place or see one of the NP's?    Alize Warren RN on 5/29/2024 at 12:10 PM

## 2024-05-31 NOTE — TELEPHONE ENCOUNTER
Called [facility] to schedule 6/19 at 8:20am.     Followed up on MyChart.     Alize Warren RN on 5/31/2024 at 3:23 PM

## 2024-06-19 ENCOUNTER — MYC MEDICAL ADVICE (OUTPATIENT)
Dept: INTERNAL MEDICINE | Facility: OTHER | Age: 64
End: 2024-06-19
Payer: COMMERCIAL

## 2024-06-19 NOTE — TELEPHONE ENCOUNTER
Patient was supposed to have appointment this morning at 8:20.     Routing to provider to review and respond.  Jasbir Cornejo RN on 6/19/2024 at 4:00 PM

## 2024-06-20 ENCOUNTER — OFFICE VISIT (OUTPATIENT)
Dept: INTERNAL MEDICINE | Facility: OTHER | Age: 64
End: 2024-06-20
Attending: INTERNAL MEDICINE
Payer: COMMERCIAL

## 2024-06-20 VITALS
TEMPERATURE: 98.6 F | RESPIRATION RATE: 18 BRPM | SYSTOLIC BLOOD PRESSURE: 126 MMHG | HEIGHT: 66 IN | OXYGEN SATURATION: 99 % | HEART RATE: 58 BPM | WEIGHT: 133.4 LBS | DIASTOLIC BLOOD PRESSURE: 76 MMHG | BODY MASS INDEX: 21.44 KG/M2

## 2024-06-20 DIAGNOSIS — M79.644 THUMB PAIN, RIGHT: Primary | ICD-10-CM

## 2024-06-20 DIAGNOSIS — F11.20 CONTINUOUS OPIOID DEPENDENCE (H): ICD-10-CM

## 2024-06-20 DIAGNOSIS — F51.04 CHRONIC INSOMNIA: ICD-10-CM

## 2024-06-20 DIAGNOSIS — J01.90 ACUTE SINUSITIS WITH SYMPTOMS > 10 DAYS: ICD-10-CM

## 2024-06-20 DIAGNOSIS — M79.2 NERVE PAIN: ICD-10-CM

## 2024-06-20 LAB — CREAT UR-MCNC: 91 MG/DL

## 2024-06-20 PROCEDURE — 80373 DRUG SCREENING TRAMADOL: CPT | Mod: ZL | Performed by: INTERNAL MEDICINE

## 2024-06-20 PROCEDURE — 80360 METHYLPHENIDATE: CPT | Mod: ZL | Performed by: INTERNAL MEDICINE

## 2024-06-20 PROCEDURE — 99214 OFFICE O/P EST MOD 30 MIN: CPT | Performed by: INTERNAL MEDICINE

## 2024-06-20 RX ORDER — ESZOPICLONE 1 MG/1
1-2 TABLET, FILM COATED ORAL AT BEDTIME
Qty: 60 TABLET | Refills: 5 | Status: SHIPPED | OUTPATIENT
Start: 2024-06-20

## 2024-06-20 RX ORDER — OXYCODONE HYDROCHLORIDE 5 MG/1
5 TABLET ORAL 2 TIMES DAILY PRN
Qty: 60 TABLET | Refills: 0 | Status: SHIPPED | OUTPATIENT
Start: 2024-08-23 | End: 2024-09-12

## 2024-06-20 RX ORDER — LORAZEPAM 0.5 MG/1
0.5 TABLET ORAL
Qty: 30 TABLET | Refills: 5 | Status: SHIPPED | OUTPATIENT
Start: 2024-06-20

## 2024-06-20 RX ORDER — DULOXETIN HYDROCHLORIDE 60 MG/1
60 CAPSULE, DELAYED RELEASE ORAL DAILY
Qty: 90 CAPSULE | Refills: 2 | Status: SHIPPED | OUTPATIENT
Start: 2024-06-20

## 2024-06-20 RX ORDER — OXYCODONE HYDROCHLORIDE 5 MG/1
5 TABLET ORAL 2 TIMES DAILY PRN
Qty: 60 TABLET | Refills: 0 | Status: SHIPPED | OUTPATIENT
Start: 2024-07-26 | End: 2024-09-12

## 2024-06-20 RX ORDER — OXYCODONE HYDROCHLORIDE 5 MG/1
5 TABLET ORAL 2 TIMES DAILY PRN
Qty: 60 TABLET | Refills: 0 | Status: SHIPPED | OUTPATIENT
Start: 2024-06-20 | End: 2024-09-12

## 2024-06-20 RX ORDER — DULOXETIN HYDROCHLORIDE 20 MG/1
20 CAPSULE, DELAYED RELEASE ORAL DAILY
Qty: 90 CAPSULE | Refills: 0 | Status: SHIPPED | OUTPATIENT
Start: 2024-06-20 | End: 2024-07-23

## 2024-06-20 ASSESSMENT — PAIN SCALES - GENERAL: PAINLEVEL: MODERATE PAIN (5)

## 2024-06-20 NOTE — LETTER
Opioid / Opioid Plus Controlled Substance Agreement    This is an agreement between you and your provider about the safe and appropriate use of controlled substance/opioids prescribed by your care team. Controlled substances are medicines that can cause physical and mental dependence (abuse).    There are strict laws about having and using these medicines. We here at [facility] are committing to working with you in your efforts to get better. To support you in this work, we ll help you schedule regular office appointments for medicine refills. If we must cancel or change your appointment for any reason, we ll make sure you have enough medicine to last until your next appointment.     As a Provider, I will:  Listen carefully to your concerns and treat you with respect.   Recommend a treatment plan that I believe is in your best interest. This plan may involve therapies other than opioid pain medication.   Talk with you often about the possible benefits, and the risk of harm of any medicine that we prescribe for you.   Provide a plan on how to taper (discontinue or go off) using this medicine if the decision is made to stop its use.    As a Patient, I understand that opioid(s):   Are a controlled substance prescribed by my care team to help me function or work and manage my condition(s).   Are strong medicines and can cause serious side effects such as:  Drowsiness, which can seriously affect my driving ability  A lower breathing rate, enough to cause death  Harm to my thinking ability   Depression   Abuse of and addiction to this medicine  Need to be taken exactly as prescribed. Combining opioids with certain medicines or chemicals (such as illegal drugs, sedatives, sleeping pills, and benzodiazepines) can be dangerous or even fatal. If I stop opioids suddenly, I may have severe withdrawal symptoms.  Do not work for all types of pain nor for all patients. If they re not helpful, I may be asked to stop  them.    I am also being prescribed a benzodiazepine (tranquilizer) controlled substance: I understand this type of medicine is sedating and can increase the risk of death when taken together with opioids. I have talked to my care team about having prescription Narcan available for reversing the opioid medicine and have been instructed in its use. I will be very careful to take my medicines only as directed    The risks, benefits and side effects of these medicine(s) were explained to me. I agree that:  I will take part in other treatments as advised by my care team. This may be psychiatry or counseling, physical therapy, behavioral therapy, group treatment or a referral to a specialist.     I will keep all my appointments. I understand that this is part of the monitoring of opioids. My care team may require an office visit for EVERY opioid/controlled substance refill. If I miss appointments or don t follow instructions, my care team may stop my medicine.    I will take my medicines as prescribed. I will not change the dose or schedule unless my care team tells me to. There will be no refills if I run out early.     I may be asked to come to the clinic and complete a urine drug test or complete a pill count at any time. If I don t give a urine sample or participate in a pill count, the care team may stop my medicine.    I will only receive prescriptions from this clinic for chronic pain. If I am treated by another provider for acute pain issues, I will tell them that I am taking opioid pain medication for chronic pain and that I have a treatment agreement with this provider. I will inform my [facility] care team within one business day if I am given a prescription for any pain medication by another healthcare provider. My [facility] care team can contact other providers and pharmacists about my use of any medicines.    It is up to me to make sure that I don t run out of my medicines on weekends or  holidays. If my care team is willing to refill my opioid prescription without a visit, I must request refills only during office hours. Refills may take up to 3 business days to process. I will use one pharmacy to fill all my opioid and other controlled substance prescriptions. I will notify the clinic about any changes to my insurance or medication availability.    I am responsible for my prescriptions. If the medicine/prescription is lost, stolen or destroyed, it will not be replaced. I also agree not to share controlled substance medicines with anyone.    I am aware I should not use any illegal or recreational drugs. I agree not to drink alcohol unless my care team says I can.       If I enroll in the [place] [facility] Cannabis program, I will tell my care team prior to my next refill.     I will tell my care team right away if I become pregnant, have a new medical problem treated outside of my regular clinic, or have a change in my medications.    I understand that this medicine can affect my thinking, judgment and reaction time. Alcohol and drugs affect the brain and body, which can affect the safety of my driving. Being under the influence of alcohol or drugs can affect my decision-making, behaviors, personal safety, and the safety of others. Driving while impaired (DWI) can occur if a person is driving, operating, or in physical control of a car, motorcycle, boat, snowmobile, ATV, motorbike, off-road vehicle, or any other motor vehicle ([place] Statute 169A.20). I understand the risk if I choose to drive or operate any vehicle or machinery.    I understand that if I do not follow any of the conditions above, my prescriptions or treatment may be stopped or changed.          Opioids  What You Need to Know    What are opioids?   Opioids are pain medicines that must be prescribed by a doctor. They are also known as narcotics.     Examples are:   morphine (MS Contin, Taylor)  oxycodone (Oxycontin)  oxycodone and  acetaminophen (Percocet)  hydrocodone and acetaminophen (Vicodin, Norco)   fentanyl patch (Duragesic)   hydromorphone (Dilaudid)   methadone  codeine (Tylenol #3)     What do opioids do well?   Opioids are best for severe short-term pain such as after a surgery or injury. They may work well for cancer pain. They may help some people with long-lasting (chronic) pain.     What do opioids NOT do well?   Opioids never get rid of pain entirely, and they don t work well for most patients with chronic pain. Opioids don t reduce swelling, one of the causes of pain.                                    Other ways to manage chronic pain and improve function include:     Treat the health problem that may be causing pain  Anti-inflammation medicines, which reduce swelling and tenderness, such as ibuprofen (Advil, Motrin) or naproxen (Aleve)  Acetaminophen (Tylenol)  Antidepressants and anti-seizure medicines, especially for nerve pain  Topical treatments such as patches or creams  Injections or nerve blocks  Chiropractic or osteopathic treatment  Acupuncture, massage, deep breathing, meditation, visual imagery, aromatherapy  Use heat or ice at the pain site  Physical therapy   Exercise  Stop smoking  Take part in therapy       Risks and side effects     Talk to your doctor before you start or decide to keep taking opioids. Possible side effects include:    Lowering your breathing rate enough to cause death  Overdose, including death, especially if taking higher than prescribed doses  Worse depression symptoms; less pleasure in things you usually enjoy  Feeling tired or sluggish  Slower thoughts or cloudy thinking  Being more sensitive to pain over time; pain is harder to control  Trouble sleeping or restless sleep  Changes in hormone levels (for example, less testosterone)  Changes in sex drive or ability to have sex  Constipation  Unsafe driving  Itching and sweating  Dizziness  Nausea, throwing up and dry mouth    What else  should I know about opioids?    Opioids may lead to dependence, tolerance, or addiction.    Dependence means that if you stop or reduce the medicine too quickly, you will have withdrawal symptoms. These include loose poop (diarrhea), jitters, flu-like symptoms, nervousness and tremors. Dependence is not the same as addiction.                     Tolerance means needing higher doses over time to get the same effect. This may increase the chance of serious side effects.    Addiction is when people improperly use a substance that harms their body, their mind or their relations with others. Use of opiates can cause a relapse of addiction if you have a history of drug or alcohol abuse.    People who have used opioids for a long time may have a lower quality of life, worse depression, higher levels of pain and more visits to doctors.    You can overdose on opioids. Take these steps to lower your risk of overdose:    Recognize the signs:  Signs of overdose include decrease or loss of consciousness (blackout), slowed breathing, trouble waking up and blue lips. If someone is worried about overdose, they should call 911.    Talk to your doctor about Narcan (naloxone).   If you are at risk for overdose, you may be given a prescription for Narcan. This medicine very quickly reverses the effects of opioids.   If you overdose, a friend or family member can give you Narcan while waiting for the ambulance. They need to know the signs of overdose and how to give Narcan.     Don't use alcohol or street drugs.   Taking them with opioids can cause death.    Do not take any of these medicines unless your doctor says it s OK. Taking these with opioids can cause death:  Benzodiazepines, such as lorazepam (Ativan), alprazolam (Xanax) or diazepam (Valium)  Muscle relaxers, such as cyclobenzaprine (Flexeril)  Sleeping pills like zolpidem (Ambien)   Other opioids      How to keep you and other people safe while taking opioids:    Never share  your opioids with others.  Opioid medicines are regulated by the Drug Enforcement Agency (ZHENG). Selling or sharing medications is a criminal act.    2. Be sure to store opioids in a secure place, locked up if possible. Young children can easily swallow them and overdose.    3. When you are traveling with your medicines, keep them in the original bottles. If you use a pill box, be sure you also carry a copy of your medicine list from your clinic or pharmacy.    4. Safe disposal of opioids    Most pharmacies have places to get rid of medicine, called disposal kiosks. Medicine disposal options are also available in every [facility]. Search your [facility] and  medication disposal  to find more options. You can find more details at:  [email or web address]://[email or web address]-[email or web address]-medications     I agree that my provider, clinic care team, and pharmacy may work with any city, state or federal law enforcement agency that investigates the misuse, sale, or other diversion of my controlled medicine. I will allow my provider to discuss my care with, or share a copy of, this agreement with any other treating provider, pharmacy or emergency room where I receive care.    I have read this agreement and have asked questions about anything I did not understand.    _______________________________________________________  Patient Signature - Alessandra A Attila _____________________                   Date     _______________________________________________________  Provider Signature - Abena Adkins DO   _____________________                   Date     _______________________________________________________  Witness Signature (required if provider not present while patient signing)   _____________________                   Date

## 2024-06-20 NOTE — NURSING NOTE
"Chief Complaint   Patient presents with    Pain       Initial /76   Pulse 58   Temp 98.6  F (37  C)   Resp 18   Ht 1.676 m (5' 6\")   Wt 60.5 kg (133 lb 6.4 oz)   LMP 03/07/2003 (Approximate)   SpO2 99%   Breastfeeding No   BMI 21.53 kg/m   Estimated body mass index is 21.53 kg/m  as calculated from the following:    Height as of this encounter: 1.676 m (5' 6\").    Weight as of this encounter: 60.5 kg (133 lb 6.4 oz).  Medication Review: complete    The next two questions are to help us understand your food security.  If you are feeling you need any assistance in this area, we have resources available to support you today.          1/18/2024   SDOH- Food Insecurity   Within the past 12 months, did you worry that your food would run out before you got money to buy more? N   Within the past 12 months, did the food you bought just not last and you didn t have money to get more? N            Health Care Directive:  Patient does not have a Health Care Directive or Living Will: Discussed advance care planning with patient; however, patient declined at this time.    Nicky Ratliff LPN      "

## 2024-06-20 NOTE — PROGRESS NOTES
Assessment & Plan     Thumb pain, right  We discussed the importance of continuing to work on reduction in her oxycodone.  We would like to discontinue this over time.  We will increase her duloxetine to 80 mg daily.  She is to monitor for any side effects.  Urine drug screen is completed today.  Pain contract is completed.  - oxyCODONE (ROXICODONE) 5 MG tablet; Take 1 tablet (5 mg) by mouth 2 times daily as needed for pain  - oxyCODONE (ROXICODONE) 5 MG tablet; Take 1 tablet (5 mg) by mouth 2 times daily as needed for pain  - DULoxetine (CYMBALTA) 20 MG capsule; Take 1 capsule (20 mg) by mouth daily In addition to 60mg capsule for a total of 80mg daily  - oxyCODONE (ROXICODONE) 5 MG tablet; Take 1 tablet (5 mg) by mouth 2 times daily as needed for pain    Nerve pain  See above, increase Cymbalta  - oxyCODONE (ROXICODONE) 5 MG tablet; Take 1 tablet (5 mg) by mouth 2 times daily as needed for pain  - DULoxetine (CYMBALTA) 60 MG capsule; Take 1 capsule (60 mg) by mouth daily  - DULoxetine (CYMBALTA) 20 MG capsule; Take 1 capsule (20 mg) by mouth daily In addition to 60mg capsule for a total of 80mg daily    Chronic insomnia  Continue on current medications.  She is aware of the risk of these medications along with her pain meds.  She denies any drug interactions at this time.  She is encouraged not to take them simultaneously.  She is to monitor for any new or changing symptoms.  - LORazepam (ATIVAN) 0.5 MG tablet; Take 1 tablet (0.5 mg) by mouth nightly as needed for anxiety or sleep  - eszopiclone (LUNESTA) 1 MG tablet; Take 1-2 tablets (1-2 mg) by mouth at bedtime    Acute sinusitis with symptoms > 10 days  - most consistent with bacterial illness due to duration of illness  - will treat with antibiotics, patient to treat symptoms as instructed below, call if she has any ADR's or worsening symptoms  - recommend eating yogurt/kefir 1-2 times daily while on antibiotics  - amoxicillin-clavulanate (AUGMENTIN) 875-125  MG tablet; Take 1 tablet by mouth 2 times daily for 10 days    Continuous opioid dependence (H)  - Drug Confirmation Panel Urine with Creatinine    Return in about 3 months (around 9/18/2024) for Recheck pain.    Sav Aparicio is a 64 year old, presenting for the following health issues:  Pain      6/20/2024    10:21 AM   Additional Questions   Roomed by Delmar Ratliff LPN     Pain    History of Present Illness       Reason for visit:  Meds renew    She eats 2-3 servings of fruits and vegetables daily.She consumes 0 sweetened beverage(s) daily.She exercises with enough effort to increase her heart rate 20 to 29 minutes per day.  She exercises with enough effort to increase her heart rate 5 days per week.   She is taking medications regularly.     Patient returns today for follow-up of chronic pain medications.  Her last fill per the PDMP was on 5/29/2024.  She reports that she has been working hard to clean out her father's home after his recent death.  She has had a tough year and a half with the loss of her mother, brother and father.  She has been using her arms more which has caused increased pain and required her to use her pain medications ongoing.  She has not been able to reduce these.  She continues on duloxetine and is interested in increasing the dose slightly.  This has been beneficial for her hand pain.    She has lost 10 pounds over the last 6 months or so.  This is primarily been due to stress.  She has had ongoing anxiety related to this.  She typically has used some lorazepam in the past for anxiety however used a little bit more over the last month or so due to the loss of her father and stress.  She does feel that things have settled some.    For the last 3 to 4 weeks she has been having some sinus drainage, sinus headaches and forehead pressure.  She has had issues with sinus infections in the past.  She is curious if antibiotics would be reasonable.  It she is leaving for [place] on Sunday  "for a 10-day vacation.    She has insomnia and continues on Lunesta.  She does need a refill at this time.  She denies any obvious side effects.      Objective    /76   Pulse 58   Temp 98.6  F (37  C)   Resp 18   Ht 1.676 m (5' 6\")   Wt 60.5 kg (133 lb 6.4 oz)   LMP 03/07/2003 (Approximate)   SpO2 99%   Breastfeeding No   BMI 21.53 kg/m    Body mass index is 21.53 kg/m .  Physical Exam   GEN: Vitals reviewed. Healthy appearing. Patient is in no acute distress. Cooperative with exam.  HEENT: Normocephalic atraumatic.  Eyes grossly normal to inspection.  No discharge or erythema, or obvious scleral/conjunctival abnormalities.   LUNGS: No audible wheeze, cough, or visible cyanosis.  No visible retractions or increased work of breathing.    ABD:  nondistended  SKIN: Warm and dry to touch.  Visible skin clear. No significant rash, abnormal pigmentation or lesions.      Signed Electronically by: Abena Adkins,     "

## 2024-06-24 LAB
LORAZEPAM UR QL CFM: PRESENT
OXYCODONE UR CFM-MCNC: 96 NG/ML
OXYCODONE/CREAT UR: 105 NG/MG {CREAT}
OXYMORPHONE UR CFM-MCNC: 213 NG/ML
OXYMORPHONE/CREAT UR: 234 NG/MG {CREAT}

## 2024-07-09 ENCOUNTER — MYC MEDICAL ADVICE (OUTPATIENT)
Dept: INTERNAL MEDICINE | Facility: OTHER | Age: 64
End: 2024-07-09
Payer: COMMERCIAL

## 2024-07-23 ENCOUNTER — OFFICE VISIT (OUTPATIENT)
Dept: INTERNAL MEDICINE | Facility: OTHER | Age: 64
End: 2024-07-23
Payer: COMMERCIAL

## 2024-07-23 ENCOUNTER — HOSPITAL ENCOUNTER (OUTPATIENT)
Dept: ULTRASOUND IMAGING | Facility: OTHER | Age: 64
Discharge: HOME | End: 2024-07-23
Payer: COMMERCIAL

## 2024-07-23 ENCOUNTER — MYC MEDICAL ADVICE (OUTPATIENT)
Dept: INTERNAL MEDICINE | Facility: OTHER | Age: 64
End: 2024-07-23
Payer: COMMERCIAL

## 2024-07-23 VITALS
WEIGHT: 138.8 LBS | HEART RATE: 67 BPM | BODY MASS INDEX: 22.4 KG/M2 | TEMPERATURE: 97.6 F | OXYGEN SATURATION: 96 % | SYSTOLIC BLOOD PRESSURE: 134 MMHG | DIASTOLIC BLOOD PRESSURE: 78 MMHG | RESPIRATION RATE: 16 BRPM

## 2024-07-23 DIAGNOSIS — J32.9 CHRONIC SINUSITIS, UNSPECIFIED LOCATION: ICD-10-CM

## 2024-07-23 DIAGNOSIS — F11.20 CONTINUOUS OPIOID DEPENDENCE (H): ICD-10-CM

## 2024-07-23 DIAGNOSIS — R59.0 ENLARGED LYMPH NODE IN NECK: Primary | ICD-10-CM

## 2024-07-23 DIAGNOSIS — R59.0 ENLARGED LYMPH NODE IN NECK: ICD-10-CM

## 2024-07-23 PROCEDURE — G2211 COMPLEX E/M VISIT ADD ON: HCPCS

## 2024-07-23 PROCEDURE — 99214 OFFICE O/P EST MOD 30 MIN: CPT

## 2024-07-23 PROCEDURE — 76536 US EXAM OF HEAD AND NECK: CPT

## 2024-07-23 ASSESSMENT — PAIN SCALES - GENERAL: PAINLEVEL: SEVERE PAIN (6)

## 2024-07-23 NOTE — PROGRESS NOTES
Assessment & Plan     ICD-10-CM    1. Enlarged lymph node in neck  R59.0 US Head Neck Soft Tissue      2. Chronic sinusitis, unspecified location  J32.9       3. Continuous opioid dependence (H)  F11.20            Order placed for ultrasound of lymph node for further characterization.  Will consider CT if she is not able to get into ultrasound in a timely fashion. I will follow up with patient after ultrasound results become available. No other adenopathy or new abnormalities noted.    Chronic sinusitis: Recently finished 10-day course of Augmentin, symptoms have improved.    Continuous opioid dependence: Chronically on oxycodone-working on reduction of her dose.-Continues on duloxetine daily.    The longitudinal plan of care for the diagnosis(es)/condition(s) as documented were addressed during this visit. Due to the added complexity in care, I will continue to support Alessandra in the subsequent management and with ongoing continuity of care.                No follow-ups on file.      CLARENCE Gagnon CNP  [facility] [place]    Review of Systems        Sav Aparicio is a 64 year old, presenting for the following health issues:  RECHECK (Lump on left side of neck )    Patient presents to clinic with concerns of a enlarged lymph node on the left side of her neck.  She states that she has had a palpable lymph node in the area for years but has noticed in the last 3 to 4 weeks it has become larger.  She does have chronic sinusitis, frequent sore throat and hoarse voice.  She has attributed this to her chronic sinus difficulties.  She does state that she did recently get over a sinus infection treated with antibiotics and shortly after she noticed the lymph node to be enlarged.  She denies any fevers, chills, abnormal weight loss or fatigue.      History of Present Illness       Reason for visit:  Limp im my neck  Symptom onset:  3-4 weeks ago  Symptoms include:  Lump in my neck  Symptom  intensity:  Mild  Symptom progression:  Staying the same  Had these symptoms before:  No    She eats 2-3 servings of fruits and vegetables daily.She consumes 0 sweetened beverage(s) daily.She exercises with enough effort to increase her heart rate 30 to 60 minutes per day.  She exercises with enough effort to increase her heart rate 5 days per week.   She is taking medications regularly.                     Objective    /78 (BP Location: Right arm, Patient Position: Sitting, Cuff Size: Adult Regular)   Pulse 67   Temp 97.6  F (36.4  C) (Temporal)   Resp 16   Wt 63 kg (138 lb 12.8 oz)   LMP 03/07/2003 (Approximate)   SpO2 96%   BMI 22.40 kg/m    Body mass index is 22.4 kg/m .  Physical Exam  Lymphadenopathy:      Cervical: Cervical adenopathy present.      Right cervical: No deep cervical adenopathy.     Left cervical: Superficial cervical adenopathy present. No deep cervical adenopathy.      Upper Body:      Right upper body: No supraclavicular, axillary or pectoral adenopathy.      Left upper body: No supraclavicular, axillary or pectoral adenopathy.      Lower Body: No right inguinal adenopathy. No left inguinal adenopathy.                    Signed Electronically by: CLARENCE Gagnon CNP

## 2024-07-23 NOTE — NURSING NOTE
"Chief Complaint   Patient presents with    RECHECK     Lump on left side of neck    Patient presents to the clinic today for a lump on the left side of neck     Initial LMP 03/07/2003 (Approximate)  Estimated body mass index is 21.53 kg/m  as calculated from the following:    Height as of 6/20/24: 1.676 m (5' 6\").    Weight as of 6/20/24: 60.5 kg (133 lb 6.4 oz).  Meds Reconciled: complete      Neelam Worthington LPN,LPN on 7/23/2024 at 9:51 AM  Ext. [phone]        Neelam Worthington LPN  "

## 2024-07-24 ENCOUNTER — MYC MEDICAL ADVICE (OUTPATIENT)
Dept: INTERNAL MEDICINE | Facility: OTHER | Age: 64
End: 2024-07-24
Payer: COMMERCIAL

## 2024-07-24 DIAGNOSIS — R59.0 ENLARGED LYMPH NODE IN NECK: Primary | ICD-10-CM

## 2024-07-25 ENCOUNTER — HOSPITAL ENCOUNTER (OUTPATIENT)
Dept: MRI IMAGING | Facility: OTHER | Age: 64
Discharge: HOME | End: 2024-07-25
Attending: ORTHOPAEDIC SURGERY | Admitting: ORTHOPAEDIC SURGERY
Payer: COMMERCIAL

## 2024-07-25 DIAGNOSIS — M75.80 ROTATOR CUFF TENDINITIS: ICD-10-CM

## 2024-07-25 DIAGNOSIS — M75.41 IMPINGEMENT SYNDROME OF RIGHT SHOULDER: ICD-10-CM

## 2024-07-25 DIAGNOSIS — M19.019 AC (ACROMIOCLAVICULAR) JOINT ARTHRITIS: ICD-10-CM

## 2024-07-25 PROCEDURE — 73221 MRI JOINT UPR EXTREM W/O DYE: CPT | Mod: RT

## 2024-07-25 NOTE — TELEPHONE ENCOUNTER
Nicky in Radiology says she always has to tell patient: you dont have the ability to schedule from [facility].     She can have the CT done here.     Radiology will be calling you to schedule this once it is authorized through your insurance.    Jasbir Cornejo RN on 7/25/2024 at 8:30 AM

## 2024-07-26 NOTE — PROGRESS NOTES
Chief Complaint   Patient presents with     Urinary Problem     Sleep Problem          Subjective:   Ms. Aiken is a 61 year old female  seen for the acute concern today of multiple issues.    She has been having some burning with urination.  No blood.  Some chills but no fevers.  No suprapubic pain.  This has been going on for months.  She has not been taking any over the counter meds for this.    She continues to have issues with migraines.  She does need a refill on her Imitrex.  She denies any obvious side effects.  She also needs a refill on her hydrochlorothiazide.  She does have an elevation in her blood pressure today.  She has not been checking it regularly at home.    She continues to have insomnia.  She has tried trazodone which did not work.  She feels in the past the Ativan has worked.  She is interested in continuing to try medications to help with this.    She is due for a mammogram.  She is also due for Covid vaccination.  She was encouraged to obtain these.    She  reports that she has never smoked. She has never used smokeless tobacco.    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      ROS:   Pertinent  ROS was performed and was negative, including for fevers, chest pain, shortness of breath, increased lower extremity edema, changes in bowel or bladder, blood in the stool. No other concerns, with  exception of HPI above.      Objective:    BP (!) 146/96 (BP Location: Right arm, Patient Position: Sitting, Cuff Size: Adult Regular)   Pulse 56   Temp 98.3  F (36.8  C) (Tympanic)   Resp 16   Wt 64.9 kg (143 lb)   LMP 03/07/2003   SpO2 99%   BMI 23.26 kg/m    GEN: Vitals reviewed.  Patient is in no acute distress. Cooperative with exam.  HEENT: Normocephalic atraumatic.  Eyes grossly normal to inspection.  No discharge or erythema, or obvious scleral/conjunctival abnormalities.   LUNGS: No audible wheeze, cough, or visible cyanosis.  No visible retractions or increased work of breathing.   : Normal female external genitalia.  Minimal atrophy present.  No vulvar lesion or mass.    SKIN: Warm and dry to touch.  Visible skin clear. No significant rash, abnormal pigmentation or lesions.  EXT: No clubbing or cyanosis.  No peripheral edema.  NEURO: Cranial nerves grossly intact.  Mentation and speech appropriate for age.  PSYCH: Mentation appears normal, affect normal/bright, judgement and insight intact, normal speech and appearance well-groomed.       Assessment/Plan:   Urgency of urination  -UA today is negative.  Exact etiology of her symptoms is unknown although it may represent interstitial cystitis.  Information provided on this.  Continue to monitor.  - UA with Microscopic reflex to Culture    Migraine without aura and without status migrainosus, not intractable  - Controlled.  Continue current regimen.    - SUMAtriptan (IMITREX) 100 MG tablet  Dispense: 9 tablet; Refill: 5    Essential hypertension  - Blood pressure today of (!) 146/96   is not at the goal of <140/90 with mild exacerbation.  - Continue current regimen at this time.  Instructed to check BP at home.  - Cautioned patient to monitor with antibiotics, herbals and any OTC medications  - electrolytes and renal function done relatively recently and stable  - hydrochlorothiazide (HYDRODIURIL) 25 MG tablet  Dispense: 90 tablet; Refill:  1    Chronic insomnia  At this time we will try doxepin 3 to 6 mg nightly as needed.  She can use Ativan as needed if this does not work.  We discussed the risk of Ativan including memory issues and sedation.  - doxepin (SILENOR) 3 MG tablet  Dispense: 60 tablet; Refill: 1  - LORazepam (ATIVAN) 0.5 MG tablet  Dispense: 30 tablet; Refill: 2    Encounter for screening mammogram for breast cancer  - MA Screen Bilateral w/Yadiel    Vulvar odor  Wet prep obtained today.  No obvious abnormality.  - Wet Prep, Genital      - Return/call as needed for follow-up should any new symptoms develop, for worsening of current symptoms or if symptoms do not resolve with above plan.    Return in about 3 months (around 10/5/2021) for Annual Review, and as needed sooner.     TORY MALONE, DO   7/5/2021 3:42 PM    This document was prepared using voice generated softwear. While every attempt was made for accuracy, grammatical errors may exist.   No.

## 2024-07-30 ENCOUNTER — HOSPITAL ENCOUNTER (OUTPATIENT)
Dept: CT IMAGING | Facility: OTHER | Age: 64
Discharge: HOME | End: 2024-07-30
Payer: COMMERCIAL

## 2024-07-30 DIAGNOSIS — R59.0 ENLARGED LYMPH NODE IN NECK: ICD-10-CM

## 2024-07-30 PROCEDURE — 250N000011 HC RX IP 250 OP 636

## 2024-07-30 PROCEDURE — 250N000009 HC RX 250

## 2024-07-30 PROCEDURE — 70491 CT SOFT TISSUE NECK W/DYE: CPT

## 2024-07-30 RX ORDER — IOPAMIDOL 755 MG/ML
100 INJECTION, SOLUTION INTRAVASCULAR ONCE
Status: COMPLETED | OUTPATIENT
Start: 2024-07-30 | End: 2024-07-30

## 2024-07-30 RX ADMIN — IOPAMIDOL 100 ML: 755 INJECTION, SOLUTION INTRAVENOUS at 08:55

## 2024-07-30 RX ADMIN — SODIUM CHLORIDE 60 ML: 9 INJECTION, SOLUTION INTRAVENOUS at 08:56

## 2024-08-03 ASSESSMENT — PAIN SCALES - PAIN ENJOYMENT GENERAL ACTIVITY SCALE (PEG)
AVG_PAIN_PASTWEEK: 8
INTERFERED_ENJOYMENT_LIFE: 5
PEG_TOTALSCORE: 5.67
AVG_PAIN_PASTWEEK: 8
INTERFERED_GENERAL_ACTIVITY: 4
INTERFERED_ENJOYMENT_LIFE: 5
INTERFERED_GENERAL_ACTIVITY: 4
PEG_TOTALSCORE: 5.67

## 2024-08-08 ENCOUNTER — OFFICE VISIT (OUTPATIENT)
Dept: INTERNAL MEDICINE | Facility: OTHER | Age: 64
End: 2024-08-08
Attending: INTERNAL MEDICINE
Payer: COMMERCIAL

## 2024-08-08 VITALS
SYSTOLIC BLOOD PRESSURE: 128 MMHG | BODY MASS INDEX: 22.02 KG/M2 | DIASTOLIC BLOOD PRESSURE: 66 MMHG | OXYGEN SATURATION: 98 % | HEIGHT: 66 IN | WEIGHT: 137 LBS | HEART RATE: 84 BPM | RESPIRATION RATE: 16 BRPM

## 2024-08-08 DIAGNOSIS — Z01.818 PREOPERATIVE EXAMINATION: Primary | ICD-10-CM

## 2024-08-08 DIAGNOSIS — M79.2 NERVE PAIN: ICD-10-CM

## 2024-08-08 DIAGNOSIS — I10 ESSENTIAL HYPERTENSION: ICD-10-CM

## 2024-08-08 DIAGNOSIS — R73.9 ELEVATED BLOOD SUGAR: ICD-10-CM

## 2024-08-08 LAB
ANION GAP SERPL CALCULATED.3IONS-SCNC: 9 MMOL/L (ref 7–15)
BUN SERPL-MCNC: 14.7 MG/DL (ref 8–23)
CALCIUM SERPL-MCNC: 10.1 MG/DL (ref 8.8–10.4)
CHLORIDE SERPL-SCNC: 97 MMOL/L (ref 98–107)
CREAT SERPL-MCNC: 0.99 MG/DL (ref 0.51–0.95)
EGFRCR SERPLBLD CKD-EPI 2021: 64 ML/MIN/1.73M2
ERYTHROCYTE [DISTWIDTH] IN BLOOD BY AUTOMATED COUNT: 12.5 % (ref 10–15)
GLUCOSE SERPL-MCNC: 125 MG/DL (ref 70–99)
HBA1C MFR BLD: 5.2 % (ref 4–6.2)
HCO3 SERPL-SCNC: 32 MMOL/L (ref 22–29)
HCT VFR BLD AUTO: 41.2 % (ref 35–47)
HGB BLD-MCNC: 13.4 G/DL (ref 11.7–15.7)
MCH RBC QN AUTO: 28.7 PG (ref 26.5–33)
MCHC RBC AUTO-ENTMCNC: 32.5 G/DL (ref 31.5–36.5)
MCV RBC AUTO: 88 FL (ref 78–100)
PLATELET # BLD AUTO: 210 10E3/UL (ref 150–450)
POTASSIUM SERPL-SCNC: 3.2 MMOL/L (ref 3.4–5.3)
RBC # BLD AUTO: 4.67 10E6/UL (ref 3.8–5.2)
SODIUM SERPL-SCNC: 138 MMOL/L (ref 135–145)
WBC # BLD AUTO: 6.2 10E3/UL (ref 4–11)

## 2024-08-08 PROCEDURE — 83036 HEMOGLOBIN GLYCOSYLATED A1C: CPT | Mod: ZL | Performed by: INTERNAL MEDICINE

## 2024-08-08 PROCEDURE — G2211 COMPLEX E/M VISIT ADD ON: HCPCS | Performed by: INTERNAL MEDICINE

## 2024-08-08 PROCEDURE — 80048 BASIC METABOLIC PNL TOTAL CA: CPT | Mod: ZL | Performed by: INTERNAL MEDICINE

## 2024-08-08 PROCEDURE — 85027 COMPLETE CBC AUTOMATED: CPT | Mod: ZL | Performed by: INTERNAL MEDICINE

## 2024-08-08 PROCEDURE — 93000 ELECTROCARDIOGRAM COMPLETE: CPT | Performed by: INTERNAL MEDICINE

## 2024-08-08 PROCEDURE — 99214 OFFICE O/P EST MOD 30 MIN: CPT | Performed by: INTERNAL MEDICINE

## 2024-08-08 PROCEDURE — 36415 COLL VENOUS BLD VENIPUNCTURE: CPT | Mod: ZL | Performed by: INTERNAL MEDICINE

## 2024-08-08 ASSESSMENT — PAIN SCALES - GENERAL: PAINLEVEL: MODERATE PAIN (4)

## 2024-08-08 NOTE — NURSING NOTE
"Chief Complaint   Patient presents with    Pre-Op Exam       Initial /66   Pulse 84   Resp 16   Ht 1.676 m (5' 6\")   Wt 62.1 kg (137 lb)   LMP 03/07/2003 (Approximate)   SpO2 98%   Breastfeeding No   BMI 22.11 kg/m   Estimated body mass index is 22.11 kg/m  as calculated from the following:    Height as of this encounter: 1.676 m (5' 6\").    Weight as of this encounter: 62.1 kg (137 lb).  Medication Review: complete    The next two questions are to help us understand your food security.  If you are feeling you need any assistance in this area, we have resources available to support you today.          1/18/2024   SDOH- Food Insecurity   Within the past 12 months, did you worry that your food would run out before you got money to buy more? N   Within the past 12 months, did the food you bought just not last and you didn t have money to get more? N            Health Care Directive:  Patient does not have a Health Care Directive or Living Will: Discussed advance care planning with patient; however, patient declined at this time.    Nicky Ratliff LPN      "

## 2024-08-08 NOTE — H&P (VIEW-ONLY)
Preoperative Evaluation  [facility]  [address]  [place] [address]  Phone: [phone]  Fax: [phone]  Primary Provider: Abena Adkins DO  Pre-op Performing Provider: Abena Adkins DO  Aug 8, 2024             8/3/2024   Surgical Information   What procedure is being done? Right shoulder   Facility or Hospital where procedure/surgery will be performed: [facility]   Who is doing the procedure / surgery?    Date of surgery / procedure: 08/13/2024   Time of surgery / procedure: Unknown   Where do you plan to recover after surgery? at home with family        Fax number for surgical facility: Note does not need to be faxed, will be available electronically in Epic.    Assessment & Plan     The proposed surgical procedure is considered INTERMEDIATE risk.    Preoperative examination  Labs and EKG obtained and all reassuring.  - EKG 12-lead, tracing only  - Basic Metabolic Panel  - CBC W PLT No Diff    Nerve pain  Continue with 60 mg daily.  Recheck in 1 month as scheduled.    Essential hypertension  - Controlled.  Continue current regimen.       - No identified additional risk factors other than previously addressed    Preoperative Medication Instructions  Antiplatelet or Anticoagulation Medication Instructions   - Patient is on no antiplatelet or anticoagulation medications.    Additional Medication Instructions  Take all scheduled medications on the day of surgery EXCEPT for modifications listed below:   - Diuretics: DO NOT TAKE on the day of surgery.   - Herbal medications and vitamins: DO NOT TAKE 14 days prior to surgery.    Recommendation  Approval given to proceed with proposed procedure, without further diagnostic evaluation.    Subjective   Alessandra is a 64 year old, presenting for the following:  Pre-Op Exam          8/8/2024     1:50 PM   Additional Questions   Roomed by Delmar Ratliff LPN     HPI related to upcoming procedure:         8/3/2024   Pre-Op  Questionnaire   Have you ever had a heart attack or stroke? No   Have you ever had surgery on your heart or blood vessels, such as a stent placement, a coronary artery bypass, or surgery on an artery in your head, neck, heart, or legs? No   Do you have chest pain with activity? No   Do you have a history of heart failure? No   Do you currently have a cold, bronchitis or symptoms of other infection? No   Do you have a cough, shortness of breath, or wheezing? No   Do you or anyone in your family have previous history of blood clots? No   Do you or does anyone in your family have a serious bleeding problem such as prolonged bleeding following surgeries or cuts? No   Have you ever had problems with anemia or been told to take iron pills? No   Have you had any abnormal blood loss such as black, tarry or bloody stools, or abnormal vaginal bleeding? No   Have you ever had a blood transfusion? (!) YES   Have you ever had a transfusion reaction? No   Are you willing to have a blood transfusion if it is medically needed before, during, or after your surgery? Yes   Have you or any of your relatives ever had problems with anesthesia? No   Do you have sleep apnea, excessive snoring or daytime drowsiness? No   Do you have any artifical heart valves or other implanted medical devices like a pacemaker, defibrillator, or continuous glucose monitor? No   Do you have artificial joints? No   Are you allergic to latex? No        Health Care Directive  Patient does not have a Health Care Directive or Living Will: Discussed advance care planning with patient; information given to patient to review.    Preoperative Review of    reviewed - controlled substances reflected in medication list.    Reports that overall she is doing well.  She recently traveled and had a good vacation.  She has not had any new or changing symptoms.    She continues on her chronic medications.  She is on lorazepam and Lunesta for sleep.  She continues on  hydrochlorothiazide for blood pressure.  Her blood pressure has been well-controlled.  She has been on duloxetine for nerve pain/CRPS.  She found that the higher dose of 80 mg of Cymbalta, made her feel tired and to a certain extent felt like her throat was closing.  She did go back down to the 60 mg dose which she has continued to tolerate.  She also has continued on chronic pain medications.  Her last refill per the PDMP was on 7/26.      Patient Active Problem List    Diagnosis Date Noted    Continuous opioid dependence (H) 02/15/2024     Priority: Medium    Sinus symptom 03/19/2021     Priority: Medium    S/P vaginal hysterectomy 08/09/2018     Priority: Medium    Chronic insomnia 10/30/2017     Priority: Medium    Migraine without aura 10/30/2017     Priority: Medium     Overview:   triggers are alcohol and dairy products      Chronic sinusitis 01/11/2017     Priority: Medium    Hypertension 05/13/2015     Priority: Medium      Past Medical History:   Diagnosis Date    Allergic rhinitis     BCC (basal cell carcinoma), face 02/2023    nose    Chronic sinusitis     Essential (primary) hypertension     High grade squamous intraepithelial cervical dysplasia 01/2018    Migraine without status migrainosus, not intractable     Osteoarthritis of first metatarsophalangeal joint     Otalgia of right ear     chronic    Pregnancy     G3, P3 - all NSVDs    Psychophysiologic insomnia     Squamous cell carcinoma of skin 06/2018    leg, right arm and sternum    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    Right    Ureterolithiasis 12/04/2014    right pyelo, mod right hydro, 2 mm right renal stone     Past Surgical History:   Procedure Laterality Date    ARTHROSCOPY SHOULDER  07/2009    AS NASAL/SINUS SCOPE W SPHENOIDOTOMY      2011/2016 Right [facility] mycetoma [facility] at [facility]2012    AS RELEASE FOREARM/HAND EXTEN TENDON Right 03/2023    s/p right thumb basilar joint reconstruction    BIOPSY OF SKIN LESION  02/2023    SCC, BCC     BIOPSY OF SKIN LESION Left 09/2023    left shoulder    COLONOSCOPY  06/05/2012    Sigmoid diverticulosis; follow up 10 years    CYSTOSCOPY N/A 08/09/2018    Procedure: CYSTOSCOPY;;  Surgeon: Ashleigh Fregoso MD;  Location:  OR    ENDOSCOPIC SINUS SURGERY      repeat clearing    FINGER SURGERY  10/2022    right thumb basilar joint reconstruction    HYSTERECTOMY VAGINAL Bilateral 08/09/2018    Procedure: HYSTERECTOMY VAGINAL;  Total Vaginal Hysterectomy & Bilateral Salpingectomy, Cystoscopy;  Surgeon: Ashleigh Fregoso MD;  Location:  OR    LEEP TX, CERVICAL  04/18/2018    Dr Fregoso    MYRINGOTOMY, INSERT TUBE, COMBINED  09/2016     Current Outpatient Medications   Medication Sig Dispense Refill    DULoxetine (CYMBALTA) 60 MG capsule Take 1 capsule (60 mg) by mouth daily 90 capsule 2    eszopiclone (LUNESTA) 1 MG tablet Take 1-2 tablets (1-2 mg) by mouth at bedtime 60 tablet 5    hydrochlorothiazide (HYDRODIURIL) 25 MG tablet Take 1 tablet (25 mg) by mouth daily 90 tablet 4    LORazepam (ATIVAN) 0.5 MG tablet Take 1 tablet (0.5 mg) by mouth nightly as needed for anxiety or sleep 30 tablet 5    oxyCODONE (ROXICODONE) 5 MG tablet Take 1 tablet (5 mg) by mouth 2 times daily as needed for pain 60 tablet 0    oxyCODONE (ROXICODONE) 5 MG tablet Take 1 tablet (5 mg) by mouth 2 times daily as needed for pain 60 tablet 0    [START ON 8/23/2024] oxyCODONE (ROXICODONE) 5 MG tablet Take 1 tablet (5 mg) by mouth 2 times daily as needed for pain 60 tablet 0    SUMAtriptan (IMITREX) 100 MG tablet Take 1 tablet (100 mg) by mouth at onset of headache for migraine 10 tablet 6       Allergies   Allergen Reactions    Fentanyl Anxiety and Itching     Patient describes feeling creepy/crawly to Dr. Saenz.         Social History     Tobacco Use    Smoking status: Never     Passive exposure: Never    Smokeless tobacco: Never   Substance Use Topics    Alcohol use: No     Alcohol/week: 0.0 standard drinks of alcohol     Comment:  "Alcoholic Drinks/day: very rarely     Family History   Problem Relation Age of Onset    Pancreatic Cancer Mother     Memory loss Father     Subarachnoid hemorrhage Father     Depression Brother     Septicemia Brother     Diabetes Brother     No Known Problems Brother     No Known Problems Brother     Heart Disease Paternal Grandfather         Heart Disease,MI at 65    Other - See Comments No family hx of         History is negative for diabetes, thyroid problems, cancer, anxiety, depression and asthma    Breast Cancer No family hx of         Cancer-breast     History   Drug Use No           ROS:  Denies any fevers, chills, SOB, chest pain, increased lower extremity edema, changes in bowel or bladder, blood in the stool, black stools or other concerns.       Objective    /66   Pulse 84   Resp 16   Ht 1.676 m (5' 6\")   Wt 62.1 kg (137 lb)   LMP 03/07/2003 (Approximate)   SpO2 98%   Breastfeeding No   BMI 22.11 kg/m     Estimated body mass index is 22.11 kg/m  as calculated from the following:    Height as of this encounter: 1.676 m (5' 6\").    Weight as of this encounter: 62.1 kg (137 lb).  Physical Exam  GEN: Vitals reviewed. Healthy appearing. Patient is in no acute distress. Cooperative with exam.  HEENT: Normocephalic atraumatic.  Eyes grossly normal to inspection.  No discharge or erythema, or obvious scleral/conjunctival abnormalities.   NECK: Supple; no thyromegaly or masses noted.  No cervical or supraclavicular lymphadenopathy.  CV: Heart regular in rate and rhythm with no murmur.    LUNGS: No audible wheeze, cough, or visible cyanosis.  No visible retractions or increased work of breathing.  Lungs clear to auscultation bilaterally.    ABD:  nondistended  SKIN: Warm and dry to touch.  Visible skin clear. No significant rash, abnormal pigmentation or lesions.  EXT: No clubbing or cyanosis.  No peripheral edema.      Recent Labs   Lab Test 01/25/24  1300 09/07/23  1348   HGB 13.8 13.2     " --     139   POTASSIUM 3.8 3.3*   CR 0.81 0.83        Diagnostics  Recent Results (from the past 24 hour(s))   EKG 12-lead, tracing only    Collection Time: 08/08/24  1:59 PM   Result Value Ref Range    Systolic Blood Pressure  mmHg    Diastolic Blood Pressure  mmHg    Ventricular Rate 80 BPM    Atrial Rate 80 BPM    LA Interval 142 ms    QRS Duration 74 ms     ms    QTc 419 ms    P Axis 72 degrees    R AXIS 39 degrees    T Axis 60 degrees    Interpretation ECG       Sinus rhythm  Possible Left atrial enlargement  Borderline ECG  When compared with ECG of 07-Sep-2023 13:39,  No significant change was found     Basic Metabolic Panel    Collection Time: 08/08/24  2:05 PM   Result Value Ref Range    Sodium 138 135 - 145 mmol/L    Potassium 3.2 (L) 3.4 - 5.3 mmol/L    Chloride 97 (L) 98 - 107 mmol/L    Carbon Dioxide (CO2) 32 (H) 22 - 29 mmol/L    Anion Gap 9 7 - 15 mmol/L    Urea Nitrogen 14.7 8.0 - 23.0 mg/dL    Creatinine 0.99 (H) 0.51 - 0.95 mg/dL    GFR Estimate 64 >60 mL/min/1.73m2    Calcium 10.1 8.8 - 10.4 mg/dL    Glucose 125 (H) 70 - 99 mg/dL   CBC W PLT No Diff    Collection Time: 08/08/24  2:05 PM   Result Value Ref Range    WBC Count 6.2 4.0 - 11.0 10e3/uL    RBC Count 4.67 3.80 - 5.20 10e6/uL    Hemoglobin 13.4 11.7 - 15.7 g/dL    Hematocrit 41.2 35.0 - 47.0 %    MCV 88 78 - 100 fL    MCH 28.7 26.5 - 33.0 pg    MCHC 32.5 31.5 - 36.5 g/dL    RDW 12.5 10.0 - 15.0 %    Platelet Count 210 150 - 450 10e3/uL      EKG: appears normal, NSR, normal axis, normal intervals, no acute ST/T changes c/w ischemia, no LVH by voltage criteria, unchanged from previous tracings    Revised Cardiac Risk Index (RCRI)  The patient has the following serious cardiovascular risks for perioperative complications:   - No serious cardiac risks = 0 points     RCRI Interpretation: 0 points: Class I (very low risk - 0.4% complication rate)         Signed Electronically by: Abena Adkins DO  A copy of this evaluation report is  provided to the requesting physician.

## 2024-08-08 NOTE — PROGRESS NOTES
Preoperative Evaluation  [facility]  [address]  [place] [address]  Phone: [phone]  Fax: [phone]  Primary Provider: Abena Adkins DO  Pre-op Performing Provider: Abena Adkins DO  Aug 8, 2024             8/3/2024   Surgical Information   What procedure is being done? Right shoulder   Facility or Hospital where procedure/surgery will be performed: [facility]   Who is doing the procedure / surgery?    Date of surgery / procedure: 08/13/2024   Time of surgery / procedure: Unknown   Where do you plan to recover after surgery? at home with family        Fax number for surgical facility: Note does not need to be faxed, will be available electronically in Epic.    Assessment & Plan     The proposed surgical procedure is considered INTERMEDIATE risk.    Preoperative examination  Labs and EKG obtained and all reassuring.  - EKG 12-lead, tracing only  - Basic Metabolic Panel  - CBC W PLT No Diff    Nerve pain  Continue with 60 mg daily.  Recheck in 1 month as scheduled.    Essential hypertension  - Controlled.  Continue current regimen.       - No identified additional risk factors other than previously addressed    Preoperative Medication Instructions  Antiplatelet or Anticoagulation Medication Instructions   - Patient is on no antiplatelet or anticoagulation medications.    Additional Medication Instructions  Take all scheduled medications on the day of surgery EXCEPT for modifications listed below:   - Diuretics: DO NOT TAKE on the day of surgery.   - Herbal medications and vitamins: DO NOT TAKE 14 days prior to surgery.    Recommendation  Approval given to proceed with proposed procedure, without further diagnostic evaluation.    Subjective   Alessandra is a 64 year old, presenting for the following:  Pre-Op Exam          8/8/2024     1:50 PM   Additional Questions   Roomed by Delmar Ratliff LPN     HPI related to upcoming procedure:         8/3/2024   Pre-Op  Questionnaire   Have you ever had a heart attack or stroke? No   Have you ever had surgery on your heart or blood vessels, such as a stent placement, a coronary artery bypass, or surgery on an artery in your head, neck, heart, or legs? No   Do you have chest pain with activity? No   Do you have a history of heart failure? No   Do you currently have a cold, bronchitis or symptoms of other infection? No   Do you have a cough, shortness of breath, or wheezing? No   Do you or anyone in your family have previous history of blood clots? No   Do you or does anyone in your family have a serious bleeding problem such as prolonged bleeding following surgeries or cuts? No   Have you ever had problems with anemia or been told to take iron pills? No   Have you had any abnormal blood loss such as black, tarry or bloody stools, or abnormal vaginal bleeding? No   Have you ever had a blood transfusion? (!) YES   Have you ever had a transfusion reaction? No   Are you willing to have a blood transfusion if it is medically needed before, during, or after your surgery? Yes   Have you or any of your relatives ever had problems with anesthesia? No   Do you have sleep apnea, excessive snoring or daytime drowsiness? No   Do you have any artifical heart valves or other implanted medical devices like a pacemaker, defibrillator, or continuous glucose monitor? No   Do you have artificial joints? No   Are you allergic to latex? No        Health Care Directive  Patient does not have a Health Care Directive or Living Will: Discussed advance care planning with patient; information given to patient to review.    Preoperative Review of    reviewed - controlled substances reflected in medication list.    Reports that overall she is doing well.  She recently traveled and had a good vacation.  She has not had any new or changing symptoms.    She continues on her chronic medications.  She is on lorazepam and Lunesta for sleep.  She continues on  hydrochlorothiazide for blood pressure.  Her blood pressure has been well-controlled.  She has been on duloxetine for nerve pain/CRPS.  She found that the higher dose of 80 mg of Cymbalta, made her feel tired and to a certain extent felt like her throat was closing.  She did go back down to the 60 mg dose which she has continued to tolerate.  She also has continued on chronic pain medications.  Her last refill per the PDMP was on 7/26.      Patient Active Problem List    Diagnosis Date Noted    Continuous opioid dependence (H) 02/15/2024     Priority: Medium    Sinus symptom 03/19/2021     Priority: Medium    S/P vaginal hysterectomy 08/09/2018     Priority: Medium    Chronic insomnia 10/30/2017     Priority: Medium    Migraine without aura 10/30/2017     Priority: Medium     Overview:   triggers are alcohol and dairy products      Chronic sinusitis 01/11/2017     Priority: Medium    Hypertension 05/13/2015     Priority: Medium      Past Medical History:   Diagnosis Date    Allergic rhinitis     BCC (basal cell carcinoma), face 02/2023    nose    Chronic sinusitis     Essential (primary) hypertension     High grade squamous intraepithelial cervical dysplasia 01/2018    Migraine without status migrainosus, not intractable     Osteoarthritis of first metatarsophalangeal joint     Otalgia of right ear     chronic    Pregnancy     G3, P3 - all NSVDs    Psychophysiologic insomnia     Squamous cell carcinoma of skin 06/2018    leg, right arm and sternum    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    Right    Ureterolithiasis 12/04/2014    right pyelo, mod right hydro, 2 mm right renal stone     Past Surgical History:   Procedure Laterality Date    ARTHROSCOPY SHOULDER  07/2009    AS NASAL/SINUS SCOPE W SPHENOIDOTOMY      2011/2016 Right for mycetoma removal at [facility]/2012    AS RELEASE FOREARM/HAND EXTEN TENDON Right 03/2023    s/p right thumb basilar joint reconstruction    BIOPSY OF SKIN LESION  02/2023    SCC, BCC     BIOPSY OF SKIN LESION Left 09/2023    left shoulder    COLONOSCOPY  06/05/2012    Sigmoid diverticulosis; follow up 10 years    CYSTOSCOPY N/A 08/09/2018    Procedure: CYSTOSCOPY;;  Surgeon: Ashleigh Fregoso MD;  Location:  OR    ENDOSCOPIC SINUS SURGERY      repeat clearing    FINGER SURGERY  10/2022    right thumb basilar joint reconstruction    HYSTERECTOMY VAGINAL Bilateral 08/09/2018    Procedure: HYSTERECTOMY VAGINAL;  Total Vaginal Hysterectomy & Bilateral Salpingectomy, Cystoscopy;  Surgeon: Ashleigh Fregoso MD;  Location:  OR    LEEP TX, CERVICAL  04/18/2018    Dr Fregoso    MYRINGOTOMY, INSERT TUBE, COMBINED  09/2016     Current Outpatient Medications   Medication Sig Dispense Refill    DULoxetine (CYMBALTA) 60 MG capsule Take 1 capsule (60 mg) by mouth daily 90 capsule 2    eszopiclone (LUNESTA) 1 MG tablet Take 1-2 tablets (1-2 mg) by mouth at bedtime 60 tablet 5    hydrochlorothiazide (HYDRODIURIL) 25 MG tablet Take 1 tablet (25 mg) by mouth daily 90 tablet 4    LORazepam (ATIVAN) 0.5 MG tablet Take 1 tablet (0.5 mg) by mouth nightly as needed for anxiety or sleep 30 tablet 5    oxyCODONE (ROXICODONE) 5 MG tablet Take 1 tablet (5 mg) by mouth 2 times daily as needed for pain 60 tablet 0    oxyCODONE (ROXICODONE) 5 MG tablet Take 1 tablet (5 mg) by mouth 2 times daily as needed for pain 60 tablet 0    [START ON 8/23/2024] oxyCODONE (ROXICODONE) 5 MG tablet Take 1 tablet (5 mg) by mouth 2 times daily as needed for pain 60 tablet 0    SUMAtriptan (IMITREX) 100 MG tablet Take 1 tablet (100 mg) by mouth at onset of headache for migraine 10 tablet 6       Allergies   Allergen Reactions    Fentanyl Anxiety and Itching     Patient describes feeling creepy/crawly to Dr. Saenz.         Social History     Tobacco Use    Smoking status: Never     Passive exposure: Never    Smokeless tobacco: Never   Substance Use Topics    Alcohol use: No     Alcohol/week: 0.0 standard drinks of alcohol     Comment:  "Alcoholic Drinks/day: very rarely     Family History   Problem Relation Age of Onset    Pancreatic Cancer Mother     Memory loss Father     Subarachnoid hemorrhage Father     Depression Brother     Septicemia Brother     Diabetes Brother     No Known Problems Brother     No Known Problems Brother     Heart Disease Paternal Grandfather         Heart Disease,MI at 65    Other - See Comments No family hx of         History is negative for diabetes, thyroid problems, cancer, anxiety, depression and asthma    Breast Cancer No family hx of         Cancer-breast     History   Drug Use No           ROS:  Denies any fevers, chills, SOB, chest pain, increased lower extremity edema, changes in bowel or bladder, blood in the stool, black stools or other concerns.       Objective    /66   Pulse 84   Resp 16   Ht 1.676 m (5' 6\")   Wt 62.1 kg (137 lb)   LMP 03/07/2003 (Approximate)   SpO2 98%   Breastfeeding No   BMI 22.11 kg/m     Estimated body mass index is 22.11 kg/m  as calculated from the following:    Height as of this encounter: 1.676 m (5' 6\").    Weight as of this encounter: 62.1 kg (137 lb).  Physical Exam  GEN: Vitals reviewed. Healthy appearing. Patient is in no acute distress. Cooperative with exam.  HEENT: Normocephalic atraumatic.  Eyes grossly normal to inspection.  No discharge or erythema, or obvious scleral/conjunctival abnormalities.   NECK: Supple; no thyromegaly or masses noted.  No cervical or supraclavicular lymphadenopathy.  CV: Heart regular in rate and rhythm with no murmur.    LUNGS: No audible wheeze, cough, or visible cyanosis.  No visible retractions or increased work of breathing.  Lungs clear to auscultation bilaterally.    ABD:  nondistended  SKIN: Warm and dry to touch.  Visible skin clear. No significant rash, abnormal pigmentation or lesions.  EXT: No clubbing or cyanosis.  No peripheral edema.      Recent Labs   Lab Test 01/25/24  1300 09/07/23  1348   HGB 13.8 13.2     " --     139   POTASSIUM 3.8 3.3*   CR 0.81 0.83        Diagnostics  Recent Results (from the past 24 hour(s))   EKG 12-lead, tracing only    Collection Time: 08/08/24  1:59 PM   Result Value Ref Range    Systolic Blood Pressure  mmHg    Diastolic Blood Pressure  mmHg    Ventricular Rate 80 BPM    Atrial Rate 80 BPM    IA Interval 142 ms    QRS Duration 74 ms     ms    QTc 419 ms    P Axis 72 degrees    R AXIS 39 degrees    T Axis 60 degrees    Interpretation ECG       Sinus rhythm  Possible Left atrial enlargement  Borderline ECG  When compared with ECG of 07-Sep-2023 13:39,  No significant change was found     Basic Metabolic Panel    Collection Time: 08/08/24  2:05 PM   Result Value Ref Range    Sodium 138 135 - 145 mmol/L    Potassium 3.2 (L) 3.4 - 5.3 mmol/L    Chloride 97 (L) 98 - 107 mmol/L    Carbon Dioxide (CO2) 32 (H) 22 - 29 mmol/L    Anion Gap 9 7 - 15 mmol/L    Urea Nitrogen 14.7 8.0 - 23.0 mg/dL    Creatinine 0.99 (H) 0.51 - 0.95 mg/dL    GFR Estimate 64 >60 mL/min/1.73m2    Calcium 10.1 8.8 - 10.4 mg/dL    Glucose 125 (H) 70 - 99 mg/dL   CBC W PLT No Diff    Collection Time: 08/08/24  2:05 PM   Result Value Ref Range    WBC Count 6.2 4.0 - 11.0 10e3/uL    RBC Count 4.67 3.80 - 5.20 10e6/uL    Hemoglobin 13.4 11.7 - 15.7 g/dL    Hematocrit 41.2 35.0 - 47.0 %    MCV 88 78 - 100 fL    MCH 28.7 26.5 - 33.0 pg    MCHC 32.5 31.5 - 36.5 g/dL    RDW 12.5 10.0 - 15.0 %    Platelet Count 210 150 - 450 10e3/uL      EKG: appears normal, NSR, normal axis, normal intervals, no acute ST/T changes c/w ischemia, no LVH by voltage criteria, unchanged from previous tracings    Revised Cardiac Risk Index (RCRI)  The patient has the following serious cardiovascular risks for perioperative complications:   - No serious cardiac risks = 0 points     RCRI Interpretation: 0 points: Class I (very low risk - 0.4% complication rate)         Signed Electronically by: Abena Adkins DO  A copy of this evaluation report is  provided to the requesting physician.

## 2024-08-09 ENCOUNTER — ANESTHESIA EVENT (OUTPATIENT)
Dept: SURGERY | Facility: OTHER | Age: 64
End: 2024-08-09
Payer: COMMERCIAL

## 2024-08-12 ENCOUNTER — ANESTHESIA (OUTPATIENT)
Dept: SURGERY | Facility: OTHER | Age: 64
End: 2024-08-12
Payer: COMMERCIAL

## 2024-08-12 ENCOUNTER — HOSPITAL ENCOUNTER (OUTPATIENT)
Facility: OTHER | Age: 64
Discharge: HOME | End: 2024-08-12
Attending: ORTHOPAEDIC SURGERY | Admitting: ORTHOPAEDIC SURGERY
Payer: COMMERCIAL

## 2024-08-12 VITALS
RESPIRATION RATE: 18 BRPM | HEIGHT: 66 IN | SYSTOLIC BLOOD PRESSURE: 129 MMHG | TEMPERATURE: 97 F | HEART RATE: 80 BPM | WEIGHT: 137 LBS | BODY MASS INDEX: 22.02 KG/M2 | OXYGEN SATURATION: 97 % | DIASTOLIC BLOOD PRESSURE: 80 MMHG

## 2024-08-12 DIAGNOSIS — M75.81 ROTATOR CUFF TENDINITIS, RIGHT: Primary | ICD-10-CM

## 2024-08-12 LAB
ATRIAL RATE - MUSE: 80 BPM
DIASTOLIC BLOOD PRESSURE - MUSE: NORMAL MMHG
INTERPRETATION ECG - MUSE: NORMAL
P AXIS - MUSE: 72 DEGREES
PR INTERVAL - MUSE: 142 MS
QRS DURATION - MUSE: 74 MS
QT - MUSE: 364 MS
QTC - MUSE: 419 MS
R AXIS - MUSE: 39 DEGREES
SYSTOLIC BLOOD PRESSURE - MUSE: NORMAL MMHG
T AXIS - MUSE: 60 DEGREES
VENTRICULAR RATE- MUSE: 80 BPM

## 2024-08-12 PROCEDURE — 250N000011 HC RX IP 250 OP 636: Performed by: NURSE ANESTHETIST, CERTIFIED REGISTERED

## 2024-08-12 PROCEDURE — 250N000009 HC RX 250: Performed by: NURSE ANESTHETIST, CERTIFIED REGISTERED

## 2024-08-12 PROCEDURE — 710N000010 HC RECOVERY PHASE 1, LEVEL 2, PER MIN: Performed by: ORTHOPAEDIC SURGERY

## 2024-08-12 PROCEDURE — 250N000011 HC RX IP 250 OP 636

## 2024-08-12 PROCEDURE — 29824 SHO ARTHRS SRG DSTL CLAVICLC: CPT | Mod: RT | Performed by: ORTHOPAEDIC SURGERY

## 2024-08-12 PROCEDURE — 250N000011 HC RX IP 250 OP 636: Performed by: ORTHOPAEDIC SURGERY

## 2024-08-12 PROCEDURE — 999N000141 HC STATISTIC PRE-PROCEDURE NURSING ASSESSMENT: Performed by: ORTHOPAEDIC SURGERY

## 2024-08-12 PROCEDURE — 258N000001 HC RX 258: Performed by: ORTHOPAEDIC SURGERY

## 2024-08-12 PROCEDURE — 29826 SHO ARTHRS SRG DECOMPRESSION: CPT | Mod: RT | Performed by: ORTHOPAEDIC SURGERY

## 2024-08-12 PROCEDURE — 29824 SHO ARTHRS SRG DSTL CLAVICLC: CPT | Performed by: NURSE ANESTHETIST, CERTIFIED REGISTERED

## 2024-08-12 PROCEDURE — 64415 NJX AA&/STRD BRCH PLXS IMG: CPT | Mod: RT

## 2024-08-12 PROCEDURE — 258N000003 HC RX IP 258 OP 636

## 2024-08-12 PROCEDURE — 370N000017 HC ANESTHESIA TECHNICAL FEE, PER MIN: Performed by: ORTHOPAEDIC SURGERY

## 2024-08-12 PROCEDURE — 272N000001 HC OR GENERAL SUPPLY STERILE: Performed by: ORTHOPAEDIC SURGERY

## 2024-08-12 PROCEDURE — 250N000025 HC SEVOFLURANE, PER MIN: Performed by: ORTHOPAEDIC SURGERY

## 2024-08-12 PROCEDURE — 710N000012 HC RECOVERY PHASE 2, PER MINUTE: Performed by: ORTHOPAEDIC SURGERY

## 2024-08-12 PROCEDURE — 360N000077 HC SURGERY LEVEL 4, PER MIN: Performed by: ORTHOPAEDIC SURGERY

## 2024-08-12 RX ORDER — ONDANSETRON 2 MG/ML
INJECTION INTRAMUSCULAR; INTRAVENOUS PRN
Status: DISCONTINUED | OUTPATIENT
Start: 2024-08-12 | End: 2024-08-12

## 2024-08-12 RX ORDER — CEFAZOLIN SODIUM/WATER 2 G/20 ML
2 SYRINGE (ML) INTRAVENOUS SEE ADMIN INSTRUCTIONS
Status: DISCONTINUED | OUTPATIENT
Start: 2024-08-12 | End: 2024-08-12 | Stop reason: HOSPADM

## 2024-08-12 RX ORDER — SODIUM CHLORIDE, SODIUM LACTATE, POTASSIUM CHLORIDE, CALCIUM CHLORIDE 600; 310; 30; 20 MG/100ML; MG/100ML; MG/100ML; MG/100ML
INJECTION, SOLUTION INTRAVENOUS CONTINUOUS
Status: DISCONTINUED | OUTPATIENT
Start: 2024-08-12 | End: 2024-08-12 | Stop reason: HOSPADM

## 2024-08-12 RX ORDER — FENTANYL CITRATE 50 UG/ML
50 INJECTION, SOLUTION INTRAMUSCULAR; INTRAVENOUS EVERY 5 MIN PRN
Status: DISCONTINUED | OUTPATIENT
Start: 2024-08-12 | End: 2024-08-12 | Stop reason: HOSPADM

## 2024-08-12 RX ORDER — ONDANSETRON 4 MG/1
4 TABLET, ORALLY DISINTEGRATING ORAL EVERY 30 MIN PRN
Status: DISCONTINUED | OUTPATIENT
Start: 2024-08-12 | End: 2024-08-12 | Stop reason: HOSPADM

## 2024-08-12 RX ORDER — IBUPROFEN 200 MG
600 TABLET ORAL
Status: DISCONTINUED | OUTPATIENT
Start: 2024-08-12 | End: 2024-08-12 | Stop reason: HOSPADM

## 2024-08-12 RX ORDER — FENTANYL CITRATE 50 UG/ML
25 INJECTION, SOLUTION INTRAMUSCULAR; INTRAVENOUS EVERY 5 MIN PRN
Status: DISCONTINUED | OUTPATIENT
Start: 2024-08-12 | End: 2024-08-12 | Stop reason: HOSPADM

## 2024-08-12 RX ORDER — DEXAMETHASONE SODIUM PHOSPHATE 10 MG/ML
4 INJECTION, SOLUTION INTRAMUSCULAR; INTRAVENOUS
Status: DISCONTINUED | OUTPATIENT
Start: 2024-08-12 | End: 2024-08-12 | Stop reason: HOSPADM

## 2024-08-12 RX ORDER — HYDROCODONE BITARTRATE AND ACETAMINOPHEN 5; 325 MG/1; MG/1
1-2 TABLET ORAL EVERY 4 HOURS PRN
Qty: 30 TABLET | Refills: 0 | Status: SHIPPED | OUTPATIENT
Start: 2024-08-12 | End: 2024-09-12

## 2024-08-12 RX ORDER — CEFAZOLIN SODIUM/WATER 2 G/20 ML
2 SYRINGE (ML) INTRAVENOUS
Status: COMPLETED | OUTPATIENT
Start: 2024-08-12 | End: 2024-08-12

## 2024-08-12 RX ORDER — OXYCODONE HYDROCHLORIDE 5 MG/1
5 TABLET ORAL
Status: DISCONTINUED | OUTPATIENT
Start: 2024-08-12 | End: 2024-08-12 | Stop reason: HOSPADM

## 2024-08-12 RX ORDER — HYDROCODONE BITARTRATE AND ACETAMINOPHEN 5; 325 MG/1; MG/1
1 TABLET ORAL
Status: DISCONTINUED | OUTPATIENT
Start: 2024-08-12 | End: 2024-08-12 | Stop reason: HOSPADM

## 2024-08-12 RX ORDER — LIDOCAINE HYDROCHLORIDE 20 MG/ML
INJECTION, SOLUTION INFILTRATION; PERINEURAL PRN
Status: DISCONTINUED | OUTPATIENT
Start: 2024-08-12 | End: 2024-08-12

## 2024-08-12 RX ORDER — DEXAMETHASONE SODIUM PHOSPHATE 4 MG/ML
INJECTION, SOLUTION INTRA-ARTICULAR; INTRALESIONAL; INTRAMUSCULAR; INTRAVENOUS; SOFT TISSUE PRN
Status: DISCONTINUED | OUTPATIENT
Start: 2024-08-12 | End: 2024-08-12

## 2024-08-12 RX ORDER — PROPOFOL 10 MG/ML
INJECTION, EMULSION INTRAVENOUS PRN
Status: DISCONTINUED | OUTPATIENT
Start: 2024-08-12 | End: 2024-08-12

## 2024-08-12 RX ORDER — NALOXONE HYDROCHLORIDE 0.4 MG/ML
0.1 INJECTION, SOLUTION INTRAMUSCULAR; INTRAVENOUS; SUBCUTANEOUS
Status: DISCONTINUED | OUTPATIENT
Start: 2024-08-12 | End: 2024-08-12 | Stop reason: HOSPADM

## 2024-08-12 RX ORDER — ONDANSETRON 2 MG/ML
4 INJECTION INTRAMUSCULAR; INTRAVENOUS EVERY 30 MIN PRN
Status: DISCONTINUED | OUTPATIENT
Start: 2024-08-12 | End: 2024-08-12 | Stop reason: HOSPADM

## 2024-08-12 RX ORDER — OXYCODONE HYDROCHLORIDE 5 MG/1
10 TABLET ORAL
Status: DISCONTINUED | OUTPATIENT
Start: 2024-08-12 | End: 2024-08-12 | Stop reason: HOSPADM

## 2024-08-12 RX ORDER — IBUPROFEN 600 MG/1
600 TABLET, FILM COATED ORAL EVERY 6 HOURS PRN
Qty: 30 TABLET | Refills: 0 | Status: SHIPPED | OUTPATIENT
Start: 2024-08-12 | End: 2024-09-12

## 2024-08-12 RX ORDER — LIDOCAINE 40 MG/G
CREAM TOPICAL
Status: DISCONTINUED | OUTPATIENT
Start: 2024-08-12 | End: 2024-08-12 | Stop reason: HOSPADM

## 2024-08-12 RX ORDER — HYDROMORPHONE HCL IN WATER/PF 6 MG/30 ML
0.4 PATIENT CONTROLLED ANALGESIA SYRINGE INTRAVENOUS EVERY 5 MIN PRN
Status: DISCONTINUED | OUTPATIENT
Start: 2024-08-12 | End: 2024-08-12 | Stop reason: HOSPADM

## 2024-08-12 RX ORDER — BUPIVACAINE HYDROCHLORIDE 5 MG/ML
INJECTION, SOLUTION EPIDURAL; INTRACAUDAL
Status: COMPLETED | OUTPATIENT
Start: 2024-08-12 | End: 2024-08-12

## 2024-08-12 RX ORDER — HYDROMORPHONE HCL IN WATER/PF 6 MG/30 ML
0.2 PATIENT CONTROLLED ANALGESIA SYRINGE INTRAVENOUS EVERY 5 MIN PRN
Status: DISCONTINUED | OUTPATIENT
Start: 2024-08-12 | End: 2024-08-12 | Stop reason: HOSPADM

## 2024-08-12 RX ADMIN — ONDANSETRON HYDROCHLORIDE 4 MG: 2 SOLUTION INTRAMUSCULAR; INTRAVENOUS at 10:08

## 2024-08-12 RX ADMIN — MIDAZOLAM 2 MG: 1 INJECTION INTRAMUSCULAR; INTRAVENOUS at 09:28

## 2024-08-12 RX ADMIN — LIDOCAINE HYDROCHLORIDE 40 MG: 20 INJECTION, SOLUTION INFILTRATION; PERINEURAL at 10:08

## 2024-08-12 RX ADMIN — BUPIVACAINE HYDROCHLORIDE 20 ML: 5 INJECTION, SOLUTION EPIDURAL; INTRACAUDAL; PERINEURAL at 09:21

## 2024-08-12 RX ADMIN — PROPOFOL 170 MG: 10 INJECTION, EMULSION INTRAVENOUS at 10:08

## 2024-08-12 RX ADMIN — DEXAMETHASONE SODIUM PHOSPHATE 4 MG: 4 INJECTION, SOLUTION INTRA-ARTICULAR; INTRALESIONAL; INTRAMUSCULAR; INTRAVENOUS; SOFT TISSUE at 10:08

## 2024-08-12 RX ADMIN — Medication 2 G: at 10:01

## 2024-08-12 RX ADMIN — SODIUM CHLORIDE, POTASSIUM CHLORIDE, SODIUM LACTATE AND CALCIUM CHLORIDE 100 ML/HR: 600; 310; 30; 20 INJECTION, SOLUTION INTRAVENOUS at 09:09

## 2024-08-12 RX ADMIN — MIDAZOLAM 2 MG: 1 INJECTION INTRAMUSCULAR; INTRAVENOUS at 09:21

## 2024-08-12 ASSESSMENT — ACTIVITIES OF DAILY LIVING (ADL)
ADLS_ACUITY_SCORE: 18

## 2024-08-12 NOTE — ANESTHESIA CARE TRANSFER NOTE
Patient: Alessandra A Attila    Procedure: Procedure(s):  Right Shoulder Arthroscopy, Subacormial Decompression, Distal Clavicle Excision       Diagnosis: Shoulder pain [M25.519]  Diagnosis Additional Information: No value filed.    Anesthesia Type:   General     Note:    Oropharynx: oropharynx clear of all foreign objects  Level of Consciousness: awake  Oxygen Supplementation: room air    Independent Airway: airway patency satisfactory and stable    Vital Signs Stable: post-procedure vital signs reviewed and stable  Report to RN Given: handoff report given  Patient transferred to: PACU    Handoff Report: Identifed the Patient, Identified the Reponsible Provider, Reviewed the pertinent medical history, Discussed the surgical course, Reviewed Intra-OP anesthesia mangement and issues during anesthesia, Set expectations for post-procedure period and Allowed opportunity for questions and acknowledgement of understanding      Vitals:  Vitals Value Taken Time   BP     Temp     Pulse     Resp     SpO2 96 % 08/12/24 1111   Vitals shown include unfiled device data.    Electronically Signed By: CLARENCE BENNETT CRNA  August 12, 2024  11:12 AM

## 2024-08-12 NOTE — OP NOTE
Preop Dx: Right shoulder rotator cuff tendinitis, AC joint arthritis    Postop Dx: Right shoulder rotator cuff tendinitis, AC joint arthritis    Procedure: Right shoulder arthroscopy with subacromial decompression, distal clavicle excision    Surgeon: Martínez Hansen MD    Assistant: MARQUEZ Avelar/RN    Anesthesia: General With postop pain control per anesthesia nerve block    Indications: Patient is a 64-year-old female with significant right shoulder pain.  MRI revealed that she had AC joint arthritis as well as rotator cuff tendinitis.  All risks and benefits surgical invention were discussed with her and given that she is exhausted all conservative measures for treatment she wished to proceed    Description: Patient was taken to the operating room and after adequate induction of anesthesia was positioned, prepped and draped in the usual sterile fashion the operative table.  The universe portal thumb was initiated once on room in agreement so she was admitted a posterior shoulder the posterior portal.  Scope trocar and camera advanced with good hemijoint trocar was off the camera.  The glenoid cartilage is intact.  Humeral cartilage is intact.  The rotator cuff appeared to be intact.  The biceps appeared to be intact.  An anterior portal to good medialization superior glenoid labrum was probed and found to be intact.  Biceps was pulled to the joint and found to be intact.  Camera is moved from the glenohumeral joint placed in subacromial space.  Once in subacromial space and anterolateral portal was created via needle localization and a complete bursectomy was performed at the subacromial space.  Attention was turned to the undersurface the acromion of the [place] currently is released out the undersurface the acromion.  This exposed a small broad-based enthesophyte off the anterior acromion which was subsequently burred down until flat with a 4 mm barrel bur.  Adequate resection as documented with a camera.   Was then removed and the posterior portal placed the anterolateral portal for view of the AC joint which was denuded of soft tissue both inferior and anterior utilizing the radiofrequency ablator.  The 4 mm barrel bur was used to bur down the distal clavicle approximately 5 mm.  Adequate resection document of the camera.  This concluded arthroscopy.  4 nylon was used in a buried figure 8 fashion close skin of all 3 portals the patient was taken stable to postanesthesia care in

## 2024-08-12 NOTE — DISCHARGE INSTRUCTIONS
.[facility]  Adult Discharge Orders & Instructions      For 24 hours after surgery:  Get plenty of rest.  A responsible adult must stay with you for at least 24 hours after you leave the hospital.   You may feel lightheaded.  IF so, sit for a few minutes before standing.  Have someone help you get up.   You may have a slight fever. Call the doctor if your fever is over 101 F (38.3 C) (taken under the tongue) or lasts longer than 24 hours.  You may have a dry mouth, a sore throat, muscle aches or trouble sleeping.  These should go away after 24 hours.  Do not make important or legal decisions.  6.   Do not drive or use heavy equipment.  If you have weakness or tingling, don't drive or use heavy equipment until this feeling goes away.                                                                                                                                                                         To contact a doctor, call    [phone]______________     Surgeon Contact Information    If you have questions or concerns related to your procedure please contact your surgeon through [facility] at [phone]

## 2024-08-12 NOTE — INTERVAL H&P NOTE
I have reviewed the surgical (or preoperative) H&P that is linked to this encounter, and examined the patient. There are no significant changes    Clinical Conditions Present on Arrival:  Clinically Significant Risk Factors Present on Admission        # Hypokalemia: Lowest K = 3.2 mmol/L in last 30 days, will replace as needed

## 2024-08-12 NOTE — ANESTHESIA PROCEDURE NOTES
Airway       Patient location during procedure: OR       Procedure Start/Stop Times: 8/12/2024 10:12 AM  Staff -        CRNA: Camila Vázquez APRN CRNA       Performed By: CRNA  Consent for Airway        Urgency: elective  Indications and Patient Condition       Indications for airway management: nikita-procedural       Induction type:intravenous       Mask difficulty assessment: 0 - not attempted    Final Airway Details       Final airway type: supraglottic airway    Supraglottic Airway Details        Type: LMA       Brand: I-Gel       LMA size: 4    Post intubation assessment        Placement verified by: capnometry        Number of attempts at approach: 1       Secured with: tape       Ease of procedure: easy       Dentition: Intact

## 2024-08-12 NOTE — ANESTHESIA POSTPROCEDURE EVALUATION
Patient: Alessandra A Attila    Procedure: Procedure(s):  Right Shoulder Arthroscopy, Subacormial Decompression, Distal Clavicle Excision       Anesthesia Type:  General    Note:  Disposition: Outpatient   Postop Pain Control: Uneventful            Sign Out: Well controlled pain   PONV: No   Neuro/Psych: Uneventful            Sign Out: Acceptable/Baseline neuro status   Airway/Respiratory: Uneventful            Sign Out: Acceptable/Baseline resp. status   CV/Hemodynamics: Uneventful            Sign Out: Acceptable CV status; No obvious hypovolemia; No obvious fluid overload   Other NRE: NONE   DID A NON-ROUTINE EVENT OCCUR? No           Last vitals:  Vitals Value Taken Time   /80 08/12/24 1215   Temp 97  F (36.1  C) 08/12/24 1215   Pulse 80 08/12/24 1215   Resp 18 08/12/24 1215   SpO2 97 % 08/12/24 1215       Electronically Signed By: CLARENCE SAGASTUME CRNA  August 12, 2024  12:57 PM

## 2024-08-12 NOTE — BRIEF OP NOTE
[facility]    Brief Operative Note    Pre-operative diagnosis: Shoulder pain [M25.519]  Post-operative diagnosis Same as pre-operative diagnosis    Procedure: Right Shoulder Arthroscopy, Subacormial Decompression, Distal Clavicle Excision, Right - Shoulder    Surgeon: Surgeons and Role:     * Martínez Hansen MD - Primary     * Yared Fregoso PA - Assisting  Anesthesia: General with Block   Estimated Blood Loss: Minimal    Drains: None  Specimens: * No specimens in log *  Findings:   None.  Complications: None.  Implants: * No implants in log *

## 2024-08-12 NOTE — ANESTHESIA PREPROCEDURE EVALUATION
Anesthesia Pre-Procedure Evaluation    Patient: Alessandra Aiken   MRN: [id number] : 1960        Procedure : Procedure(s):  Right Shoulder Arthroscopy, Subacormial Decompression, Distal Clavicle Excision          Past Medical History:   Diagnosis Date    Allergic rhinitis     BCC (basal cell carcinoma), face 2023    nose    Chronic sinusitis     Essential (primary) hypertension     High grade squamous intraepithelial cervical dysplasia 2018    Migraine without status migrainosus, not intractable     Osteoarthritis of first metatarsophalangeal joint     Otalgia of right ear     chronic    Pregnancy     G3, P3 - all NSVDs    Psychophysiologic insomnia     Squamous cell carcinoma of skin 2018    leg, right arm and sternum    Tubulo-interstitial nephritis 2014    Right    Ureterolithiasis 2014    right pyelo, mod right hydro, 2 mm right renal stone      Past Surgical History:   Procedure Laterality Date    ARTHROSCOPY SHOULDER  2009    AS NASAL/SINUS SCOPE W SPHENOIDOTOMY       Right [facility] mycetoma [facility]    AS RELEASE FOREARM/HAND EXTEN TENDON Right 2023    s/p right thumb basilar joint reconstruction    BIOPSY OF SKIN LESION  2023    SCC, BCC    BIOPSY OF SKIN LESION Left 2023    left shoulder    COLONOSCOPY  2012    Sigmoid diverticulosis; follow up 10 years    CYSTOSCOPY N/A 2018    Procedure: CYSTOSCOPY;;  Surgeon: Ashleigh Fregoso MD;  Location:  OR    ENDOSCOPIC SINUS SURGERY      repeat clearing    FINGER SURGERY  10/2022    right thumb basilar joint reconstruction    HYSTERECTOMY VAGINAL Bilateral 2018    Procedure: HYSTERECTOMY VAGINAL;  Total Vaginal Hysterectomy & Bilateral Salpingectomy, Cystoscopy;  Surgeon: Ashleigh Fregoso MD;  Location:  OR    LEEP TX, CERVICAL  2018    Dr Fregoso    MYRINGOTOMY, INSERT TUBE, COMBINED  2016      Allergies   Allergen Reactions    Fentanyl Anxiety and Itching     Patient  describes feeling creepy/crawly to Dr. Saenz.       Social History     Tobacco Use    Smoking status: Never     Passive exposure: Never    Smokeless tobacco: Never   Substance Use Topics    Alcohol use: No     Alcohol/week: 0.0 standard drinks of alcohol     Comment: Alcoholic Drinks/day: very rarely      Wt Readings from Last 1 Encounters:   08/08/24 62.1 kg (137 lb)        Anesthesia Evaluation   Pt has had prior anesthetic. Type: General, MAC and Regional.    No history of anesthetic complications       ROS/MED HX  ENT/Pulmonary:       Neurologic:       Cardiovascular:     (+)  hypertension- -   -  - -                                 Previous cardiac testing   Echo: Date: Results:    Stress Test:  Date: Results:    ECG Reviewed:  Date: 8/8/2024 Results:  Sinus rhythm   Possible Left atrial enlargement   Borderline ECG   When compared with ECG of 07-Sep-2023 13:39,   No significant change was found     Cath:  Date: Results:      METS/Exercise Tolerance: >4 METS    Hematologic:       Musculoskeletal:       GI/Hepatic:       Renal/Genitourinary:     (+) renal disease,  Pt does not require dialysis,           Endo:       Psychiatric/Substance Use:       Infectious Disease:       Malignancy:       Other:            Physical Exam    Airway        Mallampati: I   TM distance: > 3 FB   Neck ROM: full   Mouth opening: > 3 cm    Respiratory Devices and Support         Dental       (+) Completely normal teeth      Cardiovascular   cardiovascular exam normal          Pulmonary   pulmonary exam normal                OUTSIDE LABS:  CBC:   Lab Results   Component Value Date    WBC 6.2 08/08/2024    WBC 4.2 01/25/2024    HGB 13.4 08/08/2024    HGB 13.8 01/25/2024    HCT 41.2 08/08/2024    HCT 39.4 01/25/2024     08/08/2024     01/25/2024     BMP:   Lab Results   Component Value Date     08/08/2024     01/25/2024    POTASSIUM 3.2 (L) 08/08/2024    POTASSIUM 3.8 01/25/2024    CHLORIDE 97 (L) 08/08/2024     "CHLORIDE 99 01/25/2024    CO2 32 (H) 08/08/2024    CO2 30 (H) 01/25/2024    BUN 14.7 08/08/2024    BUN 10.6 01/25/2024    CR 0.99 (H) 08/08/2024    CR 0.81 01/25/2024     (H) 08/08/2024    GLC 96 01/25/2024     COAGS: No results found for: \"PTT\", \"INR\", \"FIBR\"  POC:   Lab Results   Component Value Date    HCG Negative 04/18/2018     HEPATIC:   Lab Results   Component Value Date    ALBUMIN 4.8 01/25/2024    PROTTOTAL 7.4 01/25/2024    ALT 12 01/25/2024    AST 22 01/25/2024    ALKPHOS 84 01/25/2024    BILITOTAL 0.7 01/25/2024     OTHER:   Lab Results   Component Value Date    A1C 5.2 08/08/2024    ELVIRA 10.1 08/08/2024    MAG 2.1 01/25/2024    TSH 0.74 01/25/2024    CRP 0.1 09/06/2019    SED 1 01/25/2024       Anesthesia Plan    ASA Status:  2    NPO Status:  NPO Appropriate    Anesthesia Type: General.              Consents    Anesthesia Plan(s) and associated risks, benefits, and realistic alternatives discussed. Questions answered and patient/representative(s) expressed understanding.     - Discussed: Risks, Benefits and Alternatives for BOTH SEDATION and the PROCEDURE were discussed     - Discussed with:  Patient      - Extended Intubation/Ventilatory Support Discussed: No.      - Patient is DNR/DNI Status: No     Use of blood products discussed: No .     Postoperative Care    Pain management: Peripheral nerve block (Single Shot).   PONV prophylaxis: Ondansetron (or other 5HT-3), Dexamethasone or Solumedrol     Comments:               CLARENCE Seo CRNA    I have reviewed the pertinent notes and labs in the chart from the past 30 days and (re)examined the patient.  Any updates or changes from those notes are reflected in this note.    # Hypokalemia: Lowest K = 3.2 mmol/L in last 30 days, will replace as needed               "

## 2024-08-12 NOTE — OR NURSING
PACU Transfer Note    Alessandra Aiken was transferred to DSU via cart.  Equipment used for transport:  none.  Accompanied by:  Ann RN & Clay RN  Prescriptions were: e-scribed    PACU Respiratory Event Documentation     1) Episodes of Apnea greater than or equal to 10 seconds: none    2) Bradypnea - less than 8 breaths per minute: none    3) Pain score on 0 to 10 scale: 0    4) Pain-sedation mismatch (yes or no): no    5) Repeated 02 desaturation less than 90% (yes or no): no    Anesthesia notified? (yes or no): N/A    Any of the above events occuring repeatedly in separate 30 minute intervals may be considered recurrent PACU respiratory events.    Patient stable and meets phase 1 discharge criteria for transport from PACU.

## 2024-08-12 NOTE — OR NURSING
Alessandra has been discharged to home at 1230 via wheelchair accompanied by spouse, Ethan.    Written discharge instructions were provided to patient and spouse.  Prescriptions were picked up at [facility].      Patient and adult caring for them verbalize understanding of discharge instructions including no driving until tomorrow and no longer taking narcotic pain medications - no operating mechanical equipment and no making any important decisions.They understand reason for discharge, and necessary follow-up appointments.       Extra ice packs sent home with patient.

## 2024-08-12 NOTE — ANESTHESIA PROCEDURE NOTES
Brachial plexus Procedure Note    Pre-Procedure   Staff -        CRNA: Debbie Bermudez APRN CRNA       Other Anesthesia Staff: Pavan Chapman APRN CRNA       Performed By: CRNA       Procedure Start/Stop Times: 8/12/2024 9:21 AM and 8/12/2024 9:33 AM       Pre-Anesthestic Checklist: patient identified, IV checked, site marked, risks and benefits discussed, informed consent, monitors and equipment checked, pre-op evaluation, at physician/surgeon's request and post-op pain management  Timeout:       Correct Patient: Yes        Correct Procedure: Yes        Correct Site: Yes        Correct Position: Yes        Correct Laterality: Yes        Site Marked: Yes  Procedure Documentation  Procedure: Brachial plexus       Diagnosis: RIGHT SHOULDER PAIN       Laterality: right       Patient Position: supine       Skin prep: Chloraprep       Local skin infiltrated with 1 mL of 1% lidocaine.  (interscalene approach).       Needle Type: insulated and short bevel       Needle Gauge: 20.        Needle Length (Inches): 3.13        Ultrasound guided       1. Ultrasound was used to identify targeted nerve, plexus, vascular marker, or fascial plane and place a needle adjacent to it in real-time.       2. Ultrasound was used to visualize the spread of anesthetic in close proximity to the above referenced structure.       3. A permanent image is entered into the patient's record.       4. The visualized anatomic structures appeared normal.       5. There were no apparent abnormal pathologic findings.    Assessment/Narrative         The placement was negative for: blood aspirated and painful injection       Paresthesias: No.       Bolus given via needle..        Secured via.        Insertion/Infusion Method: Single Shot       Complications: none    Medication(s) Administered   Bupivacaine 0.5% PF (Infiltration) - Infiltration   20 mL - 8/12/2024 9:21:00 AM  Medication Administration Time: 8/12/2024 9:21 AM      FOR [facility] ([facility])  "ONLY:   Pain Team Contact information: please page the Pain Team Via [facility]. Search \"Pain\". During daytime hours, please page the attending first. At night please page the resident first.      "

## 2024-08-19 ENCOUNTER — OFFICE VISIT (OUTPATIENT)
Dept: ORTHOPEDICS | Facility: OTHER | Age: 64
End: 2024-08-19
Attending: ORTHOPAEDIC SURGERY
Payer: COMMERCIAL

## 2024-08-19 DIAGNOSIS — M75.41 IMPINGEMENT SYNDROME OF RIGHT SHOULDER: Primary | ICD-10-CM

## 2024-08-19 PROCEDURE — 99024 POSTOP FOLLOW-UP VISIT: CPT | Performed by: ORTHOPAEDIC SURGERY

## 2024-08-19 NOTE — PROGRESS NOTES
Subjective:    64-year-old female who is now 1 week status post right shoulder arthroscopy with subacromial decompression, distal clavicle excision.  Her shoulder is doing really well and feels better than it did prior to surgery.    Objective:    On examination today her wounds are healing nicely Steri-Strips were applied sutures were removed    Assessment:    64-year-old female doing very well status post the above.    Plan:    We will get her into physical therapy.  I will see her back in approximately 4 weeks

## 2024-09-07 ASSESSMENT — PAIN SCALES - PAIN ENJOYMENT GENERAL ACTIVITY SCALE (PEG)
INTERFERED_GENERAL_ACTIVITY: 7
PEG_TOTALSCORE: 6
AVG_PAIN_PASTWEEK: 7
INTERFERED_ENJOYMENT_LIFE: 4
PEG_TOTALSCORE: 6

## 2024-09-09 ENCOUNTER — THERAPY VISIT (OUTPATIENT)
Dept: PHYSICAL THERAPY | Facility: OTHER | Age: 64
End: 2024-09-09
Attending: ORTHOPAEDIC SURGERY
Payer: COMMERCIAL

## 2024-09-09 DIAGNOSIS — M75.41 IMPINGEMENT SYNDROME OF RIGHT SHOULDER: ICD-10-CM

## 2024-09-09 PROCEDURE — 97530 THERAPEUTIC ACTIVITIES: CPT | Mod: GP

## 2024-09-09 PROCEDURE — 97110 THERAPEUTIC EXERCISES: CPT | Mod: GP

## 2024-09-09 PROCEDURE — 97161 PT EVAL LOW COMPLEX 20 MIN: CPT | Mod: GP

## 2024-09-09 NOTE — PROGRESS NOTES
PHYSICAL THERAPY EVALUATION  Type of Visit: Evaluation              Subjective       Presenting condition or subjective complaint: Subacromial decompression on 24 on R GHJ, has been doing alright since but has noticed that she has continued having pain despite the surgery, is getting back into exercise routine tonight, notes she has been lifting from floor height up to 15 pounds of more, lives an active lifestyle. Works full time. Noticed an integ wound near where her stitches came out a couple weeks ago and is wondering if there is any need for concern.  Date of onset: 24    Relevant medical history:     Dates & types of surgery:  R carpal tunnel release, tendon release, and basal joint replacement R sided all in the last 3 years    Employment:    Working, [employer] in school   Hobbies/Interests: Outdoors, recreational shooting, exercise     Patient goals for therapy: Back into exercise routine.    Pain assessment: Location: R post shoulder/[id number]-810     Objective   AROM  R GHJ flex 170, abd 170 pain free end range, IR 75, ER 85, horizontal Abd 5 deg slight discomfort, horizontal add 10 deg prior to impingement  L WFL   PROM:   R GHJ complete ROM with slight apprehension at end range  Special Test:   + Hawkin Low, Bicep Load, and impingement cluster for minimal symptoms  Strength:   R lat, MT/Rhomboids/Post delt 4-/5, R LT 3/5  L Grossly 4+/5 globally UE   Inte cm by 1 cm pitting wound with cloudy drainage and slough tissue noted around brim of wound bed near middle distal acromian process, warm to touch, slight redness surrounding wound  + ULTT R Median N   Limited  strength R compared to L  Sig ttp R pec consuelo/min, subscap, with audible click non-painful through R GHJ with PROM flexion in supine, snapping scapula in overhead mobility in seated AROM flexion    Assessment & Plan   CLINICAL IMPRESSIONS  Medical Diagnosis: Right shoulder impingement syndrome    [facility]   Impression/Assessment: Patient is a 64 year old female with generalized R shoulder pain with occasional referring pain through R post shoulder down to hand complaints. Pt also notes open wound with clear to cloudy drainage 1 cm by 1cm with pitting formation over superior stitch location that is slightly warm and red. Pt advised to call surgical team to address potential infection. The following significant findings have been identified: Pain, Decreased ROM/flexibility, Decreased joint mobility, Decreased strength, Impaired muscle performance, and Decreased activity tolerance. These impairments interfere with their ability to perform self care tasks, work tasks, recreational activities, household chores, household mobility, and community mobility as compared to previous level of function.     Clinical Decision Making (Complexity):  Clinical Presentation: Stable/Uncomplicated  Clinical Presentation Rationale: based on medical and personal factors listed in PT evaluation  Clinical Decision Making (Complexity): Low complexity    PLAN OF CARE  Treatment Interventions:  Modalities: Contrast Bath, Cryotherapy, Dry Needling, Fluidotherapy, E-stim, Hot Pack, Iontophoresis, Ultrasound  Interventions: Manual Therapy, Neuromuscular Re-education, Therapeutic Activity, Therapeutic Exercise, Self-Care/Home Management, Aquatic Therapy    Long Term Goals     PT Goal 1  Goal Identifier: ROM  Goal Description: Pt will be able to complete full ROM without compensation in order to return to PLOF.  Rationale: to maximize safety and independence with performance of ADLs and functional tasks  Target Date: 10/07/24  PT Goal 2  Goal Identifier: ADLs  Goal Description: Pt will be able to complete all ADLs and functional tasks without pain or compensation in order to improve QoL.  Rationale: to maximize safety and independence with performance of ADLs and functional tasks  Target Date: 10/21/24  PT Goal  3  Goal Identifier: HEP  Goal Description: Pt will be I with HEP and educated on home progression for generalized wellness in the future.  Rationale: to maximize safety and independence with performance of ADLs and functional tasks  Target Date: 10/21/24      Frequency of Treatment: 2x per week  Duration of Treatment: 6 weeks    Recommended Referrals to Other Professionals:  Pt referred back to her surgical team follow PT consult to address open wound  Education Assessment:   Learner/Method: Patient  Education Comments: HEP, anatomy, POC, integ concern    Risks and benefits of evaluation/treatment have been explained.   Patient/Family/caregiver agrees with Plan of Care.     Evaluation Time:     PT Eval, Low Complexity Minutes (14039): 15     Signing Clinician: Hector Stein, PT

## 2024-09-11 ENCOUNTER — THERAPY VISIT (OUTPATIENT)
Dept: PHYSICAL THERAPY | Facility: OTHER | Age: 64
End: 2024-09-11
Attending: ORTHOPAEDIC SURGERY
Payer: COMMERCIAL

## 2024-09-11 DIAGNOSIS — M75.41 IMPINGEMENT SYNDROME OF RIGHT SHOULDER: Primary | ICD-10-CM

## 2024-09-11 PROCEDURE — 97110 THERAPEUTIC EXERCISES: CPT | Mod: GP

## 2024-09-11 PROCEDURE — 97140 MANUAL THERAPY 1/> REGIONS: CPT | Mod: GP

## 2024-09-12 ENCOUNTER — OFFICE VISIT (OUTPATIENT)
Dept: INTERNAL MEDICINE | Facility: OTHER | Age: 64
End: 2024-09-12
Attending: INTERNAL MEDICINE
Payer: COMMERCIAL

## 2024-09-12 VITALS
DIASTOLIC BLOOD PRESSURE: 84 MMHG | HEART RATE: 70 BPM | OXYGEN SATURATION: 97 % | WEIGHT: 139.8 LBS | SYSTOLIC BLOOD PRESSURE: 122 MMHG | RESPIRATION RATE: 16 BRPM | BODY MASS INDEX: 22.47 KG/M2 | HEIGHT: 66 IN | TEMPERATURE: 99.5 F

## 2024-09-12 DIAGNOSIS — M79.2 NERVE PAIN: Primary | ICD-10-CM

## 2024-09-12 DIAGNOSIS — Z98.890 S/P SHOULDER SURGERY: ICD-10-CM

## 2024-09-12 DIAGNOSIS — M62.838 MUSCLE SPASM: ICD-10-CM

## 2024-09-12 DIAGNOSIS — T14.8XXA OPEN WOUND OF SKIN: ICD-10-CM

## 2024-09-12 DIAGNOSIS — M79.644 THUMB PAIN, RIGHT: ICD-10-CM

## 2024-09-12 PROCEDURE — G2211 COMPLEX E/M VISIT ADD ON: HCPCS | Performed by: INTERNAL MEDICINE

## 2024-09-12 PROCEDURE — 99214 OFFICE O/P EST MOD 30 MIN: CPT | Performed by: INTERNAL MEDICINE

## 2024-09-12 RX ORDER — METHOCARBAMOL 750 MG/1
750-1125 TABLET, FILM COATED ORAL 4 TIMES DAILY PRN
Qty: 45 TABLET | Refills: 3 | Status: SHIPPED | OUTPATIENT
Start: 2024-09-12

## 2024-09-12 RX ORDER — OXYCODONE HYDROCHLORIDE 5 MG/1
5 TABLET ORAL 2 TIMES DAILY PRN
Qty: 60 TABLET | Refills: 0 | Status: SHIPPED | OUTPATIENT
Start: 2024-10-18

## 2024-09-12 RX ORDER — OXYCODONE HYDROCHLORIDE 5 MG/1
5 TABLET ORAL 2 TIMES DAILY PRN
Qty: 60 TABLET | Refills: 0 | Status: SHIPPED | OUTPATIENT
Start: 2024-11-15

## 2024-09-12 RX ORDER — OXYCODONE HYDROCHLORIDE 5 MG/1
5 TABLET ORAL 2 TIMES DAILY PRN
Qty: 60 TABLET | Refills: 0 | Status: SHIPPED | OUTPATIENT
Start: 2024-09-19

## 2024-09-12 ASSESSMENT — ANXIETY QUESTIONNAIRES
GAD7 TOTAL SCORE: 0
GAD7 TOTAL SCORE: 0
7. FEELING AFRAID AS IF SOMETHING AWFUL MIGHT HAPPEN: NOT AT ALL
GAD7 TOTAL SCORE: 0
8. IF YOU CHECKED OFF ANY PROBLEMS, HOW DIFFICULT HAVE THESE MADE IT FOR YOU TO DO YOUR WORK, TAKE CARE OF THINGS AT HOME, OR GET ALONG WITH OTHER PEOPLE?: NOT DIFFICULT AT ALL

## 2024-09-12 ASSESSMENT — PAIN SCALES - GENERAL: PAINLEVEL: SEVERE PAIN (7)

## 2024-09-12 NOTE — Clinical Note
Alessandra has an area at her incision site on the right shoulder that remains an open wound.  Please let me know if there is anything additional that we need to do to address this.  Thank you, MAGO

## 2024-09-12 NOTE — PROGRESS NOTES
Assessment & Plan     Nerve pain  Continue on current dosing of oxycodone however strongly encouraged to reduce this.  Continue on Cymbalta which has helped her nerve pain the most.  - oxyCODONE (ROXICODONE) 5 MG tablet; Take 1 tablet (5 mg) by mouth 2 times daily as needed for pain.    Thumb pain, right  Work on reducing oxycodone.  It would be preferable that we do not continue this beyond this year when she has completed surgery.  - oxyCODONE (ROXICODONE) 5 MG tablet; Take 1 tablet (5 mg) by mouth 2 times daily as needed for pain.  - oxyCODONE (ROXICODONE) 5 MG tablet; Take 1 tablet (5 mg) by mouth 2 times daily as needed for pain.  - oxyCODONE (ROXICODONE) 5 MG tablet; Take 1 tablet (5 mg) by mouth 2 times daily as needed for pain.    Muscle spasm  Will trial muscle relaxer.  She was cautioned to avoid taking this at the same time as her oxycodone.  - methocarbamol (ROBAXIN) 750 MG tablet; Take 1-1.5 tablets (750-1,125 mg) by mouth 4 times daily as needed for muscle spasms.    Open wound of skin  At this time she will use antibacterial topical during the day and clean the wound and leave it open to air overnight.  She is to monitor over the weekend and if she does not see some drying and improvement in this she is to let us know.  Will reach out to orthopedics to see if they have any additional recommendations.    S/P shoulder surgery  Follow-up with orthopedics as scheduled.      Return in about 3 months (around 12/12/2024) for Recheck pain.    Sav Aparicio is a 64 year old, presenting for the following health issues:  Medication Therapy Management (Follow-Up/)        9/12/2024     4:19 PM   Additional Questions   Accompanied by self     History of Present Illness       Reason for visit:  Cronic pain in right thumb    She eats 2-3 servings of fruits and vegetables daily.She consumes 0 sweetened beverage(s) daily.She exercises with enough effort to increase her heart rate 20 to 29 minutes per day.  She  "exercises with enough effort to increase her heart rate 3 or less days per week.   She is taking medications regularly.     Returns for a follow-up of chronic pain.  She has had ongoing issues with pain in her right arm.  She did undergo surgery in early August on the right shoulder.  Since then she has been taking both hydrocodone and oxycodone.  She is unsure how much the shoulder surgery helped with her right arm pain at this time.  She continues to have some pain and stiffness but overall feels she is doing okay.  She did start working with physical therapy recently.  We had a long discussion regarding her pain today.  She is having some muscle spasms in addition to underlining pain.  We discussed that perhaps a muscle relaxer would be beneficial however she was cautioned on use of this with her pain medications.  We also discussed that I would prefer that she is not taking both hydrocodone and oxycodone and she was encouraged to discontinue the hydrocodone at this time and avoid any further refills.  Per the PDMP refill of her hydrocodone was just 2 days ago.  Her last fill of oxycodone was on August 20.    She does report that after surgery she has an open area on her incision site.  She has not been able to get a hold of orthopedics with this.  She has not noted significant drainage.  She has not had any fevers.      Objective    /84 (BP Location: Right arm, Patient Position: Sitting, Cuff Size: Adult Regular)   Pulse 70   Temp 99.5  F (37.5  C) (Tympanic)   Resp 16   Ht 1.676 m (5' 6\")   Wt 63.4 kg (139 lb 12.8 oz)   LMP 03/07/2003 (Approximate)   SpO2 97%   Breastfeeding No   BMI 22.56 kg/m    Body mass index is 22.56 kg/m .  Physical Exam   GEN: Vitals reviewed. Healthy appearing. Patient is in no acute distress. Cooperative with exam.  HEENT: Normocephalic atraumatic.  Eyes grossly normal to inspection.  No discharge or erythema, or obvious scleral/conjunctival abnormalities.   LUNGS: No " audible wheeze, cough, or visible cyanosis.  No visible retractions or increased work of breathing.    ABD:  nondistended  SKIN: Warm and dry to touch.  Visible skin clear.  Right shoulder with small area of open wound, slight drainage noted although may reflect antibacterial ointment she has been using.  No significant erythema.            The longitudinal plan of care for the diagnosis(es)/condition(s) as documented were addressed during this visit. Due to the added complexity in care, I will continue to support Alessandra in the subsequent management and with ongoing continuity of care.    Signed Electronically by: Abena Adkins DO

## 2024-09-12 NOTE — NURSING NOTE
"Chief Complaint   Patient presents with    Medication Therapy Management     Follow-Up         Initial /84 (BP Location: Right arm, Patient Position: Sitting, Cuff Size: Adult Regular)   Pulse 70   Temp 99.5  F (37.5  C) (Tympanic)   Resp 16   Ht 1.676 m (5' 6\")   Wt 63.4 kg (139 lb 12.8 oz)   LMP 03/07/2003 (Approximate)   SpO2 97%   Breastfeeding No   BMI 22.56 kg/m   Estimated body mass index is 22.56 kg/m  as calculated from the following:    Height as of this encounter: 1.676 m (5' 6\").    Weight as of this encounter: 63.4 kg (139 lb 12.8 oz).  Medication Review: complete    The next two questions are to help us understand your food security.  If you are feeling you need any assistance in this area, we have resources available to support you today.          1/18/2024   SDOH- Food Insecurity   Within the past 12 months, did you worry that your food would run out before you got money to buy more? N   Within the past 12 months, did the food you bought just not last and you didn t have money to get more? N            Health Care Directive:  Patient does not have a Health Care Directive or Living Will: Discussed advance care planning with patient; however, patient declined at this time.    Lulú Everett      "

## 2024-09-16 ENCOUNTER — THERAPY VISIT (OUTPATIENT)
Dept: PHYSICAL THERAPY | Facility: OTHER | Age: 64
End: 2024-09-16
Attending: ORTHOPAEDIC SURGERY
Payer: COMMERCIAL

## 2024-09-16 DIAGNOSIS — M75.41 IMPINGEMENT SYNDROME OF RIGHT SHOULDER: Primary | ICD-10-CM

## 2024-09-16 PROCEDURE — 97530 THERAPEUTIC ACTIVITIES: CPT | Mod: GP

## 2024-09-16 PROCEDURE — 97112 NEUROMUSCULAR REEDUCATION: CPT | Mod: GP

## 2024-09-18 ENCOUNTER — THERAPY VISIT (OUTPATIENT)
Dept: PHYSICAL THERAPY | Facility: OTHER | Age: 64
End: 2024-09-18
Attending: ORTHOPAEDIC SURGERY
Payer: COMMERCIAL

## 2024-09-18 DIAGNOSIS — M75.41 IMPINGEMENT SYNDROME OF RIGHT SHOULDER: Primary | ICD-10-CM

## 2024-09-18 PROCEDURE — 97530 THERAPEUTIC ACTIVITIES: CPT | Mod: GP

## 2024-09-18 PROCEDURE — 97110 THERAPEUTIC EXERCISES: CPT | Mod: GP

## 2024-09-23 ENCOUNTER — THERAPY VISIT (OUTPATIENT)
Dept: PHYSICAL THERAPY | Facility: OTHER | Age: 64
End: 2024-09-23
Attending: ORTHOPAEDIC SURGERY
Payer: COMMERCIAL

## 2024-09-23 DIAGNOSIS — M75.41 IMPINGEMENT SYNDROME OF RIGHT SHOULDER: Primary | ICD-10-CM

## 2024-09-23 PROCEDURE — 97110 THERAPEUTIC EXERCISES: CPT | Mod: GP

## 2024-09-23 PROCEDURE — 97112 NEUROMUSCULAR REEDUCATION: CPT | Mod: GP

## 2024-09-23 PROCEDURE — 97530 THERAPEUTIC ACTIVITIES: CPT | Mod: GP

## 2024-09-25 ENCOUNTER — THERAPY VISIT (OUTPATIENT)
Dept: PHYSICAL THERAPY | Facility: OTHER | Age: 64
End: 2024-09-25
Attending: ORTHOPAEDIC SURGERY
Payer: COMMERCIAL

## 2024-09-25 DIAGNOSIS — M75.41 IMPINGEMENT SYNDROME OF RIGHT SHOULDER: Primary | ICD-10-CM

## 2024-09-25 PROCEDURE — 97112 NEUROMUSCULAR REEDUCATION: CPT | Mod: GP

## 2024-09-25 PROCEDURE — 97110 THERAPEUTIC EXERCISES: CPT | Mod: GP

## 2024-09-30 ENCOUNTER — OFFICE VISIT (OUTPATIENT)
Dept: ORTHOPEDICS | Facility: OTHER | Age: 64
End: 2024-09-30
Attending: ORTHOPAEDIC SURGERY
Payer: COMMERCIAL

## 2024-09-30 DIAGNOSIS — M25.511 RIGHT SHOULDER PAIN, UNSPECIFIED CHRONICITY: Primary | ICD-10-CM

## 2024-09-30 PROCEDURE — 99024 POSTOP FOLLOW-UP VISIT: CPT | Performed by: ORTHOPAEDIC SURGERY

## 2024-09-30 NOTE — PROGRESS NOTES
Subjective:    64-year-old female status post right shoulder arthroscopy with subacromial decompression, distal clavicle excision.  She is doing really well at this point.  She still has a small red spot over the top of her shoulder which appears to be healing slowly.  She is 7 weeks status post.    Objective:    On examination today she has near full range of motion of her right shoulder.  She still has a little bit of healing to do at the very superior most spot on the top of her shoulder.    Assessment:    64-year-old female status post right shoulder arthroscopy with subacromial decompression, distal clavicle excision    Plan:    She is doing really well right now with her right shoulder.  She has been discharged from physical therapy.  Will see her back in approximately 5 to 6 weeks unless she is doing really well which case she can skip that appointment.  Recommend that she just watch the small area of nonhealing.  Her residual shoulder pain should slowly resolve over the next 6 months.

## 2024-10-08 ENCOUNTER — OFFICE VISIT (OUTPATIENT)
Dept: FAMILY MEDICINE | Facility: OTHER | Age: 64
End: 2024-10-08
Attending: NURSE PRACTITIONER
Payer: COMMERCIAL

## 2024-10-08 VITALS
TEMPERATURE: 99.1 F | OXYGEN SATURATION: 96 % | DIASTOLIC BLOOD PRESSURE: 80 MMHG | HEART RATE: 71 BPM | RESPIRATION RATE: 18 BRPM | WEIGHT: 142.6 LBS | HEIGHT: 66 IN | SYSTOLIC BLOOD PRESSURE: 117 MMHG | BODY MASS INDEX: 22.92 KG/M2

## 2024-10-08 DIAGNOSIS — J01.00 ACUTE NON-RECURRENT MAXILLARY SINUSITIS: Primary | ICD-10-CM

## 2024-10-08 PROCEDURE — 99213 OFFICE O/P EST LOW 20 MIN: CPT

## 2024-10-08 ASSESSMENT — ENCOUNTER SYMPTOMS
FEVER: 0
CARDIOVASCULAR NEGATIVE: 1
GASTROINTESTINAL NEGATIVE: 1
SINUS PAIN: 1
RESPIRATORY NEGATIVE: 1
SORE THROAT: 1
SINUS PRESSURE: 1
EYES NEGATIVE: 1

## 2024-10-08 ASSESSMENT — PAIN SCALES - GENERAL: PAINLEVEL: EXTREME PAIN (8)

## 2024-10-08 NOTE — PROGRESS NOTES
"Chief Complaint   Patient presents with    Sinus Pressure     Pharyngitis     Patient presents to the rapid clinic today for concerns of sinus pressure and a sore throat. Patient states these symptoms have been going on for about 2 weeks.       Initial /80 (BP Location: Right arm, Patient Position: Sitting, Cuff Size: Adult Regular)   Pulse 71   Temp 99.1  F (37.3  C) (Temporal)   Resp 18   Ht 1.676 m (5' 6\")   Wt 64.7 kg (142 lb 9.6 oz)   LMP 03/07/2003 (Approximate)   SpO2 96%   BMI 23.02 kg/m   Estimated body mass index is 23.02 kg/m  as calculated from the following:    Height as of this encounter: 1.676 m (5' 6\").    Weight as of this encounter: 64.7 kg (142 lb 9.6 oz).     FOOD SECURITY SCREENING QUESTIONS:    The next two questions are to help us understand your food security.  If you are feeling you need any assistance in this area, we have resources available to support you today.    Hunger Vital Signs:  Within the past 12 months we worried whether our food would run out before we got money to buy more. Never  Within the past 12 months the food we bought just didn't last and we didn't have money to get more. Never      Graham Messer   "

## 2024-10-08 NOTE — PROGRESS NOTES
Alessandra Aiken  1960    ASSESSMENT/PLAN      Presents to [facility] clinic by herself. Patient's vitals are stable and appears nontoxic.      1. Acute non-recurrent maxillary sinusitis  - amoxicillin-clavulanate (AUGMENTIN) 875-125 MG tablet; Take 1 tablet by mouth 2 times daily for 10 days.  Dispense: 20 tablet; Refill: 0  - Sinus pressure/pain and sore throat present for 2 weeks. Hx of chronic sinusitis, symptoms consistent with sinusitis.   - Symptomatic treatment - Encouraged fluids, salt water gargles, honey, humidifier, saline nasal spray, lozenges, tea, soup, smoothies, popsicles, topical vapor rub, rest, etc   - May use over-the-counter Tylenol or ibuprofen PRN  - Follow up as needed for new or worsening symptoms      *Explanation of diagnosis, treatment options and risk and benefits of medications reviewed with patient. Patient agrees with plan of care.  *All questions were answered.    *Red flags symptoms were discussed and patient was advised when they should return for reevaluation or for prompt emergency evaluation.   *Patient was given verbal and written instructions on plan of care. Instructions were printed or are available on [employer] on electronic AVS.   *We discussed potential side effects of any prescribed or recommended therapies, as well as expectations for response to treatments.  *Patient discharged in stable condition    Babatunde Mccollum, DINORA, APRN, FNP-C  [facility]    SUBJECTIVE  CHIEF COMPLAINT/ REASON FOR VISIT  Patient presents with:  Sinus Pressure   Pharyngitis     HISTORY OF PRESENT ILLNESS  Alessandra Aiken is a pleasant 64 year old female presents to [facility] clinic today with concerns of sinus pressure and sore throat. Her symptoms have been consistent for approximately 2 weeks. Sinus pressure to maxillary and frontal sinuses. This pressure is consistent with her chronic sinusitis. No known exposure to illness. No viral testing completed.     History provided by  "patient.      I have reviewed the nursing notes.  I have reviewed allergies, medication list, problem list, and past medical history.    REVIEW OF SYSTEMS  Review of Systems   Constitutional:  Negative for fever.   HENT:  Positive for sinus pressure, sinus pain and sore throat.    Eyes: Negative.    Respiratory: Negative.     Cardiovascular: Negative.    Gastrointestinal: Negative.    All other systems reviewed and are negative.       VITAL SIGNS  Vitals:    10/08/24 1530   BP: 117/80   BP Location: Right arm   Patient Position: Sitting   Cuff Size: Adult Regular   Pulse: 71   Resp: 18   Temp: 99.1  F (37.3  C)   TempSrc: Temporal   SpO2: 96%   Weight: 64.7 kg (142 lb 9.6 oz)   Height: 1.676 m (5' 6\")      Body mass index is 23.02 kg/m .      OBJECTIVE  PHYSICAL EXAM  Physical Exam  Vitals and nursing note reviewed.   Constitutional:       General: She is not in acute distress.     Appearance: Normal appearance. She is normal weight. She is not ill-appearing, toxic-appearing or diaphoretic.   HENT:      Head: Normocephalic and atraumatic.      Right Ear: Tympanic membrane, ear canal and external ear normal. There is no impacted cerumen.      Left Ear: Tympanic membrane, ear canal and external ear normal. There is no impacted cerumen.      Nose: Congestion present. No rhinorrhea.      Right Sinus: Maxillary sinus tenderness and frontal sinus tenderness present.      Left Sinus: Maxillary sinus tenderness and frontal sinus tenderness present.      Mouth/Throat:      Mouth: Mucous membranes are moist.      Pharynx: Oropharynx is clear. Posterior oropharyngeal erythema present.   Cardiovascular:      Rate and Rhythm: Normal rate and regular rhythm.      Pulses: Normal pulses.      Heart sounds: Normal heart sounds. No murmur heard.  Pulmonary:      Effort: No respiratory distress.      Breath sounds: Normal breath sounds. No wheezing or rhonchi.   Chest:      Chest wall: No tenderness.   Musculoskeletal:      Cervical " back: Normal range of motion. No tenderness.   Lymphadenopathy:      Cervical: No cervical adenopathy.   Neurological:      Mental Status: She is alert.            DIAGNOSTICS  No results found for any visits on 10/08/24.

## 2024-12-02 ENCOUNTER — OFFICE VISIT (OUTPATIENT)
Dept: ORTHOPEDICS | Facility: OTHER | Age: 64
End: 2024-12-02
Attending: ORTHOPAEDIC SURGERY
Payer: COMMERCIAL

## 2024-12-02 DIAGNOSIS — M25.511 RIGHT SHOULDER PAIN, UNSPECIFIED CHRONICITY: Primary | ICD-10-CM

## 2024-12-02 PROCEDURE — 250N000009 HC RX 250: Mod: JZ | Performed by: ORTHOPAEDIC SURGERY

## 2024-12-02 PROCEDURE — 250N000011 HC RX IP 250 OP 636: Performed by: ORTHOPAEDIC SURGERY

## 2024-12-02 RX ORDER — LIDOCAINE HYDROCHLORIDE 10 MG/ML
0.5 INJECTION, SOLUTION EPIDURAL; INFILTRATION; INTRACAUDAL; PERINEURAL ONCE
Status: COMPLETED | OUTPATIENT
Start: 2024-12-02 | End: 2024-12-02

## 2024-12-02 RX ORDER — TRIAMCINOLONE ACETONIDE 40 MG/ML
20 INJECTION, SUSPENSION INTRA-ARTICULAR; INTRAMUSCULAR ONCE
Status: COMPLETED | OUTPATIENT
Start: 2024-12-02 | End: 2024-12-02

## 2024-12-02 RX ADMIN — TRIAMCINOLONE ACETONIDE 20 MG: 40 INJECTION, SUSPENSION INTRA-ARTICULAR; INTRAMUSCULAR at 14:41

## 2024-12-02 RX ADMIN — LIDOCAINE HYDROCHLORIDE 0.5 ML: 10 INJECTION, SOLUTION EPIDURAL; INFILTRATION; INTRACAUDAL; PERINEURAL at 14:41

## 2024-12-02 NOTE — PROGRESS NOTES
SUBJECTIVE:  Alessandra is a 64-year-old female now about 16 weeks status post a right shoulder arthroscopy with subacromial decompression, and distal clavicle excision.  She states everything is going quite well until about a month ago when her right shoulder started to feel very tight and would become painful when holding her arm out at 90 degrees for any extended period such as when using computer mouse.  She states pain is not particularly bothersome when reaching overhead or manipulating any weight.    OBJECTIVE:  64-year-old female alert and oriented in no acute distress.  She is very tender to palpation over the right AC joint.  She has no tenderness palpation over Codman's point or the bicipital groove.  She has full active range of motion.  Her rotator cuff strength is good in all planes 5/5.  She has a positive cross body adduction test against resistance.    ASSESSMENT/PLAN:  1. Right shoulder pain, unspecified chronicity (Primary)  We will proceed with a corticosteroid injection of the right AC joint today.  She tolerated this procedure well and immediately noticed improvement of her symptoms.  Will plan to follow-up in an as-needed basis.  A steroid injection was performed at right AC joint using 1% plain Lidocaine and 20 mg of Kenalog. This was well tolerated.  - triamcinolone (KENALOG-40) injection 20 mg  - lidocaine (PF) (XYLOCAINE) 1 % injection 0.5 mL

## 2024-12-12 ENCOUNTER — OFFICE VISIT (OUTPATIENT)
Dept: INTERNAL MEDICINE | Facility: OTHER | Age: 64
End: 2024-12-12
Attending: INTERNAL MEDICINE
Payer: COMMERCIAL

## 2024-12-12 VITALS
DIASTOLIC BLOOD PRESSURE: 80 MMHG | HEART RATE: 66 BPM | WEIGHT: 147 LBS | TEMPERATURE: 98.6 F | BODY MASS INDEX: 23.73 KG/M2 | RESPIRATION RATE: 16 BRPM | OXYGEN SATURATION: 96 % | SYSTOLIC BLOOD PRESSURE: 110 MMHG

## 2024-12-12 DIAGNOSIS — G90.511 COMPLEX REGIONAL PAIN SYNDROME TYPE 1 OF RIGHT UPPER EXTREMITY: Primary | ICD-10-CM

## 2024-12-12 DIAGNOSIS — M79.2 NERVE PAIN: ICD-10-CM

## 2024-12-12 DIAGNOSIS — I10 ESSENTIAL HYPERTENSION: ICD-10-CM

## 2024-12-12 DIAGNOSIS — F51.04 CHRONIC INSOMNIA: ICD-10-CM

## 2024-12-12 DIAGNOSIS — M79.644 THUMB PAIN, RIGHT: ICD-10-CM

## 2024-12-12 RX ORDER — ESZOPICLONE 1 MG/1
1-2 TABLET, FILM COATED ORAL AT BEDTIME
Qty: 60 TABLET | Refills: 5 | Status: SHIPPED | OUTPATIENT
Start: 2024-12-12

## 2024-12-12 RX ORDER — TIZANIDINE 2 MG/1
2-4 TABLET ORAL 3 TIMES DAILY PRN
Qty: 45 TABLET | Refills: 4 | Status: SHIPPED | OUTPATIENT
Start: 2024-12-12

## 2024-12-12 RX ORDER — OXYCODONE HYDROCHLORIDE 5 MG/1
5 TABLET ORAL 2 TIMES DAILY PRN
Qty: 60 TABLET | Refills: 0 | Status: SHIPPED | OUTPATIENT
Start: 2024-12-23

## 2024-12-12 RX ORDER — OXYCODONE HYDROCHLORIDE 5 MG/1
5 TABLET ORAL 2 TIMES DAILY PRN
Qty: 60 TABLET | Refills: 0 | Status: SHIPPED | OUTPATIENT
Start: 2025-01-22

## 2024-12-12 RX ORDER — PREGABALIN 25 MG/1
25 CAPSULE ORAL 2 TIMES DAILY
Qty: 60 CAPSULE | Refills: 2 | Status: SHIPPED | OUTPATIENT
Start: 2024-12-12

## 2024-12-12 RX ORDER — OXYCODONE HYDROCHLORIDE 5 MG/1
5 TABLET ORAL 2 TIMES DAILY PRN
Qty: 60 TABLET | Refills: 0 | Status: SHIPPED | OUTPATIENT
Start: 2025-02-21

## 2024-12-12 RX ORDER — LORAZEPAM 0.5 MG/1
0.5 TABLET ORAL
Qty: 30 TABLET | Refills: 5 | Status: SHIPPED | OUTPATIENT
Start: 2024-12-12

## 2024-12-12 RX ORDER — HYDROCHLOROTHIAZIDE 12.5 MG/1
12.5 TABLET ORAL DAILY
Qty: 90 TABLET | Refills: 1 | Status: SHIPPED | OUTPATIENT
Start: 2024-12-12

## 2024-12-12 ASSESSMENT — PAIN SCALES - PAIN ENJOYMENT GENERAL ACTIVITY SCALE (PEG)
PEG_TOTALSCORE: 7
INTERFERED_ENJOYMENT_LIFE: 7
AVG_PAIN_PASTWEEK: 7
AVG_PAIN_PASTWEEK: 7
INTERFERED_ENJOYMENT_LIFE: 7
PEG_TOTALSCORE: 7
INTERFERED_GENERAL_ACTIVITY: 7
INTERFERED_GENERAL_ACTIVITY: 7

## 2024-12-12 ASSESSMENT — PAIN SCALES - GENERAL: PAINLEVEL_OUTOF10: MODERATE PAIN (4)

## 2024-12-12 ASSESSMENT — ANXIETY QUESTIONNAIRES
3. WORRYING TOO MUCH ABOUT DIFFERENT THINGS: NOT AT ALL
1. FEELING NERVOUS, ANXIOUS, OR ON EDGE: NOT AT ALL
7. FEELING AFRAID AS IF SOMETHING AWFUL MIGHT HAPPEN: NOT AT ALL
4. TROUBLE RELAXING: NOT AT ALL
7. FEELING AFRAID AS IF SOMETHING AWFUL MIGHT HAPPEN: NOT AT ALL
2. NOT BEING ABLE TO STOP OR CONTROL WORRYING: NOT AT ALL
GAD7 TOTAL SCORE: 0
8. IF YOU CHECKED OFF ANY PROBLEMS, HOW DIFFICULT HAVE THESE MADE IT FOR YOU TO DO YOUR WORK, TAKE CARE OF THINGS AT HOME, OR GET ALONG WITH OTHER PEOPLE?: NOT DIFFICULT AT ALL
6. BECOMING EASILY ANNOYED OR IRRITABLE: NOT AT ALL
IF YOU CHECKED OFF ANY PROBLEMS ON THIS QUESTIONNAIRE, HOW DIFFICULT HAVE THESE PROBLEMS MADE IT FOR YOU TO DO YOUR WORK, TAKE CARE OF THINGS AT HOME, OR GET ALONG WITH OTHER PEOPLE: NOT DIFFICULT AT ALL
5. BEING SO RESTLESS THAT IT IS HARD TO SIT STILL: NOT AT ALL
GAD7 TOTAL SCORE: 0
GAD7 TOTAL SCORE: 0

## 2024-12-12 ASSESSMENT — PATIENT HEALTH QUESTIONNAIRE - PHQ9
SUM OF ALL RESPONSES TO PHQ QUESTIONS 1-9: 1
SUM OF ALL RESPONSES TO PHQ QUESTIONS 1-9: 1
10. IF YOU CHECKED OFF ANY PROBLEMS, HOW DIFFICULT HAVE THESE PROBLEMS MADE IT FOR YOU TO DO YOUR WORK, TAKE CARE OF THINGS AT HOME, OR GET ALONG WITH OTHER PEOPLE: NOT DIFFICULT AT ALL

## 2024-12-12 NOTE — PROGRESS NOTES
Assessment & Plan     Complex regional pain syndrome type 1 of right upper extremity  We had a long discussion regarding her medications.  At this time I am hesitant to increase her dose of oxycodone due to the risk of this.  She previously tried a higher dose of duloxetine and did not tolerate it.  We did discuss the potential of trying Lyrica.  She is open to this idea.  She also would like to try a new muscle relaxer.  We discussed the pros and cons and she is to monitor for any side effects.  She was cautioned with interactions between her multitude of medications.  She was cautioned with interactions between her multitude of medications.  She is aware that her risk of overdose is increased due to her medications.  She was instructed to take them at separate times and monitor for any issues.  - tiZANidine (ZANAFLEX) 2 MG tablet; Take 1-2 tablets (2-4 mg) by mouth 3 times daily as needed for muscle spasms.  - pregabalin (LYRICA) 25 MG capsule; Take 1 capsule (25 mg) by mouth 2 times daily.    Chronic insomnia  - Controlled.  Continue current regimen.    - eszopiclone (LUNESTA) 1 MG tablet; Take 1-2 tablets (1-2 mg) by mouth at bedtime.  - LORazepam (ATIVAN) 0.5 MG tablet; Take 1 tablet (0.5 mg) by mouth nightly as needed for anxiety or sleep.    Essential hypertension  Decrease in dose.  Monitor for worsening blood pressure.  - hydrochlorothiazide 12.5 MG tablet; Take 1 tablet (12.5 mg) by mouth daily. Decrease in dose    Thumb pain, right  See above  - Patient is on stable doses with no indication of abuse or diversion.  - Advised will not be able to completely relieve chronic pain  - Discussed the risks of chronic narcotics including sedation, impaired driving, constipation, decreased bone density and respiratory depression and death; especially in combination with benzodiazepines  - handout given on risk of addiction with opioids   - PDMP checked and appropriate  - UDS done within the year  - oxyCODONE  (ROXICODONE) 5 MG tablet; Take 1 tablet (5 mg) by mouth 2 times daily as needed for pain.  - oxyCODONE (ROXICODONE) 5 MG tablet; Take 1 tablet (5 mg) by mouth 2 times daily as needed for pain.  - oxyCODONE (ROXICODONE) 5 MG tablet; Take 1 tablet (5 mg) by mouth 2 times daily as needed for pain.    Nerve pain  See above  - oxyCODONE (ROXICODONE) 5 MG tablet; Take 1 tablet (5 mg) by mouth 2 times daily as needed for pain.    Return in about 3 months (around 3/12/2025) for Recheck pain, and as needed sooner.    Sav Aparicio is a 64 year old, presenting for the following health issues:  Pain Management (Chronic)        12/12/2024     3:32 PM   Additional Questions   Roomed by MARISA Larios     History of Present Illness       Back Pain:  She presents for follow up of back pain.   Location of back pain:  Right shoulder  Description of back pain: dull ache  Back pain spreads: right side of neck    Since patient first noticed back pain, pain is: unchanged  Does back pain interfere with her job:  No       Reason for visit:  Med follow up    She eats 2-3 servings of fruits and vegetables daily.She consumes 0 sweetened beverage(s) daily.She exercises with enough effort to increase her heart rate 10 to 19 minutes per day.  She exercises with enough effort to increase her heart rate 3 or less days per week.   She is taking medications regularly.     Patient presents for follow-up of her medications.  She reports that she continues to have pain in her right hand.  She also has had new pain in her right shoulder after her recent shoulder surgery.  She reports that her pain is a burning pain.  She is concerned about worsening complex regional pain syndrome.  We discussed this in depth today.  It does appear that she is suffering from this.  We had a long discussion regarding her medications for this.  She does need refills at this time.  Her last refill of her oxycodone was on 11/26.  She refilled her Lunesta on her lorazepam  on 11/11.  She reports that her methocarbamol has not been helping.  She is interested in trying a different muscle relaxer.  She does feel that the medication that helps her pain the most is her oxycodone however I expressed my concern with her being on increasing doses of this especially in light of her benzodiazepine use.  She is frustrated with the pain that she is dealing with.      She reports that her blood pressure has been on the low normal side.  She is curious about decreasing the dose of her blood pressure medication.  She is currently on hydrochlorothiazide 25 mg daily.        Objective    /80   Pulse 66   Temp 98.6  F (37  C) (Temporal)   Resp 16   Wt 66.7 kg (147 lb)   LMP 03/07/2003 (Approximate)   SpO2 96%   Breastfeeding No   BMI 23.73 kg/m    Body mass index is 23.73 kg/m .  Physical Exam   GEN: Vitals reviewed. Healthy appearing. Patient is in no acute distress. Cooperative with exam.  HEENT: Normocephalic atraumatic.  Eyes grossly normal to inspection.  No discharge or erythema, or obvious scleral/conjunctival abnormalities.   LUNGS: No audible wheeze, cough, or visible cyanosis.  No visible retractions or increased work of breathing.    ABD:  nondistended  SKIN: Warm and dry to touch.  Visible skin clear. No significant rash, abnormal pigmentation or lesions.        The longitudinal plan of care for the diagnosis(es)/condition(s) as documented were addressed during this visit. Due to the added complexity in care, I will continue to support Alessandra in the subsequent management and with ongoing continuity of care.    Signed Electronically by: Abena Adkins DO

## 2024-12-12 NOTE — NURSING NOTE
"Chief Complaint   Patient presents with    Pain Management     Chronic       Initial /80   Pulse 66   Temp 98.6  F (37  C) (Temporal)   Resp 16   Wt 66.7 kg (147 lb)   LMP 03/07/2003 (Approximate)   SpO2 96%   Breastfeeding No   BMI 23.73 kg/m   Estimated body mass index is 23.73 kg/m  as calculated from the following:    Height as of 10/8/24: 1.676 m (5' 6\").    Weight as of this encounter: 66.7 kg (147 lb).  Medication Review: complete    The next two questions are to help us understand your food security.  If you are feeling you need any assistance in this area, we have resources available to support you today.          1/18/2024   SDOH- Food Insecurity   Within the past 12 months, did you worry that your food would run out before you got money to buy more? N    Within the past 12 months, did the food you bought just not last and you didn t have money to get more? N        Patient-reported         Health Care Directive:  Patient does not have a Health Care Directive: Discussed advance care planning with patient; however, patient declined at this time.    Norma J. Gosselin, LPN      "

## 2025-01-27 ENCOUNTER — OFFICE VISIT (OUTPATIENT)
Dept: FAMILY MEDICINE | Facility: OTHER | Age: 65
End: 2025-01-27
Attending: STUDENT IN AN ORGANIZED HEALTH CARE EDUCATION/TRAINING PROGRAM
Payer: COMMERCIAL

## 2025-01-27 VITALS
WEIGHT: 153 LBS | OXYGEN SATURATION: 96 % | RESPIRATION RATE: 19 BRPM | SYSTOLIC BLOOD PRESSURE: 110 MMHG | TEMPERATURE: 98.6 F | HEART RATE: 69 BPM | BODY MASS INDEX: 24.59 KG/M2 | HEIGHT: 66 IN | DIASTOLIC BLOOD PRESSURE: 70 MMHG

## 2025-01-27 DIAGNOSIS — Z98.890 HISTORY OF SINUS SURGERY: ICD-10-CM

## 2025-01-27 DIAGNOSIS — J01.40 ACUTE NON-RECURRENT PANSINUSITIS: Primary | ICD-10-CM

## 2025-01-27 ASSESSMENT — PAIN SCALES - GENERAL: PAINLEVEL_OUTOF10: MODERATE PAIN (6)

## 2025-01-27 NOTE — PATIENT INSTRUCTIONS
Sinusitis    Augmentin twice a day for 10 days. Probiotics such as yogurt/kefir.    Ibuprofen/tylenol as needed.    Fluids.    Nasal saline rinses.    Follow up with ENT if not improving.  Return to rapid clinic/ER if worsening.

## 2025-01-27 NOTE — PROGRESS NOTES
"  Assessment & Plan     (J01.40) Acute non-recurrent pansinusitis  (primary encounter diagnosis)  Comment:  sinusitis.  Consider either bacterial, inflammatory, mechanical.  This may be related to the structure of her sinuses.  She does have history of sinus surgery.  Her vital signs are stable at this time.  Augmentin has helped in the past.  Plan: amoxicillin-clavulanate (AUGMENTIN) 875-125 MG         tablet, Adult ENT  Referral, CT Sinus         w/o Contrast          Plan to treat with Augmentin twice a day for 10 days.  CT scan of the sinuses was ordered today to be completed outpatient.  ENT referral with Dr. Davila as she has seen him in the past as needed.  Discussed using over-the-counter management including sinus rinses, ibuprofen and/or Tylenol.  Go to the ER if symptoms worsen or change.      (Z98.890) History of sinus surgery  Comment: CT scan.  Plan: CT Sinus w/o Contrast              Sav Aparicio is a 64 year old, presenting for the following health issues:  Sinus Problem    HPI     Patient presents today with concerns of sinus pressure/pain. She notes symptoms started about two to three weeks ago. Have continued to worsen. She has been continuing to monitor symptoms, no medications at this time. She does not history of multiple sinus surgeries. She has not had any fevers.      Review of Systems  Constitutional, HEENT, cardiovascular, pulmonary, gi and gu systems are negative, except as otherwise noted.        Objective    /70   Pulse 69   Temp 98.6  F (37  C) (Tympanic)   Resp 19   Ht 1.676 m (5' 6\")   Wt 69.4 kg (153 lb)   LMP 03/07/2003 (Approximate)   SpO2 96%   BMI 24.69 kg/m    Body mass index is 24.69 kg/m .    Physical Exam   GENERAL: alert and no distress  EYES: Eyes grossly normal to inspection, PERRL and conjunctivae and sclerae normal  HENT: ear canals and TM's normal, nose and mouth without ulcers or lesions  NECK: no adenopathy, no asymmetry, masses, or " scars  RESP: lungs clear to auscultation - no rales, rhonchi or wheezes  CV: regular rate and rhythm, normal S1 S2, no S3 or S4, no murmur, click or rub, no peripheral edema  MS: no gross musculoskeletal defects noted, no edema        Signed Electronically by: Carley Ocasio PA-C

## 2025-02-03 ENCOUNTER — OFFICE VISIT (OUTPATIENT)
Dept: ORTHOPEDICS | Facility: OTHER | Age: 65
End: 2025-02-03
Attending: ORTHOPAEDIC SURGERY
Payer: COMMERCIAL

## 2025-02-03 DIAGNOSIS — M54.12 CERVICAL RADICULOPATHY: Primary | ICD-10-CM

## 2025-02-03 NOTE — PROGRESS NOTES
SUBJECTIVE:  Alessandra is a 64-year-old female now 25 weeks status post a right shoulder arthroscopy with subacromial decompression, and distal clavicle excision.  At her last visit on 12/2/2024 she received a corticosteroid injection into the right AC joint which she states offered her some relief for about an hour before pain began to return.  Within the past 3 weeks she notes pain now radiating from the neck all the way down to the base of her right thumb.  Described as somewhat burning and constant.  She has attempted pregabalin but discontinued it due to nausea.  She is also been attempting tizanidine, duloxetine and oxycodone with marginal relief.    OBJECTIVE:  64-year-old female alert and oriented in no acute distress.  She is moderately tender to palpation over the AC joint.  She has no tenderness palpation of the bicipital groove or Codman's point.  She has full range of motion of the right shoulder.    ASSESSMENT/PLAN:  1. Cervical radiculopathy (Primary)  We will plan to proceed with an MRI of the cervical spine with a focus on C4-C5 looking for foraminal stenosis.  I will call patient with results once available and refer to either Dr. Valentin or Dr. Steiner pending results.  - MR Cervical Spine w/o Contrast; Future

## 2025-02-07 ENCOUNTER — HOSPITAL ENCOUNTER (OUTPATIENT)
Dept: CT IMAGING | Facility: OTHER | Age: 65
Discharge: HOME | End: 2025-02-07
Attending: STUDENT IN AN ORGANIZED HEALTH CARE EDUCATION/TRAINING PROGRAM | Admitting: STUDENT IN AN ORGANIZED HEALTH CARE EDUCATION/TRAINING PROGRAM
Payer: COMMERCIAL

## 2025-02-07 DIAGNOSIS — Z98.890 HISTORY OF SINUS SURGERY: ICD-10-CM

## 2025-02-07 DIAGNOSIS — J01.40 ACUTE NON-RECURRENT PANSINUSITIS: ICD-10-CM

## 2025-02-07 PROCEDURE — 70486 CT MAXILLOFACIAL W/O DYE: CPT

## 2025-02-12 ENCOUNTER — HOSPITAL ENCOUNTER (OUTPATIENT)
Dept: MRI IMAGING | Facility: OTHER | Age: 65
Discharge: HOME | End: 2025-02-12
Attending: ORTHOPAEDIC SURGERY
Payer: COMMERCIAL

## 2025-02-12 DIAGNOSIS — M54.12 CERVICAL RADICULOPATHY: ICD-10-CM

## 2025-02-12 PROCEDURE — 72141 MRI NECK SPINE W/O DYE: CPT

## 2025-02-18 DIAGNOSIS — G90.511 COMPLEX REGIONAL PAIN SYNDROME TYPE 1 OF RIGHT UPPER EXTREMITY: ICD-10-CM

## 2025-02-19 RX ORDER — TIZANIDINE 2 MG/1
TABLET ORAL
Qty: 45 TABLET | Refills: 1 | Status: SHIPPED | OUTPATIENT
Start: 2025-02-19

## 2025-02-19 NOTE — TELEPHONE ENCOUNTER
tizanidine 2 mg tablet   128/12/24      Last Written Prescription Date:  12/12/24  Last Fill Quantity: 45,   # refills: 4  Last Office Visit: 12/12/24  Future Office visit:    Next 5 appointments (look out 90 days)      Mar 10, 2025 3:40 PM  (Arrive by 3:25 PM)  Provider Visit with Abena Adkins, [facility] ([facility]) [address]  [place] [id number] [phone]             Routing refill request to provider for review/approval because:  Drug not on the [facility], P or [facility] refill protocol or controlled substance.  Will forward to provider for consideration. Unable to complete prescription refill per RNMedication Refill Policy.................... Oralia Wood RN ....................  2/19/2025   7:34 AM

## 2025-02-27 ENCOUNTER — TRANSFERRED RECORDS (OUTPATIENT)
Dept: HEALTH INFORMATION MANAGEMENT | Facility: OTHER | Age: 65
End: 2025-02-27
Payer: COMMERCIAL

## 2025-03-09 ENCOUNTER — HEALTH MAINTENANCE LETTER (OUTPATIENT)
Age: 65
End: 2025-03-09

## 2025-03-10 ENCOUNTER — OFFICE VISIT (OUTPATIENT)
Dept: INTERNAL MEDICINE | Facility: OTHER | Age: 65
End: 2025-03-10
Attending: INTERNAL MEDICINE
Payer: COMMERCIAL

## 2025-03-10 VITALS
WEIGHT: 146.8 LBS | HEIGHT: 66 IN | DIASTOLIC BLOOD PRESSURE: 102 MMHG | OXYGEN SATURATION: 93 % | BODY MASS INDEX: 23.59 KG/M2 | HEART RATE: 78 BPM | SYSTOLIC BLOOD PRESSURE: 138 MMHG | RESPIRATION RATE: 16 BRPM

## 2025-03-10 DIAGNOSIS — M79.2 NERVE PAIN: ICD-10-CM

## 2025-03-10 DIAGNOSIS — M25.511 CHRONIC RIGHT SHOULDER PAIN: ICD-10-CM

## 2025-03-10 DIAGNOSIS — M54.2 NECK PAIN ON RIGHT SIDE: Primary | ICD-10-CM

## 2025-03-10 DIAGNOSIS — G89.29 CHRONIC RIGHT SHOULDER PAIN: ICD-10-CM

## 2025-03-10 DIAGNOSIS — M79.644 THUMB PAIN, RIGHT: ICD-10-CM

## 2025-03-10 DIAGNOSIS — F51.04 CHRONIC INSOMNIA: ICD-10-CM

## 2025-03-10 DIAGNOSIS — I10 ESSENTIAL HYPERTENSION: ICD-10-CM

## 2025-03-10 RX ORDER — OXYCODONE HYDROCHLORIDE 5 MG/1
5 TABLET ORAL 2 TIMES DAILY PRN
Qty: 60 TABLET | Refills: 0 | Status: SHIPPED | OUTPATIENT
Start: 2025-04-09

## 2025-03-10 RX ORDER — OXYCODONE HYDROCHLORIDE 5 MG/1
5 TABLET ORAL 2 TIMES DAILY PRN
Qty: 60 TABLET | Refills: 0 | Status: SHIPPED | OUTPATIENT
Start: 2025-03-10

## 2025-03-10 RX ORDER — DULOXETIN HYDROCHLORIDE 60 MG/1
60 CAPSULE, DELAYED RELEASE ORAL DAILY
Qty: 90 CAPSULE | Refills: 2 | Status: SHIPPED | OUTPATIENT
Start: 2025-03-10

## 2025-03-10 RX ORDER — OXYCODONE HYDROCHLORIDE 5 MG/1
5 TABLET ORAL 2 TIMES DAILY PRN
Qty: 60 TABLET | Refills: 0 | Status: SHIPPED | OUTPATIENT
Start: 2025-05-09

## 2025-03-10 ASSESSMENT — PAIN SCALES - GENERAL: PAINLEVEL_OUTOF10: MODERATE PAIN (5)

## 2025-03-10 NOTE — PATIENT INSTRUCTIONS
Naproxen (Aleve) 220 mg tablet (1-2 tablets) 1-2 times daily as needed.  Be sure to take with food.  Max of 4 per day.     Caution with NSAIDS (ibuprofen, aspirin, naproxen, aleve, advil) due to risk for increased blood pressure, heart disease, stomach pain/nausea/ulcers and kidney damage; use minimal amount necessary     Ok to take Acetaminophen (Tylenol Arthritis 8 hour extended release) 650mg - 1300mg three times a day as needed; Maximum of 4000mg daily     Decrease Lunesta to 1mg (1 tablet) nightly for 2-4 weeks and then try 0.5mg tablet for 2-4 weeks and if tolerated, stop medication    Continue Lorazepam 0.5mg nightly; if needed, increase to 1.5 or 2 tablets as needed at night    For sleep: try daily exercise, Richard Chi, daily relaxing music in the evening    - maintain a routine, go to bed and wake up at the same time every day  - make sure that your bedroom is comfortable, keep your bedroom quiet, dark,comfortable and cool  - include a warm beverage and/or hot bath 1-2 hours before bedtime  - avoid naps  - expose yourself to bright light during the day  - do not watch the clock  - avoid caffeine and nicotine for at least 4-6 hours prior to bed and consider cutting out all caffeine (coffee, chocolate, tea) if able  - avoid alcohol before bedtime   - exercise regularly but avoid exercising right before bed  - avoid looking at illuminated screens (phones, computers, TV) in the 1-2 hours before bedtime.  - If you haven t been able to get to sleep after about 20 minutes or more, get up and do something calming or boring until you feel sleepy, then return to bed and try again. Avoid doing anything that is too stimulating or interesting, as this will wake you up even more.  - use bed only for sleep and sex, do not use it as a place to watch TV, eat, read, work on your laptop, pay bills, and other things  - can trial melatonin 1mg - 5mg nightly 2-3 hours before sleep if you have not in the past; requires taking on a  regular/daily basis

## 2025-03-10 NOTE — NURSING NOTE
"Chief Complaint   Patient presents with    Pain       Initial BP (!) 142/98   Pulse 78   Resp 16   Ht 1.676 m (5' 6\")   Wt 66.6 kg (146 lb 12.8 oz)   LMP 03/07/2003 (Approximate)   SpO2 93%   Breastfeeding No   BMI 23.69 kg/m   Estimated body mass index is 23.69 kg/m  as calculated from the following:    Height as of this encounter: 1.676 m (5' 6\").    Weight as of this encounter: 66.6 kg (146 lb 12.8 oz).  Medication Review: complete    The next two questions are to help us understand your food security.  If you are feeling you need any assistance in this area, we have resources available to support you today.          1/27/2025   SDOH- Food Insecurity   Within the past 12 months, did you worry that your food would run out before you got money to buy more? N   Within the past 12 months, did the food you bought just not last and you didn t have money to get more? N         Health Care Directive:  Patient does not have a Health Care Directive: Discussed advance care planning with patient; however, patient declined at this time.    Nicky Ratliff LPN      "

## 2025-03-10 NOTE — PROGRESS NOTES
Assessment & Plan     Neck pain on right side  At this time we will place an order for a repeat injection that can be scheduled this spring sometime.  Would defer to radiologist regarding exact level and location based on MRI results.  Patient is to call with any issues scheduling this.  - XR Cervical Thoracic Transforaminal Inj Right; Future    Chronic right shoulder pain  See above  - XR Cervical Thoracic Transforaminal Inj Right; Future    Nerve pain  Continue with Cymbalta 60 mg daily.  Continue off of pregabalin.  - DULoxetine (CYMBALTA) 60 MG capsule; Take 1 capsule (60 mg) by mouth daily.  - oxyCODONE (ROXICODONE) 5 MG tablet; Take 1 tablet (5 mg) by mouth 2 times daily as needed for pain.    Chronic insomnia  At this time she will taper down off of Lunesta and continue on lorazepam.  She was cautioned about these medications in conjunction with her pain medications.    Thumb pain, right  - Patient is on stable doses with no indication of abuse or diversion.  - Advised will not be able to completely relieve chronic pain  - Discussed the risks of chronic narcotics including sedation, impaired driving, constipation, decreased bone density and respiratory depression and death; especially in combination with benzodiazepines  - handout given on risk of addiction with opioids   - PDMP checked and appropriate  - UDS done within the year  - oxyCODONE (ROXICODONE) 5 MG tablet; Take 1 tablet (5 mg) by mouth 2 times daily as needed for pain.  - oxyCODONE (ROXICODONE) 5 MG tablet; Take 1 tablet (5 mg) by mouth 2 times daily as needed for pain.  - oxyCODONE (ROXICODONE) 5 MG tablet; Take 1 tablet (5 mg) by mouth 2 times daily as needed for pain.    Essential hypertension  Blood pressure is uncontrolled at this time.  She is to check it at home and if it remains elevated we may need to consider increasing the medication when she is seen in follow-up      Return in about 3 months (around 6/10/2025) for Recheck pain, and  "as needed sooner.    Subjective   Alessandra is a 64 year old, presenting for the following health issues:  Pain        3/10/2025     3:51 PM   Additional Questions   Roomed by MARISA Jacobsen     History of Present Illness       Reason for visit:  Return follow up   She is taking medications regularly.      Patient presents for follow-up of chronic pain.  She reports that she continues to have chronic neck pain along with right shoulder pain.  She had a cervical injection done approximately 3 or 4 weeks ago and did not notice any significant improvement.  She is unsure the exact level or location of the injection.  She is interested in trying to have these injections done locally if possible going forward.  She did have an MRI here within the last couple months.    She continues to have ongoing nerve pain.  She did reduce her dose of Cymbalta to 60 mg daily.  She also has stopped her pregabalin as she did not feel that this was beneficial.    She has ongoing thumb pain that is most likely consistent with CRPS.  We had a long discussion today regarding her pain and pain control.    We also discussed her medications for sleep.  We discussed that it would likely be best if she is not on multiple different meds for this and try to narrow it down to 1 medication.  She is open to this idea.    She continues on chronic pain medications for her CRPS along with her right shoulder pain.  She does feel that she was running out however in looking back at her refills it does appear that she only filled the prescription in December and in February and did not fill a prescription in January as prescribed.    She has noted to have an elevated blood pressure today.  She is currently on hydrochlorothiazide.  We did decrease the dose prior due to low pressures.  She has not been checking her blood pressure at home.          Objective    BP (!) 138/102   Pulse 78   Resp 16   Ht 1.676 m (5' 6\")   Wt 66.6 kg (146 lb 12.8 oz)   LMP " 03/07/2003 (Approximate)   SpO2 93%   Breastfeeding No   BMI 23.69 kg/m    Body mass index is 23.69 kg/m .  Physical Exam   GEN: Vitals reviewed. Healthy appearing. Patient is in no acute distress. Cooperative with exam.  HEENT: Normocephalic atraumatic.  Eyes grossly normal to inspection.  No discharge or erythema, or obvious scleral/conjunctival abnormalities.   LUNGS: No audible wheeze, cough, or visible cyanosis.  No visible retractions or increased work of breathing.    ABD:  nondistended  SKIN: Warm and dry to touch.  Visible skin clear. No significant rash, abnormal pigmentation or lesions.    The longitudinal plan of care for the diagnosis(es)/condition(s) as documented were addressed during this visit. Due to the added complexity in care, I will continue to support Alessandra in the subsequent management and with ongoing continuity of care.    40 minutes spent on the date of the encounter doing chart review, history and exam, documentation and further activities as noted above      Signed Electronically by: Abena Adkins,

## 2025-03-11 ENCOUNTER — MYC MEDICAL ADVICE (OUTPATIENT)
Dept: INTERNAL MEDICINE | Facility: OTHER | Age: 65
End: 2025-03-11
Payer: COMMERCIAL

## 2025-03-24 DIAGNOSIS — G90.511 COMPLEX REGIONAL PAIN SYNDROME TYPE 1 OF RIGHT UPPER EXTREMITY: ICD-10-CM

## 2025-03-24 DIAGNOSIS — G43.009 MIGRAINE WITHOUT AURA AND WITHOUT STATUS MIGRAINOSUS, NOT INTRACTABLE: ICD-10-CM

## 2025-03-25 RX ORDER — SUMATRIPTAN SUCCINATE 100 MG/1
TABLET ORAL
Qty: 10 TABLET | Refills: 1 | Status: SHIPPED | OUTPATIENT
Start: 2025-03-25

## 2025-03-25 RX ORDER — TIZANIDINE 2 MG/1
TABLET ORAL
Qty: 45 TABLET | Refills: 1 | Status: SHIPPED | OUTPATIENT
Start: 2025-03-25

## 2025-03-25 NOTE — TELEPHONE ENCOUNTER
[facility] sent Rx request for the following:      Requested Prescriptions   Pending Prescriptions Disp Refills    tiZANidine (ZANAFLEX) 2 MG tablet [Pharmacy Med Name: tizanidine 2 mg tablet] 45 tablet 1     Sig: TAKE 1 TO 2 TABLETS BY MOUTH 3 TIMES DAILY AS NEEDED FOR MUSCLE SPASMS       There is no refill protocol information for this order     Last Prescription Date:   2/19/25  Last Fill Qty/Refills:         45, R-1          SUMAtriptan (IMITREX) 100 MG tablet [Pharmacy Med Name: sumatriptan 100 mg tablet] 10 tablet 1     Sig: Take 1 tablet (100 mg) by mouth at onset of headache for migraine. May repeat in 2 hours if needed. Max of 2 tablets (200 mg) in 24 hours.       Serotonin Agonists Failed - 3/25/2025  9:20 AM        Failed - Most recent blood pressure under 140/90 in past 12 months     BP Readings from Last 3 Encounters:   03/10/25 (!) 138/102   01/27/25 110/70   12/12/24 110/80       No data recorded            Failed - Review patient's medical record. If the patient has used less than or equal to nine (9) tablets or injections for migraine a month the RN may authorize the refill request.        Failed - Medication is active on med list and the sig matches. RN to manually verify dose and sig if red X/fail.     If the protocol passes (green check), you do not need to verify med dose and sig.    A prescription matches if they are the same clinical intention.    For Example: once daily and every morning are the same.    The protocol can not identify upper and lower case letters as matching and will fail.     For Example: Take 1 tablet (50 mg) by mouth daily     TAKE 1 TABLET (50 MG) BY MOUTH DAILY    For all fails (red x), verify dose and sig.    If the refill does match what is on file, the RN can still proceed to approve the refill request.       If they do not match, route to the appropriate provider.            Last Prescription Date:   1/25/24  Last Fill Qty/Refills:         10, R-6    Last Office Visit:               1/25/24   Future Office visit:             Next 5 appointments (look out 90 days)      Jun 04, 2025 10:00 AM  (Arrive by 9:45 AM)  Provider Visit with Abena Adkins DO  [facility] [address]  [place] [address]  [phone]             Routing refill request to provider for review/approval because:  Drug not on the [facility] refill protocol     Lennie Cota RN on 3/25/2025 at 9:26 AM

## 2025-03-31 ENCOUNTER — HOSPITAL ENCOUNTER (OUTPATIENT)
Dept: MRI IMAGING | Facility: OTHER | Age: 65
Discharge: HOME | End: 2025-03-31
Attending: ORTHOPAEDIC SURGERY | Admitting: ORTHOPAEDIC SURGERY
Payer: COMMERCIAL

## 2025-03-31 ENCOUNTER — MYC MEDICAL ADVICE (OUTPATIENT)
Dept: INTERNAL MEDICINE | Facility: OTHER | Age: 65
End: 2025-03-31
Payer: COMMERCIAL

## 2025-03-31 DIAGNOSIS — M75.41 IMPINGEMENT SYNDROME OF RIGHT SHOULDER: ICD-10-CM

## 2025-03-31 PROCEDURE — 73221 MRI JOINT UPR EXTREM W/O DYE: CPT | Mod: RT

## 2025-03-31 NOTE — TELEPHONE ENCOUNTER
Pt requesting Rx for Oxy filled before 4/4 due to trip.     Rx is to start 4/9    Can I call and Okay this?     Routing to provider to review and respond.  Jasbir Cornejo RN on 3/31/2025 at 1:17 PM

## 2025-04-14 ENCOUNTER — HOSPITAL ENCOUNTER (OUTPATIENT)
Dept: GENERAL RADIOLOGY | Facility: OTHER | Age: 65
Discharge: HOME | End: 2025-04-14
Attending: STUDENT IN AN ORGANIZED HEALTH CARE EDUCATION/TRAINING PROGRAM
Payer: COMMERCIAL

## 2025-04-14 ENCOUNTER — OFFICE VISIT (OUTPATIENT)
Dept: ORTHOPEDICS | Facility: OTHER | Age: 65
End: 2025-04-14
Attending: STUDENT IN AN ORGANIZED HEALTH CARE EDUCATION/TRAINING PROGRAM
Payer: COMMERCIAL

## 2025-04-14 VITALS
WEIGHT: 135 LBS | HEIGHT: 66 IN | HEART RATE: 71 BPM | OXYGEN SATURATION: 99 % | SYSTOLIC BLOOD PRESSURE: 150 MMHG | RESPIRATION RATE: 14 BRPM | BODY MASS INDEX: 21.69 KG/M2 | DIASTOLIC BLOOD PRESSURE: 106 MMHG

## 2025-04-14 DIAGNOSIS — M54.12 CERVICAL RADICULOPATHY: Primary | ICD-10-CM

## 2025-04-14 DIAGNOSIS — M54.12 CERVICAL RADICULOPATHY: ICD-10-CM

## 2025-04-14 PROCEDURE — 72050 X-RAY EXAM NECK SPINE 4/5VWS: CPT

## 2025-04-14 ASSESSMENT — PAIN SCALES - GENERAL: PAINLEVEL_OUTOF10: SEVERE PAIN (10)

## 2025-04-14 NOTE — PROGRESS NOTES
HPI    [facility] today.    As you know she is a pleasant 64-year-old female presenting with severe and functionally debilitating neck and right upper extremity pain and what seems to be a C5-6 distribution extending down to the thumb that unfortunately has been progressing over the past several months despite extensive alternative therapy in the form of structured PT, multiple injections, chiropractic care, anti-inflammatory medications, narcotic pain medication, and muscle relaxers, heat, ice and a host of activity modifications to the point that she really has minimal quality of life and she rates her current level of function is only 40%.  She rates her current arm and neck pain is 10 out of 10 at times.  She denies any balance or fine motor skill issues.  She endorses numbness in the C5-6 distribution.  She endorses weakness specifically in the deltoid and right bicep to the point that she states her arm feels like it is floating at times.    Patient has no relevant past medical or past surgical history.    Physical exam    Patient is 5 foot 6, 135 pounds.  Patient has neutral sagittal coronal alignment.  Hip and shoulder exams unremarkable.  No long track signs.  Full strength in upper and lower extremity myotomes with exception of the right deltoid and bicep at 4-5.  Sensory exam elicits intact sensation throughout the extremities with the exception of the right deltoid extending into the associated biceps and thumb where she has some patchy dysesthesias relative the contralateral side.  Cervical range of motion is severely limited to about a 40 degree flexion extension arc with severe extension causing the associated radiculopathy and right upper extremity almost like a self under Spurling sign.  Reflexes are 1+ and symmetric in upper and lower extremities.  Thoracolumbar range of motion is normal age-appropriate.    Imaging    4 views of cervical spine  ordered and reviewed in the office.  These radiographically multilevel subaxial spondylosis worst at the 6 level level.    MRI    MRI from 2/12/2025 physical for review.  This MRI delineates severe right-sided foraminal stenosis at C4-5.  At C5-6 she has cord abutment with severe bilateral foraminal stenosis.  At C6-7 she has severe spondylosis without any significant neural compression.    Assessment    Alessandra is a pleasant 63-year-old female presenting with sterile function debilitating cervical radiculopathy secondary to cord compressive pathology and imaging.  Extensive discussion very natural history of her associated condition and the risk benefits prognostically with conservative therapy.  At this point the patient is like to proceed with anterior cervical decompression and instrumented fusion.    The patient is presenting with severe and debilitating cervical radiculopathy at the C4-6 level despite extensive conservative therapy and objective concordant pathology on imaging delineating a source of the patient s discomfort.  Therefore, given the patients persistent and debilitating symptoms, surgery was offered as an alternative to more conservative therapy and the associated risks, benefits, and alternatives of an anterior cervical decompression and fusion was discussed at length.  We talked about the time of surgery time and the likely destination of the patient postoperatively, whether it be home or in the hospital.  We talked about the high likelihood of relief of arm pain and return to normal activities; we also discussed that while surgery may help relieve neck pain, the surgery is designed to treat arm pain not neck pain. Additionally, we discussed that when the nerve root is decompressed, the patient will have the ability to regain strength, but that it may takes six months or more to regain strength, and furthermore, the amount of strength regained is predicated based on the severity of the weakness  pre-operatively, the length of time the weakness has been present, and the effort put forth by the patient in postoperative rehabilitation. I also discussed with the patient that resolution of numbness is unpredictable, and this should not be a major reason why the patient elects to have surgery. We talked about the small risks of neurologic injury, including a risk of injury to the nerve root, or even possibly an injury to the spinal cord leading to paralysis. We discussed at length the risk of a possible C5 palsy, which means temporary or permanent weakness in the deltoid or bicep in one or both arms.  We also discussed the risk of failure of fusion and the fact that the majority of such patients do not develop symptoms from this, but (the possibility of requiring a subsequent posterior procedure if the fusion does not heal and symptoms recur). We talked about the potential for developing adjacent segment disease at other levels as well as the likelihood of soreness and difficulty with swallowing following surgery and the risks of anesthesia. We talked about the other risks of the procedure beside neurologic injury which include spinal fluid leak, infection and or meningitis, excessive bleeding, blindness, sexual dysfunction, injury to neighboring organs including the esophagus and trachea, need for further surgery, bowel and bladder dysfunction, instability, and autonomic nervous system dysfunction, including the possibility of developing Tigre s syndrome. The patient was also counseled further as to the possible adverse consequences of or relating to anterior cervical surgery including the above mentioned swallowing difficulties with the possible need for tube feeding, recurrent and/or superior laryngeal nerve root injury, esophageal injury, voice changes and hoarseness, postoperative hematoma, vascular injury, and the possibility of medical complications from surgery including but not limited to a stroke, a  heart attack, blood clot in the leg or lungs, pneumonia, aspiration, and even death. We discussed using an anterior cervical plate and screws and the FDA status of instrumentation.      BONE GRAFT  We also discussed the different graft options. We discussed the use of both allograft bone and local bone for the fusion. We discussed that the fusion rate is slightly higher with the use of iliac crest autograft, but due to the increase in morbidity we often elect  to proceed with allograft and local autograph but there is the possibility of using autograft from a separate fascial incision including the iliac crest. We discussed the risk of disease transmission from using allograft bone. We discussed the complications of iliac crest bone graft harvest including significant initial pain, and possible persistent pain at the graft harvest site, as well as infection, hematoma, pelvis fracture, damage to the surrounding nerves and vessels, and the need for revision surgery at the graft harvest site    ANTI-INFLAMMATORIES   The patient was instructed to stop all NSAIDS, or anti-inflammatory medications, 5 days prior to surgery, and remain off of them for 8 weeks after surgery unless specifically discussed with your surgeon and prescribing physician.  These medications include Aleve, Motrin, Advil, Celebrex, Naprosyn, Mobic, )    ANTICOAGULATION  The patient was instructed that if they were to start or continue taking any  blood thinning medications prescribed by another physician, it is the expectation that this physician will continue to manage this medication before and after surgery within the following guidelines.  Discontinue all blood thinning medications like Xarelto/Rivaroxaban, Eliquis/Apixaban, Coumadin/warfarin, Lovenox, Heparin, Clopidogrel/Plavix, Aspirin, Pradaxa/Dabigatran, or Arixtra/ Fondaparinux 5 days prior to surgery and resume them 5 days after surgery unless otherwise discussed directly with your surgeon  or managing physician.    At this point the patient would like to move forward with the surgery offered above.  A packet clearly outlining the pre and postoperative pathway associated with surgery was provided to the patient that also contained extensive information pertaining to the patient s disease process, surgical intervention, and the associated risks involved.  All their questions were answered.    Plan    C4-6 ACDF.

## 2025-04-16 ENCOUNTER — HOSPITAL ENCOUNTER (OUTPATIENT)
Dept: GENERAL RADIOLOGY | Facility: OTHER | Age: 65
Discharge: HOME | End: 2025-04-16
Attending: INTERNAL MEDICINE
Payer: COMMERCIAL

## 2025-04-16 DIAGNOSIS — M54.2 NECK PAIN ON RIGHT SIDE: ICD-10-CM

## 2025-04-16 DIAGNOSIS — M25.511 CHRONIC RIGHT SHOULDER PAIN: ICD-10-CM

## 2025-04-16 DIAGNOSIS — G89.29 CHRONIC RIGHT SHOULDER PAIN: ICD-10-CM

## 2025-04-16 PROCEDURE — 96372 THER/PROPH/DIAG INJ SC/IM: CPT | Performed by: RADIOLOGY

## 2025-04-16 PROCEDURE — 250N000009 HC RX 250: Performed by: RADIOLOGY

## 2025-04-16 PROCEDURE — 64479 NJX AA&/STRD TFRM EPI C/T 1: CPT | Mod: RT

## 2025-04-16 PROCEDURE — 255N000002 HC RX 255 OP 636: Performed by: RADIOLOGY

## 2025-04-16 PROCEDURE — 250N000011 HC RX IP 250 OP 636: Performed by: RADIOLOGY

## 2025-04-16 RX ORDER — DEXAMETHASONE SODIUM PHOSPHATE 10 MG/ML
10 INJECTION, SOLUTION INTRAMUSCULAR; INTRAVENOUS ONCE
Status: COMPLETED | OUTPATIENT
Start: 2025-04-16 | End: 2025-04-16

## 2025-04-16 RX ORDER — LIDOCAINE HYDROCHLORIDE 10 MG/ML
2 INJECTION, SOLUTION EPIDURAL; INFILTRATION; INTRACAUDAL; PERINEURAL ONCE
Status: COMPLETED | OUTPATIENT
Start: 2025-04-16 | End: 2025-04-16

## 2025-04-16 RX ORDER — LIDOCAINE HYDROCHLORIDE 10 MG/ML
20 INJECTION, SOLUTION INFILTRATION; PERINEURAL ONCE
Status: COMPLETED | OUTPATIENT
Start: 2025-04-16 | End: 2025-04-16

## 2025-04-16 RX ADMIN — LIDOCAINE HYDROCHLORIDE 1 ML: 10 INJECTION, SOLUTION INFILTRATION; PERINEURAL at 08:36

## 2025-04-16 RX ADMIN — DEXAMETHASONE SODIUM PHOSPHATE 10 MG: 10 INJECTION, SOLUTION INTRAMUSCULAR; INTRAVENOUS at 08:38

## 2025-04-16 RX ADMIN — IOHEXOL 1 ML: 240 INJECTION, SOLUTION INTRATHECAL; INTRAVASCULAR; INTRAVENOUS; ORAL at 08:37

## 2025-04-16 RX ADMIN — LIDOCAINE HYDROCHLORIDE 1.5 ML: 10 INJECTION, SOLUTION EPIDURAL; INFILTRATION; INTRACAUDAL; PERINEURAL at 08:38

## 2025-04-21 ENCOUNTER — OFFICE VISIT (OUTPATIENT)
Dept: ORTHOPEDICS | Facility: OTHER | Age: 65
End: 2025-04-21
Attending: ORTHOPAEDIC SURGERY
Payer: COMMERCIAL

## 2025-04-21 DIAGNOSIS — M25.511 RIGHT SHOULDER PAIN, UNSPECIFIED CHRONICITY: ICD-10-CM

## 2025-04-21 DIAGNOSIS — M75.81 TENDINITIS OF RIGHT ROTATOR CUFF: Primary | ICD-10-CM

## 2025-04-21 PROCEDURE — 20610 DRAIN/INJ JOINT/BURSA W/O US: CPT | Mod: RT | Performed by: ORTHOPAEDIC SURGERY

## 2025-04-21 PROCEDURE — 250N000011 HC RX IP 250 OP 636: Mod: JZ | Performed by: ORTHOPAEDIC SURGERY

## 2025-04-21 PROCEDURE — 250N000009 HC RX 250: Performed by: ORTHOPAEDIC SURGERY

## 2025-04-21 RX ORDER — TRIAMCINOLONE ACETONIDE 40 MG/ML
40 INJECTION, SUSPENSION INTRA-ARTICULAR; INTRAMUSCULAR ONCE
Status: COMPLETED | OUTPATIENT
Start: 2025-04-21 | End: 2025-04-21

## 2025-04-21 RX ORDER — LIDOCAINE HYDROCHLORIDE 10 MG/ML
4 INJECTION, SOLUTION INFILTRATION; PERINEURAL ONCE
Status: COMPLETED | OUTPATIENT
Start: 2025-04-21 | End: 2025-04-21

## 2025-04-21 RX ADMIN — TRIAMCINOLONE ACETONIDE 40 MG: 40 INJECTION, SUSPENSION INTRA-ARTICULAR; INTRAMUSCULAR at 09:10

## 2025-04-21 RX ADMIN — LIDOCAINE HYDROCHLORIDE 4 ML: 10 INJECTION, SOLUTION INFILTRATION; PERINEURAL at 09:10

## 2025-04-21 NOTE — PROGRESS NOTES
SUBJECTIVE:  Alessandra is a 64-year-old female who presents today with right shoulder pain.  Recent MRI completed on 3/31/2025 showed evidence of rotator cuff tendinitis but without evidence of partial or full-thickness tearing.  She has already discussed these findings with Dr. Hansen and would like to proceed with a subacromial injection today.    OBJECTIVE:  64-year-old female alert and oriented in no acute distress.  She has near full range of motion of the right shoulder.  She has full strength of the rotator cuff in all planes but with pain particularly with supraspinatus and infraspinatus testing.  She is neurovascularly intact.    ASSESSMENT/PLAN:  1. Tendinitis of right rotator cuff (Primary)  2. Right shoulder pain, unspecified chronicity  We will proceed with a corticosteroid injection of the right subacromial space.  She tolerated the procedure quite well and noticed immediate improvement following the injection.  We will get her into physical therapy as well to work on rotator cuff strengthening.  We will plan to follow-up as needed.  - triamcinolone (KENALOG-40) injection 40 mg  - lidocaine 1 % injection 4 mL  - Physical Therapy  Referral; Future

## 2025-04-29 DIAGNOSIS — G90.511 COMPLEX REGIONAL PAIN SYNDROME TYPE 1 OF RIGHT UPPER EXTREMITY: ICD-10-CM

## 2025-04-30 ENCOUNTER — OFFICE VISIT (OUTPATIENT)
Dept: FAMILY MEDICINE | Facility: OTHER | Age: 65
End: 2025-04-30
Attending: NURSE PRACTITIONER
Payer: COMMERCIAL

## 2025-04-30 VITALS
TEMPERATURE: 96.5 F | HEIGHT: 66 IN | OXYGEN SATURATION: 98 % | BODY MASS INDEX: 23.11 KG/M2 | WEIGHT: 143.8 LBS | SYSTOLIC BLOOD PRESSURE: 140 MMHG | RESPIRATION RATE: 14 BRPM | HEART RATE: 70 BPM | DIASTOLIC BLOOD PRESSURE: 80 MMHG

## 2025-04-30 DIAGNOSIS — M48.02 CERVICAL STENOSIS OF SPINAL CANAL: ICD-10-CM

## 2025-04-30 DIAGNOSIS — G89.29 CHRONIC PAIN OF RIGHT UPPER EXTREMITY: ICD-10-CM

## 2025-04-30 DIAGNOSIS — M79.601 CHRONIC PAIN OF RIGHT UPPER EXTREMITY: ICD-10-CM

## 2025-04-30 DIAGNOSIS — Z01.818 PRE-OP EXAM: Primary | ICD-10-CM

## 2025-04-30 DIAGNOSIS — I10 PRIMARY HYPERTENSION: ICD-10-CM

## 2025-04-30 DIAGNOSIS — G89.29 CHRONIC NECK PAIN: ICD-10-CM

## 2025-04-30 DIAGNOSIS — F41.1 GENERALIZED ANXIETY DISORDER: ICD-10-CM

## 2025-04-30 DIAGNOSIS — J32.9 CHRONIC SINUSITIS, UNSPECIFIED LOCATION: ICD-10-CM

## 2025-04-30 DIAGNOSIS — M54.2 CHRONIC NECK PAIN: ICD-10-CM

## 2025-04-30 DIAGNOSIS — F51.04 CHRONIC INSOMNIA: ICD-10-CM

## 2025-04-30 DIAGNOSIS — Z01.818 PREOP GENERAL PHYSICAL EXAM: ICD-10-CM

## 2025-04-30 DIAGNOSIS — F11.20 CONTINUOUS OPIOID DEPENDENCE (H): ICD-10-CM

## 2025-04-30 LAB
ANION GAP SERPL CALCULATED.3IONS-SCNC: 11 MMOL/L (ref 7–15)
BUN SERPL-MCNC: 17.8 MG/DL (ref 8–23)
CALCIUM SERPL-MCNC: 10 MG/DL (ref 8.8–10.4)
CHLORIDE SERPL-SCNC: 103 MMOL/L (ref 98–107)
CREAT SERPL-MCNC: 0.84 MG/DL (ref 0.51–0.95)
EGFRCR SERPLBLD CKD-EPI 2021: 77 ML/MIN/1.73M2
GLUCOSE SERPL-MCNC: 88 MG/DL (ref 70–99)
HCO3 SERPL-SCNC: 27 MMOL/L (ref 22–29)
HGB BLD-MCNC: 14 G/DL (ref 11.7–15.7)
POTASSIUM SERPL-SCNC: 3.6 MMOL/L (ref 3.4–5.3)
SODIUM SERPL-SCNC: 141 MMOL/L (ref 135–145)

## 2025-04-30 PROCEDURE — 80048 BASIC METABOLIC PNL TOTAL CA: CPT | Mod: ZL | Performed by: NURSE PRACTITIONER

## 2025-04-30 PROCEDURE — 85018 HEMOGLOBIN: CPT | Mod: ZL | Performed by: NURSE PRACTITIONER

## 2025-04-30 PROCEDURE — 36415 COLL VENOUS BLD VENIPUNCTURE: CPT | Mod: ZL | Performed by: NURSE PRACTITIONER

## 2025-04-30 RX ORDER — TIZANIDINE 2 MG/1
TABLET ORAL
Qty: 45 TABLET | Refills: 1 | Status: SHIPPED | OUTPATIENT
Start: 2025-04-30

## 2025-04-30 ASSESSMENT — PAIN SCALES - GENERAL: PAINLEVEL_OUTOF10: MODERATE PAIN (6)

## 2025-04-30 NOTE — PATIENT INSTRUCTIONS
How to Take Your Medication Before Surgery  Preoperative Medication Instructions   Antiplatelet or Anticoagulation Medication Instructions   - We reviewed the medication list and the patient is not on an antiplatelet or anticoagulation medications.    Additional Medication Instructions   - Herbal medications and vitamins: DO NOT TAKE 14 days prior to surgery.   - Diuretics (furosemide, hydrochlorothiazide, chlorothalidone): May continue due to heart failure.   - Contrave: DO NOT TAKE 3 days (72 hours) prior to surgery.       Do not take Lunesta the night before surgery.     (DISREGARD the note on contrave- I know you are not on this)    Patient Education   Preparing for Your Surgery  For Adults  Getting started  In most cases, a nurse will call to review your health history and instructions. They will give you an arrival time based on your scheduled surgery time. Please be ready to share:  Your doctor's clinic name and phone number  Your medical, surgical, and anesthesia history  A list of allergies and sensitivities  A list of medicines, including herbal treatments and over-the-counter drugs  Whether the patient has a legal guardian (ask how to send us the papers in advance)  Note: You may not receive a call if you were seen at our PAC (Preoperative Assessment Center).  Please tell us if you're pregnant--or if there's any chance you might be pregnant. Some surgeries may injure a fetus (unborn baby), so they require a pregnancy test. Surgeries that are safe for a fetus don't always need a test, and you can choose whether to have one.   Preparing for surgery  Within 10 to 30 days of surgery: Have a pre-op exam (sometimes called an H&P, or History and Physical). This can be done at a clinic or pre-operative center.  If you're having a , you may not need this exam. Talk to your care team.  At your pre-op exam, talk to your care team about all medicines you take. (This includes CBD oil and any drugs, such as  THC, marijuana, and other forms of cannabis.) If you need to stop any medicine before surgery, ask when to start taking it again.  This is for your safety. Many medicines and drugs can make you bleed too much during surgery. Some change how well surgery (anesthesia) drugs work.  Call your insurance company to let them know you're having surgery. (If you don't have insurance, call [phone].)  Call your clinic if there's any change in your health. This includes a scrape or scratch near the surgery site, or any signs of a cold (sore throat, runny nose, cough, rash, fever).  Eating and drinking guidelines  For your safety: Unless your surgeon tells you otherwise, follow the guidelines below.  Eat and drink as normal until 8 hours before you arrive for surgery. After that, no food or milk. You can spit out gum when you arrive.  Drink clear liquids until 2 hours before you arrive. These are liquids you can see through, like water, Gatorade, and Propel Water. They also include plain black coffee and tea (no cream or milk).  No alcohol for 24 hours before you arrive. The night before surgery, stop any drinks that contain THC.  If your care team tells you to take medicine on the morning of surgery, it's okay to take it with a sip of water. No other medicines or drugs are allowed (including CBD oil)--follow your care team's instructions.  If you have questions the day of surgery, call your hospital or surgery center.   Preventing infection  Shower or bathe the night before and the morning of surgery. Follow the instructions your clinic gave you. (If no instructions, use regular soap.)  Don't shave or clip hair near your surgery site. We'll remove the hair if needed.  Don't smoke or vape the morning of surgery. No chewing tobacco for 6 hours before you arrive. A nicotine patch is okay. You may spit out nicotine gum when you arrive.  For some surgeries, the surgeon will tell you to fully quit smoking and nicotine.  We will  make every effort to keep you safe from infection. We will:  Clean our hands often with soap and water (or an alcohol-based hand rub).  Clean the skin at your surgery site with a special soap that kills germs.  Give you a special gown to keep you warm. (Cold raises the risk of infection.)  Wear hair covers, masks, gowns, and gloves during surgery.  Give antibiotic medicine, if prescribed. Not all surgeries need this medicine.  What to bring on the day of surgery  Photo ID and insurance card  Copy of your health care directive, if you have one  Glasses and hearing aids (bring cases)  You can't wear contacts during surgery  Inhaler and eye drops, if you use them (tell us about these when you arrive)  CPAP machine or breathing device, if you use them  A few personal items, if spending the night  If you have . . .  A pacemaker, ICD (cardiac defibrillator), or other implant: Bring the ID card.  An implanted stimulator: Bring the remote control.  A legal guardian: Bring a copy of the certified (court-stamped) guardianship papers.  Please remove any jewelry, including body piercings. Leave jewelry and other valuables at home.  If you're going home the day of surgery  You must have a responsible adult drive you home. They should stay with you overnight as well.  If you don't have someone to stay with you, and you aren't safe to go home alone, we may keep you overnight. Insurance often won't pay for this.  After surgery  If it's hard to control your pain or you need more pain medicine, please call your surgeon's office.  Questions?   If you have any questions for your care team, list them here:   ____________________________________________________________________________________________________________________________________________________________________________________________________________________________________________________________  For informational purposes only. Not to replace the advice of your health care  provider. Copyright   2003, 2019 [facility]. All rights reserved. Clinically reviewed by Ozzie Mares MD. [employer] [id number] - REV 08/24.

## 2025-04-30 NOTE — PROGRESS NOTES
Preoperative Evaluation  [facility]  [address]  [place] [address]  Phone: [phone]  Fax: [phone]  Primary Provider: Abena Adkins,   Pre-op Performing Provider: Soco Rogers NP  Apr 30, 2025 4/29/2025   Surgical Information   What procedure is being done? Neck   Facility or Hospital where procedure/surgery will be performed: [facility]   Who is doing the procedure / surgery?    Date of surgery / procedure: May 20   Time of surgery / procedure: ?   Where do you plan to recover after surgery? at home with Home Care     Fax number for surgical facility: Note does not need to be faxed, will be available electronically in Epic.    Assessment & Plan     The proposed surgical procedure is considered INTERMEDIATE risk.    Problem List Items Addressed This Visit       Chronic sinusitis    Chronic insomnia    Hypertension    Continuous opioid dependence (H)     Other Visit Diagnoses       Pre-op exam    -  Primary    Relevant Orders    EKG 12-lead complete w/read - Clinics (Completed)    Hemoglobin (Completed)    Basic Metabolic Panel (Completed)    Preop general physical exam        Cervical stenosis of spinal canal        Chronic neck pain        Chronic pain of right upper extremity        Generalized anxiety disorder                  1. Pre-op exam (Primary)  2. Preop general physical exam  - EKG 12-lead complete w/read - Clinics  - Basic Metabolic Panel  - Hemoglobin  Stable values. Normal EKG. Asymptomatic. Optimized from a medical standpoint for surgery.     3. Cervical stenosis of spinal canal  4. Chronic neck pain  I have reviewed prior imaging and documentation in the chart. This is the indication for upcoming planned surgical procedure.     5. Chronic pain of right upper extremity  6. Continuous opioid dependence (H)  Currently taking oxycodone twice daily; pdmp reviewed. Goal is ultimately to get off of this if upcoming surgery  helps with her chronic pain.     7. Primary hypertension  Slightly elevated today; she is in pain. Continue with current dose of hydrochlorothiazide.     8. Chronic sinusitis, unspecified location  Ongoing symptoms; history of sinus surgeries. There is no evidence of acute sinusitis on exam     9. Chronic insomnia  -lunesta   Advised to not take this the night prior to surgery     10. Generalized anxiety disorder  Chronic, stable,   -lorazepam prn   -cymbalta         - No identified additional risk factors other than previously addressed      Antiplatelet or Anticoagulation Medication Instructions   - We reviewed the medication list and the patient is not on an antiplatelet or anticoagulation medications.    Additional Medication Instructions   - Herbal medications and vitamins: DO NOT TAKE 14 days prior to surgery.   - Diuretics (furosemide, hydrochlorothiazide, chlorothalidone): May continue due to heart failure.    Recommendation  Approval given to proceed with proposed procedure, without further diagnostic evaluation.        Sav Aparicio is a 65 year old, presenting for the following:  Pre-Op Exam          4/30/2025    11:16 AM   Additional Questions   Roomed by JACINTA Wan     HPI: Alessandra presents today for a pre-op appointment prior to upcoming neck surgery. She has had chronic pain of the neck and right upper extremity for quite some time. She has had shoulder surgeries and thumb surgeries that have not improved her pain. An MRI of the cervical spine has confirmed cervical stenosis, the worst at level C6 and it is believed this is likely the root cause of a lot of the chronic pain she has been dealing with. The upcoming surgery will be an anterior cervical decompression and fusion which was previously discussed at length with Dr. Aburto per his documentation and patient statement.     She is on hydrochlorothiazide - this was decreased about 3 month ago. Her blood pressure is elevated today - her pain  is pretty significant at this time and she believes that is why the BP is elevated. She has taken her hydrochlorothiazide this morning.     She denies any cardiac history aside from hypertension. She denies any chest pain, palpitations, cough, shortness of breath with activity or at rest. No recent febrile illnesses or infections. No past anesthesia complications.               4/29/2025   Pre-Op Questionnaire   Have you ever had a heart attack or stroke? No   Have you ever had surgery on your heart or blood vessels, such as a stent placement, a coronary artery bypass, or surgery on an artery in your head, neck, heart, or legs? No   Do you have chest pain with activity? No   Do you have a history of heart failure? No   Do you currently have a cold, bronchitis or symptoms of other infection? No   Do you have a cough, shortness of breath, or wheezing? No   Do you or anyone in your family have previous history of blood clots? No   Do you or does anyone in your family have a serious bleeding problem such as prolonged bleeding following surgeries or cuts? No   Have you ever had problems with anemia or been told to take iron pills? No   Have you had any abnormal blood loss such as black, tarry or bloody stools, or abnormal vaginal bleeding? No   Have you ever had a blood transfusion? No   Are you willing to have a blood transfusion if it is medically needed before, during, or after your surgery? Yes   Have you or any of your relatives ever had problems with anesthesia? No   Do you have sleep apnea, excessive snoring or daytime drowsiness? No   Do you have any artifical heart valves or other implanted medical devices like a pacemaker, defibrillator, or continuous glucose monitor? No   Do you have artificial joints? No   Are you allergic to latex? No     Advance Care Planning    Advance care planning document is on file but is outdated.  Patient encouraged to update or provider to update POLST.    Preoperative Review of     reviewed - controlled substances reflected in medication list.      Oxycodone daily - not helping the pain much. Has been on this for a while.       Patient Active Problem List    Diagnosis Date Noted    Impingement syndrome of right shoulder 09/09/2024     Priority: Medium    Continuous opioid dependence (H) 02/15/2024     Priority: Medium    Sinus symptom 03/19/2021     Priority: Medium    S/P vaginal hysterectomy 08/09/2018     Priority: Medium    Chronic insomnia 10/30/2017     Priority: Medium    Migraine without aura 10/30/2017     Priority: Medium     Overview:   triggers are alcohol and dairy products      Chronic sinusitis 01/11/2017     Priority: Medium    Hypertension 05/13/2015     Priority: Medium      Past Medical History:   Diagnosis Date    Allergic rhinitis     BCC (basal cell carcinoma), face 02/2023    nose    Chronic sinusitis     Essential (primary) hypertension     High grade squamous intraepithelial cervical dysplasia 01/2018    Migraine without status migrainosus, not intractable     Osteoarthritis of first metatarsophalangeal joint     Otalgia of right ear     chronic    Pregnancy     G3, P3 - all NSVDs    Psychophysiologic insomnia     Squamous cell carcinoma of skin 06/2018    leg, right arm and sternum    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    Right    Ureterolithiasis 12/04/2014    right pyelo, mod right hydro, 2 mm right renal stone     Past Surgical History:   Procedure Laterality Date    ARTHROSCOPY SHOULDER  07/2009    ARTHROSCOPY SHOULDER ROTATOR CUFF REPAIR, SUBACROMIAL DECOMP, DIST CLAVICLE RESECTN, BICEP TENOTOMY Right 8/12/2024    Procedure: Right Shoulder Arthroscopy, Subacormial Decompression, Distal Clavicle Excision;  Surgeon: Martínez Hansen MD;  Location: [facility] OR    AS NASAL/SINUS SCOPE W SPHENOIDOTOMY      2011/2016 Right for mycetoma removal at [facility]6/2012    AS RELEASE FOREARM/HAND EXTEN TENDON Right 03/2023    s/p right thumb basilar joint reconstruction     BIOPSY OF SKIN LESION  02/2023    SCC, BCC    BIOPSY OF SKIN LESION Left 09/2023    left shoulder    COLONOSCOPY  06/05/2012    Sigmoid diverticulosis; follow up 10 years    CYSTOSCOPY N/A 08/09/2018    Procedure: CYSTOSCOPY;;  Surgeon: Ashleigh Fregoso MD;  Location:  OR    ENDOSCOPIC SINUS SURGERY      repeat clearing    FINGER SURGERY  10/2022    right thumb basilar joint reconstruction    HYSTERECTOMY VAGINAL Bilateral 08/09/2018    Procedure: HYSTERECTOMY VAGINAL;  Total Vaginal Hysterectomy & Bilateral Salpingectomy, Cystoscopy;  Surgeon: Ashleigh Fregoso MD;  Location:  OR    LEEP TX, CERVICAL  04/18/2018    Dr Fregoso    MYRINGOTOMY, INSERT TUBE, COMBINED  09/2016     Current Outpatient Medications   Medication Sig Dispense Refill    DULoxetine (CYMBALTA) 60 MG capsule Take 1 capsule (60 mg) by mouth daily. 90 capsule 2    eszopiclone (LUNESTA) 1 MG tablet Take 1-2 tablets (1-2 mg) by mouth at bedtime. 60 tablet 5    hydrochlorothiazide 12.5 MG tablet Take 1 tablet (12.5 mg) by mouth daily. Decrease in dose 90 tablet 1    LORazepam (ATIVAN) 0.5 MG tablet Take 1 tablet (0.5 mg) by mouth nightly as needed for anxiety or sleep. 30 tablet 5    [START ON 5/9/2025] oxyCODONE (ROXICODONE) 5 MG tablet Take 1 tablet (5 mg) by mouth 2 times daily as needed for pain. 60 tablet 0    oxyCODONE (ROXICODONE) 5 MG tablet Take 1 tablet (5 mg) by mouth 2 times daily as needed for pain. 60 tablet 0    oxyCODONE (ROXICODONE) 5 MG tablet Take 1 tablet (5 mg) by mouth 2 times daily as needed for pain. 60 tablet 0    SUMAtriptan (IMITREX) 100 MG tablet Take 1 tablet (100 mg) by mouth at onset of headache for migraine. May repeat in 2 hours if needed. Max of 2 tablets (200 mg) in 24 hours. 10 tablet 1    tiZANidine (ZANAFLEX) 2 MG tablet Take 1 to 2 tablets by mouth 3 times daily as needed for muscle spasms. 45 tablet 1       Allergies   Allergen Reactions    Fentanyl Anxiety and Itching     Patient describes feeling  "jud/sang to Dr. Saenz.         Social History     Tobacco Use    Smoking status: Never     Passive exposure: Never    Smokeless tobacco: Never   Substance Use Topics    Alcohol use: No     Alcohol/week: 0.0 standard drinks of alcohol     Comment: Alcoholic Drinks/day: very rarely       History   Drug Use No               Objective    BP (!) 140/80   Pulse 70   Temp (!) 96.5  F (35.8  C) (Tympanic)   Resp 14   Ht 1.676 m (5' 6\")   Wt 65.2 kg (143 lb 12.8 oz)   LMP 03/07/2003 (Approximate)   SpO2 98%   Breastfeeding No   BMI 23.21 kg/m     Estimated body mass index is 23.21 kg/m  as calculated from the following:    Height as of this encounter: 1.676 m (5' 6\").    Weight as of this encounter: 65.2 kg (143 lb 12.8 oz).  Physical Exam  Vitals and nursing note reviewed.   Constitutional:       General: She is not in acute distress.     Appearance: Normal appearance. She is normal weight. She is not ill-appearing or toxic-appearing.   Cardiovascular:      Rate and Rhythm: Normal rate and regular rhythm.      Heart sounds: Normal heart sounds. No murmur heard.  Pulmonary:      Effort: Pulmonary effort is normal. No respiratory distress.      Breath sounds: Normal breath sounds. No stridor. No wheezing, rhonchi or rales.   Chest:      Chest wall: No tenderness.   Musculoskeletal:         General: Normal range of motion.   Skin:     General: Skin is warm and dry.   Neurological:      General: No focal deficit present.      Mental Status: She is alert and oriented to person, place, and time.   Psychiatric:         Mood and Affect: Mood normal.         Recent Labs   Lab Test 08/08/24  1405   HGB 13.4         POTASSIUM 3.2*   CR 0.99*   A1C 5.2        Diagnostics     Results for orders placed or performed in visit on 04/30/25   Basic Metabolic Panel     Status: Normal   Result Value Ref Range    Sodium 141 135 - 145 mmol/L    Potassium 3.6 3.4 - 5.3 mmol/L    Chloride 103 98 - 107 mmol/L    Carbon " Dioxide (CO2) 27 22 - 29 mmol/L    Anion Gap 11 7 - 15 mmol/L    Urea Nitrogen 17.8 8.0 - 23.0 mg/dL    Creatinine 0.84 0.51 - 0.95 mg/dL    GFR Estimate 77 >60 mL/min/1.73m2    Calcium 10.0 8.8 - 10.4 mg/dL    Glucose 88 70 - 99 mg/dL   Hemoglobin     Status: Normal   Result Value Ref Range    Hemoglobin 14.0 11.7 - 15.7 g/dL   EKG 12-lead complete w/read - Clinics     Status: None (Preliminary result)   Result Value Ref Range    Systolic Blood Pressure  mmHg    Diastolic Blood Pressure  mmHg    Ventricular Rate 69 BPM    Atrial Rate 69 BPM    LA Interval 150 ms    QRS Duration 76 ms     ms    QTc 428 ms    P Axis 67 degrees    R AXIS 24 degrees    T Axis 49 degrees    Interpretation ECG       Sinus rhythm  Possible Left atrial enlargement  Borderline ECG  When compared with ECG of 08-Aug-2024 13:59,  T wave amplitude has increased in Lateral leads       EKG today shows sinus rhythm and possible left atrial enlargement - that has been noted on prior EKGs. No significant change in comparision. Asymptomatic.     Revised Cardiac Risk Index (RCRI)  The patient has the following serious cardiovascular risks for perioperative complications:   - No serious cardiac risks = 0 points     RCRI Interpretation: 0 points: Class I (very low risk - 0.4% complication rate)       Signed Electronically by: Soco Rogers NP  A copy of this evaluation report is provided to the requesting physician.

## 2025-04-30 NOTE — NURSING NOTE
"Chief Complaint   Patient presents with    Pre-Op Exam       Initial BP (!) 152/90   Pulse 70   Temp (!) 96.5  F (35.8  C) (Tympanic)   Resp 14   Ht 1.676 m (5' 6\")   Wt 65.2 kg (143 lb 12.8 oz)   LMP 03/07/2003 (Approximate)   SpO2 98%   Breastfeeding No   BMI 23.21 kg/m   Estimated body mass index is 23.21 kg/m  as calculated from the following:    Height as of this encounter: 1.676 m (5' 6\").    Weight as of this encounter: 65.2 kg (143 lb 12.8 oz).  Medication Review: complete    The next two questions are to help us understand your food security.  If you are feeling you need any assistance in this area, we have resources available to support you today.          1/27/2025   SDOH- Food Insecurity   Within the past 12 months, did you worry that your food would run out before you got money to buy more? N   Within the past 12 months, did the food you bought just not last and you didn t have money to get more? N         Health Care Directive:  Patient does not have a Health Care Directive: Discussed advance care planning with patient; however, patient declined at this time.    Soco Scanlon CMA      "

## 2025-04-30 NOTE — TELEPHONE ENCOUNTER
[facility] sent Rx request for the following:      Requested Prescriptions   Pending Prescriptions Disp Refills    tiZANidine (ZANAFLEX) 2 MG tablet 45 tablet 1     Sig: Take 1 to 2 tablets by mouth 3 times daily as needed for muscle spasms.     Preop today.    Last prescription:  tiZANidine (ZANAFLEX) 2 MG tablet 45 tablet 1 3/25/2025 -- No   Sig: TAKE 1 TO 2 TABLETS BY MOUTH 3 TIMES DAILY AS NEEDED FOR MUSCLE SPASMS     Tati Phillips, Refill RN .............. 4/30/2025  11:59 AM

## 2025-05-01 LAB
ATRIAL RATE - MUSE: 69 BPM
DIASTOLIC BLOOD PRESSURE - MUSE: NORMAL MMHG
INTERPRETATION ECG - MUSE: NORMAL
P AXIS - MUSE: 67 DEGREES
PR INTERVAL - MUSE: 150 MS
QRS DURATION - MUSE: 76 MS
QT - MUSE: 400 MS
QTC - MUSE: 428 MS
R AXIS - MUSE: 24 DEGREES
SYSTOLIC BLOOD PRESSURE - MUSE: NORMAL MMHG
T AXIS - MUSE: 49 DEGREES
VENTRICULAR RATE- MUSE: 69 BPM

## 2025-05-12 ENCOUNTER — TRANSFERRED RECORDS (OUTPATIENT)
Dept: HEALTH INFORMATION MANAGEMENT | Facility: OTHER | Age: 65
End: 2025-05-12
Payer: COMMERCIAL

## 2025-05-28 ENCOUNTER — TELEPHONE (OUTPATIENT)
Dept: GENERAL RADIOLOGY | Facility: OTHER | Age: 65
End: 2025-05-28
Payer: COMMERCIAL

## 2025-05-28 DIAGNOSIS — G43.009 MIGRAINE WITHOUT AURA AND WITHOUT STATUS MIGRAINOSUS, NOT INTRACTABLE: ICD-10-CM

## 2025-05-28 NOTE — TELEPHONE ENCOUNTER
2 week F/U Cervical transforaminal injection  Left message is patient getting pain relief?  Akua Hernandez, ARRT on 5/28/2025 at 11:00 AM

## 2025-05-28 NOTE — TELEPHONE ENCOUNTER
[facility] sent Rx request for the following:      Requested Prescriptions   Pending Prescriptions Disp Refills    SUMAtriptan (IMITREX) 100 MG tablet 10 tablet 1     Sig: Take 1 tablet (100 mg) by mouth at onset of headache for migraine. May repeat in 2 hours if needed. Max of 2 tablets (200 mg) in 24 hours       Serotonin Agonists Failed - 5/28/2025 10:31 AM   Last Prescription Date:   03/25/2025  Last Fill Qty/Refills:         10 , R-1    Last Office Visit:              Unknown   Future Office visit:           06/04/2025  Lima Chan RN on 5/28/2025 at 10:37 AM

## 2025-05-29 RX ORDER — SUMATRIPTAN SUCCINATE 100 MG/1
TABLET ORAL
Qty: 10 TABLET | Refills: 4 | Status: SHIPPED | OUTPATIENT
Start: 2025-05-29

## 2025-06-04 ENCOUNTER — MYC MEDICAL ADVICE (OUTPATIENT)
Dept: INTERNAL MEDICINE | Facility: OTHER | Age: 65
End: 2025-06-04
Payer: COMMERCIAL

## 2025-06-04 ENCOUNTER — OFFICE VISIT (OUTPATIENT)
Dept: INTERNAL MEDICINE | Facility: OTHER | Age: 65
End: 2025-06-04
Attending: INTERNAL MEDICINE
Payer: COMMERCIAL

## 2025-06-04 VITALS
OXYGEN SATURATION: 98 % | WEIGHT: 142.2 LBS | BODY MASS INDEX: 22.95 KG/M2 | SYSTOLIC BLOOD PRESSURE: 132 MMHG | RESPIRATION RATE: 16 BRPM | HEART RATE: 55 BPM | DIASTOLIC BLOOD PRESSURE: 78 MMHG

## 2025-06-04 DIAGNOSIS — F51.04 CHRONIC INSOMNIA: ICD-10-CM

## 2025-06-04 DIAGNOSIS — M79.644 THUMB PAIN, RIGHT: Primary | ICD-10-CM

## 2025-06-04 DIAGNOSIS — J01.90 ACUTE NON-RECURRENT SINUSITIS, UNSPECIFIED LOCATION: ICD-10-CM

## 2025-06-04 DIAGNOSIS — G90.511 COMPLEX REGIONAL PAIN SYNDROME TYPE 1 OF RIGHT UPPER EXTREMITY: ICD-10-CM

## 2025-06-04 DIAGNOSIS — M79.2 NERVE PAIN: ICD-10-CM

## 2025-06-04 PROCEDURE — 3075F SYST BP GE 130 - 139MM HG: CPT | Performed by: INTERNAL MEDICINE

## 2025-06-04 PROCEDURE — 3078F DIAST BP <80 MM HG: CPT | Performed by: INTERNAL MEDICINE

## 2025-06-04 PROCEDURE — G2211 COMPLEX E/M VISIT ADD ON: HCPCS | Performed by: INTERNAL MEDICINE

## 2025-06-04 PROCEDURE — 99214 OFFICE O/P EST MOD 30 MIN: CPT | Performed by: INTERNAL MEDICINE

## 2025-06-04 PROCEDURE — 1125F AMNT PAIN NOTED PAIN PRSNT: CPT | Performed by: INTERNAL MEDICINE

## 2025-06-04 RX ORDER — METHOCARBAMOL 750 MG/1
TABLET, FILM COATED ORAL
COMMUNITY
Start: 2025-05-21 | End: 2025-06-04

## 2025-06-04 RX ORDER — OXYCODONE HYDROCHLORIDE 5 MG/1
5 TABLET ORAL 2 TIMES DAILY PRN
Qty: 60 TABLET | Refills: 0 | Status: SHIPPED | OUTPATIENT
Start: 2025-06-04

## 2025-06-04 RX ORDER — LIDOCAINE 50 MG/G
PATCH TOPICAL
COMMUNITY
Start: 2025-05-21

## 2025-06-04 RX ORDER — TIZANIDINE 2 MG/1
2 TABLET ORAL 3 TIMES DAILY PRN
Qty: 90 TABLET | Refills: 3 | Status: SHIPPED | OUTPATIENT
Start: 2025-06-04

## 2025-06-04 RX ORDER — OXYCODONE HYDROCHLORIDE 5 MG/1
5 TABLET ORAL 2 TIMES DAILY PRN
Qty: 60 TABLET | Refills: 0 | Status: SHIPPED | OUTPATIENT
Start: 2025-08-04

## 2025-06-04 RX ORDER — LORAZEPAM 0.5 MG/1
0.5 TABLET ORAL
Qty: 30 TABLET | Refills: 3 | Status: SHIPPED | OUTPATIENT
Start: 2025-06-04

## 2025-06-04 RX ORDER — OXYCODONE HYDROCHLORIDE 5 MG/1
5 TABLET ORAL 2 TIMES DAILY PRN
Qty: 60 TABLET | Refills: 0 | Status: SHIPPED | OUTPATIENT
Start: 2025-07-04

## 2025-06-04 RX ORDER — ESZOPICLONE 1 MG/1
1 TABLET, FILM COATED ORAL AT BEDTIME
Qty: 30 TABLET | Refills: 3 | Status: SHIPPED | OUTPATIENT
Start: 2025-06-04

## 2025-06-04 RX ORDER — SENNOSIDES 8.6 MG
1 TABLET ORAL DAILY PRN
COMMUNITY
Start: 2025-05-21

## 2025-06-04 ASSESSMENT — PAIN SCALES - GENERAL: PAINLEVEL_OUTOF10: MODERATE PAIN (6)

## 2025-06-04 NOTE — NURSING NOTE
"Chief Complaint   Patient presents with    Pain       Initial /78   Pulse 55   Resp 16   Wt 64.5 kg (142 lb 3.2 oz)   LMP 03/07/2003 (Approximate)   SpO2 98%   Breastfeeding No   BMI 22.95 kg/m   Estimated body mass index is 22.95 kg/m  as calculated from the following:    Height as of 4/30/25: 1.676 m (5' 6\").    Weight as of this encounter: 64.5 kg (142 lb 3.2 oz).  Medication Review: complete    The next two questions are to help us understand your food security.  If you are feeling you need any assistance in this area, we have resources available to support you today.          1/27/2025   SDOH- Food Insecurity   Within the past 12 months, did you worry that your food would run out before you got money to buy more? N   Within the past 12 months, did the food you bought just not last and you didn t have money to get more? N        Data saved with a previous flowsheet row definition         Health Care Directive:  Patient does not have a Health Care Directive: Discussed advance care planning with patient; however, patient declined at this time.    Nicky Ratliff LPN      "

## 2025-06-04 NOTE — PROGRESS NOTES
Assessment & Plan     Nerve pain  See below  - oxyCODONE (ROXICODONE) 5 MG tablet; Take 1 tablet (5 mg) by mouth 2 times daily as needed for pain.    Thumb pain, right  - Patient is on stable doses with no indication of abuse or diversion.  - Advised will not be able to completely relieve chronic pain  - Discussed the risks of chronic narcotics including sedation, impaired driving, constipation, decreased bone density and respiratory depression and death; especially in combination with benzodiazepines  - handout given on risk of addiction with opioids   - PDMP checked and appropriate  - UDS done within the year  - Strongly encouraged to continue to work on reduction of medications.  - oxyCODONE (ROXICODONE) 5 MG tablet; Take 1 tablet (5 mg) by mouth 2 times daily as needed for pain.  - oxyCODONE (ROXICODONE) 5 MG tablet; Take 1 tablet (5 mg) by mouth 2 times daily as needed for pain.  - oxyCODONE (ROXICODONE) 5 MG tablet; Take 1 tablet (5 mg) by mouth 2 times daily as needed for pain.    Complex regional pain syndrome type 1 of right upper extremity  Encouraged to work on reduction of use of muscle relaxers.  - tiZANidine (ZANAFLEX) 2 MG tablet; Take 1 tablet (2 mg) by mouth 3 times daily as needed for muscle spasms.    Chronic insomnia  She is aware of the risk of her medications and is working to reduce her Lunesta.  We will continue to try and taper off of this.  - eszopiclone (LUNESTA) 1 MG tablet; Take 1 tablet (1 mg) by mouth at bedtime.  - LORazepam (ATIVAN) 0.5 MG tablet; Take 1 tablet (0.5 mg) by mouth nightly as needed for anxiety or sleep.    Acute non-recurrent sinusitis, unspecified location  - most consistent with bacterial illness due to duration of illness  - will treat with antibiotics, patient to treat symptoms as instructed below, call if she has any ADR's or worsening symptoms  - recommend eating yogurt/kefir 1-2 times daily while on antibiotics  - amoxicillin-clavulanate (AUGMENTIN) 875-125 MG  tablet; Take 1 tablet by mouth 2 times daily for 10 days.      Follow-up  Return in about 3 months (around 9/4/2025) for Recheck pain, and as needed sooner.    Sav Aparicio is a 65 year old, presenting for the following health issues:  Pain        6/4/2025     9:43 AM   Additional Questions   Roomed by MARISA Jacobsen     Musculoskeletal Problem    History of Present Illness       Reason for visit:  Pain management    She eats 4 or more servings of fruits and vegetables daily.She consumes 0 sweetened beverage(s) daily.She exercises with enough effort to increase her heart rate 10 to 19 minutes per day.  She exercises with enough effort to increase her heart rate 6 days per week.   She is taking medications regularly.      Patient returns today for follow-up of her chronic pain.  Her pain is primarily nerve pain that radiates down her right arm to her right thumb.  She reports that she does feel that since having surgery on her neck that her pain has improved.  She just had surgery 2 weeks ago.  She is however continuing to have nerve pain in her thumb.  She is interested in reducing her medications over time.  With her recent surgery, she was provided a short course of oxycodone.  On 6/2 she received oxycodone 20 tablets and then her last refill of her chronic meds was on 5/9.    She has a history of insomnia and has been working to reduce her medications for this.  She is down to 1 tablet of Lunesta.    She has been having significant sinus pressure over the last 10 days.  She has a history of sinus infections.  She denies any outright fevers.      Objective    /78   Pulse 55   Resp 16   Wt 64.5 kg (142 lb 3.2 oz)   LMP 03/07/2003 (Approximate)   SpO2 98%   Breastfeeding No   BMI 22.95 kg/m    Body mass index is 22.95 kg/m .  Physical Exam   GEN: Vitals reviewed. Healthy appearing. Patient is in no acute distress. Cooperative with exam.  HEENT: Normocephalic atraumatic.  Eyes grossly normal to  inspection.  No discharge or erythema, or obvious scleral/conjunctival abnormalities.   LUNGS: No audible wheeze, cough, or visible cyanosis.  No visible retractions or increased work of breathing.    ABD:  nondistended  SKIN: Warm and dry to touch.  Visible skin clear. No significant rash, abnormal pigmentation or lesions.  Inspection of the right thumb reveals no obvious abnormality.      The longitudinal plan of care for the diagnosis(es)/condition(s) as documented were addressed during this visit. Due to the added complexity in care, I will continue to support Alessandra in the subsequent management and with ongoing continuity of care.    Signed Electronically by: Abena Adkins DO

## 2025-06-04 NOTE — LETTER

## 2025-06-05 NOTE — TELEPHONE ENCOUNTER
Secure chat sent to Dr. Adkins about Abx from yesterday's visit.     Alize Warren RN on 6/5/2025 at 7:35 AM

## 2025-06-09 ENCOUNTER — HOSPITAL ENCOUNTER (OUTPATIENT)
Dept: GENERAL RADIOLOGY | Facility: OTHER | Age: 65
Discharge: HOME | End: 2025-06-09
Attending: STUDENT IN AN ORGANIZED HEALTH CARE EDUCATION/TRAINING PROGRAM
Payer: COMMERCIAL

## 2025-06-09 ENCOUNTER — OFFICE VISIT (OUTPATIENT)
Dept: ORTHOPEDICS | Facility: OTHER | Age: 65
End: 2025-06-09
Attending: STUDENT IN AN ORGANIZED HEALTH CARE EDUCATION/TRAINING PROGRAM
Payer: COMMERCIAL

## 2025-06-09 DIAGNOSIS — Z98.1 S/P CERVICAL SPINAL FUSION: Primary | ICD-10-CM

## 2025-06-09 DIAGNOSIS — Z98.1 S/P CERVICAL SPINAL FUSION: ICD-10-CM

## 2025-06-09 PROCEDURE — 72040 X-RAY EXAM NECK SPINE 2-3 VW: CPT

## 2025-06-09 PROCEDURE — 72040 X-RAY EXAM NECK SPINE 2-3 VW: CPT | Mod: 26 | Performed by: RADIOLOGY

## 2025-06-09 PROCEDURE — 99024 POSTOP FOLLOW-UP VISIT: CPT | Performed by: STUDENT IN AN ORGANIZED HEALTH CARE EDUCATION/TRAINING PROGRAM

## 2025-06-09 NOTE — PROGRESS NOTES
HPI    Pleasure seeing Alessandra and her  in the orthopedic/spine clinic today.    As you know she is a wonderful 65-year-old female presenting approximately 3 weeks status post C4-6 ACDF.  She doing exceedingly well at this time.  She really has no neck or arm pain.  She is back to about 100% normal function.  No complaints of dysphagia or upper extremity paresthesias.    Physical exam    Incisions well-healed no signs erythema breakdown or infection.  Full strength in upper and lower EXTR and myotomes.  Full sensation upper lower extremity dermatomes.    Imaging    2 view cervical spine ordered in the office.  These radiographs delineate a C4-6 ACDF without interval change in implant position.  Hardware looks appropriately placed.    Assessment    Alessandra is a pleasant 65-year-old female presenting approximate 3 weeks status post C4 6 ACDF.  He doing exceedingly well the postoperative setting.  Will plan for her to keep her restrictions in place.  Will see her back in 3 weeks for repeat clinical evaluation.

## 2025-06-23 ENCOUNTER — OFFICE VISIT (OUTPATIENT)
Dept: ORTHOPEDICS | Facility: OTHER | Age: 65
End: 2025-06-23
Attending: STUDENT IN AN ORGANIZED HEALTH CARE EDUCATION/TRAINING PROGRAM
Payer: COMMERCIAL

## 2025-06-23 DIAGNOSIS — Z98.1 S/P CERVICAL SPINAL FUSION: Primary | ICD-10-CM

## 2025-06-23 PROCEDURE — 99024 POSTOP FOLLOW-UP VISIT: CPT | Performed by: STUDENT IN AN ORGANIZED HEALTH CARE EDUCATION/TRAINING PROGRAM

## 2025-06-23 NOTE — PROGRESS NOTES
Surgery: C4-6 ACDF    HPI: Alessandra is now approximately 6 weeks postoperatively from procedure above.  She reports doing very well.  She has had complete resolution of preoperative symptoms.  She denies any significant neck or arm pain.  Rates her functionality 100% normal.  She has no questions or complaints today.    Physical exam:  General: Alert and oriented, doing well  Neck: Range of motion with mild limitations  Motor: 5/5 bilateral upper extremities all myotomes  Neurological: No focal deficits    Imaging: No new imaging.    Assessment/plan: Presents with complete resolution of preoperative symptoms.  Doing very well.  Doing home exercises without difficulty.  Hoping to taper off her duloxetine.No concerns. Follow up in 6 weeks.      This patient was seen in conjunction with Dr. Analia Aburto, who is in agreement with plan of care.

## 2025-08-11 ENCOUNTER — OFFICE VISIT (OUTPATIENT)
Dept: FAMILY MEDICINE | Facility: OTHER | Age: 65
End: 2025-08-11
Attending: STUDENT IN AN ORGANIZED HEALTH CARE EDUCATION/TRAINING PROGRAM
Payer: COMMERCIAL

## 2025-08-11 VITALS
HEART RATE: 71 BPM | TEMPERATURE: 98.2 F | SYSTOLIC BLOOD PRESSURE: 122 MMHG | RESPIRATION RATE: 16 BRPM | DIASTOLIC BLOOD PRESSURE: 80 MMHG | WEIGHT: 145.6 LBS | BODY MASS INDEX: 23.5 KG/M2 | OXYGEN SATURATION: 96 %

## 2025-08-11 DIAGNOSIS — H65.01 NON-RECURRENT ACUTE SEROUS OTITIS MEDIA OF RIGHT EAR: Primary | ICD-10-CM

## 2025-08-11 PROCEDURE — 3079F DIAST BP 80-89 MM HG: CPT | Performed by: STUDENT IN AN ORGANIZED HEALTH CARE EDUCATION/TRAINING PROGRAM

## 2025-08-11 PROCEDURE — 3074F SYST BP LT 130 MM HG: CPT | Performed by: STUDENT IN AN ORGANIZED HEALTH CARE EDUCATION/TRAINING PROGRAM

## 2025-08-11 PROCEDURE — 1125F AMNT PAIN NOTED PAIN PRSNT: CPT | Performed by: STUDENT IN AN ORGANIZED HEALTH CARE EDUCATION/TRAINING PROGRAM

## 2025-08-11 PROCEDURE — 99213 OFFICE O/P EST LOW 20 MIN: CPT | Performed by: STUDENT IN AN ORGANIZED HEALTH CARE EDUCATION/TRAINING PROGRAM

## 2025-08-11 ASSESSMENT — PAIN SCALES - GENERAL: PAINLEVEL_OUTOF10: MODERATE PAIN (6)

## 2025-08-25 ENCOUNTER — OFFICE VISIT (OUTPATIENT)
Dept: ORTHOPEDICS | Facility: OTHER | Age: 65
End: 2025-08-25
Attending: STUDENT IN AN ORGANIZED HEALTH CARE EDUCATION/TRAINING PROGRAM
Payer: COMMERCIAL

## 2025-08-25 DIAGNOSIS — Z98.1 S/P CERVICAL SPINAL FUSION: Primary | ICD-10-CM

## 2025-08-25 DIAGNOSIS — M25.511 RIGHT SHOULDER PAIN, UNSPECIFIED CHRONICITY: ICD-10-CM

## 2025-08-25 PROCEDURE — 250N000011 HC RX IP 250 OP 636: Mod: JZ | Performed by: STUDENT IN AN ORGANIZED HEALTH CARE EDUCATION/TRAINING PROGRAM

## 2025-08-25 PROCEDURE — 250N000009 HC RX 250: Performed by: STUDENT IN AN ORGANIZED HEALTH CARE EDUCATION/TRAINING PROGRAM

## 2025-08-25 RX ORDER — TRIAMCINOLONE ACETONIDE 40 MG/ML
40 INJECTION, SUSPENSION INTRA-ARTICULAR; INTRAMUSCULAR ONCE
Status: COMPLETED | OUTPATIENT
Start: 2025-08-25 | End: 2025-08-25

## 2025-08-25 RX ORDER — LIDOCAINE HYDROCHLORIDE 10 MG/ML
4 INJECTION, SOLUTION INFILTRATION; PERINEURAL ONCE
Status: COMPLETED | OUTPATIENT
Start: 2025-08-25 | End: 2025-08-25

## 2025-08-25 RX ADMIN — TRIAMCINOLONE ACETONIDE 40 MG: 40 INJECTION, SUSPENSION INTRA-ARTICULAR; INTRAMUSCULAR at 11:22

## 2025-08-25 RX ADMIN — LIDOCAINE HYDROCHLORIDE 4 ML: 10 INJECTION, SOLUTION INFILTRATION; PERINEURAL at 11:22

## 2025-09-02 DIAGNOSIS — M79.644 THUMB PAIN, RIGHT: ICD-10-CM

## 2025-09-04 RX ORDER — OXYCODONE HYDROCHLORIDE 5 MG/1
5 TABLET ORAL 2 TIMES DAILY PRN
Qty: 14 TABLET | Refills: 0 | Status: SHIPPED | OUTPATIENT
Start: 2025-09-04

## (undated) DEVICE — PAD CHUX UNDERPAD 30X36" P3036C

## (undated) DEVICE — PAD PERI INDIV WRAP 11" 2022A

## (undated) DEVICE — TUBING SET ARTHREX DUALWAVE OUTFLOW AR-6430

## (undated) DEVICE — FASTENER LEGBAND CATHETER DALE 316

## (undated) DEVICE — GLOVE BIOGEL PI SZ 8.5 40885

## (undated) DEVICE — ESU LIGASURE IMPACT OPEN SEALER/DVDR CVD LG JAW LF4418

## (undated) DEVICE — PACK SHOULDER ARTHROSCOPY SOP15SAFCA

## (undated) DEVICE — PREP SKIN SCRUB TRAY 4461A

## (undated) DEVICE — DRSG ABDOMINAL PAD UNSTERILE 5X9" 9190

## (undated) DEVICE — DRAPE ARTHROSCOPY SHOULDER BEACHCHAIR 29369

## (undated) DEVICE — DRAPE UNDER BUTTOCK W/FLUID POUCH

## (undated) DEVICE — LIGHT HANDLES PLASTIC

## (undated) DEVICE — SYR 10ML FINGER CONTROL W/O NDL 309695

## (undated) DEVICE — DRAPE STERI TOWEL LG 1010

## (undated) DEVICE — STRAP STIRRUP W/O SLIP 30187-020

## (undated) DEVICE — KIT PATIENT POSITIONING PIGAZZI LATEX FREE 40580

## (undated) DEVICE — GOWN XLG XLONG DISP LF DYNJP2302P

## (undated) DEVICE — PACK MAJOR LAPAROTOMY LF SBA15MLFCA

## (undated) DEVICE — DRAPE SHEET REV FOLD 3/4 9349

## (undated) DEVICE — DRSG GAUZE 4X4" TRAY 6939

## (undated) DEVICE — Device

## (undated) DEVICE — TUBING IRRIG TUR Y TYPE 96" LF 6543-01

## (undated) DEVICE — SOL WATER 1500ML

## (undated) DEVICE — BUR ARTHREX COOLCUT EXCALIBUR 4.0MMX13CM AR-8400EX

## (undated) DEVICE — BUR ARTHREX COOLCUT OVAL 8 FLUTE 4.0MMX13CM AR-8400OBE

## (undated) DEVICE — SLEEVE COMPRESSION SCD KNEE MED 74022

## (undated) DEVICE — DRAPE POUCH INSTRUMENT 1018

## (undated) DEVICE — GLOVE PROTEXIS POWDER FREE SMT 8.5 2D72PT85X

## (undated) DEVICE — ABLATOR ARTHREX APOLLO RF MP90 ASPIRATING 90DEG AR-9811

## (undated) DEVICE — SU VICRYL 0 CT-2 27" UND J270H

## (undated) DEVICE — KIT SHOULDER POSITIONING BEACH CHAIR ARM HOLDER AR-1644

## (undated) DEVICE — TUBING SUCTION 10'X3/16" N510

## (undated) DEVICE — SU ETHILON 3-0 PS-2 18" 1669H

## (undated) DEVICE — GLOVE ESTEEM POWDER FREE SMT 6.5  2D72PT65

## (undated) DEVICE — CATH SELF FEMALE 14FR 6" 214

## (undated) DEVICE — SU VICRYL 0 CT-1 CR 8X27" UND JJ41G

## (undated) DEVICE — LEGGINGS 31X48" LF KM89408

## (undated) DEVICE — DRAPE U-SHAPED ORTHOARTS 60X70" 89331

## (undated) DEVICE — TUBING ARTHROSCOPY PUMP ARTHREX AR-6410

## (undated) DEVICE — ESU HOLSTER PLASTIC DISP E2400

## (undated) DEVICE — TRAY 16FR CATH W/URINE METER SILVER 903116

## (undated) DEVICE — NDL SPINAL 18GA 3.5" 405184

## (undated) DEVICE — PREP CHLORAPREP 26ML TINTED ORANGE  260815

## (undated) DEVICE — DRAPE STERI U 1015

## (undated) DEVICE — NDL SPINAL 20GA 3.5" 405182

## (undated) DEVICE — GLOVE PROTEXIS BLUE W/NEU-THERA 6.5  2D73EB65

## (undated) DEVICE — PANTIES MESH LG/XLG 2PK 706M2

## (undated) DEVICE — SUCTION MANIFOLD NEPTUNE 2 SYS 4 PORT 0702-020-000

## (undated) DEVICE — JELLY LUBRICATING SURGILUBE 2OZ TUBE

## (undated) RX ORDER — LIDOCAINE HYDROCHLORIDE 20 MG/ML
INJECTION, SOLUTION EPIDURAL; INFILTRATION; INTRACAUDAL; PERINEURAL
Status: DISPENSED
Start: 2018-08-09

## (undated) RX ORDER — METHYLPREDNISOLONE ACETATE 40 MG/ML
INJECTION, SUSPENSION INTRA-ARTICULAR; INTRALESIONAL; INTRAMUSCULAR; SOFT TISSUE
Status: DISPENSED
Start: 2021-06-11

## (undated) RX ORDER — LIDOCAINE HYDROCHLORIDE 10 MG/ML
INJECTION, SOLUTION INFILTRATION; PERINEURAL
Status: DISPENSED
Start: 2025-08-25

## (undated) RX ORDER — TRIAMCINOLONE ACETONIDE 40 MG/ML
INJECTION, SUSPENSION INTRA-ARTICULAR; INTRAMUSCULAR
Status: DISPENSED
Start: 2024-12-02

## (undated) RX ORDER — TRIAMCINOLONE ACETONIDE 40 MG/ML
INJECTION, SUSPENSION INTRA-ARTICULAR; INTRAMUSCULAR
Status: DISPENSED
Start: 2024-04-29

## (undated) RX ORDER — TRIAMCINOLONE ACETONIDE 40 MG/ML
INJECTION, SUSPENSION INTRA-ARTICULAR; INTRAMUSCULAR
Status: DISPENSED
Start: 2025-08-25

## (undated) RX ORDER — TRIAMCINOLONE ACETONIDE 40 MG/ML
INJECTION, SUSPENSION INTRA-ARTICULAR; INTRAMUSCULAR
Status: DISPENSED
Start: 2022-07-01

## (undated) RX ORDER — METHYLPREDNISOLONE ACETATE 40 MG/ML
INJECTION, SUSPENSION INTRA-ARTICULAR; INTRALESIONAL; INTRAMUSCULAR; SOFT TISSUE
Status: DISPENSED
Start: 2021-08-20

## (undated) RX ORDER — METHYLPREDNISOLONE ACETATE 40 MG/ML
INJECTION, SUSPENSION INTRA-ARTICULAR; INTRALESIONAL; INTRAMUSCULAR; SOFT TISSUE
Status: DISPENSED
Start: 2021-03-05

## (undated) RX ORDER — CEFAZOLIN SODIUM/WATER 2 G/20 ML
SYRINGE (ML) INTRAVENOUS
Status: DISPENSED
Start: 2024-08-12

## (undated) RX ORDER — BUPIVACAINE HYDROCHLORIDE 5 MG/ML
INJECTION, SOLUTION EPIDURAL; INTRACAUDAL
Status: DISPENSED
Start: 2021-08-20

## (undated) RX ORDER — DEXAMETHASONE SODIUM PHOSPHATE 4 MG/ML
INJECTION, SOLUTION INTRA-ARTICULAR; INTRALESIONAL; INTRAMUSCULAR; INTRAVENOUS; SOFT TISSUE
Status: DISPENSED
Start: 2024-08-12

## (undated) RX ORDER — BUPIVACAINE HYDROCHLORIDE 2.5 MG/ML
INJECTION, SOLUTION EPIDURAL; INFILTRATION; INTRACAUDAL
Status: DISPENSED
Start: 2022-07-01

## (undated) RX ORDER — LIDOCAINE HYDROCHLORIDE 10 MG/ML
INJECTION, SOLUTION INFILTRATION; PERINEURAL
Status: DISPENSED
Start: 2024-04-29

## (undated) RX ORDER — TRIAMCINOLONE ACETONIDE 40 MG/ML
INJECTION, SUSPENSION INTRA-ARTICULAR; INTRAMUSCULAR
Status: DISPENSED
Start: 2025-04-21

## (undated) RX ORDER — BUPIVACAINE HYDROCHLORIDE 5 MG/ML
INJECTION, SOLUTION EPIDURAL; INTRACAUDAL
Status: DISPENSED
Start: 2023-12-15

## (undated) RX ORDER — BUPIVACAINE HYDROCHLORIDE 5 MG/ML
INJECTION, SOLUTION EPIDURAL; INTRACAUDAL
Status: DISPENSED
Start: 2022-01-28

## (undated) RX ORDER — BUPIVACAINE HYDROCHLORIDE 5 MG/ML
INJECTION, SOLUTION EPIDURAL; INTRACAUDAL
Status: DISPENSED
Start: 2020-05-29

## (undated) RX ORDER — LIDOCAINE HYDROCHLORIDE 10 MG/ML
INJECTION, SOLUTION INFILTRATION; PERINEURAL
Status: DISPENSED
Start: 2025-04-21

## (undated) RX ORDER — METHYLPREDNISOLONE ACETATE 40 MG/ML
INJECTION, SUSPENSION INTRA-ARTICULAR; INTRALESIONAL; INTRAMUSCULAR; SOFT TISSUE
Status: DISPENSED
Start: 2020-05-29

## (undated) RX ORDER — BUPIVACAINE HYDROCHLORIDE 5 MG/ML
INJECTION, SOLUTION EPIDURAL; INTRACAUDAL
Status: DISPENSED
Start: 2023-08-18

## (undated) RX ORDER — BUPIVACAINE HYDROCHLORIDE 5 MG/ML
INJECTION, SOLUTION EPIDURAL; INTRACAUDAL
Status: DISPENSED
Start: 2020-11-06

## (undated) RX ORDER — METHYLPREDNISOLONE ACETATE 40 MG/ML
INJECTION, SUSPENSION INTRA-ARTICULAR; INTRALESIONAL; INTRAMUSCULAR; SOFT TISSUE
Status: DISPENSED
Start: 2022-04-01

## (undated) RX ORDER — LIDOCAINE HYDROCHLORIDE 10 MG/ML
INJECTION, SOLUTION EPIDURAL; INFILTRATION; INTRACAUDAL; PERINEURAL
Status: DISPENSED
Start: 2024-12-02

## (undated) RX ORDER — BUPIVACAINE HYDROCHLORIDE 5 MG/ML
INJECTION, SOLUTION EPIDURAL; INTRACAUDAL
Status: DISPENSED
Start: 2021-03-05

## (undated) RX ORDER — BUPIVACAINE HYDROCHLORIDE 5 MG/ML
INJECTION, SOLUTION EPIDURAL; INTRACAUDAL
Status: DISPENSED
Start: 2022-04-01

## (undated) RX ORDER — METHYLPREDNISOLONE ACETATE 40 MG/ML
INJECTION, SUSPENSION INTRA-ARTICULAR; INTRALESIONAL; INTRAMUSCULAR; SOFT TISSUE
Status: DISPENSED
Start: 2022-11-18

## (undated) RX ORDER — NEOMYCIN/BACITRACIN/POLYMYXINB 3.5-400-5K
OINTMENT (GRAM) TOPICAL
Status: DISPENSED
Start: 2018-06-04

## (undated) RX ORDER — BUPIVACAINE HYDROCHLORIDE 5 MG/ML
INJECTION, SOLUTION EPIDURAL; INTRACAUDAL
Status: DISPENSED
Start: 2024-08-12

## (undated) RX ORDER — BUPIVACAINE HYDROCHLORIDE 5 MG/ML
INJECTION, SOLUTION EPIDURAL; INTRACAUDAL
Status: DISPENSED
Start: 2020-08-21

## (undated) RX ORDER — PROPOFOL 10 MG/ML
INJECTION, EMULSION INTRAVENOUS
Status: DISPENSED
Start: 2018-08-09

## (undated) RX ORDER — METHYLPREDNISOLONE ACETATE 40 MG/ML
INJECTION, SUSPENSION INTRA-ARTICULAR; INTRALESIONAL; INTRAMUSCULAR; SOFT TISSUE
Status: DISPENSED
Start: 2020-11-06

## (undated) RX ORDER — PROPOFOL 10 MG/ML
INJECTION, EMULSION INTRAVENOUS
Status: DISPENSED
Start: 2024-08-12

## (undated) RX ORDER — BUPIVACAINE HYDROCHLORIDE 5 MG/ML
INJECTION, SOLUTION EPIDURAL; INTRACAUDAL
Status: DISPENSED
Start: 2022-11-18

## (undated) RX ORDER — LIDOCAINE HYDROCHLORIDE 10 MG/ML
INJECTION, SOLUTION EPIDURAL; INFILTRATION; INTRACAUDAL; PERINEURAL
Status: DISPENSED
Start: 2023-12-15

## (undated) RX ORDER — BUPIVACAINE HYDROCHLORIDE 7.5 MG/ML
INJECTION, SOLUTION INTRASPINAL
Status: DISPENSED
Start: 2018-08-09

## (undated) RX ORDER — LIDOCAINE HYDROCHLORIDE 10 MG/ML
INJECTION, SOLUTION EPIDURAL; INFILTRATION; INTRACAUDAL; PERINEURAL
Status: DISPENSED
Start: 2024-08-12

## (undated) RX ORDER — ACETAMINOPHEN 10 MG/ML
INJECTION, SOLUTION INTRAVENOUS
Status: DISPENSED
Start: 2018-08-09

## (undated) RX ORDER — SCOLOPAMINE TRANSDERMAL SYSTEM 1 MG/1
PATCH, EXTENDED RELEASE TRANSDERMAL
Status: DISPENSED
Start: 2018-08-09

## (undated) RX ORDER — DEXAMETHASONE SODIUM PHOSPHATE 10 MG/ML
INJECTION, SOLUTION INTRAMUSCULAR; INTRAVENOUS
Status: DISPENSED
Start: 2025-04-16

## (undated) RX ORDER — METHYLPREDNISOLONE ACETATE 40 MG/ML
INJECTION, SUSPENSION INTRA-ARTICULAR; INTRALESIONAL; INTRAMUSCULAR; SOFT TISSUE
Status: DISPENSED
Start: 2021-11-19

## (undated) RX ORDER — ONDANSETRON 2 MG/ML
INJECTION INTRAMUSCULAR; INTRAVENOUS
Status: DISPENSED
Start: 2024-08-12

## (undated) RX ORDER — KETOROLAC TROMETHAMINE 30 MG/ML
INJECTION, SOLUTION INTRAMUSCULAR; INTRAVENOUS
Status: DISPENSED
Start: 2018-08-09

## (undated) RX ORDER — BUPIVACAINE HYDROCHLORIDE 5 MG/ML
INJECTION, SOLUTION EPIDURAL; INTRACAUDAL
Status: DISPENSED
Start: 2021-11-19

## (undated) RX ORDER — LIDOCAINE HYDROCHLORIDE 10 MG/ML
INJECTION, SOLUTION EPIDURAL; INFILTRATION; INTRACAUDAL; PERINEURAL
Status: DISPENSED
Start: 2025-04-16

## (undated) RX ORDER — METHYLPREDNISOLONE ACETATE 40 MG/ML
INJECTION, SUSPENSION INTRA-ARTICULAR; INTRALESIONAL; INTRAMUSCULAR; SOFT TISSUE
Status: DISPENSED
Start: 2020-08-21

## (undated) RX ORDER — METHYLPREDNISOLONE ACETATE 40 MG/ML
INJECTION, SUSPENSION INTRA-ARTICULAR; INTRALESIONAL; INTRAMUSCULAR; SOFT TISSUE
Status: DISPENSED
Start: 2023-06-16

## (undated) RX ORDER — METHYLPREDNISOLONE ACETATE 40 MG/ML
INJECTION, SUSPENSION INTRA-ARTICULAR; INTRALESIONAL; INTRAMUSCULAR; SOFT TISSUE
Status: DISPENSED
Start: 2023-02-09

## (undated) RX ORDER — BUPIVACAINE HYDROCHLORIDE 5 MG/ML
INJECTION, SOLUTION EPIDURAL; INTRACAUDAL
Status: DISPENSED
Start: 2023-02-09

## (undated) RX ORDER — LIDOCAINE HYDROCHLORIDE 10 MG/ML
INJECTION, SOLUTION EPIDURAL; INFILTRATION; INTRACAUDAL; PERINEURAL
Status: DISPENSED
Start: 2018-08-09

## (undated) RX ORDER — EPINEPHRINE 1 MG/ML
INJECTION INTRAMUSCULAR; INTRAVENOUS; SUBCUTANEOUS
Status: DISPENSED
Start: 2024-08-12

## (undated) RX ORDER — DEXAMETHASONE SODIUM PHOSPHATE 4 MG/ML
INJECTION, SOLUTION INTRA-ARTICULAR; INTRALESIONAL; INTRAMUSCULAR; INTRAVENOUS; SOFT TISSUE
Status: DISPENSED
Start: 2018-08-09

## (undated) RX ORDER — ONDANSETRON 2 MG/ML
INJECTION INTRAMUSCULAR; INTRAVENOUS
Status: DISPENSED
Start: 2018-08-09

## (undated) RX ORDER — BUPIVACAINE HYDROCHLORIDE 5 MG/ML
INJECTION, SOLUTION EPIDURAL; INTRACAUDAL
Status: DISPENSED
Start: 2021-06-11

## (undated) RX ORDER — BUPIVACAINE HYDROCHLORIDE 5 MG/ML
INJECTION, SOLUTION EPIDURAL; INTRACAUDAL
Status: DISPENSED
Start: 2022-07-01

## (undated) RX ORDER — METHYLPREDNISOLONE ACETATE 40 MG/ML
INJECTION, SUSPENSION INTRA-ARTICULAR; INTRALESIONAL; INTRAMUSCULAR; SOFT TISSUE
Status: DISPENSED
Start: 2022-07-01

## (undated) RX ORDER — LIDOCAINE HYDROCHLORIDE 10 MG/ML
INJECTION, SOLUTION INFILTRATION; PERINEURAL
Status: DISPENSED
Start: 2025-04-16

## (undated) RX ORDER — LIDOCAINE HYDROCHLORIDE 10 MG/ML
INJECTION, SOLUTION EPIDURAL; INFILTRATION; INTRACAUDAL; PERINEURAL
Status: DISPENSED
Start: 2018-06-04

## (undated) RX ORDER — BUPIVACAINE HYDROCHLORIDE AND EPINEPHRINE 5; 5 MG/ML; UG/ML
INJECTION, SOLUTION EPIDURAL; INTRACAUDAL; PERINEURAL
Status: DISPENSED
Start: 2018-08-09

## (undated) RX ORDER — BUPIVACAINE HYDROCHLORIDE 5 MG/ML
INJECTION, SOLUTION EPIDURAL; INTRACAUDAL
Status: DISPENSED
Start: 2023-06-16

## (undated) RX ORDER — CEFAZOLIN SODIUM 2 G/100ML
INJECTION, SOLUTION INTRAVENOUS
Status: DISPENSED
Start: 2018-08-09

## (undated) RX ORDER — METHYLPREDNISOLONE ACETATE 40 MG/ML
INJECTION, SUSPENSION INTRA-ARTICULAR; INTRALESIONAL; INTRAMUSCULAR; SOFT TISSUE
Status: DISPENSED
Start: 2022-01-28